# Patient Record
Sex: FEMALE | Race: BLACK OR AFRICAN AMERICAN | Employment: UNEMPLOYED | URBAN - METROPOLITAN AREA
[De-identification: names, ages, dates, MRNs, and addresses within clinical notes are randomized per-mention and may not be internally consistent; named-entity substitution may affect disease eponyms.]

---

## 2017-01-17 ENCOUNTER — TELEPHONE (OUTPATIENT)
Dept: BEHAVIORAL/MENTAL HEALTH CLINIC | Age: 62
End: 2017-01-17

## 2017-01-17 DIAGNOSIS — F33.0 MDD (MAJOR DEPRESSIVE DISORDER), RECURRENT EPISODE, MILD (HCC): ICD-10-CM

## 2017-01-17 DIAGNOSIS — F41.0 PANIC DISORDER: ICD-10-CM

## 2017-01-17 NOTE — TELEPHONE ENCOUNTER
Patient's transportation did not pick her up on time, despite her telling them her appointment was an hour earlier than scheduled. She will not be able to make it back in before provider leaves practice. Requesting refills.  Patient due on 1/30/2017

## 2017-01-18 RX ORDER — QUETIAPINE FUMARATE 100 MG/1
TABLET, FILM COATED ORAL
Qty: 90 TAB | Refills: 0 | Status: SHIPPED | OUTPATIENT
Start: 2017-01-18 | End: 2017-02-15 | Stop reason: SDUPTHER

## 2017-01-18 RX ORDER — ALPRAZOLAM 0.5 MG/1
0.5 TABLET ORAL 2 TIMES DAILY
Qty: 60 TAB | Refills: 0 | OUTPATIENT
Start: 2017-01-18 | End: 2017-02-15 | Stop reason: SDUPTHER

## 2017-01-18 RX ORDER — SERTRALINE HYDROCHLORIDE 100 MG/1
150 TABLET, FILM COATED ORAL DAILY
Qty: 45 TAB | Refills: 0 | Status: SHIPPED | OUTPATIENT
Start: 2017-01-18 | End: 2017-03-05 | Stop reason: SDUPTHER

## 2017-01-18 NOTE — TELEPHONE ENCOUNTER
Discussed w patient. Xanax to Ativan switch was too rapid for patient, resulting in rebound anxiety. Seroquel dose decrease has led to insomnia and the patient is has been using an extra 100mg seroquel at night. We agreed to switch back to Xanax 0.5mg BID. She was instructed to stop Ativan. We also agreed to taker Seroquel 100mg qAM + 200mg qPM.  She will follow up with Alicia Stone next month.      Signed By: Edwina Bartlett MD     January 18, 2017

## 2017-01-18 NOTE — TELEPHONE ENCOUNTER
Patient is requesting a recommendation on what over the counter medication she can take to help her sleep because she is going to be out today and \"just doesn't know why. \"    After further discussion, she stated the Seroquel wasn't working, so she increased the night time dose to 2 tablets. This is working. So, she will be out sooner because she is taking 3 tabs a day instead of the just 2 prescribed. Informed her I will discuss this dosage change with Dr. Star Leroy and see if he will approve it.

## 2017-01-30 DIAGNOSIS — K21.9 GASTROESOPHAGEAL REFLUX DISEASE WITHOUT ESOPHAGITIS: ICD-10-CM

## 2017-01-30 RX ORDER — OMEPRAZOLE 20 MG/1
20 CAPSULE, DELAYED RELEASE ORAL DAILY
Qty: 30 CAP | Refills: 6 | Status: SHIPPED | OUTPATIENT
Start: 2017-01-30 | End: 2018-02-21 | Stop reason: SDUPTHER

## 2017-02-04 DIAGNOSIS — F41.1 GAD (GENERALIZED ANXIETY DISORDER): Chronic | ICD-10-CM

## 2017-02-04 DIAGNOSIS — F33.0 MDD (MAJOR DEPRESSIVE DISORDER), RECURRENT EPISODE, MILD (HCC): ICD-10-CM

## 2017-02-06 RX ORDER — SERTRALINE HYDROCHLORIDE 50 MG/1
TABLET, FILM COATED ORAL
Qty: 90 TAB | Refills: 0 | Status: SHIPPED | OUTPATIENT
Start: 2017-02-06 | End: 2017-03-05 | Stop reason: SDUPTHER

## 2017-02-14 RX ORDER — ALPRAZOLAM 0.5 MG/1
TABLET ORAL
Qty: 60 TAB | Refills: 0 | OUTPATIENT
Start: 2017-02-14

## 2017-02-15 DIAGNOSIS — F33.0 MDD (MAJOR DEPRESSIVE DISORDER), RECURRENT EPISODE, MILD (HCC): ICD-10-CM

## 2017-02-15 RX ORDER — QUETIAPINE FUMARATE 100 MG/1
TABLET, FILM COATED ORAL
Qty: 90 TAB | Refills: 0 | Status: SHIPPED | OUTPATIENT
Start: 2017-02-15 | End: 2017-03-14 | Stop reason: SDUPTHER

## 2017-02-15 RX ORDER — ALPRAZOLAM 0.5 MG/1
0.5 TABLET ORAL
Qty: 60 TAB | Refills: 1 | OUTPATIENT
Start: 2017-02-15 | End: 2017-03-29 | Stop reason: SDUPTHER

## 2017-03-14 DIAGNOSIS — F33.0 MDD (MAJOR DEPRESSIVE DISORDER), RECURRENT EPISODE, MILD (HCC): ICD-10-CM

## 2017-03-14 RX ORDER — QUETIAPINE FUMARATE 100 MG/1
TABLET, FILM COATED ORAL
Qty: 90 TAB | Refills: 0 | Status: SHIPPED | OUTPATIENT
Start: 2017-03-14 | End: 2017-04-11 | Stop reason: SDUPTHER

## 2017-03-24 ENCOUNTER — PATIENT OUTREACH (OUTPATIENT)
Dept: INTERNAL MEDICINE CLINIC | Age: 62
End: 2017-03-24

## 2017-03-24 NOTE — PROGRESS NOTES
NNTOCIP Post Hospitalization       - Patient did not attend rescheduled [facility] appt with Dr. John North on 6/29/16.  - Patient attended RFU with Dr. John North on 10/7/16; was advised to f/u in 3 months. To the best of this NN's knowledge, this patient had no additional Hospital Admissions during 30 day ARIANA period following admission to [facility] 6/3/16-6/15/16. ARIANA period has ended. Post-Hospitalization Episode Resolved. Future Appointments:  Future Appointments      Provider Noah Valero   3/29/2017 1:00 PM Karmen Gilmore NP [address]   4/18/2017 1:45 PM MD Nora Langston 62 will route this encounter to Ambulatory CM NN for notification/review; patient overdue for routine follow-up with PCP. This note will not be viewable in [address]

## 2017-03-29 ENCOUNTER — APPOINTMENT (OUTPATIENT)
Dept: CT IMAGING | Age: 62
End: 2017-03-29
Attending: EMERGENCY MEDICINE
Payer: MEDICARE

## 2017-03-29 ENCOUNTER — HOSPITAL ENCOUNTER (EMERGENCY)
Age: 62
Discharge: HOME | End: 2017-03-29
Attending: EMERGENCY MEDICINE | Admitting: EMERGENCY MEDICINE
Payer: MEDICARE

## 2017-03-29 ENCOUNTER — OFFICE VISIT (OUTPATIENT)
Dept: BEHAVIORAL/MENTAL HEALTH CLINIC | Age: 62
End: 2017-03-29

## 2017-03-29 VITALS
HEIGHT: 62 IN | WEIGHT: 293 LBS | DIASTOLIC BLOOD PRESSURE: 105 MMHG | HEART RATE: 78 BPM | BODY MASS INDEX: 53.92 KG/M2 | SYSTOLIC BLOOD PRESSURE: 182 MMHG

## 2017-03-29 VITALS
HEIGHT: 62 IN | WEIGHT: 293 LBS | TEMPERATURE: 97.7 F | BODY MASS INDEX: 53.92 KG/M2 | HEART RATE: 75 BPM | RESPIRATION RATE: 17 BRPM | SYSTOLIC BLOOD PRESSURE: 135 MMHG | DIASTOLIC BLOOD PRESSURE: 80 MMHG | OXYGEN SATURATION: 94 %

## 2017-03-29 DIAGNOSIS — F41.0 PANIC DISORDER: ICD-10-CM

## 2017-03-29 DIAGNOSIS — F41.1 GAD (GENERALIZED ANXIETY DISORDER): ICD-10-CM

## 2017-03-29 DIAGNOSIS — R42 LIGHTHEADEDNESS: ICD-10-CM

## 2017-03-29 DIAGNOSIS — G44.209 ACUTE NON INTRACTABLE TENSION-TYPE HEADACHE: Primary | ICD-10-CM

## 2017-03-29 DIAGNOSIS — F33.0 MDD (MAJOR DEPRESSIVE DISORDER), RECURRENT EPISODE, MILD (HCC): Primary | ICD-10-CM

## 2017-03-29 DIAGNOSIS — R42 VERTIGO: ICD-10-CM

## 2017-03-29 LAB
ALBUMIN SERPL BCP-MCNC: 3.7 G/DL (ref 3.5–5)
ALBUMIN/GLOB SERPL: 1 {RATIO} (ref 1.1–2.2)
ALP SERPL-CCNC: 101 U/L (ref 45–117)
ALT SERPL-CCNC: 27 U/L (ref 12–78)
ANION GAP BLD CALC-SCNC: 10 MMOL/L (ref 5–15)
AST SERPL W P-5'-P-CCNC: 21 U/L (ref 15–37)
ATRIAL RATE: 73 BPM
BASOPHILS # BLD AUTO: 0 K/UL (ref 0–0.1)
BASOPHILS # BLD: 0 % (ref 0–1)
BILIRUB SERPL-MCNC: 0.4 MG/DL (ref 0.2–1)
BUN SERPL-MCNC: 15 MG/DL (ref 6–20)
BUN/CREAT SERPL: 14 (ref 12–20)
CALCIUM SERPL-MCNC: 9.2 MG/DL (ref 8.5–10.1)
CALCULATED P AXIS, ECG09: 59 DEGREES
CALCULATED R AXIS, ECG10: 4 DEGREES
CALCULATED T AXIS, ECG11: 69 DEGREES
CHLORIDE SERPL-SCNC: 105 MMOL/L (ref 97–108)
CO2 SERPL-SCNC: 26 MMOL/L (ref 21–32)
CREAT SERPL-MCNC: 1.04 MG/DL (ref 0.55–1.02)
DIAGNOSIS, 93000: NORMAL
EOSINOPHIL # BLD: 0.1 K/UL (ref 0–0.4)
EOSINOPHIL NFR BLD: 2 % (ref 0–7)
ERYTHROCYTE [DISTWIDTH] IN BLOOD BY AUTOMATED COUNT: 14.2 % (ref 11.5–14.5)
GLOBULIN SER CALC-MCNC: 3.8 G/DL (ref 2–4)
GLUCOSE BLD STRIP.AUTO-MCNC: 110 MG/DL (ref 65–100)
GLUCOSE SERPL-MCNC: 111 MG/DL (ref 65–100)
HCT VFR BLD AUTO: 39.1 % (ref 35–47)
HGB BLD-MCNC: 12.7 G/DL (ref 11.5–16)
LYMPHOCYTES # BLD AUTO: 41 % (ref 12–49)
LYMPHOCYTES # BLD: 2.3 K/UL (ref 0.8–3.5)
MAGNESIUM SERPL-MCNC: 1.6 MG/DL (ref 1.6–2.4)
MCH RBC QN AUTO: 27.4 PG (ref 26–34)
MCHC RBC AUTO-ENTMCNC: 32.5 G/DL (ref 30–36.5)
MCV RBC AUTO: 84.3 FL (ref 80–99)
MONOCYTES # BLD: 0.5 K/UL (ref 0–1)
MONOCYTES NFR BLD AUTO: 9 % (ref 5–13)
NEUTS SEG # BLD: 2.7 K/UL (ref 1.8–8)
NEUTS SEG NFR BLD AUTO: 48 % (ref 32–75)
P-R INTERVAL, ECG05: 156 MS
PLATELET # BLD AUTO: 166 K/UL (ref 150–400)
POTASSIUM SERPL-SCNC: 3.9 MMOL/L (ref 3.5–5.1)
PROT SERPL-MCNC: 7.5 G/DL (ref 6.4–8.2)
Q-T INTERVAL, ECG07: 418 MS
QRS DURATION, ECG06: 70 MS
QTC CALCULATION (BEZET), ECG08: 460 MS
RBC # BLD AUTO: 4.64 M/UL (ref 3.8–5.2)
SERVICE CMNT-IMP: ABNORMAL
SODIUM SERPL-SCNC: 141 MMOL/L (ref 136–145)
VENTRICULAR RATE, ECG03: 73 BPM
WBC # BLD AUTO: 5.6 K/UL (ref 3.6–11)

## 2017-03-29 PROCEDURE — 74011250636 HC RX REV CODE- 250/636: Performed by: EMERGENCY MEDICINE

## 2017-03-29 PROCEDURE — 70450 CT HEAD/BRAIN W/O DYE: CPT

## 2017-03-29 PROCEDURE — 96374 THER/PROPH/DIAG INJ IV PUSH: CPT

## 2017-03-29 PROCEDURE — 82962 GLUCOSE BLOOD TEST: CPT

## 2017-03-29 PROCEDURE — 99284 EMERGENCY DEPT VISIT MOD MDM: CPT

## 2017-03-29 PROCEDURE — 36415 COLL VENOUS BLD VENIPUNCTURE: CPT | Performed by: EMERGENCY MEDICINE

## 2017-03-29 PROCEDURE — 85025 COMPLETE CBC W/AUTO DIFF WBC: CPT | Performed by: EMERGENCY MEDICINE

## 2017-03-29 PROCEDURE — 80053 COMPREHEN METABOLIC PANEL: CPT | Performed by: EMERGENCY MEDICINE

## 2017-03-29 PROCEDURE — 83735 ASSAY OF MAGNESIUM: CPT | Performed by: EMERGENCY MEDICINE

## 2017-03-29 PROCEDURE — 96375 TX/PRO/DX INJ NEW DRUG ADDON: CPT

## 2017-03-29 PROCEDURE — 93005 ELECTROCARDIOGRAM TRACING: CPT

## 2017-03-29 RX ORDER — ZOLPIDEM TARTRATE 5 MG/1
5 TABLET ORAL
Qty: 30 TAB | Refills: 1 | OUTPATIENT
Start: 2017-03-29 | End: 2017-05-24 | Stop reason: SDUPTHER

## 2017-03-29 RX ORDER — DIPHENHYDRAMINE HYDROCHLORIDE 50 MG/ML
25 INJECTION, SOLUTION INTRAMUSCULAR; INTRAVENOUS
Status: COMPLETED | OUTPATIENT
Start: 2017-03-29 | End: 2017-03-29

## 2017-03-29 RX ORDER — PROCHLORPERAZINE EDISYLATE 5 MG/ML
10 INJECTION INTRAMUSCULAR; INTRAVENOUS
Status: COMPLETED | OUTPATIENT
Start: 2017-03-29 | End: 2017-03-29

## 2017-03-29 RX ORDER — SERTRALINE HYDROCHLORIDE 100 MG/1
150 TABLET, FILM COATED ORAL DAILY
Qty: 45 TAB | Refills: 1 | Status: SHIPPED | OUTPATIENT
Start: 2017-03-29 | End: 2017-05-24 | Stop reason: SDUPTHER

## 2017-03-29 RX ORDER — KETOROLAC TROMETHAMINE 30 MG/ML
30 INJECTION, SOLUTION INTRAMUSCULAR; INTRAVENOUS
Status: COMPLETED | OUTPATIENT
Start: 2017-03-29 | End: 2017-03-29

## 2017-03-29 RX ORDER — ALPRAZOLAM 0.5 MG/1
0.5 TABLET ORAL
Qty: 60 TAB | Refills: 1 | Status: SHIPPED | OUTPATIENT
Start: 2017-03-29 | End: 2017-05-24 | Stop reason: SDUPTHER

## 2017-03-29 RX ADMIN — DIPHENHYDRAMINE HYDROCHLORIDE 25 MG: 50 INJECTION, SOLUTION INTRAMUSCULAR; INTRAVENOUS at 11:30

## 2017-03-29 RX ADMIN — PROCHLORPERAZINE EDISYLATE 10 MG: 5 INJECTION INTRAMUSCULAR; INTRAVENOUS at 11:31

## 2017-03-29 RX ADMIN — KETOROLAC TROMETHAMINE 30 MG: 30 INJECTION, SOLUTION INTRAMUSCULAR at 11:31

## 2017-03-29 NOTE — DISCHARGE INSTRUCTIONS

## 2017-03-29 NOTE — PROGRESS NOTES
INITIAL PSYCHIATRIC EVALUATION    IDENTIFICATION:      A. Name: Pravin Ardon Age:     60 y.o.      C.   MRN: [id number]       D.   CSN:      [id number]      E.   :     1955          CC: \"The medicines I take help\". HISTORY OF PRESENT ILLNESS:    The patient Regis López is a 60 y.o.  female with a history of depression anxiety. She reports the following psychiatric symptoms:  depression and anxiety. The symptoms have been present for months/years and are of moderate severity. The symptoms are chronic in nature. Pt reports depression started approx 5 years ago when she started gaining weight and when she had to stop working. Additional symptoms include agitation, anger outbursts, anxiety, anxiety attacks, avoidance of crowds, chronic pain, concern about health problems, depression worse, difficulty sleeping, fearfulness, tearfulness and trauma recollections. Symptoms are not precipitated by psychosocial stressors. Symptoms made worse by psychosocial stressors. Pt denies hx of childhood trauma. Pt here today to transition care. PAST HISTORY:  Psychiatric:  Past Psychiatric Hospitalization:  [facility] [place], approx 20 yrs, was dx'd with bipolar disorder  Past Outpatient Providers:   Russell John NP, Dr Juan Ontiveros, Dr Margarita Taveras  Past Psychiatric Medications: Paxil - worsened anxiety  Xanax and Valium   Medical:  Active Ambulatory Problems     Diagnosis Date Noted    Pneumonia due to other Gram-negative bacteria ([facility] 2011    GABRIEL (generalized anxiety disorder) 2012    Diverticulitis large intestine 2014    Hypotension 2014    UTI (urinary tract infection) 2015    Hyperlipidemia 2015    Pre-syncope 2015    CHANDLER (acute kidney injury) ([address] 09/10/2015    MDD (major depressive disorder), recurrent episode, mild ([address] 2016    Acute respiratory failure with hypoxia ([address] 2016    RLL pneumonia 2016    MURIEL on CPAP 2016    Hypokalemia 06/03/2016     Resolved Ambulatory Problems     Diagnosis Date Noted    HTN (hypertension) 12/06/2011    ARF (acute renal failure) ([facility] 12/06/2011    Acute renal failure (ARF) ([facility] 09/09/2015    Leg cramps 09/09/2015     Past Medical History:   Diagnosis Date    Arthritis     Bipolar 1 disorder (HCC)     GERD (gastroesophageal reflux disease)     Hypertension     Other ill-defined conditions(799.89)     Psychiatric disorder     Sleep apnea     Thromboembolus ([facility]     Unspecified sleep apnea      Substance Use:   Social History     Social History    Marital status:      Spouse name: N/A    Number of children: N/A    Years of education: N/A     Social History Main Topics    Smoking status: Never Smoker    Smokeless tobacco: Never Used    Alcohol use No    Drug use: No    Sexual activity: Not Asked     Other Topics Concern    None     Social History Narrative     Social:  Marital Status:  in 1982, son and grandson live with pt, goes to [employer] for support, supportive family, mother still living and is active, pt does things with her mother socially  Children: x2 children: daughter=39 and son=30, grandchildren=24,18,15,10,8 and 9, loves being a mother and a grandmother  Educational Level:  12th grade, [employer] x2 yrs for early childhood development  Work History: disability-approx 6-7 yrs ago  Legal History: denies  Pertinent Childhood History: raised by mother and grandparents, father was overseas-would see him at times, describes childhood as \"excellent\"  Family:  Family history of mental or substance use history reported: grandmother=, older sister=sub eugene d/o and depression. Family history of medical problems reviewed: mother=arthritis, diabetes, cardiac    MEDICATIONS:  Current Outpatient Prescriptions   Medication Sig Dispense Refill    ALPRAZolam (XANAX) 0.5 mg tablet Take 1 Tab by mouth two (2) times daily as needed for Anxiety.  Max Daily Amount: 1 mg. 60 Tab 1    sertraline (ZOLOFT) 100 mg tablet Take 1.5 Tabs by mouth daily. 45 Tab 1    zolpidem (AMBIEN) 5 mg tablet Take 1 Tab by mouth nightly as needed for Sleep. Max Daily Amount: 5 mg. 30 Tab 1    aspirin-acetaminophen-caffeine (EXCEDRIN MIGRAINE) 250-250-65 mg per tablet Take 1 Tab by mouth every six (6) hours as needed for Headache. 30 Tab 0    QUEtiapine (SEROQUEL) 100 mg tablet Take 1 tab QAM, take 2 tabs QHS 90 Tab 0    omeprazole (PRILOSEC) 20 mg capsule Take 1 Cap by mouth daily. 30 Cap 6    lisinopril (PRINIVIL, ZESTRIL) 5 mg tablet TAKE 1 TABLET BY MOUTH DAILY 30 Tab 6    HYDROcodone-acetaminophen (NORCO) 5-325 mg per tablet Take 1 Tab by mouth every six (6) hours as needed for Pain. Max Daily Amount: 4 Tabs. 12 Tab 0    potassium chloride SR (KLOR-CON 10) 10 mEq tablet Take 2 Tabs by mouth daily. 60 Tab 6    furosemide (LASIX) 40 mg tablet Take 1 Tab by mouth daily. 30 Tab 6    simvastatin (ZOCOR) 20 mg tablet Take 1 Tab by mouth nightly. 30 Tab 6       ALLERGIES:  No Known Allergies    REVIEW OF SYSTEMS:  Pt reports feeling mentally I feel ok but physically I'm having some problems.   All other Systems reviewed and are considered negative    MENTAL STATUS EXAM:    FINDINGS WITHIN NORMAL LIMITS (WNL) UNLESS OTHERWISE STATED BELOW:    Orientation oriented to time, place and person   Vital Signs (BP,Pulse, Temp) See below (reviewed)   Gait and Station Within normal limits   Abnormal Muscular Movements/Tone/Behavior No EPS, no Tardive Dyskinesia, no abnormal muscular movements; wnl tone   Relations cooperative   General Appearance:  age appropriate and casually dressed   Language No aphasia or dysarthria   Speech:  normal volume   Thought Processes logical, wnl rate of thoughts, good abstract reasoning and computation   Thought Associations goal directed   Thought Content free of delusions and free of hallucinations   Suicidal Ideations no intention   Homicidal Ideations no intention   Mood:  Denies depression at times, within normal limits, intermittent anxiety   Affect:  euthymic   Memory recent  adequate   Memory remote:  adequate   Concentration/Attention:  adequate   Fund of Knowledge Fair/average   Insight:  fair   Reliability fair   Judgment:  fair     VITALS:     Visit Vitals    BP (!) 182/105    Pulse 78    Ht 5' 2\" (1.575 m)    Wt (!) 161.5 kg (356 lb)    BMI 65.11 kg/m2       PERTINENT DATA:  Admission on 03/29/2017, Discharged on 03/29/2017   Component Date Value Ref Range Status    Ventricular Rate 03/29/2017 73  BPM Preliminary    Atrial Rate 03/29/2017 73  BPM Preliminary    P-R Interval 03/29/2017 156  ms Preliminary    QRS Duration 03/29/2017 70  ms Preliminary    Q-T Interval 03/29/2017 418  ms Preliminary    QTC Calculation (Bezet) 03/29/2017 460  ms Preliminary    Calculated P Axis 03/29/2017 59  degrees Preliminary    Calculated R Axis 03/29/2017 4  degrees Preliminary    Calculated T Axis 03/29/2017 69  degrees Preliminary    Diagnosis 03/29/2017    Preliminary                    Value:Normal sinus rhythm  Nonspecific T wave abnormality  Abnormal ECG  When compared with ECG of 03-JUN-2016 15:50,  Nonspecific T wave abnormality no longer evident in Inferior leads      Sodium 03/29/2017 141  136 - 145 mmol/L Final    Potassium 03/29/2017 3.9  3.5 - 5.1 mmol/L Final    Chloride 03/29/2017 105  97 - 108 mmol/L Final    CO2 03/29/2017 26  21 - 32 mmol/L Final    Anion gap 03/29/2017 10  5 - 15 mmol/L Final    Glucose 03/29/2017 111* 65 - 100 mg/dL Final    BUN 03/29/2017 15  6 - 20 MG/DL Final    Creatinine 03/29/2017 1.04* 0.55 - 1.02 MG/DL Final    BUN/Creatinine ratio 03/29/2017 14  12 - 20   Final    GFR est AA 03/29/2017 >60  >60 ml/min/1.73m2 Final    GFR est non-AA 03/29/2017 54* >60 ml/min/1.73m2 Final    Calcium 03/29/2017 9.2  8.5 - 10.1 MG/DL Final    Bilirubin, total 03/29/2017 0.4  0.2 - 1.0 MG/DL Final    ALT (SGPT) 03/29/2017 27  12 - 78 U/L Final    AST (SGOT) 03/29/2017 21  15 - 37 U/L Final    Alk. phosphatase 03/29/2017 101  45 - 117 U/L Final    Protein, total 03/29/2017 7.5  6.4 - 8.2 g/dL Final    Albumin 03/29/2017 3.7  3.5 - 5.0 g/dL Final    Globulin 03/29/2017 3.8  2.0 - 4.0 g/dL Final    A-G Ratio 03/29/2017 1.0* 1.1 - 2.2   Final    WBC 03/29/2017 5.6  3.6 - 11.0 K/uL Final    RBC 03/29/2017 4.64  3.80 - 5.20 M/uL Final    HGB 03/29/2017 12.7  11.5 - 16.0 g/dL Final    HCT 03/29/2017 39.1  35.0 - 47.0 % Final    MCV 03/29/2017 84.3  80.0 - 99.0 FL Final    MCH 03/29/2017 27.4  26.0 - 34.0 PG Final    MCHC 03/29/2017 32.5  30.0 - 36.5 g/dL Final    RDW 03/29/2017 14.2  11.5 - 14.5 % Final    PLATELET 76/49/2885 535  150 - 400 K/uL Final    NEUTROPHILS 03/29/2017 48  32 - 75 % Final    LYMPHOCYTES 03/29/2017 41  12 - 49 % Final    MONOCYTES 03/29/2017 9  5 - 13 % Final    EOSINOPHILS 03/29/2017 2  0 - 7 % Final    BASOPHILS 03/29/2017 0  0 - 1 % Final    ABS. NEUTROPHILS 03/29/2017 2.7  1.8 - 8.0 K/UL Final    ABS. LYMPHOCYTES 03/29/2017 2.3  0.8 - 3.5 K/UL Final    ABS. MONOCYTES 03/29/2017 0.5  0.0 - 1.0 K/UL Final    ABS. EOSINOPHILS 03/29/2017 0.1  0.0 - 0.4 K/UL Final    ABS. BASOPHILS 03/29/2017 0.0  0.0 - 0.1 K/UL Final    Magnesium 03/29/2017 1.6  1.6 - 2.4 mg/dL Final    Glucose (POC) 03/29/2017 110* 65 - 100 mg/dL Final    Performed by 03/29/2017 Ritta Roses   Final       XR Results (most recent):    Results from Hospital Encounter encounter on 07/19/16   XR CHEST PA LAT   Narrative **Final Report**      ICD Codes / Adm. Diagnosis: 486  J18.9 / Pneumonia, organism unspecifie    Pneumonia, unspecified organ  Examination:  CR CHEST PA AND LATERAL  - [id number] - Jul 19 2016 11:16AM  Accession No:  [id number]  Reason:  pneumonia      REPORT:  EXAM:  CR CHEST PA AND LATERAL    INDICATION:   pneumonia assess for resolution    COMPARISON: 6/13/2016.     FINDINGS: PA and lateral radiographs of the chest demonstrate clear lungs   except for minor residual atelectasis/scarring left base. . The cardiac and   mediastinal contours and pulmonary vascularity are normal.  Bony structures   are unchanged. IMPRESSION: No acute abnormality. The previously noted bibasilar atelectasis   has resolved except for minimal residual atelectasis/scarring left base. Signing/Reading Doctor: Pancho Walter ([id number])    Approved: Pancho Walter ([id number])  Jul 19 2016 11:36AM                                    ASSESSMENT/PLAN:  The patient Carlin Medel is a 60 y.o.  female who presents at this time for treatment of the following diagnoses:    ICD-10-CM ICD-9-CM    1. MDD (major depressive disorder), recurrent episode, mild (HCC) F33.0 296.31 sertraline (ZOLOFT) 100 mg tablet   2. Panic disorder F41.0 300.01    3. GABRIEL (generalized anxiety disorder) F41.1 300.02 sertraline (ZOLOFT) 100 mg tablet       Assessment:   Carlin Medel is a 60 y.o. female and presents with h/o bipolar d/o, depression, anxiety and panic attacks. Pt here today to transition care. Reviewed dx and past tx. Unclear if pt is truly bipolar as she does not seem to have distinct episodes of maxine. She reports an incident when she left a classroom unattended and went to eat at a restaurant. She states she does not understand how that happened but no other manic symptoms noted. Will work to get records form past hospitalization. For now will begin weaning off of seroquel. Pt reports sig weight gain since starting use. Currently, physical health impacted by weight gain. Pt agrees with current plan. Will re-start ambien for sleep as pt dose benefit from seroquel use for sleep. Discussed benzo safety. Plan:  1. Medications:  Current Outpatient Prescriptions   Medication Sig Dispense Refill    ALPRAZolam (XANAX) 0.5 mg tablet Take 1 Tab by mouth two (2) times daily as needed for Anxiety. Max Daily Amount: 1 mg. 60 Tab 1    sertraline (ZOLOFT) 100 mg tablet Take 1.5 Tabs by mouth daily. 45 Tab 1    zolpidem (AMBIEN) 5 mg tablet Take 1 Tab by mouth nightly as needed for Sleep. Max Daily Amount: 5 mg. 30 Tab 1    aspirin-acetaminophen-caffeine (EXCEDRIN MIGRAINE) 250-250-65 mg per tablet Take 1 Tab by mouth every six (6) hours as needed for Headache. 30 Tab 0    QUEtiapine (SEROQUEL) 100 mg tablet Take 1 tab QAM, take 2 tabs QHS 90 Tab 0    omeprazole (PRILOSEC) 20 mg capsule Take 1 Cap by mouth daily. 30 Cap 6    lisinopril (PRINIVIL, ZESTRIL) 5 mg tablet TAKE 1 TABLET BY MOUTH DAILY 30 Tab 6    HYDROcodone-acetaminophen (NORCO) 5-325 mg per tablet Take 1 Tab by mouth every six (6) hours as needed for Pain. Max Daily Amount: 4 Tabs. 12 Tab 0    potassium chloride SR (KLOR-CON 10) 10 mEq tablet Take 2 Tabs by mouth daily. 60 Tab 6    furosemide (LASIX) 40 mg tablet Take 1 Tab by mouth daily. 30 Tab 6    simvastatin (ZOCOR) 20 mg tablet Take 1 Tab by mouth nightly. 30 Tab 6      The following regarding treatment was addressed with patient:   Decrease seroquel to 100 mg daily (take in am if using ambien)  Cont zoloft 150 mg for dep/anx  Cont alprazolam 0.5 bid prn anxiety  Start ambien   the risks and benefits of the proposed medication, instructions for management, education and risk factor reduction. The patient was given the opportunity to ask questions. Informed consent given to the use of the above medications. Adjust psychiatric medications as needed based upon diagnoses and response to treatment. 2.  Review previous labs and medical tests in the EHR and or transferring hospital documents. I will continue to order blood tests/labs and diagnostic tests as deemed appropriate and review results as they become available (see orders for details). 3.  Review old psychiatric and medical records available in the EHR. I will order additional psychiatric records from other institutions/providers as appropriate. 4.  Gather additional collateral information as appropriate.     5. Supportive and/or Individual Therapy    Follow-up Disposition:  Return in about 4 weeks (around 4/26/2017).      STRENGTHS:  Supportive family  Stable place to live      SIGNED:    Marquise Whitmore NP  3/29/2017

## 2017-03-29 NOTE — ED PROVIDER NOTES
HPI Comments: Anthony Miranda, 60 y.o. female, presents ambulatory to [address] [facility] ED with cc of 10/10 intermittent episodes of pulsating frontal headaches x 1 month. The patient also c/o intermittent episodes of dizziness, light-headedness, nausea, and feeling \"clammy. \" the patient states that she took tylenol with no relief. The patient also notes that she has chronic diarrhea, and she has an appointment with GI on 4/5/17 for this symptom. The patient denies a history of migraines or headaches. The patient specifically denies vomiting, visual changes, photophobia, phonophobia, fevers, unilateral numbness, new gait changes, tinnitus, or ear pressure at this time. PCP: Emerson Wray MD  GI: Delfin Coates MD    PMHx significant for: HTN, GERD, sleep apnea, hyperlipidemia, bipolar 1 disorder, arthritis  PSHx significant for: Cholecystectomy, hysterectomy  Social history significant for: - Tobacco, - EtOH, - Illicit Drug Use    There are no other complaints, changes, or physical findings at this time. Written by CHRIS Parr, as dictated by Karen Poon MD.    The history is provided by the patient. No  was used. Past Medical History:   Diagnosis Date    Arthritis     chronic pain related arthritis    Bipolar 1 disorder (HCC)     GERD (gastroesophageal reflux disease)     Hypertension     Other ill-defined conditions(799.89)     hyperlipidemia.  Stomach abcess    Psychiatric disorder     panic attacks    Sleep apnea     Thromboembolus ([address]     last year L leg    Unspecified sleep apnea        Past Surgical History:   Procedure Laterality Date    HX CHOLECYSTECTOMY      HX GI      gallbladder removed 2/2010    HX HYSTERECTOMY           Family History:   Problem Relation Age of Onset    Heart Disease Mother     Diabetes Mother     Arthritis-osteo Mother     High Cholesterol Mother     Heart Attack Sister     Diabetes Sister     Heart Disease Sister     Diabetes Sister     Arthritis-osteo Sister     High Cholesterol Sister     Schizophrenia Paternal Grandmother     Drug Abuse Sister        Social History     Social History    Marital status:      Spouse name: N/A    Number of children: N/A    Years of education: N/A     Occupational History    Not on file. Social History Main Topics    Smoking status: Never Smoker    Smokeless tobacco: Never Used    Alcohol use No    Drug use: No    Sexual activity: Not on file     Other Topics Concern    Not on file     Social History Narrative         ALLERGIES: Review of patient's allergies indicates no known allergies. Review of Systems   Constitutional: Positive for diaphoresis (Feeling clammy). Negative for chills and fever. HENT: Negative for ear pain and tinnitus. Eyes: Negative for photophobia and visual disturbance. Respiratory: Negative for cough and shortness of breath. Cardiovascular: Negative for chest pain. Gastrointestinal: Positive for nausea. Negative for constipation, diarrhea and vomiting. Musculoskeletal: Negative for gait problem. Neurological: Positive for dizziness, light-headedness and headaches. Negative for weakness and numbness. (-) Phonophobia   All other systems reviewed and are negative. Vitals:    03/29/17 0954 03/29/17 1004 03/29/17 1200   BP: 147/83 (!) 155/97 135/80   Pulse: 71 74 75   Resp: 16  17   Temp: 97.7 °F (36.5 °C)  (P) 97.4 °F (36.3 °C)   SpO2: 99%  94%   Weight: (!) 161.7 kg (356 lb 7.7 oz)     Height: 5' 2\" (1.575 m)              Physical Exam   Constitutional: She is oriented to person, place, and time. She appears well-developed and well-nourished. Obese female   HENT:   Head: Normocephalic. Head: No temporal artery pain   Eyes: EOM are normal. Pupils are equal, round, and reactive to light. No photophobia   Neck: Normal range of motion. Cardiovascular: Normal rate and regular rhythm.     Pulmonary/Chest: Effort normal and breath sounds normal.   Abdominal: Soft. She exhibits no distension. There is no tenderness. Neurological: She is alert and oriented to person, place, and time. She has normal strength. No cranial nerve deficit. CN 2-12 intact, Finger to nose intact, negative Romberg, normal gait   Skin: Skin is warm and dry. Psychiatric: She has a normal mood and affect. Nursing note and vitals reviewed. Written by Mitchell Deras ED Scribe, as dictated by Karen Poon MD.    MDM  Number of Diagnoses or Management Options  Acute non intractable tension-type headache:   Lightheadedness:   Vertigo:   Diagnosis management comments: Patient presents with headache that is likely tensions headache vs dehydration. DDx: migraine, cluster HA, tension HA. Less likely pseudotumor cerebri, SAH, ICH, cerebral dural venous thrombosis, or meningitis given the course, story and physical exam.  Labs and analgesics and fluids ordered. Discussed proper hydration and intake. Discussed headaches and return instructions. F/u with GI. Amount and/or Complexity of Data Reviewed  Clinical lab tests: ordered and reviewed  Tests in the radiology section of CPT®: ordered and reviewed  Tests in the medicine section of CPT®: ordered and reviewed  Review and summarize past medical records: yes  Independent visualization of images, tracings, or specimens: yes    Patient Progress  Patient progress: stable    ED Course       Procedures     EKG interpretation: (Preliminary) 10:23  Rhythm: normal sinus rhythm; and regular . Rate (approx.): 73; Axis: normal; P wave: normal; QRS interval: normal ; ST/T wave: Nonspecific T wave abnormality. Written by CHRIS Parribe, as dictated by Karen Poon MD.    Progress Note:  12:02 PM  The patient was re-evaluated and states that she is feeling better. The patient's lab and imaging results have been discussed with her, and she conveys her understanding. Will discharge.   Written by Mitchell Deras ED Epi, as dictated by Demetrius Erwin MD.    Progress Note:  12:14 PM  The patient was counseled on the side effects of using narcotic pain medications. Side effects include nausea, constipation, fatigue, itchy skin, dizziness, drowsiness, and the potential to be habit forming. The patient conveys his understanding of the side effects, and he is in agreement with the plan of care. Written by CHRIS Banuelos, as dictated by Demetrius Erwin MD.    LABORATORY TESTS:  Recent Results (from the past 12 hour(s))   METABOLIC PANEL, COMPREHENSIVE    Collection Time: 03/29/17 10:00 AM   Result Value Ref Range    Sodium 141 136 - 145 mmol/L    Potassium 3.9 3.5 - 5.1 mmol/L    Chloride 105 97 - 108 mmol/L    CO2 26 21 - 32 mmol/L    Anion gap 10 5 - 15 mmol/L    Glucose 111 (H) 65 - 100 mg/dL    BUN 15 6 - 20 MG/DL    Creatinine 1.04 (H) 0.55 - 1.02 MG/DL    BUN/Creatinine ratio 14 12 - 20      GFR est AA >60 >60 ml/min/1.73m2    GFR est non-AA 54 (L) >60 ml/min/1.73m2    Calcium 9.2 8.5 - 10.1 MG/DL    Bilirubin, total 0.4 0.2 - 1.0 MG/DL    ALT (SGPT) 27 12 - 78 U/L    AST (SGOT) 21 15 - 37 U/L    Alk. phosphatase 101 45 - 117 U/L    Protein, total 7.5 6.4 - 8.2 g/dL    Albumin 3.7 3.5 - 5.0 g/dL    Globulin 3.8 2.0 - 4.0 g/dL    A-G Ratio 1.0 (L) 1.1 - 2.2     CBC WITH AUTOMATED DIFF    Collection Time: 03/29/17 10:00 AM   Result Value Ref Range    WBC 5.6 3.6 - 11.0 K/uL    RBC 4.64 3.80 - 5.20 M/uL    HGB 12.7 11.5 - 16.0 g/dL    HCT 39.1 35.0 - 47.0 %    MCV 84.3 80.0 - 99.0 FL    MCH 27.4 26.0 - 34.0 PG    MCHC 32.5 30.0 - 36.5 g/dL    RDW 14.2 11.5 - 14.5 %    PLATELET 239 585 - 542 K/uL    NEUTROPHILS 48 32 - 75 %    LYMPHOCYTES 41 12 - 49 %    MONOCYTES 9 5 - 13 %    EOSINOPHILS 2 0 - 7 %    BASOPHILS 0 0 - 1 %    ABS. NEUTROPHILS 2.7 1.8 - 8.0 K/UL    ABS. LYMPHOCYTES 2.3 0.8 - 3.5 K/UL    ABS. MONOCYTES 0.5 0.0 - 1.0 K/UL    ABS. EOSINOPHILS 0.1 0.0 - 0.4 K/UL    ABS.  BASOPHILS 0.0 0.0 - 0.1 K/UL MAGNESIUM    Collection Time: 03/29/17 10:00 AM   Result Value Ref Range    Magnesium 1.6 1.6 - 2.4 mg/dL   GLUCOSE, POC    Collection Time: 03/29/17 10:04 AM   Result Value Ref Range    Glucose (POC) 110 (H) 65 - 100 mg/dL    Performed by Chrissy Bentiez    EKG, 12 LEAD, INITIAL    Collection Time: 03/29/17 10:23 AM   Result Value Ref Range    Ventricular Rate 73 BPM    Atrial Rate 73 BPM    P-R Interval 156 ms    QRS Duration 70 ms    Q-T Interval 418 ms    QTC Calculation (Bezet) 460 ms    Calculated P Axis 59 degrees    Calculated R Axis 4 degrees    Calculated T Axis 69 degrees    Diagnosis       Normal sinus rhythm  Nonspecific T wave abnormality  Abnormal ECG  When compared with ECG of 03-JUN-2016 15:50,  Nonspecific T wave abnormality no longer evident in Inferior leads         IMAGING RESULTS:  CT Results  (Last 48 hours)               03/29/17 1015  CT HEAD WO CONT Final result    Impression:  IMPRESSION: No acute process or change compared to the prior exam.               Narrative:  EXAM:  CT HEAD WO CONT       INDICATION:   headaches       COMPARISON: 6/1/2016. TECHNIQUE: Unenhanced CT of the head was performed using 5 mm images. Brain and   bone windows were generated. CT dose reduction was achieved through use of a   standardized protocol tailored for this examination and automatic exposure   control for dose modulation. FINDINGS:   The ventricles and sulci are normal in size, shape and configuration and   midline. There is no significant white matter disease. There is no intracranial   hemorrhage, extra-axial collection, mass, mass effect or midline shift. The   basilar cisterns are open. No acute infarct is identified. The bone windows   demonstrate no abnormalities. The visualized portions of the paranasal sinuses   and mastoid air cells are clear.                MEDICATIONS GIVEN:  Medications   ketorolac (TORADOL) injection 30 mg (30 mg IntraVENous Given 3/29/17 3711) prochlorperazine (COMPAZINE) injection 10 mg (10 mg IntraVENous Given 3/29/17 1131)   diphenhydrAMINE (BENADRYL) injection 25 mg (25 mg IntraVENous Given 3/29/17 1130)       IMPRESSION:  1. Acute non intractable tension-type headache    2. Lightheadedness    3. Vertigo        PLAN:  1. Discharge Medication List as of 3/29/2017 12:11 PM      START taking these medications    Details   aspirin-acetaminophen-caffeine (EXCEDRIN MIGRAINE) 250-250-65 mg per tablet Take 1 Tab by mouth every six (6) hours as needed for Headache., Normal, Disp-30 Tab, R-0         CONTINUE these medications which have NOT CHANGED    Details   QUEtiapine (SEROQUEL) 100 mg tablet Take 1 tab QAM, take 2 tabs QHS, Normal, Disp-90 Tab, R-0      sertraline (ZOLOFT) 50 mg tablet TAKE 3 TABLETS BY MOUTH DAILY, Normal, Disp-90 Tab, R-2      ALPRAZolam (XANAX) 0.5 mg tablet Take 1 Tab by mouth two (2) times daily as needed for Anxiety. Max Daily Amount: 1 mg., Phone In, Disp-60 Tab, R-1      omeprazole (PRILOSEC) 20 mg capsule Take 1 Cap by mouth daily. , Normal, Disp-30 Cap, R-6      lisinopril (PRINIVIL, ZESTRIL) 5 mg tablet TAKE 1 TABLET BY MOUTH DAILY, Normal, Disp-30 Tab, R-6      HYDROcodone-acetaminophen (NORCO) 5-325 mg per tablet Take 1 Tab by mouth every six (6) hours as needed for Pain. Max Daily Amount: 4 Tabs., Print, Disp-12 Tab, R-0      potassium chloride SR (KLOR-CON 10) 10 mEq tablet Take 2 Tabs by mouth daily. , Normal, Disp-60 Tab, R-6      furosemide (LASIX) 40 mg tablet Take 1 Tab by mouth daily. , Normal, Disp-30 Tab, R-6      simvastatin (ZOCOR) 20 mg tablet Take 1 Tab by mouth nightly., Normal, Disp-30 Tab, R-6           2. Follow-up Information     Follow up With Details Comments Contact Info    Alfie Causey MD  If symptoms worsen [address]  [address]  [phone]          Return to ED if worse     Discharge Note:  12:14 PM  The pt is ready for discharge.  The pt's signs, symptoms, diagnosis, and discharge instructions have been discussed and pt has conveyed their understanding. The pt is to follow up as recommended or return to ER should their symptoms worsen. Plan has been discussed and pt is in agreement. This note is prepared by Chelsi Corona, acting as a Scribe for Monique De Souza MD.    Monique De Souza MD: The scribe's documentation has been prepared under my direction and personally reviewed by me in its entirety. I confirm that the notes above accurately reflects all work, treatment, procedures, and medical decision making performed by me.

## 2017-03-29 NOTE — MR AVS SNAPSHOT
Visit Information Date & Time Provider Department Dept. Phone Encounter #  
 3/29/2017  1:00 PM Camryn Thornton NP [place] [address] [phone] [id number] Follow-up Instructions Return in about 4 weeks (around 4/26/2017). Your Appointments 4/18/2017  1:45 PM  
ROUTINE CARE with MD ABRAHAN [facility]) Appt Note: routine f/u appt per pt; rs; pt r/s from 3/3/17 [address] [facility] [id number]  
[address] [facility] [address] [place] [address] Upcoming Health Maintenance Date Due Hepatitis C Screening 1955 DTaP/Tdap/Td series (1 - Tdap) 3/5/1975 PAP AKA CERVICAL CYTOLOGY 3/5/1975 BREAST CANCER SCRN MAMMOGRAM 3/5/2004 ZOSTER VACCINE AGE 60> 3/5/2014 INFLUENZA AGE 9 TO ADULT 8/1/2016 MEDICARE YEARLY EXAM 2/8/2017 FOBT Q 1 YEAR AGE 50-75 2/27/2017 Allergies as of 3/29/2017  Review Complete On: 3/29/2017 By: Camryn Thornton NP No Known Allergies Current Immunizations  Reviewed on 9/10/2015 Name Date Influenza Vaccine 4/9/2015 Pneumococcal Vaccine (Unspecified Type) 4/9/2015 Not reviewed this visit You Were Diagnosed With   
  
 Codes Comments MDD (major depressive disorder), recurrent episode, mild ([facility]    -  Primary ICD-10-CM: F33.0 ICD-9-CM: 296.31 Panic disorder     ICD-10-CM: F41.0 ICD-9-CM: 300.01   
 GABRIEL (generalized anxiety disorder)     ICD-10-CM: F41.1 ICD-9-CM: 300.02 Vitals BP Pulse Height(growth percentile) Weight(growth percentile) BMI OB Status (!) 182/105 78 5' 2\" (1.575 m) (!) 356 lb (161.5 kg) 65.11 kg/m2 Postmenopausal  
 Smoking Status Never Smoker Vitals History BMI and BSA Data Body Mass Index Body Surface Area  
 65.11 kg/m 2 2.66 m 2 Preferred Pharmacy Pharmacy Name Phone [address] [facility] [phone] Your Updated Medication List  
  
   
This list is accurate as of: 3/29/17  1:39 PM.  Always use your most recent med list.  
  
  
  
  
 ALPRAZolam 0.5 mg tablet Commonly known as:  Zac Beecham Take 1 Tab by mouth two (2) times daily as needed for Anxiety. Max Daily Amount: 1 mg.  
  
 aspirin-acetaminophen-caffeine 250-250-65 mg per tablet Commonly known as:  Kashmir Wilder Take 1 Tab by mouth every six (6) hours as needed for Headache. furosemide 40 mg tablet Commonly known as:  LASIX Take 1 Tab by mouth daily. HYDROcodone-acetaminophen 5-325 mg per tablet Commonly known as:  Juanetta Rasp Take 1 Tab by mouth every six (6) hours as needed for Pain. Max Daily Amount: 4 Tabs. lisinopril 5 mg tablet Commonly known as:  PRINIVIL, ZESTRIL  
TAKE 1 TABLET BY MOUTH DAILY  
  
 omeprazole 20 mg capsule Commonly known as:  PRILOSEC Take 1 Cap by mouth daily. potassium chloride SR 10 mEq tablet Commonly known as:  KLOR-CON 10 Take 2 Tabs by mouth daily. QUEtiapine 100 mg tablet Commonly known as:  SEROquel Take 1 tab QAM, take 2 tabs QHS  
  
 sertraline 100 mg tablet Commonly known as:  ZOLOFT Take 1.5 Tabs by mouth daily. simvastatin 20 mg tablet Commonly known as:  ZOCOR Take 1 Tab by mouth nightly. Prescriptions Printed Refills ALPRAZolam (XANAX) 0.5 mg tablet 1 Sig: Take 1 Tab by mouth two (2) times daily as needed for Anxiety. Max Daily Amount: 1 mg. Class: Print Route: Oral  
  
Prescriptions Sent to Pharmacy Refills  
 sertraline (ZOLOFT) 100 mg tablet 1 Sig: Take 1.5 Tabs by mouth daily. Class: Normal  
 Pharmacy: [facility] [address] [place] [address], [address] [place] [address] #: [phone] Route: Oral  
  
Follow-up Instructions Return in about 4 weeks (around 4/26/2017). Introducing [facility] & HEALTH SERVICES! April Beasley introduces [facility] patient portal. Now you can access parts of your medical record, email your doctor's office, and request medication refills online. 1. In your internet browser, go to [email or web address] 2. Click on the First Time User? Click Here link in the Sign In box. You will see the New Member Sign Up page. 3. Enter your [facility] Access Code exactly as it appears below. You will not need to use this code after youve completed the sign-up process. If you do not sign up before the expiration date, you must request a new code. · [facility] Access Code: [email or web address] Expires: 6/27/2017 10:14 AM 
 
4. Enter the last four digits of your Social Security Number (xxxx) and Date of Birth (mm/dd/yyyy) as indicated and click Submit. You will be taken to the next sign-up page. 5. Create a [facility] ID. This will be your [facility] login ID and cannot be changed, so think of one that is secure and easy to remember. 6. Create a [facility] password. You can change your password at any time. 7. Enter your Password Reset Question and Answer. This can be used at a later time if you forget your password. 8. Enter your e-mail address. You will receive e-mail notification when new information is available in [address] 9. Click Sign Up. You can now view and download portions of your medical record. 10. Click the Download Summary menu link to download a portable copy of your medical information. If you have questions, please visit the Frequently Asked Questions section of the [facility] website. Remember, [facility] is NOT to be used for urgent needs. For medical emergencies, dial 911. Now available from your iPhone and Android! Please provide this summary of care documentation to your next provider.  
  
  
 Your primary care clinician is listed as Cyril Singh. If you have any questions after today's visit, please call [phone].

## 2017-04-04 ENCOUNTER — PATIENT OUTREACH (OUTPATIENT)
Dept: INTERNAL MEDICINE CLINIC | Age: 62
End: 2017-04-04

## 2017-04-04 NOTE — PROGRESS NOTES
[address] [facility]            NN follow-up    Patient referred to this NN via Referral from [facility] ED Report. Patient with ED visit to [id number] [place] [address] on 3/29/17 with diagnosis of Acute non-intractable tension type headache, lightheadedness, vertigo. Per notes, patient was discharged from ED to Home. New Presciption(s) at ED Discharge: Excedrin Migraine  Change(s) to existing Medication(s) at ED Discharge: N/A  Medication(s) discontinued at ED Discharge: N/A  Patient to follow-up with:   Dr. Sade Houston - if symptoms worsen    Patient's most recent Office Visit with PCP: 10/7/16; was advised to f/u in 3 months  Patient's currently scheduled future appointments are listed below. Future Appointments      Provider Noah Valero   4/18/2017 1:45 PM Pavan Cormier MD [facility]             NN follow-up phone call  NN contacted the patient today to complete NN Post-ED discharge assessment. Two patient identifiers verified. NN introduced self to the patient, including NN role and purpose of NN follow-up phone call; patient verbalizes understanding. NN inquired about the patient's current condition and how the patient is feeling. Patient denies dizziness, headaches since ED visit. Patient able to pick-up new medication(s) without difficulty: Yes; reports that she is taking Excedrin as prescribed (as needed). Questions/Concerns regarding new medication(s) and/or medication change(s): Denies questions/concerns. Patient expressed no questions, concerns or needs for this NN at this time. Patient verbalized understanding of all information discussed. NN contact information provided and patient advised to contact NN as needed.            PLAN    -Patient to attend routine follow-up Appointment with Dr. Sade Houston on 4/18/17; declines the need for an acute office visit or ED f/u appt prior to this date.  -Patient reports that she has an appt scheduled with Dr. Zahira Fernandez (JOSE MARIA) tomorrow, 4/5/17, for evaluation of diarrhea/constipation.  -Patient to contact this NN and/or PCP office with any questions/concerns.  -Notified of availability of PCP on-call physician after-hours and on the weekends for non-emergent/non-life threatening medical questions/concerns; patient verbalizes understanding. NN will route this encounter to Dr. Dipesh Mcgrath for notification/review. NN will route this encounter to Ambulatory CM NN for notification/review. This note will not be viewable in [address]

## 2017-04-10 NOTE — TELEPHONE ENCOUNTER
Xiomara sent a fax over stating patient lost/misplaced her medication (xanax) and would like us to fill it now. The last fill was 3/15. Pharmacy is asking if we are ok with filling it early. ..

## 2017-04-13 RX ORDER — ALPRAZOLAM 0.5 MG/1
0.5 TABLET ORAL
Qty: 60 TAB | Refills: 1 | OUTPATIENT
Start: 2017-04-13

## 2017-04-19 ENCOUNTER — PATIENT OUTREACH (OUTPATIENT)
Dept: INTERNAL MEDICINE CLINIC | Age: 62
End: 2017-04-19

## 2017-05-18 DIAGNOSIS — F33.0 MDD (MAJOR DEPRESSIVE DISORDER), RECURRENT EPISODE, MILD (HCC): ICD-10-CM

## 2017-05-18 DIAGNOSIS — F41.1 GAD (GENERALIZED ANXIETY DISORDER): Chronic | ICD-10-CM

## 2017-05-18 RX ORDER — QUETIAPINE FUMARATE 100 MG/1
TABLET, FILM COATED ORAL
Qty: 90 TAB | Refills: 0 | Status: SHIPPED | OUTPATIENT
Start: 2017-05-18 | End: 2017-05-18 | Stop reason: SDUPTHER

## 2017-05-18 RX ORDER — QUETIAPINE FUMARATE 100 MG/1
TABLET, FILM COATED ORAL
Qty: 270 TAB | Refills: 0 | Status: SHIPPED | OUTPATIENT
Start: 2017-05-18 | End: 2017-05-24 | Stop reason: SDUPTHER

## 2017-05-19 RX ORDER — SERTRALINE HYDROCHLORIDE 50 MG/1
TABLET, FILM COATED ORAL
Qty: 270 TAB | Refills: 2 | Status: SHIPPED | OUTPATIENT
Start: 2017-05-19 | End: 2017-06-29 | Stop reason: SDUPTHER

## 2017-05-24 ENCOUNTER — OFFICE VISIT (OUTPATIENT)
Dept: BEHAVIORAL/MENTAL HEALTH CLINIC | Age: 62
End: 2017-05-24

## 2017-05-24 VITALS
WEIGHT: 293 LBS | HEART RATE: 90 BPM | BODY MASS INDEX: 53.92 KG/M2 | HEIGHT: 62 IN | SYSTOLIC BLOOD PRESSURE: 158 MMHG | DIASTOLIC BLOOD PRESSURE: 97 MMHG

## 2017-05-24 DIAGNOSIS — F41.0 PANIC DISORDER: ICD-10-CM

## 2017-05-24 DIAGNOSIS — F41.1 GAD (GENERALIZED ANXIETY DISORDER): ICD-10-CM

## 2017-05-24 DIAGNOSIS — F33.0 MDD (MAJOR DEPRESSIVE DISORDER), RECURRENT EPISODE, MILD (HCC): Primary | ICD-10-CM

## 2017-05-24 RX ORDER — ZOLPIDEM TARTRATE 5 MG/1
5 TABLET ORAL
Qty: 30 TAB | Refills: 1 | Status: SHIPPED | OUTPATIENT
Start: 2017-05-24 | End: 2017-08-09

## 2017-05-24 RX ORDER — QUETIAPINE FUMARATE 100 MG/1
200 TABLET, FILM COATED ORAL
Qty: 270 TAB | Refills: 0
Start: 2017-05-24 | End: 2017-08-09 | Stop reason: SINTOL

## 2017-05-24 RX ORDER — ALPRAZOLAM 0.5 MG/1
0.5 TABLET ORAL
Qty: 60 TAB | Refills: 1 | Status: SHIPPED | OUTPATIENT
Start: 2017-05-24 | End: 2017-07-20 | Stop reason: SDUPTHER

## 2017-05-24 NOTE — PROGRESS NOTES
CHIEF COMPLAINT:  Stas Martinez is a 60 y.o. female and was seen today for follow-up of psychiatric condition and psychotropic medication management. HPI:    Annie Rosario reports the following psychiatric symptoms: anxiety, depression, maxine and hypomania. The symptoms have been present for months and are of moderate severity. The symptoms occur most days. Pt reports overall she has benefited from current medication. She reports she has had sig medical stressors lately which have contributed to depression. Pt here today to review current treatment plan. FAMILY/SOCIAL HX: medical and financial stressors    REVIEW OF SYSTEMS:  Psychiatric:  depression, anxiety, hypomania/maxine  Appetite:decreased secondary to GI symptoms   Sleep: poor with DMS (maintaining sleep)   Neuro: denies    Visit Vitals    BP (!) 158/97    Pulse 90    Ht 5' 2\" (1.575 m)    Wt (!) 161 kg (355 lb)    BMI 64.93 kg/m2       Side Effects:  Weight gain from seroquel    MENTAL STATUS EXAM:   Sensorium  oriented to time, place and person   Relations cooperative   Appearance:  age appropriate and casually dressed   Motor Behavior:  gait stable and within normal limits   Speech:  normal volume   Thought Process: goal directed   Thought Content free of delusions and free of hallucinations   Suicidal ideations no intention   Homicidal ideations no intention   Mood:  anxious and depressed   Affect:  anxious and depressed   Memory recent  adequate   Memory remote:  adequate   Concentration:  adequate   Abstraction:  abstract   Insight:  fair   Reliability fair   Judgment:  fair     MEDICAL DECISION MAKING:  Problems addressed today:    ICD-10-CM ICD-9-CM    1. MDD (major depressive disorder), recurrent episode, mild (HCC) F33.0 296.31 QUEtiapine (SEROQUEL) 100 mg tablet   2. Panic disorder F41.0 300.01    3.  GABRIEL (generalized anxiety disorder) F41.1 300.02        Assessment:   Annie Rosario is responding to treatment, symptoms are relatively stable at this time. She is experiencing depressive symptoms and was tearful at times during session. Have been assessing for mood lability and possible bipolar presentation but pt claims these symptoms are r/t concerns about physical health and possible upcoming surgery. Have been working on weaning off of seroquel due to metabolic side effects. She got wrong bottle of seroquel refilled and so was still taking it 100 mg in am and 200 mg in pm. Will reduce to 200 mg at bedtime. Will continue to monitor closely and and determine if pt does require a mood stabilize. Pt has benefit from prn use of alprazolam. Reviewed potential for build up of tolerance with daily use and pt agrees to prn use. Pt has benefited form use of ambien. Still waking early in am but this may be due to medical issues so will cont lower dose. Pt discussed current stressors. Provided support and encouragement. Plan:   1. Current Outpatient Prescriptions   Medication Sig Dispense Refill    QUEtiapine (SEROQUEL) 100 mg tablet Take 2 Tabs by mouth nightly. 270 Tab 0    ALPRAZolam (XANAX) 0.5 mg tablet Take 1 Tab by mouth two (2) times daily as needed for Anxiety. Max Daily Amount: 1 mg. 60 Tab 1    zolpidem (AMBIEN) 5 mg tablet Take 1 Tab by mouth nightly as needed for Sleep. Max Daily Amount: 5 mg. 30 Tab 1    lisinopril (PRINIVIL, ZESTRIL) 5 mg tablet TAKE 1 TABLET BY MOUTH DAILY 90 Tab 3    sertraline (ZOLOFT) 50 mg tablet TAKE 3 TABLETS BY MOUTH DAILY 270 Tab 2    aspirin-acetaminophen-caffeine (EXCEDRIN MIGRAINE) 250-250-65 mg per tablet Take 1 Tab by mouth every six (6) hours as needed for Headache. 30 Tab 0    omeprazole (PRILOSEC) 20 mg capsule Take 1 Cap by mouth daily. 30 Cap 6    HYDROcodone-acetaminophen (NORCO) 5-325 mg per tablet Take 1 Tab by mouth every six (6) hours as needed for Pain. Max Daily Amount: 4 Tabs. 12 Tab 0    potassium chloride SR (KLOR-CON 10) 10 mEq tablet Take 2 Tabs by mouth daily.  60 Tab 6    furosemide (LASIX) 40 mg tablet Take 1 Tab by mouth daily. 30 Tab 6    simvastatin (ZOCOR) 20 mg tablet Take 1 Tab by mouth nightly. 30 Tab 6          medication changes made today:   Seroquel 200 mg bedtime  Cont zoloft 150 mg for dep/anx  Cont alprazolam 0.5 bid prn anxiety  Ambien 5 mg     2. Counseling and coordination of care including instructions for treatment, risks/benefits, risk factor reduction and patient/family education. She agrees with the plan. Patient instructed to call with any side effects, questions or issues. 3.  Follow-up Disposition:  Return in about 8 weeks (around 7/19/2017).     5/24/2017  Ana Lara NP

## 2017-05-24 NOTE — MR AVS SNAPSHOT
Visit Information Date & Time Provider Department Dept. Phone Encounter #  
 5/24/2017  2:00 PM [facility] [phone] [id number] Follow-up Instructions Return in about 8 weeks (around 7/19/2017). Follow-up and Disposition History Your Appointments 6/14/2017  1:30 PM  
ROUTINE CARE with Levander Cheadle, MD [facility]) Appt Note: routine f/u appt per pt; rs; pt r/s from 3/3/17 [address] [place] [address] 37; Routine F/U per pat.//R/S from 4/18/17 No Show  
 [facility] [address] [id number]  
[address] [place] [address] Upcoming Health Maintenance Date Due Hepatitis C Screening 1955 DTaP/Tdap/Td series (1 - Tdap) 3/5/1975 PAP AKA CERVICAL CYTOLOGY 3/5/1975 BREAST CANCER SCRN MAMMOGRAM 3/5/2004 ZOSTER VACCINE AGE 60> 3/5/2014 MEDICARE YEARLY EXAM 2/8/2017 FOBT Q 1 YEAR AGE 50-75 2/27/2017 INFLUENZA AGE 9 TO ADULT 8/1/2017 Allergies as of 5/24/2017  Review Complete On: 5/24/2017 By: Ellouise Osgood, NP No Known Allergies Current Immunizations  Reviewed on 9/10/2015 Name Date Influenza Vaccine 4/9/2015 Pneumococcal Vaccine (Unspecified Type) 4/9/2015 Not reviewed this visit You Were Diagnosed With   
  
 Codes Comments MDD (major depressive disorder), recurrent episode, mild ([facility]    -  Primary ICD-10-CM: F33.0 ICD-9-CM: 296.31 Panic disorder     ICD-10-CM: F41.0 ICD-9-CM: 300.01   
 GABRIEL (generalized anxiety disorder)     ICD-10-CM: F41.1 ICD-9-CM: 300.02 Vitals BP Pulse Height(growth percentile) Weight(growth percentile) BMI OB Status (!) 158/97 90 5' 2\" (1.575 m) (!) 355 lb (161 kg) 64.93 kg/m2 Postmenopausal  
 Smoking Status Never Smoker BMI and BSA Data Body Mass Index Body Surface Area  64.93 kg/m 2 2.65 m 2  
  
  
 Preferred Pharmacy Pharmacy Name Phone [facility] [phone] [facility] [place] [phone] Your Updated Medication List  
  
   
This list is accurate as of: 5/24/17  3:30 PM.  Always use your most recent med list.  
  
  
  
  
 ALPRAZolam 0.5 mg tablet Commonly known as:  Latrice Hyde Take 1 Tab by mouth two (2) times daily as needed for Anxiety. Max Daily Amount: 1 mg.  
  
 aspirin-acetaminophen-caffeine 250-250-65 mg per tablet Commonly known as:  Marilyn Forts Take 1 Tab by mouth every six (6) hours as needed for Headache. furosemide 40 mg tablet Commonly known as:  LASIX Take 1 Tab by mouth daily. HYDROcodone-acetaminophen 5-325 mg per tablet Commonly known as:  Simmie Blonder Take 1 Tab by mouth every six (6) hours as needed for Pain. Max Daily Amount: 4 Tabs. lisinopril 5 mg tablet Commonly known as:  PRINIVIL, ZESTRIL  
TAKE 1 TABLET BY MOUTH DAILY  
  
 omeprazole 20 mg capsule Commonly known as:  PRILOSEC Take 1 Cap by mouth daily. potassium chloride SR 10 mEq tablet Commonly known as:  KLOR-CON 10 Take 2 Tabs by mouth daily. QUEtiapine 100 mg tablet Commonly known as:  SEROquel Take 2 Tabs by mouth nightly. sertraline 50 mg tablet Commonly known as:  ZOLOFT  
TAKE 3 TABLETS BY MOUTH DAILY  
  
 simvastatin 20 mg tablet Commonly known as:  ZOCOR Take 1 Tab by mouth nightly. zolpidem 5 mg tablet Commonly known as:  AMBIEN Take 1 Tab by mouth nightly as needed for Sleep. Max Daily Amount: 5 mg. Prescriptions Printed Refills ALPRAZolam (XANAX) 0.5 mg tablet 1 Sig: Take 1 Tab by mouth two (2) times daily as needed for Anxiety. Max Daily Amount: 1 mg. Class: Print Route: Oral  
 zolpidem (AMBIEN) 5 mg tablet 1 Sig: Take 1 Tab by mouth nightly as needed for Sleep. Max Daily Amount: 5 mg. Class: Print  Route: Oral  
  
 Follow-up Instructions Return in about 8 weeks (around 7/19/2017). Introducing [facility] & HEALTH SERVICES! Giulia Cleveland introduces [employer] patient portal. Now you can access parts of your medical record, email your doctor's office, and request medication refills online. 1. In your internet browser, go to [email or web address] 2. Click on the First Time User? Click Here link in the Sign In box. You will see the New Member Sign Up page. 3. Enter your [employer] Access Code exactly as it appears below. You will not need to use this code after youve completed the sign-up process. If you do not sign up before the expiration date, you must request a new code. · [employer] Access Code: [email or web address] Expires: 6/27/2017 10:14 AM 
 
4. Enter the last four digits of your Social Security Number (xxxx) and Date of Birth (mm/dd/yyyy) as indicated and click Submit. You will be taken to the next sign-up page. 5. Create a [employer] Style ID. This will be your [employer] Style login ID and cannot be changed, so think of one that is secure and easy to remember. 6. Create a [employer] password. You can change your password at any time. 7. Enter your Password Reset Question and Answer. This can be used at a later time if you forget your password. 8. Enter your e-mail address. You will receive e-mail notification when new information is available in [address] 9. Click Sign Up. You can now view and download portions of your medical record. 10. Click the Download Summary menu link to download a portable copy of your medical information. If you have questions, please visit the Frequently Asked Questions section of the [employer] website. Remember, [employer] Style is NOT to be used for urgent needs. For medical emergencies, dial 911. Now available from your iPhone and Android! Please provide this summary of care documentation to your next provider. Your primary care clinician is listed as Suhas Addison. If you have any questions after today's visit, please call [phone].

## 2017-05-25 RX ORDER — DICYCLOMINE HYDROCHLORIDE 20 MG/1
TABLET ORAL
Qty: 90 TAB | Refills: 0 | Status: SHIPPED | OUTPATIENT
Start: 2017-05-25 | End: 2019-01-16 | Stop reason: SDUPTHER

## 2017-06-19 DIAGNOSIS — F33.0 MDD (MAJOR DEPRESSIVE DISORDER), RECURRENT EPISODE, MILD (HCC): ICD-10-CM

## 2017-06-19 DIAGNOSIS — F41.1 GAD (GENERALIZED ANXIETY DISORDER): ICD-10-CM

## 2017-06-19 RX ORDER — SERTRALINE HYDROCHLORIDE 100 MG/1
TABLET, FILM COATED ORAL
Qty: 135 TAB | Refills: 0 | OUTPATIENT
Start: 2017-06-19

## 2017-06-21 DIAGNOSIS — F41.1 GAD (GENERALIZED ANXIETY DISORDER): Chronic | ICD-10-CM

## 2017-06-21 DIAGNOSIS — F33.0 MDD (MAJOR DEPRESSIVE DISORDER), RECURRENT EPISODE, MILD (HCC): ICD-10-CM

## 2017-06-21 NOTE — TELEPHONE ENCOUNTER
6/21/2017    Requested Prescriptions     Pending Prescriptions Disp Refills    sertraline (ZOLOFT) 50 mg tablet 270 Tab 2       Quantity requested:   Pharmacy requested: era on file     Patient advised of 72 hour turnaround: Yes    Other Comments: pt schedule her f/u appt for 8/14/17 with Dr. HANDY [facility] (first available)

## 2017-06-23 RX ORDER — SERTRALINE HYDROCHLORIDE 50 MG/1
TABLET, FILM COATED ORAL
Qty: 270 TAB | Refills: 2 | OUTPATIENT
Start: 2017-06-23

## 2017-06-23 NOTE — TELEPHONE ENCOUNTER
Patient states she needs a call back to get the status of her refill request made on 6/21/17 & is still pending .  Please call to advise & update on approval. Thank you

## 2017-06-28 NOTE — TELEPHONE ENCOUNTER
Identified patient 2 identifiers verified.   Patient reports that Dr. Rosita Mathews psychiatrist told her to get refill for Zoloft from PCP

## 2017-06-28 NOTE — TELEPHONE ENCOUNTER
Hesham Paz filled this when she stopped seeing Dr. Gagan Bradley. Then she switched to you. I believe you are adjusting her dose and filled this prescription in May.   Thanks,  Nicholas Malone

## 2017-06-29 ENCOUNTER — TELEPHONE (OUTPATIENT)
Dept: INTERNAL MEDICINE CLINIC | Age: 62
End: 2017-06-29

## 2017-06-29 DIAGNOSIS — F41.1 GAD (GENERALIZED ANXIETY DISORDER): Chronic | ICD-10-CM

## 2017-06-29 DIAGNOSIS — F33.0 MDD (MAJOR DEPRESSIVE DISORDER), RECURRENT EPISODE, MILD (HCC): ICD-10-CM

## 2017-06-29 RX ORDER — SERTRALINE HYDROCHLORIDE 50 MG/1
TABLET, FILM COATED ORAL
Qty: 270 TAB | Refills: 0 | Status: SHIPPED | OUTPATIENT
Start: 2017-06-29 | End: 2017-08-09 | Stop reason: CLARIF

## 2017-06-29 NOTE — TELEPHONE ENCOUNTER
Identified patient 2 identifiers verified. Spoke with patient and informed her to contact Dr. Ludwig Bailey office for refill.

## 2017-06-30 ENCOUNTER — HOSPITAL ENCOUNTER (OUTPATIENT)
Dept: GENERAL RADIOLOGY | Age: 62
Discharge: HOME | End: 2017-06-30
Payer: MEDICARE

## 2017-06-30 DIAGNOSIS — F32.A DEPRESSIVE DISORDER: ICD-10-CM

## 2017-06-30 DIAGNOSIS — G47.30 SLEEP APNEA: ICD-10-CM

## 2017-06-30 DIAGNOSIS — K57.90 DIVERTICULAR DISEASE: ICD-10-CM

## 2017-06-30 DIAGNOSIS — F41.0 PANIC DISORDER: ICD-10-CM

## 2017-06-30 DIAGNOSIS — R19.4 CHANGE IN BOWEL HABITS: ICD-10-CM

## 2017-06-30 DIAGNOSIS — F41.9 ANXIETY: ICD-10-CM

## 2017-06-30 DIAGNOSIS — K60.2 ANAL FISSURE: ICD-10-CM

## 2017-06-30 DIAGNOSIS — K62.5 RECTAL HEMORRHAGE: ICD-10-CM

## 2017-06-30 PROCEDURE — 74020 XR ABD FLAT/ ERECT: CPT

## 2017-07-20 RX ORDER — ALPRAZOLAM 0.5 MG/1
0.5 TABLET ORAL
Qty: 60 TAB | Refills: 0 | OUTPATIENT
Start: 2017-07-20 | End: 2017-08-09 | Stop reason: SDUPTHER

## 2017-08-09 ENCOUNTER — OFFICE VISIT (OUTPATIENT)
Dept: BEHAVIORAL/MENTAL HEALTH CLINIC | Age: 62
End: 2017-08-09

## 2017-08-09 VITALS
HEART RATE: 98 BPM | WEIGHT: 293 LBS | DIASTOLIC BLOOD PRESSURE: 108 MMHG | BODY MASS INDEX: 53.92 KG/M2 | HEIGHT: 62 IN | SYSTOLIC BLOOD PRESSURE: 143 MMHG

## 2017-08-09 DIAGNOSIS — F33.0 MDD (MAJOR DEPRESSIVE DISORDER), RECURRENT EPISODE, MILD (HCC): Primary | ICD-10-CM

## 2017-08-09 DIAGNOSIS — F41.0 PANIC DISORDER: ICD-10-CM

## 2017-08-09 DIAGNOSIS — F41.1 GAD (GENERALIZED ANXIETY DISORDER): ICD-10-CM

## 2017-08-09 RX ORDER — DULOXETIN HYDROCHLORIDE 30 MG/1
CAPSULE, DELAYED RELEASE ORAL
Qty: 90 CAP | Refills: 1 | Status: SHIPPED | OUTPATIENT
Start: 2017-08-09 | End: 2017-10-18 | Stop reason: SDUPTHER

## 2017-08-09 RX ORDER — ALPRAZOLAM 0.5 MG/1
0.5 TABLET ORAL
Qty: 60 TAB | Refills: 1 | Status: SHIPPED | OUTPATIENT
Start: 2017-08-09 | End: 2017-10-18 | Stop reason: SDUPTHER

## 2017-08-09 RX ORDER — DULOXETIN HYDROCHLORIDE 30 MG/1
30 CAPSULE, DELAYED RELEASE ORAL DAILY
Qty: 30 CAP | Refills: 1 | Status: SHIPPED | OUTPATIENT
Start: 2017-08-09 | End: 2017-08-09 | Stop reason: SDUPTHER

## 2017-08-09 RX ORDER — TEMAZEPAM 30 MG/1
30 CAPSULE ORAL
Qty: 30 CAP | Refills: 1 | Status: SHIPPED | OUTPATIENT
Start: 2017-08-09 | End: 2017-10-06 | Stop reason: SDUPTHER

## 2017-08-09 NOTE — MR AVS SNAPSHOT
Visit Information Date & Time Provider Department Dept. Phone Encounter #  
 8/9/2017 11:00 AM [facility] [phone] [id number] Your Appointments 8/14/2017 11:30 AM  
ROUTINE CARE with MD ABRAHAN Uriostegui [facility] [address]) Appt Note: routine f/u appt per pt  
 [place] [address] [address]  
[address] [facility] [address] [place] [address] Upcoming Health Maintenance Date Due Hepatitis C Screening 1955 DTaP/Tdap/Td series (1 - Tdap) 3/5/1975 PAP AKA CERVICAL CYTOLOGY 3/5/1975 BREAST CANCER SCRN MAMMOGRAM 3/5/2004 ZOSTER VACCINE AGE 60> 1/5/2014 MEDICARE YEARLY EXAM 2/8/2017 FOBT Q 1 YEAR AGE 50-75 2/27/2017 INFLUENZA AGE 9 TO ADULT 8/1/2017 Allergies as of 8/9/2017  Review Complete On: 8/9/2017 By: Dori Castillo NP No Known Allergies Current Immunizations  Reviewed on 9/10/2015 Name Date Influenza Vaccine 4/9/2015 Pneumococcal Vaccine (Unspecified Type) 4/9/2015 Not reviewed this visit You Were Diagnosed With   
  
 Codes Comments MDD (major depressive disorder), recurrent episode, mild ([facility]    -  Primary ICD-10-CM: F33.0 ICD-9-CM: 296.31   
 GABRIEL (generalized anxiety disorder)     ICD-10-CM: F41.1 ICD-9-CM: 300.02 Panic disorder     ICD-10-CM: F41.0 ICD-9-CM: 300.01 Vitals BP Pulse Height(growth percentile) Weight(growth percentile) BMI OB Status (!) 143/108 98 5' 2\" (1.575 m) (!) 376 lb (170.6 kg) 68.77 kg/m2 Postmenopausal  
 Smoking Status Never Smoker BMI and BSA Data Body Mass Index Body Surface Area 68.77 kg/m 2 2.73 m 2 Preferred Pharmacy Pharmacy Name Phone [facility] [address] [address] [phone] Your Updated Medication List  
  
   
 This list is accurate as of: 8/9/17 11:23 AM.  Always use your most recent med list.  
  
  
  
  
 ALPRAZolam 0.5 mg tablet Commonly known as:  Tate Flack Take 1 Tab by mouth two (2) times daily as needed for Anxiety (Do not take with narcotics, other benzos or alcohol. ). Max Daily Amount: 1 mg.  
  
 aspirin-acetaminophen-caffeine 250-250-65 mg per tablet Commonly known as:  Karen Pod Take 1 Tab by mouth every six (6) hours as needed for Headache. dicyclomine 20 mg tablet Commonly known as:  BENTYL TAKE 1 TABLET BY MOUTH THREE TIMES DAILY DULoxetine 30 mg capsule Commonly known as:  CYMBALTA Take 1 Cap by mouth daily. furosemide 40 mg tablet Commonly known as:  LASIX Take 1 Tab by mouth daily. HYDROcodone-acetaminophen 5-325 mg per tablet Commonly known as:  Billye [place] Take 1 Tab by mouth every six (6) hours as needed for Pain. Max Daily Amount: 4 Tabs. lisinopril 5 mg tablet Commonly known as:  PRINIVIL, ZESTRIL  
TAKE 1 TABLET BY MOUTH DAILY  
  
 omeprazole 20 mg capsule Commonly known as:  PRILOSEC Take 1 Cap by mouth daily. potassium chloride SR 10 mEq tablet Commonly known as:  KLOR-CON 10  
TAKE 2 TABLETS BY MOUTH DAILY  
  
 simvastatin 20 mg tablet Commonly known as:  ZOCOR Take 1 Tab by mouth nightly. temazepam 30 mg capsule Commonly known as:  RESTORIL Take 1 Cap by mouth nightly as needed for Sleep (Do not take with other benzos, narcotics or alcohol. ). Max Daily Amount: 30 mg.  
  
  
  
  
Prescriptions Printed Refills ALPRAZolam (XANAX) 0.5 mg tablet 1 Sig: Take 1 Tab by mouth two (2) times daily as needed for Anxiety (Do not take with narcotics, other benzos or alcohol. ). Max Daily Amount: 1 mg. Class: Print Route: Oral  
 temazepam (RESTORIL) 30 mg capsule 1 Sig: Take 1 Cap by mouth nightly as needed for Sleep (Do not take with other benzos, narcotics or alcohol. ). Max Daily Amount: 30 mg. Class: Print Route: Oral  
  
Prescriptions Sent to Pharmacy Refills DULoxetine (CYMBALTA) 30 mg capsule 1 Sig: Take 1 Cap by mouth daily. Class: Normal  
 Pharmacy: [id number] | [employer] [address] #: [phone] Route: Oral  
  
Patient Instructions DIRECTIONS FOR NEW MEDICATIONS: 
 
1. WEAN OFF OF SERTRALINE/ZOLOFT: 
 
STARTING TOMORROW DECREASE TO 100mg for 7 days, AFTER 7 days DECREASE TO 50 mg FOR 7 days and THEN STOP. TOMORROW START CYMBALTA 30 mg 
 
2. STARTING TOMORROW DECREASE SEROQUEL  mg AT NIGHT. DO NOT TAKE ANY DURING THE DAYTIME. AFTER 3 DAYS DECREASE  mg (1.5 tablets). AFTER 3 DAYS DECREASE  mg (1 tablet) FOLLOWED BY 50 mg (1/2 tablet) for 3 DAYS and then STOP SEROQUEL. DO NOT RESTART AS THIS MEDICATION CAN CONTRIBUTE TO WEIGHT GAIN. ONCE SEROQUEL IS 50 mg you can start TEMAZEPAM. Introducing [facility] & HEALTH SERVICES! [facility] introduces [employer] patient portal. Now you can access parts of your medical record, email your doctor's office, and request medication refills online. 1. In your internet browser, go to [email or web address] 2. Click on the First Time User? Click Here link in the Sign In box. You will see the New Member Sign Up page. 3. Enter your [employer] Access Code exactly as it appears below. You will not need to use this code after youve completed the sign-up process. If you do not sign up before the expiration date, you must request a new code. · [employer] Access Code: [email or web address] Expires: 9/28/2017 11:23 AM 
 
4. Enter the last four digits of your Social Security Number (xxxx) and Date of Birth (mm/dd/yyyy) as indicated and click Submit. You will be taken to the next sign-up page. 5. Create a Yet[employer] ID. This will be your [employer] login ID and cannot be changed, so think of one that is secure and easy to remember. 6. Create a [employer] password. You can change your password at any time. 7. Enter your Password Reset Question and Answer. This can be used at a later time if you forget your password. 8. Enter your e-mail address. You will receive e-mail notification when new information is available in [address] 9. Click Sign Up. You can now view and download portions of your medical record. 10. Click the Download Summary menu link to download a portable copy of your medical information. If you have questions, please visit the Frequently Asked Questions section of the [employer] website. Remember, [employer] is NOT to be used for urgent needs. For medical emergencies, dial 911. Now available from your iPhone and Android! Please provide this summary of care documentation to your next provider. Your primary care clinician is listed as Angelina Jean. If you have any questions after today's visit, please call [phone].

## 2017-08-09 NOTE — PATIENT INSTRUCTIONS
DIRECTIONS FOR NEW MEDICATIONS:    1. WEAN OFF OF SERTRALINE/ZOLOFT:    STARTING TOMORROW DECREASE TO 100mg for 7 days, AFTER 7 days DECREASE TO 50 mg FOR 7 days and THEN STOP. TOMORROW START CYMBALTA 30 mg    2. STARTING TOMORROW DECREASE SEROQUEL  mg AT NIGHT. DO NOT TAKE ANY DURING THE DAYTIME. AFTER 3 DAYS DECREASE  mg (1.5 tablets). AFTER 3 DAYS DECREASE  mg (1 tablet) FOLLOWED BY 50 mg (1/2 tablet) for 3 DAYS and then STOP SEROQUEL. DO NOT RESTART AS THIS MEDICATION CAN CONTRIBUTE TO WEIGHT GAIN.     ONCE SEROQUEL IS 50 mg you can start TEMAZEPAM.

## 2017-08-09 NOTE — PROGRESS NOTES
CHIEF COMPLAINT:  Bayron Davis is a 60 y.o. female and was seen today for follow-up of psychiatric condition and psychotropic medication management. HPI:    Cruz Fleming reports the following psychiatric symptoms:  depression and anxiety. The symptoms have been present for months and are of moderate/high severity. The symptoms occur daily. Pt reports concern about her weight has caused increased depression and social isolation. She reports some benefit from current medications. She is here today to review current treatment. FAMILY/SOCIAL HX: medical stressors, wants to undergo bariatric surgery    REVIEW OF SYSTEMS:  Psychiatric:  depression, anxiety  Appetite:decreased, working on weight loss strategies   Sleep: good with use of seroquel  Neuro: chronic pain    Visit Vitals    BP (!) 143/108    Pulse 98    Ht 5' 2\" (1.575 m)    Wt (!) 170.6 kg (376 lb)  Comment: \"full of fluid\" per pt    BMI 68.77 kg/m2       Side Effects:  none    MENTAL STATUS EXAM:   Sensorium  oriented to time, place and person   Relations cooperative   Appearance:  age appropriate, casually dressed and overweight   Motor Behavior:  gait stable and within normal limits   Speech:  normal volume   Thought Process: goal directed   Thought Content free of delusions and free of hallucinations   Suicidal ideations no intention   Homicidal ideations no intention   Mood:  anxious and depressed   Affect:  anxious and depressed   Memory recent  adequate   Memory remote:  adequate   Concentration:  adequate   Abstraction:  abstract   Insight:  fair   Reliability fair   Judgment:  fair     MEDICAL DECISION MAKING:  Problems addressed today:    ICD-10-CM ICD-9-CM    1. MDD (major depressive disorder), recurrent episode, mild (HCC) F33.0 296.31    2. GABRIEL (generalized anxiety disorder) F41.1 300.02    3. Panic disorder F41.0 300.01        Assessment:   Cruz Fleming is not responding to treatment, symptoms are currently exacerbated.  Pt presents today with increased depression and anxiety. Was tearful throughout the session. Reviewed treatment plan and explored options. Will switch antidepressant to cymbalta which may also help with chronic pain issues. Pt warned to wean off of seroquel and not re-start. She continues to express concern that she will not sleep. Reviewed risk of using seroquel and contributing to weight issues does not justify use. Pt verbalized agreement. Reviewed options for sleep medication and will re-try temazepam but at a higher dose. She has used 15 mg in past but does not benefit. Pt also has tried trazodone but stated even at 300 mg dose she did not sleep. Reviewed prn use of alprazolam. Discussed benzo safety. Answered questions and provided written instructions for weaning off of sertraline and seroquel. Provided support and encouragement. Plan:   1. Current Outpatient Prescriptions   Medication Sig Dispense Refill    ALPRAZolam (XANAX) 0.5 mg tablet Take 1 Tab by mouth two (2) times daily as needed for Anxiety (Do not take with narcotics, other benzos or alcohol. ). Max Daily Amount: 1 mg. 60 Tab 1    temazepam (RESTORIL) 30 mg capsule Take 1 Cap by mouth nightly as needed for Sleep (Do not take with other benzos, narcotics or alcohol. ). Max Daily Amount: 30 mg. 30 Cap 1    DULoxetine (CYMBALTA) 30 mg capsule Take 1 Cap by mouth daily. 30 Cap 1    potassium chloride SR (KLOR-CON 10) 10 mEq tablet TAKE 2 TABLETS BY MOUTH DAILY 180 Tab 0    dicyclomine (BENTYL) 20 mg tablet TAKE 1 TABLET BY MOUTH THREE TIMES DAILY 90 Tab 0    lisinopril (PRINIVIL, ZESTRIL) 5 mg tablet TAKE 1 TABLET BY MOUTH DAILY 90 Tab 3    aspirin-acetaminophen-caffeine (EXCEDRIN MIGRAINE) 250-250-65 mg per tablet Take 1 Tab by mouth every six (6) hours as needed for Headache. 30 Tab 0    omeprazole (PRILOSEC) 20 mg capsule Take 1 Cap by mouth daily.  30 Cap 6    HYDROcodone-acetaminophen (NORCO) 5-325 mg per tablet Take 1 Tab by mouth every six (6) hours as needed for Pain. Max Daily Amount: 4 Tabs. 12 Tab 0    furosemide (LASIX) 40 mg tablet Take 1 Tab by mouth daily. 30 Tab 6    simvastatin (ZOCOR) 20 mg tablet Take 1 Tab by mouth nightly. 30 Tab 6          medication changes made today: wean off zoloft and start cymbalta, wean off of seroquel and start temazepam for sleep, cont alprazolam 0.5 mg prn anxiety    WEAN OFF OF SERTRALINE/ZOLOFT:    STARTING TOMORROW DECREASE TO 100mg for 7 days, AFTER 7 days DECREASE TO 50 mg FOR 7 days and THEN STOP. TOMORROW START CYMBALTA 30 mg    2. STARTING TOMORROW DECREASE SEROQUEL  mg AT NIGHT. DO NOT TAKE ANY DURING THE DAYTIME. AFTER 3 DAYS DECREASE  mg (1.5 tablets). AFTER 3 DAYS DECREASE  mg (1 tablet) FOLLOWED BY 50 mg (1/2 tablet) for 3 DAYS and then STOP SEROQUEL. DO NOT RESTART AS THIS MEDICATION CAN CONTRIBUTE TO WEIGHT GAIN. ONCE SEROQUEL IS 50 mg you can start TEMAZEPAM.      2.  Counseling and coordination of care including instructions for treatment, risks/benefits, risk factor reduction and patient/family education. She agrees with the plan. Patient instructed to call with any side effects, questions or issues. 3.  Follow-up Disposition:  Return in about 6 weeks (around 9/20/2017).      4. Consider ind therapy     8/9/2017  Millie Garcia NP

## 2017-08-11 ENCOUNTER — TELEPHONE (OUTPATIENT)
Dept: BEHAVIORAL/MENTAL HEALTH CLINIC | Age: 62
End: 2017-08-11

## 2017-08-11 NOTE — TELEPHONE ENCOUNTER
Patient called to let you know she let her cousin in her house and they had a nice sit down conversation.

## 2017-08-27 ENCOUNTER — HOSPITAL ENCOUNTER (EMERGENCY)
Age: 62
Discharge: HOME | End: 2017-08-27
Attending: EMERGENCY MEDICINE
Payer: MEDICARE

## 2017-08-27 VITALS
TEMPERATURE: 97.4 F | OXYGEN SATURATION: 95 % | HEIGHT: 62 IN | BODY MASS INDEX: 53.92 KG/M2 | WEIGHT: 293 LBS | RESPIRATION RATE: 14 BRPM | HEART RATE: 90 BPM

## 2017-08-27 DIAGNOSIS — R19.7 DIARRHEA, UNSPECIFIED TYPE: ICD-10-CM

## 2017-08-27 DIAGNOSIS — R10.84 ABDOMINAL PAIN, GENERALIZED: Primary | ICD-10-CM

## 2017-08-27 PROCEDURE — 99281 EMR DPT VST MAYX REQ PHY/QHP: CPT

## 2017-08-27 NOTE — ED NOTES
Patient discharged by Dr. Savita Vasquez. Patient provided with discharge instructions Rx and instructions on follow up care. Patient out of ED via wheelchair accompanied by son.

## 2017-08-27 NOTE — ED NOTES
Upon this RN's assessment, patient states that she is here for abdominal cramping for two weeks and states that she has a scheduled colonoscopy this week and would rather follow up, keep the appointment then be seen and wants to go home MD Raz Todd made aware

## 2017-08-27 NOTE — DISCHARGE INSTRUCTIONS
Abdominal Pain: Care Instructions  Your Care Instructions    Abdominal pain has many possible causes. Some aren't serious and get better on their own in a few days. Others need more testing and treatment. If your pain continues or gets worse, you need to be rechecked and may need more tests to find out what is wrong. You may need surgery to correct the problem. Don't ignore new symptoms, such as fever, nausea and vomiting, urination problems, pain that gets worse, and dizziness. These may be signs of a more serious problem. Your doctor may have recommended a follow-up visit in the next 8 to 12 hours. If you are not getting better, you may need more tests or treatment. The doctor has checked you carefully, but problems can develop later. If you notice any problems or new symptoms, get medical treatment right away. Follow-up care is a key part of your treatment and safety. Be sure to make and go to all appointments, and call your doctor if you are having problems. It's also a good idea to know your test results and keep a list of the medicines you take. How can you care for yourself at home? · Rest until you feel better. · To prevent dehydration, drink plenty of fluids, enough so that your urine is light yellow or clear like water. Choose water and other caffeine-free clear liquids until you feel better. If you have kidney, heart, or liver disease and have to limit fluids, talk with your doctor before you increase the amount of fluids you drink. · If your stomach is upset, eat mild foods, such as rice, dry toast or crackers, bananas, and applesauce. Try eating several small meals instead of two or three large ones. · Wait until 48 hours after all symptoms have gone away before you have spicy foods, alcohol, and drinks that contain caffeine. · Do not eat foods that are high in fat. · Avoid anti-inflammatory medicines such as aspirin, ibuprofen (Advil, Motrin), and naproxen (Aleve).  These can cause stomach upset. Talk to your doctor if you take daily aspirin for another health problem. When should you call for help? Call 911 anytime you think you may need emergency care. For example, call if:  · You passed out (lost consciousness). · You pass maroon or very bloody stools. · You vomit blood or what looks like coffee grounds. · You have new, severe belly pain. Call your doctor now or seek immediate medical care if:  · Your pain gets worse, especially if it becomes focused in one area of your belly. · You have a new or higher fever. · Your stools are black and look like tar, or they have streaks of blood. · You have unexpected vaginal bleeding. · You have symptoms of a urinary tract infection. These may include:  ¨ Pain when you urinate. ¨ Urinating more often than usual.  ¨ Blood in your urine. · You are dizzy or lightheaded, or you feel like you may faint. Watch closely for changes in your health, and be sure to contact your doctor if:  · You are not getting better after 1 day (24 hours). Where can you learn more? Go to http://teddyRivet & Swaymerlin.info/. Enter K157 in the search box to learn more about \"Abdominal Pain: Care Instructions. \"  Current as of: March 20, 2017  Content Version: 11.3  © 8133-2263 [employer]. Care instructions adapted under license by ip.access (which disclaims liability or warranty for this information). If you have questions about a medical condition or this instruction, always ask your healthcare professional. [place] [address] any warranty or liability for your use of this information. Omnistream Activation    Thank you for requesting access to Omnistream. Please follow the instructions below to securely access and download your online medical record. Omnistream allows you to send messages to your doctor, view your test results, renew your prescriptions, schedule appointments, and more. How Do I Sign Up? 1.  In your internet browser, go to [email or web address]  2. Click on the First Time User? Click Here link in the Sign In box. You will be redirect to the New Member Sign Up page. 3. Enter your Spotlight Access Code exactly as it appears below. You will not need to use this code after youve completed the sign-up process. If you do not sign up before the expiration date, you must request a new code. Spotlight Access Code: [email or web address]  Expires: 2017 11:23 AM (This is the date your Spotlight access code will )    4. Enter the last four digits of your Social Security Number (xxxx) and Date of Birth (mm/dd/yyyy) as indicated and click Submit. You will be taken to the next sign-up page. 5. Create a Spotlight ID. This will be your Spotlight login ID and cannot be changed, so think of one that is secure and easy to remember. 6. Create a Spotlight password. You can change your password at any time. 7. Enter your Password Reset Question and Answer. This can be used at a later time if you forget your password. 8. Enter your e-mail address. You will receive e-mail notification when new information is available in [address] 9. Click Sign Up. You can now view and download portions of your medical record. 10. Click the Download Summary menu link to download a portable copy of your medical information. Additional Information    If you have questions, please visit the Frequently Asked Questions section of the Spotlight website at [email or web address] Remember, Spotlight is NOT to be used for urgent needs. For medical emergencies, dial 911.

## 2017-08-27 NOTE — ED PROVIDER NOTES
HPI Comments: Rashida Cerna is a 60 y.o. female, with PMHx significant for HTN, GERD, who presents ambulatory to the ED with cc of worsening abdominal pain. Per nurse, she has an appointment on Tuesday for colonoscopy, she thought she had a colonoscopy 3 weeks from now, which is why she chose to come into the ED today. She states she wants to go home, since her colonoscopy appointment is early next week. Pt denies being on anticoagulants or abx. Pt specifically denies fever    PSHx of hysterectomy, cholecystectomy    Social hx: - Tobacco use, - EtOH use, - Illicit drug use    PCP: Barb Parra MD    There are no other complaints, changes or physical findings at this time. The history is provided by the patient. No  was used. Past Medical History:   Diagnosis Date    Arthritis     chronic pain related arthritis    Bipolar 1 disorder (HCC)     GERD (gastroesophageal reflux disease)     Hypertension     Other ill-defined conditions     hyperlipidemia. Stomach abcess    Psychiatric disorder     panic attacks    Sleep apnea     Thromboembolus ([address]     last year L leg    Unspecified sleep apnea        Past Surgical History:   Procedure Laterality Date    HX CHOLECYSTECTOMY      HX GI      gallbladder removed 2/2010    HX HYSTERECTOMY           Family History:   Problem Relation Age of Onset    Heart Disease Mother     Diabetes Mother     Arthritis-osteo Mother     High Cholesterol Mother     Heart Attack Sister     Diabetes Sister     Heart Disease Sister     Diabetes Sister     Arthritis-osteo Sister     High Cholesterol Sister     Schizophrenia Paternal Grandmother     Drug Abuse Sister        Social History     Social History    Marital status:      Spouse name: N/A    Number of children: N/A    Years of education: N/A     Occupational History    Not on file.      Social History Main Topics    Smoking status: Never Smoker    Smokeless tobacco: Never Used    Alcohol use No    Drug use: No    Sexual activity: Not on file     Other Topics Concern    Not on file     Social History Narrative         ALLERGIES: Review of patient's allergies indicates no known allergies. Review of Systems   Constitutional: Negative. Negative for chills and fever. HENT: Negative. Negative for congestion and rhinorrhea. Respiratory: Negative. Negative for cough, chest tightness and wheezing. Cardiovascular: Negative. Negative for chest pain and palpitations. Gastrointestinal: Positive for abdominal pain. Negative for constipation, nausea and vomiting. Endocrine: Negative. Genitourinary: Negative. Negative for decreased urine volume, flank pain, hematuria and pelvic pain. Musculoskeletal: Negative. Negative for back pain and neck pain. Skin: Negative. Negative for color change, pallor and rash. Neurological: Negative. Negative for dizziness, seizures, weakness, numbness and headaches. Hematological: Negative. Negative for adenopathy. Psychiatric/Behavioral: Negative. All other systems reviewed and are negative. Vitals:    08/27/17 1400   Pulse: 90   Resp: 14   Temp: 97.4 °F (36.3 °C)   SpO2: 95%   Weight: (!) 170 kg (374 lb 12.5 oz)   Height: 5' 2\" (1.575 m)            Physical Exam   Constitutional: She appears well-developed and well-nourished. No distress. HENT:   Head: Normocephalic and atraumatic. Mouth/Throat: No oropharyngeal exudate. Eyes: Conjunctivae are normal. Pupils are equal, round, and reactive to light. Right eye exhibits no discharge. Left eye exhibits no discharge. No scleral icterus. Neck: Normal range of motion. Neck supple. No JVD present. Cardiovascular: Normal rate, regular rhythm, normal heart sounds and intact distal pulses. Exam reveals no gallop and no friction rub. No murmur heard. Pulmonary/Chest: Effort normal and breath sounds normal. No stridor. No respiratory distress.  She has no wheezes. She has no rales. She exhibits no tenderness. Abdominal: Soft. Bowel sounds are normal. She exhibits no distension and no mass. There is no tenderness. There is no rebound and no guarding. Skin: Skin is warm. She is not diaphoretic. Nursing note and vitals reviewed. MDM  Number of Diagnoses or Management Options  Abdominal pain, generalized:   Diarrhea, unspecified type:   Risk of Complications, Morbidity, and/or Mortality  Presenting problems: low  Diagnostic procedures: low  Management options: low    Patient Progress  Patient progress: stable    ED Course       Procedures    PROGRESS NOTE:  2:47 PM  Pt has been re-evaluated. Pt was offered full medical evaluation including testing, pt declined, will follow up with her GI doctor as planned. IMPRESSION:  1. Abdominal pain, generalized    2. Diarrhea, unspecified type        PLAN:  1. Current Discharge Medication List        2. Follow-up Information     Follow up With Details Comments Contact Angel Carrillo MD Call in 2 days  [facility] 36      Fani Cleveland MD Call in 2 days  [address] [place] [address] [phone]      [facility] EMERGENCY DEPT  If symptoms worsen [address] [place] [address]  [phone]        Return to ED if worse     DISCHARGE NOTE  2:57 PM  The patient has been re-evaluated and is ready for discharge. Reviewed available results with patient. Counseled patient on diagnosis and care plan. Patient has expressed understanding, and all questions have been answered. Patient agrees with plan and agrees to follow up as recommended, or return to the ED if their symptoms worsen. Discharge instructions have been provided and explained to the patient, along with reasons to return to the ED.     ATTESTATION:  This note is prepared by Von Low, acting as Scribe for Sal Cid MD.    Sal Cid MD: The scribe's documentation has been prepared under my direction and personally reviewed by me in its entirety. I confirm that the note above accurately reflects all work, treatment, procedures, and medical decision making performed by me.

## 2017-09-26 DIAGNOSIS — F41.1 GAD (GENERALIZED ANXIETY DISORDER): Chronic | ICD-10-CM

## 2017-09-26 DIAGNOSIS — F33.0 MDD (MAJOR DEPRESSIVE DISORDER), RECURRENT EPISODE, MILD (HCC): ICD-10-CM

## 2017-09-26 DIAGNOSIS — I10 ESSENTIAL HYPERTENSION WITH GOAL BLOOD PRESSURE LESS THAN 140/90: ICD-10-CM

## 2017-09-26 RX ORDER — SERTRALINE HYDROCHLORIDE 50 MG/1
TABLET, FILM COATED ORAL
Qty: 270 TAB | Refills: 0 | OUTPATIENT
Start: 2017-09-26

## 2017-09-28 RX ORDER — POTASSIUM CHLORIDE 750 MG/1
TABLET, FILM COATED, EXTENDED RELEASE ORAL
Qty: 180 TAB | Refills: 6 | Status: SHIPPED | OUTPATIENT
Start: 2017-09-28 | End: 2018-12-10 | Stop reason: SDUPTHER

## 2017-10-06 NOTE — TELEPHONE ENCOUNTER
Pt calls asking about Temazepam. I called pharmacy and LRD was 9/8/17 for #30    Means should would be due Monday

## 2017-10-09 RX ORDER — TEMAZEPAM 30 MG/1
30 CAPSULE ORAL
Qty: 30 CAP | Refills: 1 | OUTPATIENT
Start: 2017-10-09 | End: 2017-10-18 | Stop reason: SDUPTHER

## 2017-10-12 DIAGNOSIS — E78.5 HYPERLIPIDEMIA, UNSPECIFIED HYPERLIPIDEMIA TYPE: ICD-10-CM

## 2017-10-13 DIAGNOSIS — E78.5 HYPERLIPIDEMIA, UNSPECIFIED HYPERLIPIDEMIA TYPE: ICD-10-CM

## 2017-10-13 RX ORDER — SIMVASTATIN 20 MG/1
TABLET, FILM COATED ORAL
Qty: 30 TAB | Refills: 0 | Status: SHIPPED | OUTPATIENT
Start: 2017-10-13 | End: 2017-10-13 | Stop reason: SDUPTHER

## 2017-10-13 RX ORDER — SIMVASTATIN 20 MG/1
TABLET, FILM COATED ORAL
Qty: 90 TAB | Refills: 0 | Status: SHIPPED | OUTPATIENT
Start: 2017-10-13 | End: 2018-01-08 | Stop reason: SDUPTHER

## 2017-10-18 ENCOUNTER — OFFICE VISIT (OUTPATIENT)
Dept: BEHAVIORAL/MENTAL HEALTH CLINIC | Age: 62
End: 2017-10-18

## 2017-10-18 VITALS
DIASTOLIC BLOOD PRESSURE: 93 MMHG | HEART RATE: 79 BPM | BODY MASS INDEX: 53.92 KG/M2 | WEIGHT: 293 LBS | SYSTOLIC BLOOD PRESSURE: 145 MMHG | HEIGHT: 62 IN

## 2017-10-18 DIAGNOSIS — Z13.31 SCREENING FOR DEPRESSION: Primary | ICD-10-CM

## 2017-10-18 DIAGNOSIS — F41.1 GAD (GENERALIZED ANXIETY DISORDER): ICD-10-CM

## 2017-10-18 DIAGNOSIS — F41.0 PANIC DISORDER: ICD-10-CM

## 2017-10-18 DIAGNOSIS — F33.0 MDD (MAJOR DEPRESSIVE DISORDER), RECURRENT EPISODE, MILD (HCC): ICD-10-CM

## 2017-10-18 RX ORDER — TEMAZEPAM 22.5 MG/1
45 CAPSULE ORAL
Qty: 60 CAP | Refills: 1 | Status: SHIPPED | OUTPATIENT
Start: 2017-10-18 | End: 2017-12-19 | Stop reason: SDUPTHER

## 2017-10-18 RX ORDER — DULOXETIN HYDROCHLORIDE 60 MG/1
60 CAPSULE, DELAYED RELEASE ORAL DAILY
Qty: 30 CAP | Refills: 1 | Status: SHIPPED | OUTPATIENT
Start: 2017-10-18 | End: 2017-12-19 | Stop reason: SDUPTHER

## 2017-10-18 RX ORDER — DULOXETIN HYDROCHLORIDE 60 MG/1
CAPSULE, DELAYED RELEASE ORAL
Qty: 90 CAP | Refills: 1 | OUTPATIENT
Start: 2017-10-18

## 2017-10-18 RX ORDER — ALPRAZOLAM 0.5 MG/1
0.5 TABLET ORAL
Qty: 60 TAB | Refills: 1 | Status: SHIPPED | OUTPATIENT
Start: 2017-10-18 | End: 2017-12-19 | Stop reason: SDUPTHER

## 2017-10-18 NOTE — MR AVS SNAPSHOT
Visit Information Date & Time Provider Department Dept. Phone Encounter #  
 10/18/2017  2:30 PM Lavinia Seaman NP [address] [phone] [id number] Upcoming Health Maintenance Date Due Hepatitis C Screening 1955 DTaP/Tdap/Td series (1 - Tdap) 3/5/1975 PAP AKA CERVICAL CYTOLOGY 3/5/1975 BREAST CANCER SCRN MAMMOGRAM 3/5/2004 ZOSTER VACCINE AGE 60> 1/5/2014 MEDICARE YEARLY EXAM 2/8/2017 FOBT Q 1 YEAR AGE 50-75 2/27/2017 INFLUENZA AGE 9 TO ADULT 8/1/2017 Allergies as of 10/18/2017  Review Complete On: 10/18/2017 By: Lavinia Seaman NP No Known Allergies Current Immunizations  Reviewed on 9/10/2015 Name Date Influenza Vaccine 4/9/2015 Pneumococcal Vaccine (Unspecified Type) 4/9/2015 Not reviewed this visit You Were Diagnosed With   
  
 Codes Comments Screening for depression    -  Primary ICD-10-CM: Z13.89 ICD-9-CM: V79.0 MDD (major depressive disorder), recurrent episode, mild ([facility] [address]     ICD-10-CM: F33.0 ICD-9-CM: 296.31   
 GABRIEL (generalized anxiety disorder)     ICD-10-CM: F41.1 ICD-9-CM: 300.02 Panic disorder     ICD-10-CM: F41.0 ICD-9-CM: 300.01 Vitals BP Pulse Height(growth percentile) Weight(growth percentile) BMI OB Status (!) 145/93 79 5' 2\" (1.575 m) 342 lb (155.1 kg) 62.55 kg/m2 Postmenopausal  
 Smoking Status Never Smoker BMI and BSA Data Body Mass Index Body Surface Area 62.55 kg/m 2 2.6 m 2 Preferred Pharmacy Pharmacy Name Phone [address] [facility] [place] [address] [phone] Your Updated Medication List  
  
   
This list is accurate as of: 10/18/17  3:03 PM.  Always use your most recent med list.  
  
  
  
  
 ALPRAZolam 0.5 mg tablet Commonly known as:  [place] Curio Take 1 Tab by mouth two (2) times daily as needed for Anxiety (Do not take with narcotics, other benzos or alcohol. ). Max Daily Amount: 1 mg.  
  
 aspirin-acetaminophen-caffeine 250-250-65 mg per tablet Commonly known as:  Carmen Livers Take 1 Tab by mouth every six (6) hours as needed for Headache. dicyclomine 20 mg tablet Commonly known as:  BENTYL TAKE 1 TABLET BY MOUTH THREE TIMES DAILY DULoxetine 60 mg capsule Commonly known as:  CYMBALTA Take 1 Cap by mouth daily. furosemide 40 mg tablet Commonly known as:  LASIX TAKE 1 TABLET BY MOUTH DAILY HYDROcodone-acetaminophen 5-325 mg per tablet Commonly known as:  Fani Oyster Take 1 Tab by mouth every six (6) hours as needed for Pain. Max Daily Amount: 4 Tabs. lisinopril 5 mg tablet Commonly known as:  PRINIVIL, ZESTRIL  
TAKE 1 TABLET BY MOUTH DAILY  
  
 omeprazole 20 mg capsule Commonly known as:  PRILOSEC Take 1 Cap by mouth daily. * potassium chloride SR 10 mEq tablet Commonly known as:  KLOR-CON 10  
TAKE 2 TABLETS BY MOUTH DAILY * potassium chloride SR 10 mEq tablet Commonly known as:  KLOR-CON 10  
TAKE 2 TABLETS BY MOUTH DAILY  
  
 simvastatin 20 mg tablet Commonly known as:  ZOCOR  
TAKE 1 TABLET BY MOUTH EVERY NIGHT  
  
 temazepam 22.5 mg capsule Commonly known as:  RESTORIL Take 2 Caps by mouth nightly as needed for Sleep (Do not take with narcotics, other benzos or alcohol. ). Max Daily Amount: 45 mg.  
  
 * Notice: This list has 2 medication(s) that are the same as other medications prescribed for you. Read the directions carefully, and ask your doctor or other care provider to review them with you. Prescriptions Printed Refills ALPRAZolam (XANAX) 0.5 mg tablet 1 Sig: Take 1 Tab by mouth two (2) times daily as needed for Anxiety (Do not take with narcotics, other benzos or alcohol. ). Max Daily Amount: 1 mg. Class: Print Route: Oral  
 temazepam (RESTORIL) 22.5 mg capsule 1 Sig: Take 2 Caps by mouth nightly as needed for Sleep (Do not take with narcotics, other benzos or alcohol. ). Max Daily Amount: 45 mg.  
 Class: Print Route: Oral  
  
Prescriptions Sent to Pharmacy Refills DULoxetine (CYMBALTA) 60 mg capsule 1 Sig: Take 1 Cap by mouth daily. Class: Normal  
 Pharmacy: [facility] [address] #: [phone] Route: Oral  
  
We Performed the Following BEHAV ASSMT W/SCORE & DOCD/STAND INSTRUMENT [id number] CPT(R)] Introducing [facility]! Jose Melendez introduces Pimovation patient portal. Now you can access parts of your medical record, email your doctor's office, and request medication refills online. 1. In your internet browser, go to [email or web address] 2. Click on the First Time User? Click Here link in the Sign In box. You will see the New Member Sign Up page. 3. Enter your Pimovation Access Code exactly as it appears below. You will not need to use this code after youve completed the sign-up process. If you do not sign up before the expiration date, you must request a new code. · Pimovation Access Code: [email or web address] Expires: 1/16/2018  3:03 PM 
 
4. Enter the last four digits of your Social Security Number (xxxx) and Date of Birth (mm/dd/yyyy) as indicated and click Submit. You will be taken to the next sign-up page. 5. Create a Mediastreamt ID. This will be your Pimovation login ID and cannot be changed, so think of one that is secure and easy to remember. 6. Create a Pimovation password. You can change your password at any time. 7. Enter your Password Reset Question and Answer. This can be used at a later time if you forget your password. 8. Enter your e-mail address. You will receive e-mail notification when new information is available in [address] 9. Click Sign Up. You can now view and download portions of your medical record. 10. Click the Download Summary menu link to download a portable copy of your medical information. If you have questions, please visit the Frequently Asked Questions section of the Studio Kate website. Remember, Studio Kate is NOT to be used for urgent needs. For medical emergencies, dial 911. Now available from your iPhone and Android! Please provide this summary of care documentation to your next provider. Your primary care clinician is listed as Nahun Hudson. If you have any questions after today's visit, please call [phone].

## 2017-10-18 NOTE — PROGRESS NOTES
CHIEF COMPLAINT:  Hermann Crespo is a 60 y.o. female and was seen today for follow-up of psychiatric condition and psychotropic medication management. HPI:    Nick Vail reports the following psychiatric symptoms:  depression and anxiety. The symptoms have been present for months and are of moderate severity. The symptoms occur less frequently and less severely overall. Pt reports she has been able to wean off of seroquel and zoloft. Pt excited about 22 lb weight loss. She reports depression is less severe but is still feeling very anxious and reports racing thoughts especially at bedtime. Pt here today to review current treatment plan. GDS:  2, very low/none  HAM-A: 25, moderate to severe  MOOD DISORDER QUESTIONNAIRE: negative for Bipolar Disorder    FAMILY/SOCIAL HX: denies any changes or updates    REVIEW OF SYSTEMS:  Psychiatric:  depression, anxiety  Appetite:improved,    Sleep: fitful and poor with DIMS (difficulty initiating & maintaining sleep)   Neuro: denies    Visit Vitals    BP (!) 145/93    Pulse 79    Ht 5' 2\" (1.575 m)    Wt 155.1 kg (342 lb)    BMI 62.55 kg/m2       Side Effects:  none    MENTAL STATUS EXAM:   Sensorium  oriented to time, place and person   Relations cooperative   Appearance:  age appropriate and casually dressed   Motor Behavior:  gait stable and within normal limits   Speech:  normal volume   Thought Process: goal directed   Thought Content free of delusions and free of hallucinations   Suicidal ideations no intention   Homicidal ideations no intention   Mood:  euthymic   Affect:  euthymic and increased in intensity   Memory recent  adequate   Memory remote:  adequate   Concentration:  adequate   Abstraction:  abstract   Insight:  fair and good   Reliability good and fair   Judgment:  fair and good     MEDICAL DECISION MAKING:  Problems addressed today:    ICD-10-CM ICD-9-CM    1. Screening for depression Z13.89 V79.0 BEHAV ASSMT W/SCORE & DOCD/STAND INSTRUMENT   2. MDD (major depressive disorder), recurrent episode, mild (HCC) F33.0 296.31    3. GABRIEL (generalized anxiety disorder) F41.1 300.02    4. Panic disorder F41.0 300.01        Assessment:   Norma Arceo is responding to treatment, symptoms are currently improving. Pt reports she feels better since weaning off of seroquel and zoloft. She is tolerating cymbalta well and depression score is low to none. Still with sig anxiety and pt has benefited from switch to temazepam in terms of weight loss but she is getting up at night. Pt also reports racing thoughts especially at nighttime. Of note, pt's Mood Disorder screening for bipolar was negative but pt endorses several bipolar mood symptoms. Reviewed meds and tx plan. Will increase cymbalta to 60 mg for possible improvement in anxiety. Also, will increase temazepam. Reviewed PRN guidelines for benzo use and discussed benzo safety. Pt asked about a supplement for weight loss. Reviewed literature on [address] [place] [address] St. It appears relatively safe although may not be effective. Pt thinks she may try it but was realistic in her approach. Provided support and encouragement. Reviewed and reinforced lifestyle modification factors and provided a handout on mindful eating. Plan:   1. Current Outpatient Prescriptions   Medication Sig Dispense Refill    ALPRAZolam (XANAX) 0.5 mg tablet Take 1 Tab by mouth two (2) times daily as needed for Anxiety (Do not take with narcotics, other benzos or alcohol. ). Max Daily Amount: 1 mg. 60 Tab 1    DULoxetine (CYMBALTA) 60 mg capsule Take 1 Cap by mouth daily. 30 Cap 1    temazepam (RESTORIL) 22.5 mg capsule Take 2 Caps by mouth nightly as needed for Sleep (Do not take with narcotics, other benzos or alcohol. ).  Max Daily Amount: 45 mg. 60 Cap 1    simvastatin (ZOCOR) 20 mg tablet TAKE 1 TABLET BY MOUTH EVERY NIGHT 90 Tab 0    potassium chloride SR (KLOR-CON 10) 10 mEq tablet TAKE 2 TABLETS BY MOUTH DAILY 180 Tab 6    furosemide (LASIX) 40 mg tablet TAKE 1 TABLET BY MOUTH DAILY 90 Tab 0    potassium chloride SR (KLOR-CON 10) 10 mEq tablet TAKE 2 TABLETS BY MOUTH DAILY 180 Tab 0    dicyclomine (BENTYL) 20 mg tablet TAKE 1 TABLET BY MOUTH THREE TIMES DAILY 90 Tab 0    lisinopril (PRINIVIL, ZESTRIL) 5 mg tablet TAKE 1 TABLET BY MOUTH DAILY 90 Tab 3    aspirin-acetaminophen-caffeine (EXCEDRIN MIGRAINE) 250-250-65 mg per tablet Take 1 Tab by mouth every six (6) hours as needed for Headache. 30 Tab 0    omeprazole (PRILOSEC) 20 mg capsule Take 1 Cap by mouth daily. 30 Cap 6    HYDROcodone-acetaminophen (NORCO) 5-325 mg per tablet Take 1 Tab by mouth every six (6) hours as needed for Pain. Max Daily Amount: 4 Tabs. 12 Tab 0          medication changes made today: increase cymbalta 60 mg for dep/anx, cont alprazolam 0.5 mg bid prn anxiety, increase temazepam 45 mg prn sleep    2. Counseling and coordination of care including instructions for treatment, risks/benefits, risk factor reduction and patient/family education. She agrees with the plan. Patient instructed to call with any side effects, questions or issues. 3.  Follow-up Disposition:  Return in about 8 weeks (around 12/13/2017).     10/18/2017  Valentina Martins NP

## 2017-12-03 DIAGNOSIS — I10 ESSENTIAL HYPERTENSION WITH GOAL BLOOD PRESSURE LESS THAN 140/90: ICD-10-CM

## 2017-12-03 RX ORDER — FUROSEMIDE 40 MG/1
TABLET ORAL
Qty: 90 TAB | Refills: 0 | Status: SHIPPED | OUTPATIENT
Start: 2017-12-03 | End: 2018-02-27 | Stop reason: SDUPTHER

## 2017-12-19 ENCOUNTER — TELEPHONE (OUTPATIENT)
Dept: INTERNAL MEDICINE CLINIC | Age: 62
End: 2017-12-19

## 2017-12-19 ENCOUNTER — OFFICE VISIT (OUTPATIENT)
Dept: BEHAVIORAL/MENTAL HEALTH CLINIC | Age: 62
End: 2017-12-19

## 2017-12-19 VITALS
HEIGHT: 62 IN | SYSTOLIC BLOOD PRESSURE: 151 MMHG | WEIGHT: 293 LBS | DIASTOLIC BLOOD PRESSURE: 104 MMHG | HEART RATE: 107 BPM | BODY MASS INDEX: 53.92 KG/M2

## 2017-12-19 DIAGNOSIS — F41.1 GAD (GENERALIZED ANXIETY DISORDER): ICD-10-CM

## 2017-12-19 DIAGNOSIS — F41.0 PANIC DISORDER: ICD-10-CM

## 2017-12-19 DIAGNOSIS — F33.0 MDD (MAJOR DEPRESSIVE DISORDER), RECURRENT EPISODE, MILD (HCC): Primary | ICD-10-CM

## 2017-12-19 PROBLEM — E66.01 OBESITY, MORBID (HCC): Status: ACTIVE | Noted: 2017-12-19

## 2017-12-19 RX ORDER — ALPRAZOLAM 0.5 MG/1
0.5 TABLET ORAL
Qty: 60 TAB | Refills: 1 | Status: SHIPPED | OUTPATIENT
Start: 2017-12-19 | End: 2018-02-13 | Stop reason: SDUPTHER

## 2017-12-19 RX ORDER — DULOXETIN HYDROCHLORIDE 60 MG/1
60 CAPSULE, DELAYED RELEASE ORAL DAILY
Qty: 30 CAP | Refills: 1 | Status: SHIPPED | OUTPATIENT
Start: 2017-12-19 | End: 2018-02-13 | Stop reason: SDUPTHER

## 2017-12-19 RX ORDER — TEMAZEPAM 22.5 MG/1
45 CAPSULE ORAL
Qty: 60 CAP | Refills: 1 | Status: SHIPPED | OUTPATIENT
Start: 2017-12-19 | End: 2018-02-13 | Stop reason: SDUPTHER

## 2017-12-19 NOTE — MR AVS SNAPSHOT
Visit Information Date & Time Provider Department Dept. Phone Encounter #  
 12/19/2017  2:00 PM [address] [facility] [phone] [id number] Your Appointments 1/31/2018  2:45 PM  
ROUTINE CARE with Saurav Trotter MD  
[facility] [address]) Appt Note: Routine F/U per pat.//  
 [place] [address] [id number]  
[address] [place] [address] [address] [place] [address] Upcoming Health Maintenance Date Due Hepatitis C Screening 1955 DTaP/Tdap/Td series (1 - Tdap) 3/5/1975 PAP AKA CERVICAL CYTOLOGY 3/5/1975 ZOSTER VACCINE AGE 60> 1/5/2014 MEDICARE YEARLY EXAM 2/8/2017 FOBT Q 1 YEAR AGE 50-75 2/27/2017 Influenza Age 5 to Adult 8/1/2017 Allergies as of 12/19/2017  Review Complete On: 12/19/2017 By: Modesta Manning NP No Known Allergies Current Immunizations  Reviewed on 9/10/2015 Name Date Influenza Vaccine 4/9/2015 Pneumococcal Vaccine (Unspecified Type) 4/9/2015 Not reviewed this visit You Were Diagnosed With   
  
 Codes Comments MDD (major depressive disorder), recurrent episode, mild ([facility]    -  Primary ICD-10-CM: F33.0 ICD-9-CM: 296.31   
 GABRIEL (generalized anxiety disorder)     ICD-10-CM: F41.1 ICD-9-CM: 300.02 Panic disorder     ICD-10-CM: F41.0 ICD-9-CM: 300.01 Vitals BP Pulse Height(growth percentile) Weight(growth percentile) BMI OB Status (!) 151/104 (!) 107 5' 2\" (1.575 m) 331 lb (150.1 kg) 60.54 kg/m2 Postmenopausal  
 Smoking Status Never Smoker BMI and BSA Data Body Mass Index Body Surface Area 60.54 kg/m 2 2.56 m 2 Preferred Pharmacy Pharmacy Name Phone [address] [facility] [place] [id number] [phone] Your Updated Medication List  
  
   
 This list is accurate as of: 12/19/17  2:39 PM.  Always use your most recent med list.  
  
  
  
  
 ALPRAZolam 0.5 mg tablet Commonly known as:  Alc Lawson Take 1 Tab by mouth two (2) times daily as needed for Anxiety (Do not take with narcotics, other benzos or alcohol. ). Max Daily Amount: 1 mg.  
  
 aspirin-acetaminophen-caffeine 250-250-65 mg per tablet Commonly known as:  Namita Fuse Take 1 Tab by mouth every six (6) hours as needed for Headache. dicyclomine 20 mg tablet Commonly known as:  BENTYL TAKE 1 TABLET BY MOUTH THREE TIMES DAILY DULoxetine 60 mg capsule Commonly known as:  CYMBALTA Take 1 Cap by mouth daily. furosemide 40 mg tablet Commonly known as:  LASIX TAKE 1 TABLET BY MOUTH DAILY HYDROcodone-acetaminophen 5-325 mg per tablet Commonly known as:  Nelma Kori Take 1 Tab by mouth every six (6) hours as needed for Pain. Max Daily Amount: 4 Tabs. lisinopril 5 mg tablet Commonly known as:  PRINIVIL, ZESTRIL  
TAKE 1 TABLET BY MOUTH DAILY  
  
 omeprazole 20 mg capsule Commonly known as:  PRILOSEC Take 1 Cap by mouth daily. * potassium chloride SR 10 mEq tablet Commonly known as:  KLOR-CON 10  
TAKE 2 TABLETS BY MOUTH DAILY * potassium chloride SR 10 mEq tablet Commonly known as:  KLOR-CON 10  
TAKE 2 TABLETS BY MOUTH DAILY  
  
 simvastatin 20 mg tablet Commonly known as:  ZOCOR  
TAKE 1 TABLET BY MOUTH EVERY NIGHT  
  
 temazepam 22.5 mg capsule Commonly known as:  RESTORIL Take 2 Caps by mouth nightly as needed for Sleep (Do not take with narcotics, other benzos or alcohol. ). Max Daily Amount: 45 mg.  
  
 * Notice: This list has 2 medication(s) that are the same as other medications prescribed for you. Read the directions carefully, and ask your doctor or other care provider to review them with you. Prescriptions Printed Refills  
 temazepam (RESTORIL) 22.5 mg capsule 1 Sig: Take 2 Caps by mouth nightly as needed for Sleep (Do not take with narcotics, other benzos or alcohol. ). Max Daily Amount: 45 mg.  
 Class: Print Route: Oral  
 ALPRAZolam (XANAX) 0.5 mg tablet 1 Sig: Take 1 Tab by mouth two (2) times daily as needed for Anxiety (Do not take with narcotics, other benzos or alcohol. ). Max Daily Amount: 1 mg. Class: Print Route: Oral  
  
Prescriptions Sent to Pharmacy Refills DULoxetine (CYMBALTA) 60 mg capsule 1 Sig: Take 1 Cap by mouth daily. Class: Normal  
 Pharmacy: [facility] [address] Ph #: [phone] Route: Oral  
  
Introducing [facility]! Maren Leroy introduces Familiar patient portal. Now you can access parts of your medical record, email your doctor's office, and request medication refills online. 1. In your internet browser, go to [email or web address] 2. Click on the First Time User? Click Here link in the Sign In box. You will see the New Member Sign Up page. 3. Enter your Familiar Access Code exactly as it appears below. You will not need to use this code after youve completed the sign-up process. If you do not sign up before the expiration date, you must request a new code. · Familiar Access Code: [email or web address] Expires: 1/16/2018  2:03 PM 
 
4. Enter the last four digits of your Social Security Number (xxxx) and Date of Birth (mm/dd/yyyy) as indicated and click Submit. You will be taken to the next sign-up page. 5. Create a Familiar ID. This will be your Familiar login ID and cannot be changed, so think of one that is secure and easy to remember. 6. Create a Familiar password. You can change your password at any time. 7. Enter your Password Reset Question and Answer. This can be used at a later time if you forget your password. 8. Enter your e-mail address.  You will receive e-mail notification when new information is available in [employer]. 9. Click Sign Up. You can now view and download portions of your medical record. 10. Click the Download Summary menu link to download a portable copy of your medical information. If you have questions, please visit the Frequently Asked Questions section of the [employer] website. Remember, [employer] is NOT to be used for urgent needs. For medical emergencies, dial 911. Now available from your iPhone and Android! Please provide this summary of care documentation to your next provider. Your primary care clinician is listed as Lore Steele. If you have any questions after today's visit, please call [phone].

## 2017-12-19 NOTE — PROGRESS NOTES
CHIEF COMPLAINT:  Zoltan Saavedra is a 60 y.o. female and was seen today for follow-up of psychiatric condition and psychotropic medication management. HPI:    Gilbert Arenas reports the following psychiatric symptoms:  depression and anxiety. The symptoms have been present for months and are of moderate/high severity. The symptoms occur less frequently with current medications. Pt reports benefit from medication changes. Pt here to update current treatment plan. FAMILY/SOCIAL HX: son has been dealing with legal issues    REVIEW OF SYSTEMS:  Psychiatric:  depression, anxiety  Appetite:improving, has had more weight loss, working on healthier eating and exercise as tolerated   Sleep: poor with DMS (maintaining sleep) at [facility], has benefited from use of temazepam which allowed pt to wean off of seroquel  Neuro: denies    Visit Vitals    BP (!) 151/104    Pulse (!) 107    Ht 5' 2\" (1.575 m)    Wt 150.1 kg (331 lb)    BMI 60.54 kg/m2     Pt to call PCP to move appt up to have BP evaluated, reports she is taking BP medication    Side Effects:  none    MENTAL STATUS EXAM:   Sensorium  oriented to time, place and person   Relations cooperative   Appearance:  age appropriate and casually dressed   Motor Behavior:  gait stable and within normal limits   Speech:  pressured and loud at times   Thought Process: goal directed   Thought Content free of delusions and free of hallucinations   Suicidal ideations no intention   Homicidal ideations no intention   Mood:  anxious and sad   Affect:  anxious and sad   Memory recent  adequate   Memory remote:  adequate   Concentration:  adequate   Abstraction:  abstract   Insight:  good   Reliability good   Judgment:  good     MEDICAL DECISION MAKING:  Problems addressed today:    ICD-10-CM ICD-9-CM    1. MDD (major depressive disorder), recurrent episode, mild ([facility]) F33.0 296.31    2. GABRIEL (generalized anxiety disorder) F41.1 300.02    3.  Panic disorder F41.0 300.01 Assessment:   Aissatou Queen is responding to treatment, symptoms are improving. Pt is doing well with use of cymbalta. She reports she got confused and took it BID vs daily. Discussed probability of benefiting from higher dose for treatment. Will reduce back to 60 mg daily for maintenance. Pt to call if symptoms worsen. She continues to deal with stressors which impact level of anxiety and contribute to sleep problems. Reviewed use of alprazolam and pt typically takes 1.5 tabs per day. She also reports benefit from use of temazepam. Reviewed benzo safety and prn guidelines. Discussed ways to promote resiliency and to set healthier boundaries. Provided support and reinforced importance of self care. Provided pt time to discuss stressors. Discussed my leaving and agreed to share feedback at next appt. Plan:   1. Current Outpatient Prescriptions   Medication Sig Dispense Refill    temazepam (RESTORIL) 22.5 mg capsule Take 2 Caps by mouth nightly as needed for Sleep (Do not take with narcotics, other benzos or alcohol. ). Max Daily Amount: 45 mg. 60 Cap 1    ALPRAZolam (XANAX) 0.5 mg tablet Take 1 Tab by mouth two (2) times daily as needed for Anxiety (Do not take with narcotics, other benzos or alcohol. ). Max Daily Amount: 1 mg. 60 Tab 1    DULoxetine (CYMBALTA) 60 mg capsule Take 1 Cap by mouth daily.  30 Cap 1    furosemide (LASIX) 40 mg tablet TAKE 1 TABLET BY MOUTH DAILY 90 Tab 0    simvastatin (ZOCOR) 20 mg tablet TAKE 1 TABLET BY MOUTH EVERY NIGHT 90 Tab 0    potassium chloride SR (KLOR-CON 10) 10 mEq tablet TAKE 2 TABLETS BY MOUTH DAILY 180 Tab 6    potassium chloride SR (KLOR-CON 10) 10 mEq tablet TAKE 2 TABLETS BY MOUTH DAILY 180 Tab 0    dicyclomine (BENTYL) 20 mg tablet TAKE 1 TABLET BY MOUTH THREE TIMES DAILY 90 Tab 0    lisinopril (PRINIVIL, ZESTRIL) 5 mg tablet TAKE 1 TABLET BY MOUTH DAILY 90 Tab 3    aspirin-acetaminophen-caffeine (EXCEDRIN MIGRAINE) 250-250-65 mg per tablet Take 1 Tab by mouth every six (6) hours as needed for Headache. 30 Tab 0    omeprazole (PRILOSEC) 20 mg capsule Take 1 Cap by mouth daily. 30 Cap 6    HYDROcodone-acetaminophen (NORCO) 5-325 mg per tablet Take 1 Tab by mouth every six (6) hours as needed for Pain. Max Daily Amount: 4 Tabs. 12 Tab 0          medication changes made today: cont cymbalta 60 mg for dep/anx, cont alprazolam 0.5 mg bid prn anxiety, cont temazepam 45 mg bedtime for sleep    2. Counseling and coordination of care including instructions for treatment, risks/benefits, risk factor reduction and patient/family education. She agrees with the plan. Patient instructed to call with any side effects, questions or issues.      3.  Follow-up Disposition: Not on File    12/19/2017  Robi Herndon NP

## 2017-12-19 NOTE — TELEPHONE ENCOUNTER
Identified patient 2 identifiers verified. Patient will keep her present appointment and monitor her BP and record, go to ED if numbness, tingling in extremities, chest pains, SOB.  Patient and will monitor  reports that she has lost 47 lbs and will monitor her BP and record

## 2017-12-20 NOTE — TELEPHONE ENCOUNTER
Call completed to patient, two identifiers verified. Patient's blood pressure was 151/104 during a visit with Stephanie Hunter on 12/19/2017. Patient also reports a headache and dizziness. Patient inquire if she can adjust her lisinopril (PRINIVIL, ZESTRIL) 5 mg tablet to 10 mg. Patient advised she will need to allow Dr. Fantasma Vang to review and a response will be communicated to her. Patient verbalized an understanding.

## 2017-12-20 NOTE — TELEPHONE ENCOUNTER
Call completed to patient, two identifiers verified. Patient advised to increase lisinopril (PRINIVIL, ZESTRIL) 5 mg tablet to 10 mg daily. Patient verbalized an understanding.

## 2017-12-20 NOTE — TELEPHONE ENCOUNTER
#[phone] pt wants to know if she takes two lisinopril would that help bring the htn down? Please call to advise.

## 2018-01-08 DIAGNOSIS — E78.5 HYPERLIPIDEMIA, UNSPECIFIED HYPERLIPIDEMIA TYPE: ICD-10-CM

## 2018-01-08 NOTE — TELEPHONE ENCOUNTER
Pt stated that Xiomara at [address] stated Dr. Talon Valentine needs to prescribe a new order from \"Lisinopril mg to Lisinopril 10mg\" pr day. Pt stated she will be out of her medication on 1/19/18.        Message received & copied from [address] [place] [address]

## 2018-01-09 RX ORDER — SIMVASTATIN 20 MG/1
TABLET, FILM COATED ORAL
Qty: 90 TAB | Refills: 0 | Status: SHIPPED | OUTPATIENT
Start: 2018-01-09 | End: 2018-04-05 | Stop reason: SDUPTHER

## 2018-01-09 RX ORDER — LISINOPRIL 10 MG/1
TABLET ORAL
Qty: 90 TAB | Refills: 3 | Status: SHIPPED | OUTPATIENT
Start: 2018-01-09 | End: 2018-02-23 | Stop reason: SDUPTHER

## 2018-01-09 NOTE — TELEPHONE ENCOUNTER
Last visit 10/7/16 last refill 5/23/17 Next visit 1/31/18. Called placed to pharmacy for clarification of what was needed. Pharmacy was closed.

## 2018-01-31 ENCOUNTER — OFFICE VISIT (OUTPATIENT)
Dept: INTERNAL MEDICINE CLINIC | Age: 63
End: 2018-01-31

## 2018-01-31 ENCOUNTER — HOSPITAL ENCOUNTER (OUTPATIENT)
Dept: LAB | Age: 63
Discharge: HOME | End: 2018-01-31
Payer: MEDICARE

## 2018-01-31 VITALS
BODY MASS INDEX: 53.92 KG/M2 | TEMPERATURE: 97.6 F | RESPIRATION RATE: 20 BRPM | OXYGEN SATURATION: 95 % | HEART RATE: 95 BPM | SYSTOLIC BLOOD PRESSURE: 130 MMHG | DIASTOLIC BLOOD PRESSURE: 80 MMHG | WEIGHT: 293 LBS | HEIGHT: 62 IN

## 2018-01-31 DIAGNOSIS — R19.8 IRREGULAR BOWEL HABITS: ICD-10-CM

## 2018-01-31 DIAGNOSIS — M25.50 ARTHRALGIA, UNSPECIFIED JOINT: ICD-10-CM

## 2018-01-31 DIAGNOSIS — R73.09 ELEVATED GLUCOSE: ICD-10-CM

## 2018-01-31 DIAGNOSIS — I10 ESSENTIAL HYPERTENSION: ICD-10-CM

## 2018-01-31 DIAGNOSIS — E78.5 HYPERLIPIDEMIA, UNSPECIFIED HYPERLIPIDEMIA TYPE: Primary | ICD-10-CM

## 2018-01-31 DIAGNOSIS — B37.2 SKIN YEAST INFECTION: ICD-10-CM

## 2018-01-31 PROCEDURE — 83036 HEMOGLOBIN GLYCOSYLATED A1C: CPT

## 2018-01-31 PROCEDURE — 80061 LIPID PANEL: CPT

## 2018-01-31 PROCEDURE — 36415 COLL VENOUS BLD VENIPUNCTURE: CPT

## 2018-01-31 PROCEDURE — 85025 COMPLETE CBC W/AUTO DIFF WBC: CPT

## 2018-01-31 PROCEDURE — 80053 COMPREHEN METABOLIC PANEL: CPT

## 2018-01-31 RX ORDER — CHOLECALCIFEROL (VITAMIN D3) 125 MCG
CAPSULE ORAL
Status: CANCELLED | OUTPATIENT
Start: 2018-01-31

## 2018-01-31 RX ORDER — LOPERAMIDE HYDROCHLORIDE 2 MG/1
2 CAPSULE ORAL AS NEEDED
Qty: 20 CAP | Refills: 1 | Status: SHIPPED | OUTPATIENT
Start: 2018-01-31 | End: 2019-01-16 | Stop reason: ALTCHOICE

## 2018-01-31 RX ORDER — LOPERAMIDE HYDROCHLORIDE 2 MG/1
CAPSULE ORAL
COMMUNITY
End: 2018-01-31 | Stop reason: SDUPTHER

## 2018-01-31 RX ORDER — LOPERAMIDE HYDROCHLORIDE 2 MG/1
CAPSULE ORAL
Status: CANCELLED | OUTPATIENT
Start: 2018-01-31

## 2018-01-31 RX ORDER — NYSTATIN 100000 [USP'U]/G
POWDER TOPICAL 4 TIMES DAILY
Qty: 1 BOTTLE | Refills: 2 | Status: SHIPPED | OUTPATIENT
Start: 2018-01-31 | End: 2018-09-02

## 2018-01-31 RX ORDER — CHOLECALCIFEROL (VITAMIN D3) 125 MCG
CAPSULE ORAL
Qty: 30 CAP | Refills: 1 | Status: SHIPPED | OUTPATIENT
Start: 2018-01-31 | End: 2018-03-23 | Stop reason: ALTCHOICE

## 2018-01-31 RX ORDER — CHOLECALCIFEROL (VITAMIN D3) 125 MCG
CAPSULE ORAL
COMMUNITY
End: 2018-01-31 | Stop reason: SDUPTHER

## 2018-01-31 NOTE — PROGRESS NOTES
Chief Complaint   Patient presents with    Obesity    Dizziness     just started     1. Have you been to the ER, urgent care clinic since your last visit? Hospitalized since your last visit? No    2. Have you seen or consulted any other health care providers outside of the [address] [place] [address] since your last visit? Include any pap smears or colon screening.  No

## 2018-01-31 NOTE — PROGRESS NOTES
SUBJECTIVE:   Ms. Vince Fuentes is a 60 y.o. female who is here for follow up of routine medical issues. Pt reports she is doing well and is excited about her weight loss. Her weight in the office today was 322lbs, down from 331lbs during her visit with another physician. Medication: Pt takes OTC imodium as needed. She also takes OTC Aleve for her arthritis. She requested to get prescriptions for these medications because it would be cheaper than buying them OTC. Pt is not currently taking Excedrin. HTN: Pt is compliant in taking lisinopril and lasix. Patient denies chest pain, MARTINS/SOB, edema, headache, visual changes, dizziness, palpitations or syncope. Pt's BP in the office today was elevated at 138/91. After some time in the office, the pt's BP was rechecked and found to be 130/80. HLD: Pt is compliant in taking simvastatin. Patient denies abdominal pain, nausea, vomiting, arthralgias/myalgias due to the medication. Pt reports she has been trying to increase her exercise. She walks occasionally, but quickly feels tired and needs to rest.    Flu Vaccine: Pt reports she did not get a flu vaccine, because she was waiting until she got over a cold. She continues to have a cough and declined getting a flu vaccine today. Pt has been taking Robitussin to help with her cough. At this time, she is otherwise doing well and has brought no other complaints to my attention today. For a list of the medical issues addressed today, see the assessment and plan below. PMH:   Past Medical History:   Diagnosis Date    Arthritis     chronic pain related arthritis    Bipolar 1 disorder (HCC)     GERD (gastroesophageal reflux disease)     Hypertension     Other ill-defined conditions(929.89)     hyperlipidemia.  Stomach abcess    Psychiatric disorder     panic attacks    Sleep apnea     Thromboembolus ([address]     last year L leg    Unspecified sleep apnea      PSH:  has a past surgical history that includes hx gi; hx cholecystectomy; and hx hysterectomy. All: has No Known Allergies. MEDS:   Current Outpatient Prescriptions   Medication Sig    loperamide (IMODIUM) 2 mg capsule Take 1 Cap by mouth as needed for Diarrhea. Indications: Diarrhea    naproxen sodium (ALEVE) 220 mg cap Take one tablet po bid    nystatin (MYCOSTATIN) powder Apply  to affected area four (4) times daily.  simvastatin (ZOCOR) 20 mg tablet TAKE 1 TABLET BY MOUTH EVERY NIGHT    lisinopril (PRINIVIL, ZESTRIL) 10 mg tablet TAKE 1 TABLET BY MOUTH DAILY    temazepam (RESTORIL) 22.5 mg capsule Take 2 Caps by mouth nightly as needed for Sleep (Do not take with narcotics, other benzos or alcohol. ). Max Daily Amount: 45 mg.    ALPRAZolam (XANAX) 0.5 mg tablet Take 1 Tab by mouth two (2) times daily as needed for Anxiety (Do not take with narcotics, other benzos or alcohol. ). Max Daily Amount: 1 mg.  DULoxetine (CYMBALTA) 60 mg capsule Take 1 Cap by mouth daily.  furosemide (LASIX) 40 mg tablet TAKE 1 TABLET BY MOUTH DAILY    potassium chloride SR (KLOR-CON 10) 10 mEq tablet TAKE 2 TABLETS BY MOUTH DAILY    potassium chloride SR (KLOR-CON 10) 10 mEq tablet TAKE 2 TABLETS BY MOUTH DAILY    omeprazole (PRILOSEC) 20 mg capsule Take 1 Cap by mouth daily.  dicyclomine (BENTYL) 20 mg tablet TAKE 1 TABLET BY MOUTH THREE TIMES DAILY    HYDROcodone-acetaminophen (NORCO) 5-325 mg per tablet Take 1 Tab by mouth every six (6) hours as needed for Pain. Max Daily Amount: 4 Tabs. No current facility-administered medications for this visit. FH: family history includes Arthritis-osteo in her mother and sister; Diabetes in her mother, sister, and sister; Drug Abuse in her sister; Heart Attack in her sister; Heart Disease in her mother and sister; High Cholesterol in her mother and sister; Schizophrenia in her paternal grandmother. SH:  reports that she has never smoked.  She has never used smokeless tobacco. She reports that she does not drink alcohol or use illicit drugs. Review of Systems - History obtained from the patient  General ROS: no fever, chills, fatigue, body aches  Psychological ROS: no change in anxiety, depression, SI/HI  Ophthalmic ROS: no blurred vision, myopia, double vision  ENT ROS: no dysphagia, otalgia, otorrhea, rhinorrhea, post nasal drip  Respiratory ROS: cough no shortness of breath, or wheezing  Cardiovascular ROS: no chest pain or dyspnea on exertion  Gastrointestinal ROS: no abdominal pain, change in bowel habits, or black or bloody stools  Genito-Urinary ROS: no frequency, urgency, incontinence, dysuria, hematouria  Musculoskeletal ROS: no arthralagia, myalgia  Neurological ROS: no headaches, dizziness, lightheadedness, tremors, seizures  Dermatological ROS: no rash or lesions    OBJECTIVE:   Vitals:   Visit Vitals    /80    Pulse 95    Temp 97.6 °F (36.4 °C) (Oral)    Resp 20    Ht 5' 2\" (1.575 m)    Wt 322 lb 3.2 oz (146.1 kg)    SpO2 95%    BMI 58.93 kg/m2      Gen: Pleasant 60 y.o.  female in NAD. HEENT: PERRLA. EOMI. OP moist and pink. Neck: Supple. No LAD. HEART: RRR, No M/G/R.      LUNGS: CTAB No W/R. ABDOMEN: S, NT, ND, BS+. EXTREMITIES: Warm. No C/C/E.    MUSCULOSKELETAL: Normal ROM, muscle strength 5/5 all groups. NEURO: Alert and oriented x 3. Cranial nerves grossly intact. No focal sensory or motor deficits noted. SKIN: Warm. Dry. No rashes or other lesions noted. ASSESSMENT/ PLAN: Diagnoses and all orders for this visit:    1. Hyperlipidemia, unspecified hyperlipidemia type  -     LIPID PANEL  -     METABOLIC PANEL, COMPREHENSIVE    2. Essential hypertension  -     LIPID PANEL  -     METABOLIC PANEL, COMPREHENSIVE    3. Irregular bowel habits  -     loperamide (IMODIUM) 2 mg capsule; Take 1 Cap by mouth as needed for Diarrhea. Indications: Diarrhea  -     CBC WITH AUTOMATED DIFF    4.  Arthralgia, unspecified joint  -     naproxen sodium (ALEVE) 220 mg cap; Take one tablet po bid  -     CBC WITH AUTOMATED DIFF    5. Elevated glucose  -     HEMOGLOBIN A1C WITH EAG    6. Skin yeast infection  -     nystatin (MYCOSTATIN) powder; Apply  to affected area four (4) times daily. ICD-10-CM ICD-9-CM    1. Hyperlipidemia, unspecified hyperlipidemia type E78.5 272.4 LIPID PANEL      METABOLIC PANEL, COMPREHENSIVE   2. Essential hypertension I10 401.9 LIPID PANEL      METABOLIC PANEL, COMPREHENSIVE   3. Irregular bowel habits R19.8 569.89 loperamide (IMODIUM) 2 mg capsule      CBC WITH AUTOMATED DIFF   4. Arthralgia, unspecified joint M25.50 719.40 naproxen sodium (ALEVE) 220 mg cap      CBC WITH AUTOMATED DIFF   5. Elevated glucose R73.09 790.29 HEMOGLOBIN A1C WITH EAG   6. Skin yeast infection B37.2 112.3 nystatin (MYCOSTATIN) powder      1. Hyperlipidemia   I recommended continuing current dose of simvastatin, eating a low fat diet, and increasing exercise. Recheck lipid panel. 2. Hypertension  I recommended continuing current dose of lisinopril and lasix, eating a low sodium diet, and increasing exercise. Recheck CMP. 3. Irregular Bowel Habits  I advised pt to continue to use Imodium as needed for diarrhea (prescription given). Recheck CBC. 4. Arthralgia   I advised pt to continue current dose of Aleve (prescription given). Recheck CBC. 5. Elevated Glucose  I advised pt to avoid sugars and starches and to increase exercise when possible. Recheck hemoglobin A1c.     6. Skin yeast infection   I prescribed Nystatin power to use as needed to prevent skin irritation from yeast. I advised pt to use antibacterial soap in areas susceptible to bacterial growth. I advised pt to take Claritin once daily. Follow-up Disposition:  Return in about 6 months (around 7/31/2018) for follow up htn and weight. I have reviewed the patient's medications and risks/side effects/benefits were discussed. Diagnosis(-es) explained to patient and questions answered. Literature provided where appropriate.      Written by Ayla Segovia, as dictated by Chata Matute MD.

## 2018-01-31 NOTE — MR AVS SNAPSHOT
[facility] [address] [facility] 
[phone] Patient: Alia Angeles MRN:  [id number] Visit Information Date & Time Provider Department Dept. Phone Encounter #  
 1/31/2018  2:45 PM Yassine Gayle, [address] [id number] Follow-up Instructions Return in about 6 months (around 7/31/2018) for follow up htn and weight. Your Appointments 2/13/2018  1:00 PM  
ESTABLISHED PATIENT with Bibiana Cordova NP  
[address] [facility]) Appt Note: 8 WEEK F/U  
 [facility] [address] [place] [address]  
[address] [address] [address] [facility] Upcoming Health Maintenance Date Due Hepatitis C Screening 1955 DTaP/Tdap/Td series (1 - Tdap) 3/5/1975 PAP AKA CERVICAL CYTOLOGY 3/5/1975 BREAST CANCER SCRN MAMMOGRAM 3/5/2004 ZOSTER VACCINE AGE 60> 1/5/2014 MEDICARE YEARLY EXAM 2/8/2017 FOBT Q 1 YEAR AGE 50-75 2/27/2017 Influenza Age 5 to Adult 8/1/2017 Allergies as of 1/31/2018  Review Complete On: 1/31/2018 By: Rose Dias LPN No Known Allergies Current Immunizations  Reviewed on 9/10/2015 Name Date Influenza Vaccine 4/9/2015 Pneumococcal Vaccine (Unspecified Type) 4/9/2015 Not reviewed this visit You Were Diagnosed With   
  
 Codes Comments Hyperlipidemia, unspecified hyperlipidemia type    -  Primary ICD-10-CM: E78.5 ICD-9-CM: 272.4 Essential hypertension     ICD-10-CM: I10 
ICD-9-CM: 401.9 Irregular bowel habits     ICD-10-CM: R19.8 ICD-9-CM: 569.89 Arthralgia, unspecified joint     ICD-10-CM: M25.50 ICD-9-CM: 719.40 Elevated glucose     ICD-10-CM: R73.09 
ICD-9-CM: 790.29 Skin yeast infection     ICD-10-CM: B37.2 ICD-9-CM: 112.3 Vitals BP Pulse Temp Resp Height(growth percentile) Weight(growth percentile) 130/80 95 97.6 °F (36.4 °C) (Oral) 20 5' 2\" (1.575 m) 322 lb 3.2 oz (146.1 kg) SpO2 BMI OB Status Smoking Status 95% 58.93 kg/m2 Postmenopausal Never Smoker Vitals History BMI and BSA Data Body Mass Index Body Surface Area 58.93 kg/m 2 2.53 m 2 Preferred Pharmacy Pharmacy Name Phone [facility] [phone] [facility] [place] [phone] Your Updated Medication List  
  
   
This list is accurate as of: 1/31/18  3:23 PM.  Always use your most recent med list.  
  
  
  
  
 ALPRAZolam 0.5 mg tablet Commonly known as:  Bertha Feather Take 1 Tab by mouth two (2) times daily as needed for Anxiety (Do not take with narcotics, other benzos or alcohol. ). Max Daily Amount: 1 mg.  
  
 dicyclomine 20 mg tablet Commonly known as:  BENTYL TAKE 1 TABLET BY MOUTH THREE TIMES DAILY DULoxetine 60 mg capsule Commonly known as:  CYMBALTA Take 1 Cap by mouth daily. furosemide 40 mg tablet Commonly known as:  LASIX TAKE 1 TABLET BY MOUTH DAILY HYDROcodone-acetaminophen 5-325 mg per tablet Commonly known as:  Salvatore Silvius Take 1 Tab by mouth every six (6) hours as needed for Pain. Max Daily Amount: 4 Tabs. lisinopril 10 mg tablet Commonly known as:  PRINIVIL, ZESTRIL  
TAKE 1 TABLET BY MOUTH DAILY  
  
 loperamide 2 mg capsule Commonly known as:  IMODIUM Take 1 Cap by mouth as needed for Diarrhea. Indications: Diarrhea  
  
 naproxen sodium 220 mg Cap Commonly known as:  Graeme Red Take one tablet po bid  
  
 nystatin powder Commonly known as:  MYCOSTATIN Apply  to affected area four (4) times daily. omeprazole 20 mg capsule Commonly known as:  PRILOSEC Take 1 Cap by mouth daily. * potassium chloride SR 10 mEq tablet Commonly known as:  KLOR-CON 10  
TAKE 2 TABLETS BY MOUTH DAILY * potassium chloride SR 10 mEq tablet Commonly known as:  KLOR-CON 10  
TAKE 2 TABLETS BY MOUTH DAILY  
  
 simvastatin 20 mg tablet Commonly known as:  ZOCOR  
TAKE 1 TABLET BY MOUTH EVERY NIGHT  
  
 temazepam 22.5 mg capsule Commonly known as:  RESTORIL Take 2 Caps by mouth nightly as needed for Sleep (Do not take with narcotics, other benzos or alcohol. ). Max Daily Amount: 45 mg.  
  
 * Notice: This list has 2 medication(s) that are the same as other medications prescribed for you. Read the directions carefully, and ask your doctor or other care provider to review them with you. Prescriptions Sent to Pharmacy Refills  
 loperamide (IMODIUM) 2 mg capsule 1 Sig: Take 1 Cap by mouth as needed for Diarrhea. Indications: Diarrhea Class: Normal  
 Pharmacy: [facility] [address] Ph #: [phone] Route: Oral  
 naproxen sodium (ALEVE) 220 mg cap 1 Sig: Take one tablet po bid Class: Normal  
 Pharmacy: [facility] [address] Ph #: [phone]  
 nystatin (MYCOSTATIN) powder 2 Sig: Apply  to affected area four (4) times daily. Class: Normal  
 Pharmacy: [facility] [address] Ph #: [phone] Route: Topical  
  
We Performed the Following CBC WITH AUTOMATED DIFF [82658 CPT(R)] HEMOGLOBIN A1C WITH EAG [81141 CPT(R)] LIPID PANEL [72212 CPT(R)] METABOLIC PANEL, COMPREHENSIVE [61217 CPT(R)] Follow-up Instructions Return in about 6 months (around 7/31/2018) for follow up htn and weight. Introducing [facility] & HEALTH SERVICES! Preston Huang introduces [employer] patient portal. Now you can access parts of your medical record, email your doctor's office, and request medication refills online.    
 
1. In your internet browser, go to [email or web address] 2. Click on the First Time User? Click Here link in the Sign In box. You will see the New Member Sign Up page. 3. Enter your NxThera Access Code exactly as it appears below. You will not need to use this code after youve completed the sign-up process. If you do not sign up before the expiration date, you must request a new code. · NxThera Access Code: [email or web address] Expires: 5/1/2018  3:23 PM 
 
4. Enter the last four digits of your Social Security Number (xxxx) and Date of Birth (mm/dd/yyyy) as indicated and click Submit. You will be taken to the next sign-up page. 5. Create a Soma Watert ID. This will be your NxThera login ID and cannot be changed, so think of one that is secure and easy to remember. 6. Create a NxThera password. You can change your password at any time. 7. Enter your Password Reset Question and Answer. This can be used at a later time if you forget your password. 8. Enter your e-mail address. You will receive e-mail notification when new information is available in [address] 9. Click Sign Up. You can now view and download portions of your medical record. 10. Click the Download Summary menu link to download a portable copy of your medical information. If you have questions, please visit the Frequently Asked Questions section of the NxThera website. Remember, NxThera is NOT to be used for urgent needs. For medical emergencies, dial 911. Now available from your iPhone and Android! Please provide this summary of care documentation to your next provider. Your primary care clinician is listed as Kulwinder Hollins. If you have any questions after today's visit, please call [phone].

## 2018-02-01 LAB
ALBUMIN SERPL-MCNC: 4.5 G/DL (ref 3.6–4.8)
ALBUMIN/GLOB SERPL: 1.7 {RATIO} (ref 1.2–2.2)
ALP SERPL-CCNC: 97 IU/L (ref 39–117)
ALT SERPL-CCNC: 17 IU/L (ref 0–32)
AST SERPL-CCNC: 19 IU/L (ref 0–40)
BASOPHILS # BLD AUTO: 0 X10E3/UL (ref 0–0.2)
BASOPHILS NFR BLD AUTO: 0 %
BILIRUB SERPL-MCNC: 1 MG/DL (ref 0–1.2)
BUN SERPL-MCNC: 10 MG/DL (ref 8–27)
BUN/CREAT SERPL: 12 (ref 12–28)
CALCIUM SERPL-MCNC: 9.6 MG/DL (ref 8.7–10.3)
CHLORIDE SERPL-SCNC: 98 MMOL/L (ref 96–106)
CHOLEST SERPL-MCNC: 226 MG/DL (ref 100–199)
CO2 SERPL-SCNC: 21 MMOL/L (ref 18–29)
CREAT SERPL-MCNC: 0.84 MG/DL (ref 0.57–1)
EOSINOPHIL # BLD AUTO: 0.1 X10E3/UL (ref 0–0.4)
EOSINOPHIL NFR BLD AUTO: 1 %
ERYTHROCYTE [DISTWIDTH] IN BLOOD BY AUTOMATED COUNT: 13.7 % (ref 12.3–15.4)
EST. AVERAGE GLUCOSE BLD GHB EST-MCNC: 126 MG/DL
GFR SERPLBLD CREATININE-BSD FMLA CKD-EPI: 74 ML/MIN/1.73
GFR SERPLBLD CREATININE-BSD FMLA CKD-EPI: 86 ML/MIN/1.73
GLOBULIN SER CALC-MCNC: 2.7 G/DL (ref 1.5–4.5)
GLUCOSE SERPL-MCNC: 111 MG/DL (ref 65–99)
HBA1C MFR BLD: 6 % (ref 4.8–5.6)
HCT VFR BLD AUTO: 41.2 % (ref 34–46.6)
HDLC SERPL-MCNC: 64 MG/DL
HGB BLD-MCNC: 14.3 G/DL (ref 11.1–15.9)
IMM GRANULOCYTES # BLD: 0 X10E3/UL (ref 0–0.1)
IMM GRANULOCYTES NFR BLD: 0 %
LDLC SERPL CALC-MCNC: 125 MG/DL (ref 0–99)
LYMPHOCYTES # BLD AUTO: 3.6 X10E3/UL (ref 0.7–3.1)
LYMPHOCYTES NFR BLD AUTO: 58 %
MCH RBC QN AUTO: 30 PG (ref 26.6–33)
MCHC RBC AUTO-ENTMCNC: 34.7 G/DL (ref 31.5–35.7)
MCV RBC AUTO: 86 FL (ref 79–97)
MONOCYTES # BLD AUTO: 0.5 X10E3/UL (ref 0.1–0.9)
MONOCYTES NFR BLD AUTO: 7 %
NEUTROPHILS # BLD AUTO: 2.1 X10E3/UL (ref 1.4–7)
NEUTROPHILS NFR BLD AUTO: 34 %
PLATELET # BLD AUTO: 187 X10E3/UL (ref 150–379)
POTASSIUM SERPL-SCNC: 3.8 MMOL/L (ref 3.5–5.2)
PROT SERPL-MCNC: 7.2 G/DL (ref 6–8.5)
RBC # BLD AUTO: 4.77 X10E6/UL (ref 3.77–5.28)
SODIUM SERPL-SCNC: 139 MMOL/L (ref 134–144)
TRIGL SERPL-MCNC: 186 MG/DL (ref 0–149)
VLDLC SERPL CALC-MCNC: 37 MG/DL (ref 5–40)
WBC # BLD AUTO: 6.2 X10E3/UL (ref 3.4–10.8)

## 2018-02-02 RX ORDER — DULOXETIN HYDROCHLORIDE 30 MG/1
CAPSULE, DELAYED RELEASE ORAL
Qty: 90 CAP | Refills: 0 | OUTPATIENT
Start: 2018-02-02

## 2018-02-03 NOTE — PROGRESS NOTES
Lipids-Your current lab results reveal a elevated total cholesterol,triglyceride, and ldl. Your total cholesterol should be under 200, triglycerides under 150, and your ldl under 100. Work on following a low fat diet and exercise at least three times a week. CMP-Normal electrolyte levels except for a elevation in glucose levels, normal renal, and liver function. A1c-Your current hgbA1c is lower than your last level of 6.3. Keep up the good work! Continue to work on following a diabetic diet and exercise. Recheck this test: hgbA1c  in  3 months. Continue with current  Medications.

## 2018-02-11 DIAGNOSIS — F41.1 GAD (GENERALIZED ANXIETY DISORDER): Primary | Chronic | ICD-10-CM

## 2018-02-12 DIAGNOSIS — F41.1 GAD (GENERALIZED ANXIETY DISORDER): Primary | Chronic | ICD-10-CM

## 2018-02-12 RX ORDER — ALPRAZOLAM 0.5 MG/1
TABLET ORAL
Qty: 60 TAB | Refills: 0 | OUTPATIENT
Start: 2018-02-12

## 2018-02-12 RX ORDER — TEMAZEPAM 22.5 MG/1
CAPSULE ORAL
Qty: 60 CAP | Refills: 0 | OUTPATIENT
Start: 2018-02-12

## 2018-02-12 RX ORDER — DULOXETIN HYDROCHLORIDE 60 MG/1
CAPSULE, DELAYED RELEASE ORAL
Qty: 30 CAP | Refills: 0 | OUTPATIENT
Start: 2018-02-12

## 2018-02-13 ENCOUNTER — TELEPHONE (OUTPATIENT)
Dept: INTERNAL MEDICINE CLINIC | Age: 63
End: 2018-02-13

## 2018-02-13 ENCOUNTER — OFFICE VISIT (OUTPATIENT)
Dept: BEHAVIORAL/MENTAL HEALTH CLINIC | Age: 63
End: 2018-02-13

## 2018-02-13 VITALS
WEIGHT: 293 LBS | DIASTOLIC BLOOD PRESSURE: 107 MMHG | HEIGHT: 62 IN | SYSTOLIC BLOOD PRESSURE: 167 MMHG | HEART RATE: 82 BPM | BODY MASS INDEX: 53.92 KG/M2

## 2018-02-13 DIAGNOSIS — F33.0 MDD (MAJOR DEPRESSIVE DISORDER), RECURRENT EPISODE, MILD (HCC): ICD-10-CM

## 2018-02-13 DIAGNOSIS — F41.0 PANIC DISORDER: ICD-10-CM

## 2018-02-13 DIAGNOSIS — F41.1 GAD (GENERALIZED ANXIETY DISORDER): Primary | ICD-10-CM

## 2018-02-13 RX ORDER — TEMAZEPAM 22.5 MG/1
22.5 CAPSULE ORAL
Qty: 30 CAP | Refills: 1 | Status: SHIPPED | OUTPATIENT
Start: 2018-02-13 | End: 2018-03-07 | Stop reason: SDUPTHER

## 2018-02-13 RX ORDER — ALPRAZOLAM 0.5 MG/1
0.5 TABLET ORAL
Qty: 60 TAB | Refills: 1 | Status: SHIPPED | OUTPATIENT
Start: 2018-02-13 | End: 2018-04-19 | Stop reason: SDUPTHER

## 2018-02-13 RX ORDER — DULOXETIN HYDROCHLORIDE 30 MG/1
CAPSULE, DELAYED RELEASE ORAL
Qty: 90 CAP | Refills: 1 | OUTPATIENT
Start: 2018-02-13

## 2018-02-13 RX ORDER — DULOXETIN HYDROCHLORIDE 60 MG/1
60 CAPSULE, DELAYED RELEASE ORAL DAILY
Qty: 30 CAP | Refills: 1 | Status: SHIPPED | OUTPATIENT
Start: 2018-02-13 | End: 2018-04-09 | Stop reason: SDUPTHER

## 2018-02-13 RX ORDER — DULOXETIN HYDROCHLORIDE 30 MG/1
30 CAPSULE, DELAYED RELEASE ORAL DAILY
Qty: 30 CAP | Refills: 1 | Status: SHIPPED | OUTPATIENT
Start: 2018-02-13 | End: 2018-04-12 | Stop reason: SDUPTHER

## 2018-02-13 NOTE — MR AVS SNAPSHOT
[address] [facility] 
[phone] Patient: Safia Firtz MRN:  [id number] Visit Information Date & Time Provider Department Dept. Phone Encounter #  
 2/13/2018  1:00 PM Rachelle Mancuso NP [address] [facility] [phone] [id number] Upcoming Health Maintenance Date Due Hepatitis C Screening 1955 DTaP/Tdap/Td series (1 - Tdap) 3/5/1975 PAP AKA CERVICAL CYTOLOGY 3/5/1975 BREAST CANCER SCRN MAMMOGRAM 3/5/2004 ZOSTER VACCINE AGE 60> 1/5/2014 MEDICARE YEARLY EXAM 2/8/2017 FOBT Q 1 YEAR AGE 50-75 2/27/2017 Influenza Age 5 to Adult 8/1/2017 Allergies as of 2/13/2018  Review Complete On: 2/13/2018 By: Rachelle Mancuso NP No Known Allergies Current Immunizations  Reviewed on 9/10/2015 Name Date Influenza Vaccine 4/9/2015 Pneumococcal Vaccine (Unspecified Type) 4/9/2015 Not reviewed this visit You Were Diagnosed With   
  
 Codes Comments GABRIEL (generalized anxiety disorder)    -  Primary ICD-10-CM: F41.1 ICD-9-CM: 300.02   
 MDD (major depressive disorder), recurrent episode, mild (HCC)     ICD-10-CM: F33.0 ICD-9-CM: 296.31 Panic disorder     ICD-10-CM: F41.0 ICD-9-CM: 300.01 Vitals BP Pulse Height(growth percentile) Weight(growth percentile) BMI OB Status (!) 167/107 82 5' 2\" (1.575 m) 327 lb (148.3 kg) 59.81 kg/m2 Postmenopausal  
 Smoking Status Never Smoker BMI and BSA Data Body Mass Index Body Surface Area  
 59.81 kg/m 2 2.55 m 2 Preferred Pharmacy Pharmacy Name Phone [facility] [phone] [facility] [place] [address] [phone] Your Updated Medication List  
  
   
This list is accurate as of: 2/13/18  1:43 PM.  Always use your most recent med list.  
  
  
  
  
 ALPRAZolam 0.5 mg tablet Commonly known as:  Aloma Mable Take 1 Tab by mouth two (2) times daily as needed for Anxiety (Do not take with narcotics, other benzos or alcohol. ). Max Daily Amount: 1 mg.  
  
 dicyclomine 20 mg tablet Commonly known as:  BENTYL TAKE 1 TABLET BY MOUTH THREE TIMES DAILY * DULoxetine 60 mg capsule Commonly known as:  CYMBALTA Take 1 Cap by mouth daily. * DULoxetine 30 mg capsule Commonly known as:  CYMBALTA Take 1 Cap by mouth daily. furosemide 40 mg tablet Commonly known as:  LASIX TAKE 1 TABLET BY MOUTH DAILY HYDROcodone-acetaminophen 5-325 mg per tablet Commonly known as:  Raymond Orange Take 1 Tab by mouth every six (6) hours as needed for Pain. Max Daily Amount: 4 Tabs. lisinopril 10 mg tablet Commonly known as:  PRINIVIL, ZESTRIL  
TAKE 1 TABLET BY MOUTH DAILY  
  
 loperamide 2 mg capsule Commonly known as:  IMODIUM Take 1 Cap by mouth as needed for Diarrhea. Indications: Diarrhea  
  
 naproxen sodium 220 mg Cap Commonly known as:  Melba Hurry Take one tablet po bid  
  
 nystatin powder Commonly known as:  MYCOSTATIN Apply  to affected area four (4) times daily. omeprazole 20 mg capsule Commonly known as:  PRILOSEC Take 1 Cap by mouth daily. * potassium chloride SR 10 mEq tablet Commonly known as:  KLOR-CON 10  
TAKE 2 TABLETS BY MOUTH DAILY * potassium chloride SR 10 mEq tablet Commonly known as:  KLOR-CON 10  
TAKE 2 TABLETS BY MOUTH DAILY  
  
 simvastatin 20 mg tablet Commonly known as:  ZOCOR  
TAKE 1 TABLET BY MOUTH EVERY NIGHT  
  
 temazepam 22.5 mg capsule Commonly known as:  RESTORIL Take 1 Cap by mouth nightly as needed for Sleep (Do not take with narcotics, other benzos or alcohol. For weaning purposes. ). Max Daily Amount: 22.5 mg.  
  
 * Notice: This list has 4 medication(s) that are the same as other medications prescribed for you.  Read the directions carefully, and ask your doctor or other care provider to review them with you. Prescriptions Printed Refills  
 temazepam (RESTORIL) 22.5 mg capsule 1 Sig: Take 1 Cap by mouth nightly as needed for Sleep (Do not take with narcotics, other benzos or alcohol. For weaning purposes. ). Max Daily Amount: 22.5 mg.  
 Class: Print Route: Oral  
 ALPRAZolam (XANAX) 0.5 mg tablet 1 Sig: Take 1 Tab by mouth two (2) times daily as needed for Anxiety (Do not take with narcotics, other benzos or alcohol. ). Max Daily Amount: 1 mg. Class: Print Route: Oral  
  
Prescriptions Sent to Pharmacy Refills DULoxetine (CYMBALTA) 60 mg capsule 1 Sig: Take 1 Cap by mouth daily. Class: Normal  
 Pharmacy: [facility] [address] Ph #: [phone] Route: Oral  
 DULoxetine (CYMBALTA) 30 mg capsule 1 Sig: Take 1 Cap by mouth daily. Class: Normal  
 Pharmacy: [facility] [address] Ph #: [phone] Route: Oral  
  
Introducing [facility] & HEALTH SERVICES! Amada Trevino introduces [employer] patient portal. Now you can access parts of your medical record, email your doctor's office, and request medication refills online. 1. In your internet browser, go to [email or web address] 2. Click on the First Time User? Click Here link in the Sign In box. You will see the New Member Sign Up page. 3. Enter your Fidbacks Access Code exactly as it appears below. You will not need to use this code after youve completed the sign-up process. If you do not sign up before the expiration date, you must request a new code. · Fidbacks Access Code: [email or web address] Expires: 5/1/2018  3:23 PM 
 
4. Enter the last four digits of your Social Security Number (xxxx) and Date of Birth (mm/dd/yyyy) as indicated and click Submit. You will be taken to the next sign-up page. 5. Create a ProspX ID. This will be your ProspX login ID and cannot be changed, so think of one that is secure and easy to remember. 6. Create a ProspX password. You can change your password at any time. 7. Enter your Password Reset Question and Answer. This can be used at a later time if you forget your password. 8. Enter your e-mail address. You will receive e-mail notification when new information is available in [address] 9. Click Sign Up. You can now view and download portions of your medical record. 10. Click the Download Summary menu link to download a portable copy of your medical information. If you have questions, please visit the Frequently Asked Questions section of the ProspX website. Remember, ProspX is NOT to be used for urgent needs. For medical emergencies, dial 911. Now available from your iPhone and Android! Please provide this summary of care documentation to your next provider. Your primary care clinician is listed as Ozzy [facility]. If you have any questions after today's visit, please call [phone].

## 2018-02-13 NOTE — TELEPHONE ENCOUNTER
Patient states she needs a call back in reference to getting her recent test/lab results. Patient states her Blood p[ressure is still elevated each day & when she tries to lay down at night her head is pounding. Please call to discuss.  Thank you

## 2018-02-13 NOTE — PROGRESS NOTES
CHIEF COMPLAINT:  Luiza Rdz is a 60 y.o. female and was seen today for follow-up of psychiatric condition and psychotropic medication management. HPI:    Piedad Hodgkins reports the following psychiatric symptoms:  depression and anxiety. The symptoms have been present for months and are of moderate severity. The symptoms occur more frequently and more severely overall. Pt reports exacerbated symptoms. She also reports feeling dizzy and light-headed. Pt here today to update current treatment plan. FAMILY/SOCIAL HX: son with legal issues    REVIEW OF SYSTEMS:  Psychiatric:  depression, anxiety  Appetite:decreased   Sleep: fitful   Neuro: denies    Visit Vitals    BP (!) 167/107    Pulse 82    Ht 5' 2\" (1.575 m)    Wt 148.3 kg (327 lb)    BMI 59.81 kg/m2      Dizzy and light-headed x3 weeks, pt reports when she lays head back she has \"swimming head\", 2/18/17 pt reports feeling jittery and shakey--pt will call PCP's office for f/u    Side Effects:  none from psychotropics    MENTAL STATUS EXAM:   Sensorium  oriented to time, place and person   Relations cooperative   Appearance:  age appropriate and casually dressed, rushing, slightly disheveled   Motor Behavior:  gait stable and within normal limits   Speech:  normal volume   Thought Process: goal directed   Thought Content free of delusions and free of hallucinations   Suicidal ideations no intention   Homicidal ideations no intention   Mood:  Anxious, sad   Affect:  anxious and increased in intensity, sad   Memory recent  adequate   Memory remote:  adequate   Concentration:  adequate   Abstraction:  abstract   Insight:  fair   Reliability fair   Judgment:  fair     MEDICAL DECISION MAKING:  Problems addressed today:    ICD-10-CM ICD-9-CM    1. GABRIEL (generalized anxiety disorder) F41.1 300.02 temazepam (RESTORIL) 22.5 mg capsule      DULoxetine (CYMBALTA) 60 mg capsule   2.  MDD (major depressive disorder), recurrent episode, mild (HCC) F33.0 296.31 temazepam (RESTORIL) 22.5 mg capsule      DULoxetine (CYMBALTA) 60 mg capsule   3. Panic disorder F41.0 300.01 ALPRAZolam (XANAX) 0.5 mg tablet       Assessment:   Barrera Juárez is responding to treatment overall. She has noticed some exacerbated symptoms. Reviewed options and will increase cymbalta today. Reviewed sleep isues and anxiety. Pt has been using benzo's. Reviewed the risk vs benefit and pt agrees risk is of concern. Will begin weaning off of temazepam first and then will work on alprazolam. Discussed use of alprazolam for prn panic attacks only. Will reduce temazepam to 22.5 g today. Pt plans to f/u with sleep specialist within the next couple of weeks. Reviewed benzo safety and PRN guidelines. Reviewed treatment goal of adequate coverage of SNRI for symptoms management. Provided support and answered pt's questions. Plan:   1. Current Outpatient Prescriptions   Medication Sig Dispense Refill    temazepam (RESTORIL) 22.5 mg capsule Take 1 Cap by mouth nightly as needed for Sleep (Do not take with narcotics, other benzos or alcohol. For weaning purposes. ). Max Daily Amount: 22.5 mg. 30 Cap 1    ALPRAZolam (XANAX) 0.5 mg tablet Take 1 Tab by mouth two (2) times daily as needed for Anxiety (Do not take with narcotics, other benzos or alcohol. ). Max Daily Amount: 1 mg. 60 Tab 1    DULoxetine (CYMBALTA) 60 mg capsule Take 1 Cap by mouth daily. 30 Cap 1    DULoxetine (CYMBALTA) 30 mg capsule Take 1 Cap by mouth daily. 30 Cap 1    loperamide (IMODIUM) 2 mg capsule Take 1 Cap by mouth as needed for Diarrhea. Indications: Diarrhea 20 Cap 1    naproxen sodium (ALEVE) 220 mg cap Take one tablet po bid 30 Cap 1    nystatin (MYCOSTATIN) powder Apply  to affected area four (4) times daily.  1 Bottle 2    simvastatin (ZOCOR) 20 mg tablet TAKE 1 TABLET BY MOUTH EVERY NIGHT 90 Tab 0    lisinopril (PRINIVIL, ZESTRIL) 10 mg tablet TAKE 1 TABLET BY MOUTH DAILY 90 Tab 3    furosemide (LASIX) 40 mg tablet TAKE 1 TABLET BY MOUTH DAILY 90 Tab 0    potassium chloride SR (KLOR-CON 10) 10 mEq tablet TAKE 2 TABLETS BY MOUTH DAILY 180 Tab 6    potassium chloride SR (KLOR-CON 10) 10 mEq tablet TAKE 2 TABLETS BY MOUTH DAILY 180 Tab 0    dicyclomine (BENTYL) 20 mg tablet TAKE 1 TABLET BY MOUTH THREE TIMES DAILY 90 Tab 0    omeprazole (PRILOSEC) 20 mg capsule Take 1 Cap by mouth daily. 30 Cap 6    HYDROcodone-acetaminophen (NORCO) 5-325 mg per tablet Take 1 Tab by mouth every six (6) hours as needed for Pain. Max Daily Amount: 4 Tabs. 12 Tab 0          medication changes made today: increase cymbalta 90 mg for dep/anx, decrease temazepam 22.5 mg prn sleep for weaning, cont alprazolam 0.5 mg bid prn anxiety with plan to wean off daily use    2. Counseling and coordination of care including instructions for treatment, risks/benefits, risk factor reduction and patient/family education. She agrees with the plan. Patient instructed to call with any side effects, questions or issues. 3.  Follow-up Disposition:  Return in about 4 weeks (around 3/13/2018).     2/13/2018  Elizabeth Gregory NP

## 2018-02-13 NOTE — TELEPHONE ENCOUNTER
Pt would like a call back regarding high BP.  Pt states last reading was 167/107.  Pt contact [phone].        Message received & copied from [facility]

## 2018-02-14 ENCOUNTER — HOSPITAL ENCOUNTER (EMERGENCY)
Age: 63
Discharge: HOME | End: 2018-02-14
Attending: EMERGENCY MEDICINE
Payer: MEDICARE

## 2018-02-14 VITALS
SYSTOLIC BLOOD PRESSURE: 146 MMHG | HEIGHT: 62 IN | TEMPERATURE: 98.2 F | WEIGHT: 293 LBS | HEART RATE: 84 BPM | DIASTOLIC BLOOD PRESSURE: 82 MMHG | RESPIRATION RATE: 17 BRPM | OXYGEN SATURATION: 99 % | BODY MASS INDEX: 53.92 KG/M2

## 2018-02-14 DIAGNOSIS — R51.9 ACUTE INTRACTABLE HEADACHE, UNSPECIFIED HEADACHE TYPE: ICD-10-CM

## 2018-02-14 DIAGNOSIS — I10 ESSENTIAL HYPERTENSION: Primary | ICD-10-CM

## 2018-02-14 DIAGNOSIS — R11.2 NAUSEA AND VOMITING, INTRACTABILITY OF VOMITING NOT SPECIFIED, UNSPECIFIED VOMITING TYPE: ICD-10-CM

## 2018-02-14 LAB
ALBUMIN SERPL-MCNC: 3.6 G/DL (ref 3.5–5)
ALBUMIN/GLOB SERPL: 0.9 {RATIO} (ref 1.1–2.2)
ALP SERPL-CCNC: 95 U/L (ref 45–117)
ALT SERPL-CCNC: 25 U/L (ref 12–78)
ANION GAP SERPL CALC-SCNC: 9 MMOL/L (ref 5–15)
AST SERPL-CCNC: 26 U/L (ref 15–37)
BASOPHILS # BLD: 0 K/UL (ref 0–0.1)
BASOPHILS NFR BLD: 1 % (ref 0–1)
BILIRUB SERPL-MCNC: 0.6 MG/DL (ref 0.2–1)
BUN SERPL-MCNC: 12 MG/DL (ref 6–20)
BUN/CREAT SERPL: 12 (ref 12–20)
CALCIUM SERPL-MCNC: 9.7 MG/DL (ref 8.5–10.1)
CHLORIDE SERPL-SCNC: 103 MMOL/L (ref 97–108)
CO2 SERPL-SCNC: 27 MMOL/L (ref 21–32)
CREAT SERPL-MCNC: 0.97 MG/DL (ref 0.55–1.02)
DIFFERENTIAL METHOD BLD: NORMAL
EOSINOPHIL # BLD: 0.1 K/UL (ref 0–0.4)
EOSINOPHIL NFR BLD: 1 % (ref 0–7)
ERYTHROCYTE [DISTWIDTH] IN BLOOD BY AUTOMATED COUNT: 13 % (ref 11.5–14.5)
GLOBULIN SER CALC-MCNC: 4 G/DL (ref 2–4)
GLUCOSE SERPL-MCNC: 96 MG/DL (ref 65–100)
HCT VFR BLD AUTO: 41.8 % (ref 35–47)
HGB BLD-MCNC: 13.7 G/DL (ref 11.5–16)
IMM GRANULOCYTES # BLD: 0 K/UL (ref 0–0.04)
IMM GRANULOCYTES NFR BLD AUTO: 0 % (ref 0–0.5)
LIPASE SERPL-CCNC: 78 U/L (ref 73–393)
LYMPHOCYTES # BLD: 2.8 K/UL (ref 0.8–3.5)
LYMPHOCYTES NFR BLD: 43 % (ref 12–49)
MCH RBC QN AUTO: 29 PG (ref 26–34)
MCHC RBC AUTO-ENTMCNC: 32.8 G/DL (ref 30–36.5)
MCV RBC AUTO: 88.6 FL (ref 80–99)
MONOCYTES # BLD: 0.5 K/UL (ref 0–1)
MONOCYTES NFR BLD: 7 % (ref 5–13)
NEUTS SEG # BLD: 3.1 K/UL (ref 1.8–8)
NEUTS SEG NFR BLD: 48 % (ref 32–75)
NRBC # BLD: 0 K/UL (ref 0–0.01)
NRBC BLD-RTO: 0 PER 100 WBC
PLATELET # BLD AUTO: 184 K/UL (ref 150–400)
PMV BLD AUTO: 12.6 FL (ref 8.9–12.9)
POTASSIUM SERPL-SCNC: 4.7 MMOL/L (ref 3.5–5.1)
PROT SERPL-MCNC: 7.6 G/DL (ref 6.4–8.2)
RBC # BLD AUTO: 4.72 M/UL (ref 3.8–5.2)
SODIUM SERPL-SCNC: 139 MMOL/L (ref 136–145)
TROPONIN I SERPL-MCNC: <0.04 NG/ML
WBC # BLD AUTO: 6.5 K/UL (ref 3.6–11)

## 2018-02-14 PROCEDURE — 99282 EMERGENCY DEPT VISIT SF MDM: CPT

## 2018-02-14 PROCEDURE — 83690 ASSAY OF LIPASE: CPT | Performed by: EMERGENCY MEDICINE

## 2018-02-14 PROCEDURE — 96375 TX/PRO/DX INJ NEW DRUG ADDON: CPT

## 2018-02-14 PROCEDURE — 74011250636 HC RX REV CODE- 250/636: Performed by: EMERGENCY MEDICINE

## 2018-02-14 PROCEDURE — 85025 COMPLETE CBC W/AUTO DIFF WBC: CPT | Performed by: EMERGENCY MEDICINE

## 2018-02-14 PROCEDURE — 84484 ASSAY OF TROPONIN QUANT: CPT | Performed by: EMERGENCY MEDICINE

## 2018-02-14 PROCEDURE — 36415 COLL VENOUS BLD VENIPUNCTURE: CPT | Performed by: EMERGENCY MEDICINE

## 2018-02-14 PROCEDURE — 96361 HYDRATE IV INFUSION ADD-ON: CPT

## 2018-02-14 PROCEDURE — 96374 THER/PROPH/DIAG INJ IV PUSH: CPT

## 2018-02-14 PROCEDURE — 80053 COMPREHEN METABOLIC PANEL: CPT | Performed by: EMERGENCY MEDICINE

## 2018-02-14 RX ORDER — KETOROLAC TROMETHAMINE 30 MG/ML
15 INJECTION, SOLUTION INTRAMUSCULAR; INTRAVENOUS
Status: COMPLETED | OUTPATIENT
Start: 2018-02-14 | End: 2018-02-14

## 2018-02-14 RX ORDER — ONDANSETRON 4 MG/1
4 TABLET, ORALLY DISINTEGRATING ORAL
Qty: 10 TAB | Refills: 0 | Status: SHIPPED | OUTPATIENT
Start: 2018-02-14 | End: 2019-01-16 | Stop reason: ALTCHOICE

## 2018-02-14 RX ORDER — SODIUM CHLORIDE 0.9 % (FLUSH) 0.9 %
5-10 SYRINGE (ML) INJECTION AS NEEDED
Status: DISCONTINUED | OUTPATIENT
Start: 2018-02-14 | End: 2018-02-14 | Stop reason: HOSPADM

## 2018-02-14 RX ORDER — SODIUM CHLORIDE 0.9 % (FLUSH) 0.9 %
5-10 SYRINGE (ML) INJECTION EVERY 8 HOURS
Status: DISCONTINUED | OUTPATIENT
Start: 2018-02-14 | End: 2018-02-14 | Stop reason: HOSPADM

## 2018-02-14 RX ORDER — METOCLOPRAMIDE HYDROCHLORIDE 5 MG/ML
10 INJECTION INTRAMUSCULAR; INTRAVENOUS
Status: COMPLETED | OUTPATIENT
Start: 2018-02-14 | End: 2018-02-14

## 2018-02-14 RX ADMIN — METOCLOPRAMIDE 10 MG: 5 INJECTION, SOLUTION INTRAMUSCULAR; INTRAVENOUS at 12:53

## 2018-02-14 RX ADMIN — KETOROLAC TROMETHAMINE 15 MG: 30 INJECTION, SOLUTION INTRAMUSCULAR at 12:53

## 2018-02-14 RX ADMIN — SODIUM CHLORIDE 500 ML: 900 INJECTION, SOLUTION INTRAVENOUS at 12:53

## 2018-02-14 NOTE — TELEPHONE ENCOUNTER
Pt request to have nurse call her as soon as possible in reference to ER visit for cholesterol, best contact number to reach pt is [phone].       Message received & copied from [facility]

## 2018-02-14 NOTE — ED PROVIDER NOTES
EMERGENCY DEPARTMENT HISTORY AND PHYSICAL EXAM      Date: 2/14/2018  Patient Name: Safia Fritz    History of Presenting Illness     Chief Complaint   Patient presents with    Hypertension     pt reported \"my BP has been high for over a month, this morning it was 167/107\"  Had lisinopril increased from 5 to 10 mg on 1/9 with no improvement in BP per patient    Vomiting     pt c/o vomiting since yesterday and she hasn't been able to sleep in two days.  Dizziness     for several weeks       History Provided By: Patient    HPI: Safia Fritz, 60 y.o. female with PMHx significant for HTN, GERD, arthritis, presents ambulatory to the ED with cc of intermittent episodes of dizziness that increase in frequency upon looking upwards, present x 2 days. Pt reports additional associated sx of \"all-over\" 5/10 headache, vomiting (yesterday and today), decreased appetite, nausea, and photophobia. Pt reports that she was recently seen by her PCP and had blood work showing increased HbA1c, HLD and HTN. Pt reports that she is on Lisinopril. She states \"my pressure has been in the 160's/107 ranges and I know it aggravates my headaches\". She states \"my PCP hasn't called me to do anything and I want something to be done\". She has a hx of GERD and seasonal allergies, and reports feeling congested recently. She did have flu-like sx in December and January. She denies abdominal pain, blood in stool, fever. Pt denies smoking or alcohol use. PCP: Suraj Lyman MD    There are no other complaints, changes, or physical findings at this time.     Current Facility-Administered Medications   Medication Dose Route Frequency Provider Last Rate Last Dose    sodium chloride (NS) flush 5-10 mL  5-10 mL IntraVENous Q8H Creed Corrente, DO        sodium chloride (NS) flush 5-10 mL  5-10 mL IntraVENous PRN Creed Corrente, DO         Current Outpatient Prescriptions   Medication Sig Dispense Refill    ondansetron (ZOFRAN ODT) 4 mg disintegrating tablet Take 1 Tab by mouth every eight (8) hours as needed for Nausea. 10 Tab 0    temazepam (RESTORIL) 22.5 mg capsule Take 1 Cap by mouth nightly as needed for Sleep (Do not take with narcotics, other benzos or alcohol. For weaning purposes. ). Max Daily Amount: 22.5 mg. 30 Cap 1    ALPRAZolam (XANAX) 0.5 mg tablet Take 1 Tab by mouth two (2) times daily as needed for Anxiety (Do not take with narcotics, other benzos or alcohol. ). Max Daily Amount: 1 mg. 60 Tab 1    DULoxetine (CYMBALTA) 60 mg capsule Take 1 Cap by mouth daily. 30 Cap 1    DULoxetine (CYMBALTA) 30 mg capsule Take 1 Cap by mouth daily. 30 Cap 1    loperamide (IMODIUM) 2 mg capsule Take 1 Cap by mouth as needed for Diarrhea. Indications: Diarrhea 20 Cap 1    naproxen sodium (ALEVE) 220 mg cap Take one tablet po bid 30 Cap 1    nystatin (MYCOSTATIN) powder Apply  to affected area four (4) times daily. 1 Bottle 2    simvastatin (ZOCOR) 20 mg tablet TAKE 1 TABLET BY MOUTH EVERY NIGHT 90 Tab 0    lisinopril (PRINIVIL, ZESTRIL) 10 mg tablet TAKE 1 TABLET BY MOUTH DAILY 90 Tab 3    furosemide (LASIX) 40 mg tablet TAKE 1 TABLET BY MOUTH DAILY 90 Tab 0    potassium chloride SR (KLOR-CON 10) 10 mEq tablet TAKE 2 TABLETS BY MOUTH DAILY 180 Tab 6    potassium chloride SR (KLOR-CON 10) 10 mEq tablet TAKE 2 TABLETS BY MOUTH DAILY 180 Tab 0    dicyclomine (BENTYL) 20 mg tablet TAKE 1 TABLET BY MOUTH THREE TIMES DAILY 90 Tab 0    omeprazole (PRILOSEC) 20 mg capsule Take 1 Cap by mouth daily. 30 Cap 6    HYDROcodone-acetaminophen (NORCO) 5-325 mg per tablet Take 1 Tab by mouth every six (6) hours as needed for Pain. Max Daily Amount: 4 Tabs. 12 Tab 0       Past History     Past Medical History:  Past Medical History:   Diagnosis Date    Arthritis     chronic pain related arthritis    Bipolar 1 disorder (HCC)     GERD (gastroesophageal reflux disease)     Hypertension     Other ill-defined conditions(169.89)     hyperlipidemia. Stomach abcess    Psychiatric disorder     panic attacks    Sleep apnea     Thromboembolus ([address]     last year L leg    Unspecified sleep apnea        Past Surgical History:  Past Surgical History:   Procedure Laterality Date    HX CHOLECYSTECTOMY      HX GI      gallbladder removed 2/2010    HX HYSTERECTOMY         Family History:  Family History   Problem Relation Age of Onset    Heart Disease Mother     Diabetes Mother     Arthritis-osteo Mother     High Cholesterol Mother     Heart Attack Sister     Diabetes Sister     Heart Disease Sister     Diabetes Sister     Arthritis-osteo Sister     High Cholesterol Sister     Schizophrenia Paternal Grandmother     Drug Abuse Sister        Social History:  Social History   Substance Use Topics    Smoking status: Never Smoker    Smokeless tobacco: Never Used    Alcohol use No       Allergies:  No Known Allergies      Review of Systems   Review of Systems   Constitutional: Positive for appetite change. Negative for chills, fatigue and fever. HENT: Negative. Negative for congestion, rhinorrhea, sinus pressure and sore throat. Eyes: Positive for photophobia. Respiratory: Negative. Negative for cough, choking, chest tightness, shortness of breath and wheezing. Cardiovascular: Negative. Negative for chest pain, palpitations and leg swelling. Gastrointestinal: Positive for nausea and vomiting. Negative for abdominal pain, blood in stool, constipation and diarrhea. Endocrine: Negative. Genitourinary: Negative. Negative for difficulty urinating, dysuria, flank pain and urgency. Musculoskeletal: Negative. Skin: Negative. Neurological: Positive for dizziness and headaches. Negative for speech difficulty, weakness, light-headedness and numbness. Psychiatric/Behavioral: Negative. All other systems reviewed and are negative. Physical Exam   Physical Exam   Constitutional: She is oriented to person, place, and time.  She appears well-developed and well-nourished. HENT:   Head: Normocephalic and atraumatic. Right Ear: External ear normal.   Left Ear: External ear normal.   Mouth/Throat: Oropharynx is clear and moist.   Eyes: Conjunctivae and EOM are normal. Pupils are equal, round, and reactive to light. Neck: Normal range of motion. Neck supple. No JVD present. No tracheal deviation present. Cardiovascular: Normal rate, regular rhythm, normal heart sounds and intact distal pulses. No murmur heard. Pulmonary/Chest: Effort normal and breath sounds normal. No stridor. No respiratory distress. She has no wheezes. She has no rales. Abdominal: Soft. Bowel sounds are normal. She exhibits no distension. There is no tenderness. There is no rebound and no guarding. Morbidly obese   Musculoskeletal: Normal range of motion. She exhibits no edema or tenderness. Neurological: She is alert and oriented to person, place, and time. No cranial nerve deficit. No nystagmus, no dysmetria, no focal motor or sensory deficits   Skin: Skin is warm and dry. Psychiatric: She has a normal mood and affect. Her behavior is normal.   Nursing note and vitals reviewed. Diagnostic Study Results     Labs -     Recent Results (from the past 12 hour(s))   CBC WITH AUTOMATED DIFF    Collection Time: 02/14/18 12:50 PM   Result Value Ref Range    WBC 6.5 3.6 - 11.0 K/uL    RBC 4.72 3.80 - 5.20 M/uL    HGB 13.7 11.5 - 16.0 g/dL    HCT 41.8 35.0 - 47.0 %    MCV 88.6 80.0 - 99.0 FL    MCH 29.0 26.0 - 34.0 PG    MCHC 32.8 30.0 - 36.5 g/dL    RDW 13.0 11.5 - 14.5 %    PLATELET 233 264 - 067 K/uL    MPV 12.6 8.9 - 12.9 FL    NRBC 0.0 0  WBC    ABSOLUTE NRBC 0.00 0.00 - 0.01 K/uL    NEUTROPHILS 48 32 - 75 %    LYMPHOCYTES 43 12 - 49 %    MONOCYTES 7 5 - 13 %    EOSINOPHILS 1 0 - 7 %    BASOPHILS 1 0 - 1 %    IMMATURE GRANULOCYTES 0 0.0 - 0.5 %    ABS. NEUTROPHILS 3.1 1.8 - 8.0 K/UL    ABS. LYMPHOCYTES 2.8 0.8 - 3.5 K/UL    ABS.  MONOCYTES 0.5 0.0 - 1.0 K/UL    ABS. EOSINOPHILS 0.1 0.0 - 0.4 K/UL    ABS. BASOPHILS 0.0 0.0 - 0.1 K/UL    ABS. IMM. GRANS. 0.0 0.00 - 0.04 K/UL    DF AUTOMATED     METABOLIC PANEL, COMPREHENSIVE    Collection Time: 02/14/18  1:50 PM   Result Value Ref Range    Sodium 139 136 - 145 mmol/L    Potassium 4.7 3.5 - 5.1 mmol/L    Chloride 103 97 - 108 mmol/L    CO2 27 21 - 32 mmol/L    Anion gap 9 5 - 15 mmol/L    Glucose 96 65 - 100 mg/dL    BUN 12 6 - 20 MG/DL    Creatinine 0.97 0.55 - 1.02 MG/DL    BUN/Creatinine ratio 12 12 - 20      GFR est AA >60 >60 ml/min/1.73m2    GFR est non-AA 58 (L) >60 ml/min/1.73m2    Calcium 9.7 8.5 - 10.1 MG/DL    Bilirubin, total 0.6 0.2 - 1.0 MG/DL    ALT (SGPT) 25 12 - 78 U/L    AST (SGOT) 26 15 - 37 U/L    Alk. phosphatase 95 45 - 117 U/L    Protein, total 7.6 6.4 - 8.2 g/dL    Albumin 3.6 3.5 - 5.0 g/dL    Globulin 4.0 2.0 - 4.0 g/dL    A-G Ratio 0.9 (L) 1.1 - 2.2     TROPONIN I    Collection Time: 02/14/18  1:50 PM   Result Value Ref Range    Troponin-I, Qt. <0.04 <0.05 ng/mL   LIPASE    Collection Time: 02/14/18  1:50 PM   Result Value Ref Range    Lipase 78 73 - 393 U/L       Radiologic Studies -   No orders to display     CT Results  (Last 48 hours)    None        CXR Results  (Last 48 hours)    None            Medical Decision Making   I am the first provider for this patient. I reviewed the vital signs, available nursing notes, past medical history, past surgical history, family history and social history. Vital Signs-Reviewed the patient's vital signs. Patient Vitals for the past 12 hrs:   Temp Pulse Resp BP SpO2   02/14/18 1131 98.2 °F (36.8 °C) 75 16 130/75 100 %       Pulse Oximetry Analysis - 98% on room air    Cardiac Monitor:   Rate: 80 bpm  Rhythm: Normal Sinus Rhythm        Records Reviewed: Old Medical Records    Provider Notes (Medical Decision Making):   DDx: gastritis, ACS, anxiety, hypertensive urgency    ED Course:   Initial assessment performed.  The patients presenting problems have been discussed, and they are in agreement with the care plan formulated and outlined with them. I have encouraged them to ask questions as they arise throughout their visit. Medications   sodium chloride (NS) flush 5-10 mL (not administered)   sodium chloride (NS) flush 5-10 mL (not administered)   sodium chloride 0.9 % bolus infusion 500 mL (0 mL IntraVENous IV Completed 2/14/18 1353)   metoclopramide HCl (REGLAN) injection 10 mg (10 mg IntraVENous Given 2/14/18 1253)   ketorolac (TORADOL) injection 15 mg (15 mg IntraVENous Given 2/14/18 1253)       Time spent with patient reassuring her about her BP readings here in the ED. I have discussed with pt that she needs to ensure that she is sitting for a short time period before checking her BP as this may cause a false read. There are times when you check your BP because of concern and there is potential that stress and worry may also then falsely elevate her BP. I have encouraged pt to keep diary of her BP with these things in mind. I have encouraged to continue diet changes and exercises. Pt has also been under increase stress due to family issues. Disposition:  Discharge Note:  4:23 PM  The pt is ready for discharge. The pt's signs, symptoms, diagnosis, and discharge instructions have been discussed and pt has conveyed their understanding. The pt is to follow up as recommended or return to ER should their symptoms worsen. Plan has been discussed and pt is in agreement. This note is prepared by Angelica Fernnadez, acting as a Scribe for Pablo Spain, [address] [place] [address] Aleja Tsang, DO: The scribe's documentation has been prepared under my direction and personally reviewed by me in its entirety. I confirm that the notes above accurately reflects all work, treatment, procedures, and medical decision making performed by me. PLAN:  1.    Current Discharge Medication List      START taking these medications    Details   ondansetron (ZOFRAN ODT) 4 mg disintegrating tablet Take 1 Tab by mouth every eight (8) hours as needed for Nausea. Qty: 10 Tab, Refills: 0           2. Follow-up Information     Follow up With Details Comments Av. MD Elizabeth Copeland. [address]  [facility] [address]  [facility]  [phone]          Return to ED if worse     Diagnosis     Clinical Impression:   1. Essential hypertension    2. Acute intractable headache, unspecified headache type    3. Nausea and vomiting, intractability of vomiting not specified, unspecified vomiting type        Attestations: This note is prepared by Donna Officer, acting as a Scribe for Robi Mejía, [address] [place], DO: The scribe's documentation has been prepared under my direction and personally reviewed by me in its entirety. I confirm that the notes above accurately reflects all work, treatment, procedures, and medical decision making performed by me.

## 2018-02-14 NOTE — TELEPHONE ENCOUNTER
Identified patient 2 identifiers verified. Patient was called she was on her way to the emergency room, with elevated BP. Reviewed recent labs with patient  She is aware of elevated lab levels. Patient reports that she has not been compliant wit diet and exercise.

## 2018-02-14 NOTE — TELEPHONE ENCOUNTER
Pt would like a call call back in reference to blood work, pt last b/p reading was 167/107, pt's third request.  pt would like a call back [phone].        Message received & copied from [facility]

## 2018-02-14 NOTE — DISCHARGE INSTRUCTIONS
Headache: Care Instructions  Your Care Instructions    Headaches have many possible causes. Most headaches aren't a sign of a more serious problem, and they will get better on their own. Home treatment may help you feel better faster. The doctor has checked you carefully, but problems can develop later. If you notice any problems or new symptoms, get medical treatment right away. Follow-up care is a key part of your treatment and safety. Be sure to make and go to all appointments, and call your doctor if you are having problems. It's also a good idea to know your test results and keep a list of the medicines you take. How can you care for yourself at home? · Do not drive if you have taken a prescription pain medicine. · Rest in a quiet, dark room until your headache is gone. Close your eyes and try to relax or go to sleep. Don't watch TV or read. · Put a cold, moist cloth or cold pack on the painful area for 10 to 20 minutes at a time. Put a thin cloth between the cold pack and your skin. · Use a warm, moist towel or a heating pad set on low to relax tight shoulder and neck muscles. · Have someone gently massage your neck and shoulders. · Take pain medicines exactly as directed. ¨ If the doctor gave you a prescription medicine for pain, take it as prescribed. ¨ If you are not taking a prescription pain medicine, ask your doctor if you can take an over-the-counter medicine. · Be careful not to take pain medicine more often than the instructions allow, because you may get worse or more frequent headaches when the medicine wears off. · Do not ignore new symptoms that occur with a headache, such as a fever, weakness or numbness, vision changes, or confusion. These may be signs of a more serious problem. To prevent headaches  · Keep a headache diary so you can figure out what triggers your headaches. Avoiding triggers may help you prevent headaches.  Record when each headache began, how long it lasted, and what the pain was like (throbbing, aching, stabbing, or dull). Write down any other symptoms you had with the headache, such as nausea, flashing lights or dark spots, or sensitivity to bright light or loud noise. Note if the headache occurred near your period. List anything that might have triggered the headache, such as certain foods (chocolate, cheese, wine) or odors, smoke, bright light, stress, or lack of sleep. · Find healthy ways to deal with stress. Headaches are most common during or right after stressful times. Take time to relax before and after you do something that has caused a headache in the past.  · Try to keep your muscles relaxed by keeping good posture. Check your jaw, face, neck, and shoulder muscles for tension, and try relaxing them. When sitting at a desk, change positions often, and stretch for 30 seconds each hour. · Get plenty of sleep and exercise. · Eat regularly and well. Long periods without food can trigger a headache. · Treat yourself to a massage. Some people find that regular massages are very helpful in relieving tension. · Limit caffeine by not drinking too much coffee, tea, or soda. But don't quit caffeine suddenly, because that can also give you headaches. · Reduce eyestrain from computers by blinking frequently and looking away from the computer screen every so often. Make sure you have proper eyewear and that your monitor is set up properly, about an arm's length away. · Seek help if you have depression or anxiety. Your headaches may be linked to these conditions. Treatment can both prevent headaches and help with symptoms of anxiety or depression. When should you call for help? Call 911 anytime you think you may need emergency care. For example, call if:  ? · You have signs of a stroke. These may include:  ¨ Sudden numbness, paralysis, or weakness in your face, arm, or leg, especially on only one side of your body. ¨ Sudden vision changes.   ¨ Sudden trouble speaking. ¨ Sudden confusion or trouble understanding simple statements. ¨ Sudden problems with walking or balance. ¨ A sudden, severe headache that is different from past headaches. ?Call your doctor now or seek immediate medical care if:  ? · You have a new or worse headache. ? · Your headache gets much worse. Where can you learn more? Go to http://teddy-merlin.info/. Enter M271 in the search box to learn more about \"Headache: Care Instructions. \"  Current as of: October 14, 2016  Content Version: 11.4  © 4217-8412 [employer]. Care instructions adapted under license by [employer] (which disclaims liability or warranty for this information). If you have questions about a medical condition or this instruction, always ask your healthcare professional. [address] any warranty or liability for your use of this information. High Blood Pressure: Care Instructions  Your Care Instructions    If your blood pressure is usually above 140/90, you have high blood pressure, or hypertension. That means the top number is 140 or higher or the bottom number is 90 or higher, or both. Despite what a lot of people think, high blood pressure usually doesn't cause headaches or make you feel dizzy or lightheaded. It usually has no symptoms. But it does increase your risk for heart attack, stroke, and kidney or eye damage. The higher your blood pressure, the more your risk increases. Your doctor will give you a goal for your blood pressure. Your goal will be based on your health and your age. An example of a goal is to keep your blood pressure below 140/90. Lifestyle changes, such as eating healthy and being active, are always important to help lower blood pressure. You might also take medicine to reach your blood pressure goal.  Follow-up care is a key part of your treatment and safety.  Be sure to make and go to all appointments, and call your doctor if you are having problems. It's also a good idea to know your test results and keep a list of the medicines you take. How can you care for yourself at home? Medical treatment  · If you stop taking your medicine, your blood pressure will go back up. You may take one or more types of medicine to lower your blood pressure. Be safe with medicines. Take your medicine exactly as prescribed. Call your doctor if you think you are having a problem with your medicine. · Talk to your doctor before you start taking aspirin every day. Aspirin can help certain people lower their risk of a heart attack or stroke. But taking aspirin isn't right for everyone, because it can cause serious bleeding. · See your doctor regularly. You may need to see the doctor more often at first or until your blood pressure comes down. · If you are taking blood pressure medicine, talk to your doctor before you take decongestants or anti-inflammatory medicine, such as ibuprofen. Some of these medicines can raise blood pressure. · Learn how to check your blood pressure at home. Lifestyle changes  · Stay at a healthy weight. This is especially important if you put on weight around the waist. Losing even 10 pounds can help you lower your blood pressure. · If your doctor recommends it, get more exercise. Walking is a good choice. Bit by bit, increase the amount you walk every day. Try for at least 30 minutes on most days of the week. You also may want to swim, bike, or do other activities. · Avoid or limit alcohol. Talk to your doctor about whether you can drink any alcohol. · Try to limit how much sodium you eat to less than 2,300 milligrams (mg) a day. Your doctor may ask you to try to eat less than 1,500 mg a day. · Eat plenty of fruits (such as bananas and oranges), vegetables, legumes, whole grains, and low-fat dairy products. · Lower the amount of saturated fat in your diet.  Saturated fat is found in animal products such as milk, cheese, and meat. Limiting these foods may help you lose weight and also lower your risk for heart disease. · Do not smoke. Smoking increases your risk for heart attack and stroke. If you need help quitting, talk to your doctor about stop-smoking programs and medicines. These can increase your chances of quitting for good. When should you call for help? Call 911 anytime you think you may need emergency care. This may mean having symptoms that suggest that your blood pressure is causing a serious heart or blood vessel problem. Your blood pressure may be over 180/110. ? For example, call 911 if:  ? · You have symptoms of a heart attack. These may include:  ¨ Chest pain or pressure, or a strange feeling in the chest.  ¨ Sweating. ¨ Shortness of breath. ¨ Nausea or vomiting. ¨ Pain, pressure, or a strange feeling in the back, neck, jaw, or upper belly or in one or both shoulders or arms. ¨ Lightheadedness or sudden weakness. ¨ A fast or irregular heartbeat. ? · You have symptoms of a stroke. These may include:  ¨ Sudden numbness, tingling, weakness, or loss of movement in your face, arm, or leg, especially on only one side of your body. ¨ Sudden vision changes. ¨ Sudden trouble speaking. ¨ Sudden confusion or trouble understanding simple statements. ¨ Sudden problems with walking or balance. ¨ A sudden, severe headache that is different from past headaches. ? · You have severe back or belly pain. ?Do not wait until your blood pressure comes down on its own. Get help right away. ?Call your doctor now or seek immediate care if:  ? · Your blood pressure is much higher than normal (such as 180/110 or higher), but you don't have symptoms. ? · You think high blood pressure is causing symptoms, such as:  ¨ Severe headache. ¨ Blurry vision. ? Watch closely for changes in your health, and be sure to contact your doctor if:  ? · Your blood pressure measures 140/90 or higher at least 2 times.  That means the top number is 140 or higher or the bottom number is 90 or higher, or both. ? · You think you may be having side effects from your blood pressure medicine. ? · Your blood pressure is usually normal, but it goes above normal at least 2 times. Where can you learn more? Go to http://teddy-merlin.info/. Enter R257 in the search box to learn more about \"High Blood Pressure: Care Instructions. \"  Current as of: September 21, 2016  Content Version: 11.4  © 6735-4477 [employer]. Care instructions adapted under license by [employer] (which disclaims liability or warranty for this information). If you have questions about a medical condition or this instruction, always ask your healthcare professional. [address] any warranty or liability for your use of this information. Low Sodium Diet (2,000 Milligram): Care Instructions  Your Care Instructions    Too much sodium causes your body to hold on to extra water. This can raise your blood pressure and force your heart and kidneys to work harder. In very serious cases, this could cause you to be put in the hospital. It might even be life-threatening. By limiting sodium, you will feel better and lower your risk of serious problems. The most common source of sodium is salt. People get most of the salt in their diet from canned, prepared, and packaged foods. Fast food and restaurant meals also are very high in sodium. Your doctor will probably limit your sodium to less than 2,000 milligrams (mg) a day. This limit counts all the sodium in prepared and packaged foods and any salt you add to your food. Follow-up care is a key part of your treatment and safety. Be sure to make and go to all appointments, and call your doctor if you are having problems. It's also a good idea to know your test results and keep a list of the medicines you take. How can you care for yourself at home?   Read food labels  · Read labels on cans and food packages. The labels tell you how much sodium is in each serving. Make sure that you look at the serving size. If you eat more than the serving size, you have eaten more sodium. · Food labels also tell you the Percent Daily Value for sodium. Choose products with low Percent Daily Values for sodium. · Be aware that sodium can come in forms other than salt, including monosodium glutamate (MSG), sodium citrate, and sodium bicarbonate (baking soda). MSG is often added to Asian food. When you eat out, you can sometimes ask for food without MSG or added salt. Buy low-sodium foods  · Buy foods that are labeled \"unsalted\" (no salt added), \"sodium-free\" (less than 5 mg of sodium per serving), or \"low-sodium\" (less than 140 mg of sodium per serving). Foods labeled \"reduced-sodium\" and \"light sodium\" may still have too much sodium. Be sure to read the label to see how much sodium you are getting. · Buy fresh vegetables, or frozen vegetables without added sauces. Buy low-sodium versions of canned vegetables, soups, and other canned goods. Prepare low-sodium meals  · Cut back on the amount of salt you use in cooking. This will help you adjust to the taste. Do not add salt after cooking. One teaspoon of salt has about 2,300 mg of sodium. · Take the salt shaker off the table. · Flavor your food with garlic, lemon juice, onion, vinegar, herbs, and spices. Do not use soy sauce, lite soy sauce, steak sauce, onion salt, garlic salt, celery salt, mustard, or ketchup on your food. · Use low-sodium salad dressings, sauces, and ketchup. Or make your own salad dressings and sauces without adding salt. · Use less salt (or none) when recipes call for it. You can often use half the salt a recipe calls for without losing flavor. Other foods such as rice, pasta, and grains do not need added salt. · Rinse canned vegetables, and cook them in fresh water. This removes some-but not all-of the salt.   · Avoid water that is naturally high in sodium or that has been treated with water softeners, which add sodium. Call your local water company to find out the sodium content of your water supply. If you buy bottled water, read the label and choose a sodium-free brand. Avoid high-sodium foods  · Avoid eating:  ¨ Smoked, cured, salted, and canned meat, fish, and poultry. ¨ Ham, almazan, hot dogs, and luncheon meats. ¨ Regular, hard, and processed cheese and regular peanut butter. ¨ Crackers with salted tops, and other salted snack foods such as pretzels, chips, and salted popcorn. ¨ Frozen prepared meals, unless labeled low-sodium. ¨ Canned and dried soups, broths, and bouillon, unless labeled sodium-free or low-sodium. ¨ Canned vegetables, unless labeled sodium-free or low-sodium. ¨ Western Frannie fries, pizza, tacos, and other fast foods. ¨ Pickles, olives, ketchup, and other condiments, especially soy sauce, unless labeled sodium-free or low-sodium. Where can you learn more? Go to http://teddy-merlin.info/. Enter L325 in the search box to learn more about \"Low Sodium Diet (2,000 Milligram): Care Instructions. \"  Current as of: May 12, 2017  Content Version: 11.4  © 9905-1502 [email or web address]. Care instructions adapted under license by [employer] (which disclaims liability or warranty for this information). If you have questions about a medical condition or this instruction, always ask your healthcare professional. [place] [address] any warranty or liability for your use of this information.

## 2018-02-14 NOTE — ED NOTES
Patient presents to the ED from triage with complaint of elevated blood pressure, dizziness and vomiting. Patient verbalizes frustration with her PCP's office stating they have not been returning her phone calls related to blood work drawn. Provided a listening ear for patient and reassuring presence. Patient reports she hasn't been sleeping well and that she has vomiting intermittently for two days. Patient assisted into stretcher and placed on monitor x3.

## 2018-02-14 NOTE — TELEPHONE ENCOUNTER
Message was left for patient to contact this office with concerns. Patient was seen in ED today.  See notes

## 2018-02-14 NOTE — TELEPHONE ENCOUNTER
Spoke to patient today. Informed her that a results letter went out 2 weeks ago. She was in no distress and feeling better. She will keep her scheduled follow up appointment. Informed her that dizziness could be a side effect of her cymbalta as well a bp elevation. Advised to continue to check bp outside of the office.

## 2018-02-14 NOTE — ED NOTES
Pt received discharge instructions from Dr. Christine Daigle and verbalized understanding. Pt wheeled to exit to wait for a ride.

## 2018-02-14 NOTE — ED NOTES
Bedside and Verbal shift change report given to Hong Jacobs (oncoming nurse) by Ruth Ann Parr (offgoing nurse). Report included the following information SBAR, ED Summary and MAR.

## 2018-02-15 NOTE — TELEPHONE ENCOUNTER
Identified patient 2 identifiers verified. Spoke with patient , reports that she feels better since she went to the ED and Dr. Lizzie Guadalupe had called her and spoke with her.

## 2018-02-23 RX ORDER — LISINOPRIL 10 MG/1
TABLET ORAL
Qty: 90 TAB | Refills: 3 | Status: SHIPPED | OUTPATIENT
Start: 2018-02-23 | End: 2018-12-27 | Stop reason: SDUPTHER

## 2018-02-23 NOTE — TELEPHONE ENCOUNTER
#[phone] pt she lost her lisinopril bottle that was for 90 days and not refillable until the end of March. Pt needs a script sent to [facility] on file. Any questions call pt.

## 2018-02-27 ENCOUNTER — HOSPITAL ENCOUNTER (EMERGENCY)
Age: 63
Discharge: HOME | End: 2018-02-28
Attending: EMERGENCY MEDICINE
Payer: MEDICARE

## 2018-02-27 ENCOUNTER — APPOINTMENT (OUTPATIENT)
Dept: CT IMAGING | Age: 63
End: 2018-02-27
Attending: EMERGENCY MEDICINE
Payer: MEDICARE

## 2018-02-27 ENCOUNTER — TELEPHONE (OUTPATIENT)
Dept: BEHAVIORAL/MENTAL HEALTH CLINIC | Age: 63
End: 2018-02-27

## 2018-02-27 VITALS
OXYGEN SATURATION: 98 % | DIASTOLIC BLOOD PRESSURE: 64 MMHG | WEIGHT: 293 LBS | SYSTOLIC BLOOD PRESSURE: 121 MMHG | RESPIRATION RATE: 22 BRPM | HEIGHT: 62 IN | TEMPERATURE: 98 F | BODY MASS INDEX: 53.92 KG/M2 | HEART RATE: 97 BPM

## 2018-02-27 DIAGNOSIS — I10 ESSENTIAL HYPERTENSION WITH GOAL BLOOD PRESSURE LESS THAN 140/90: ICD-10-CM

## 2018-02-27 DIAGNOSIS — S31.105A OPEN WOUND OF UMBILICAL REGION, INITIAL ENCOUNTER: Primary | ICD-10-CM

## 2018-02-27 LAB
ALBUMIN SERPL-MCNC: 3.3 G/DL (ref 3.5–5)
ALBUMIN/GLOB SERPL: 0.8 {RATIO} (ref 1.1–2.2)
ALP SERPL-CCNC: 98 U/L (ref 45–117)
ALT SERPL-CCNC: 21 U/L (ref 12–78)
ANION GAP SERPL CALC-SCNC: 9 MMOL/L (ref 5–15)
AST SERPL-CCNC: 25 U/L (ref 15–37)
BASOPHILS # BLD: 0 K/UL (ref 0–0.1)
BASOPHILS NFR BLD: 0 % (ref 0–1)
BILIRUB SERPL-MCNC: 0.4 MG/DL (ref 0.2–1)
BUN SERPL-MCNC: 20 MG/DL (ref 6–20)
BUN/CREAT SERPL: 14 (ref 12–20)
CALCIUM SERPL-MCNC: 9 MG/DL (ref 8.5–10.1)
CHLORIDE SERPL-SCNC: 103 MMOL/L (ref 97–108)
CO2 SERPL-SCNC: 25 MMOL/L (ref 21–32)
CREAT SERPL-MCNC: 1.44 MG/DL (ref 0.55–1.02)
DIFFERENTIAL METHOD BLD: ABNORMAL
EOSINOPHIL # BLD: 0.1 K/UL (ref 0–0.4)
EOSINOPHIL NFR BLD: 2 % (ref 0–7)
ERYTHROCYTE [DISTWIDTH] IN BLOOD BY AUTOMATED COUNT: 12.8 % (ref 11.5–14.5)
GLOBULIN SER CALC-MCNC: 3.9 G/DL (ref 2–4)
GLUCOSE SERPL-MCNC: 101 MG/DL (ref 65–100)
HCT VFR BLD AUTO: 39.9 % (ref 35–47)
HGB BLD-MCNC: 13.3 G/DL (ref 11.5–16)
IMM GRANULOCYTES # BLD: 0 K/UL (ref 0–0.04)
IMM GRANULOCYTES NFR BLD AUTO: 0 % (ref 0–0.5)
INR PPP: 1 (ref 0.9–1.1)
LYMPHOCYTES # BLD: 3.8 K/UL (ref 0.8–3.5)
LYMPHOCYTES NFR BLD: 50 % (ref 12–49)
MCH RBC QN AUTO: 28.9 PG (ref 26–34)
MCHC RBC AUTO-ENTMCNC: 33.3 G/DL (ref 30–36.5)
MCV RBC AUTO: 86.7 FL (ref 80–99)
MONOCYTES # BLD: 0.7 K/UL (ref 0–1)
MONOCYTES NFR BLD: 9 % (ref 5–13)
NEUTS SEG # BLD: 2.9 K/UL (ref 1.8–8)
NEUTS SEG NFR BLD: 39 % (ref 32–75)
NRBC # BLD: 0 K/UL (ref 0–0.01)
NRBC BLD-RTO: 0 PER 100 WBC
PLATELET # BLD AUTO: 178 K/UL (ref 150–400)
PMV BLD AUTO: 11.3 FL (ref 8.9–12.9)
POTASSIUM SERPL-SCNC: 4.3 MMOL/L (ref 3.5–5.1)
PROT SERPL-MCNC: 7.2 G/DL (ref 6.4–8.2)
PROTHROMBIN TIME: 10 SEC (ref 9–11.1)
RBC # BLD AUTO: 4.6 M/UL (ref 3.8–5.2)
SODIUM SERPL-SCNC: 137 MMOL/L (ref 136–145)
WBC # BLD AUTO: 7.7 K/UL (ref 3.6–11)

## 2018-02-27 PROCEDURE — 96376 TX/PRO/DX INJ SAME DRUG ADON: CPT

## 2018-02-27 PROCEDURE — 96361 HYDRATE IV INFUSION ADD-ON: CPT

## 2018-02-27 PROCEDURE — 85025 COMPLETE CBC W/AUTO DIFF WBC: CPT | Performed by: EMERGENCY MEDICINE

## 2018-02-27 PROCEDURE — 80053 COMPREHEN METABOLIC PANEL: CPT | Performed by: EMERGENCY MEDICINE

## 2018-02-27 PROCEDURE — 74011250636 HC RX REV CODE- 250/636: Performed by: EMERGENCY MEDICINE

## 2018-02-27 PROCEDURE — 74011636320 HC RX REV CODE- 636/320: Performed by: EMERGENCY MEDICINE

## 2018-02-27 PROCEDURE — 74011250637 HC RX REV CODE- 250/637: Performed by: EMERGENCY MEDICINE

## 2018-02-27 PROCEDURE — 96374 THER/PROPH/DIAG INJ IV PUSH: CPT

## 2018-02-27 PROCEDURE — 74177 CT ABD & PELVIS W/CONTRAST: CPT

## 2018-02-27 PROCEDURE — 96375 TX/PRO/DX INJ NEW DRUG ADDON: CPT

## 2018-02-27 PROCEDURE — 36415 COLL VENOUS BLD VENIPUNCTURE: CPT | Performed by: EMERGENCY MEDICINE

## 2018-02-27 PROCEDURE — 74011000250 HC RX REV CODE- 250: Performed by: EMERGENCY MEDICINE

## 2018-02-27 PROCEDURE — 85610 PROTHROMBIN TIME: CPT | Performed by: EMERGENCY MEDICINE

## 2018-02-27 PROCEDURE — 93005 ELECTROCARDIOGRAM TRACING: CPT

## 2018-02-27 PROCEDURE — 99283 EMERGENCY DEPT VISIT LOW MDM: CPT

## 2018-02-27 RX ORDER — MORPHINE SULFATE 2 MG/ML
4 INJECTION, SOLUTION INTRAMUSCULAR; INTRAVENOUS ONCE
Status: COMPLETED | OUTPATIENT
Start: 2018-02-27 | End: 2018-02-27

## 2018-02-27 RX ORDER — ONDANSETRON 2 MG/ML
8 INJECTION INTRAMUSCULAR; INTRAVENOUS
Status: COMPLETED | OUTPATIENT
Start: 2018-02-27 | End: 2018-02-27

## 2018-02-27 RX ORDER — SODIUM CHLORIDE 0.9 % (FLUSH) 0.9 %
10 SYRINGE (ML) INJECTION
Status: COMPLETED | OUTPATIENT
Start: 2018-02-27 | End: 2018-02-27

## 2018-02-27 RX ORDER — HYDROCODONE BITARTRATE AND ACETAMINOPHEN 5; 325 MG/1; MG/1
1 TABLET ORAL
Qty: 12 TAB | Refills: 0 | Status: SHIPPED | OUTPATIENT
Start: 2018-02-27 | End: 2018-03-23

## 2018-02-27 RX ORDER — CEPHALEXIN 500 MG/1
1000 CAPSULE ORAL 2 TIMES DAILY
Qty: 28 CAP | Refills: 0 | Status: SHIPPED | OUTPATIENT
Start: 2018-02-27 | End: 2018-03-06

## 2018-02-27 RX ORDER — SODIUM CHLORIDE 9 MG/ML
50 INJECTION, SOLUTION INTRAVENOUS
Status: COMPLETED | OUTPATIENT
Start: 2018-02-27 | End: 2018-02-27

## 2018-02-27 RX ADMIN — SODIUM CHLORIDE 1000 ML: 900 INJECTION, SOLUTION INTRAVENOUS at 21:10

## 2018-02-27 RX ADMIN — SODIUM CHLORIDE 1000 ML: 900 INJECTION, SOLUTION INTRAVENOUS at 23:14

## 2018-02-27 RX ADMIN — BENZOCAINE, BUTAMBEN, AND TETRACAINE HYDROCHLORIDE: .028; .004; .004 AEROSOL, SPRAY TOPICAL at 23:33

## 2018-02-27 RX ADMIN — MORPHINE SULFATE 4 MG: 2 INJECTION, SOLUTION INTRAMUSCULAR; INTRAVENOUS at 21:10

## 2018-02-27 RX ADMIN — ONDANSETRON HYDROCHLORIDE 8 MG: 2 INJECTION, SOLUTION INTRAMUSCULAR; INTRAVENOUS at 21:10

## 2018-02-27 RX ADMIN — MORPHINE SULFATE 4 MG: 2 INJECTION, SOLUTION INTRAMUSCULAR; INTRAVENOUS at 22:45

## 2018-02-27 RX ADMIN — Medication 10 ML: at 22:33

## 2018-02-27 RX ADMIN — SODIUM CHLORIDE 50 ML/HR: 900 INJECTION, SOLUTION INTRAVENOUS at 22:33

## 2018-02-27 RX ADMIN — IOPAMIDOL 100 ML: 755 INJECTION, SOLUTION INTRAVENOUS at 22:34

## 2018-02-27 NOTE — TELEPHONE ENCOUNTER
Pt called tearful. .. Zoila Favorite decreased her temazepam from 2 tabs to 1 tab and pt is not sleeping now. Very upset. Kept saying she needs 2 tabs a night. Pt is requesting a call back on [phone].

## 2018-02-27 NOTE — TELEPHONE ENCOUNTER
Returned call. Pt reports she has not been sleeping with only 1 temazepam. Pt states she has been up at night and often gets out of bed. Briefly discussed limiting total daily benzo dose. She reports she will decrease alprazolam dose to x1 per day. Agreed to higher dose of temazepam (45 mg) for sleep. Reviewed goal to reduce benzo's and pt in agreement. Will refill temazepam early when pt is out due to need for higher dose.

## 2018-02-28 LAB
ATRIAL RATE: 87 BPM
CALCULATED P AXIS, ECG09: 50 DEGREES
CALCULATED R AXIS, ECG10: 12 DEGREES
CALCULATED T AXIS, ECG11: 44 DEGREES
DIAGNOSIS, 93000: NORMAL
P-R INTERVAL, ECG05: 162 MS
Q-T INTERVAL, ECG07: 390 MS
QRS DURATION, ECG06: 72 MS
QTC CALCULATION (BEZET), ECG08: 469 MS
VENTRICULAR RATE, ECG03: 87 BPM

## 2018-02-28 RX ORDER — FUROSEMIDE 40 MG/1
TABLET ORAL
Qty: 90 TAB | Refills: 0 | Status: SHIPPED | OUTPATIENT
Start: 2018-02-28 | End: 2018-05-27 | Stop reason: SDUPTHER

## 2018-02-28 NOTE — DISCHARGE INSTRUCTIONS
Wound Care: After Your Visit  Your Care Instructions  Taking good care of your wound at home will help it heal quickly and reduce your chance of infection. The doctor has checked you carefully, but problems can develop later. If you notice any problems or new symptoms, get medical treatment right away. Follow-up care is a key part of your treatment and safety. Be sure to make and go to all appointments, and call your doctor if you are having problems. It's also a good idea to know your test results and keep a list of the medicines you take. How can you care for yourself at home? · Clean the area with soap and water 2 times a day unless your doctor gives you different instructions. Don't use hydrogen peroxide or alcohol, which can slow healing. ¨ You may cover the wound with a thin layer of antibiotic ointment, such as bacitracin, and a nonstick bandage. ¨ Apply more ointment and replace the bandage as needed. · Take pain medicines exactly as directed. Some pain is normal with a wound, but do not ignore pain that is getting worse instead of better. You could have an infection. ¨ If the doctor gave you a prescription medicine for pain, take it as prescribed. ¨ If you are not taking a prescription pain medicine, ask your doctor if you can take an over-the-counter medicine. · Your doctor may have closed your wound with stitches (sutures), staples, or skin glue. ¨ If you have stitches, your doctor may remove them after several days to 2 weeks. Or you may have stitches that dissolve on their own. ¨ If you have staples, your doctor may remove them after 7 to 10 days. ¨ If your wound was closed with skin glue, the glue will wear off in a few days to 2 weeks. When should you call for help? Call your doctor now or seek immediate medical care if:  · You have signs of infection, such as:  ¨ Increased pain, swelling, warmth, or redness near the wound. ¨ Red streaks leading from the wound.   ¨ Pus draining from the wound. ¨ A fever. · You bleed so much from your incision that you soak one or more bandages over 2 to 4 hours. Watch closely for changes in your health, and be sure to contact your doctor if:  · The wound is not getting better each day. Where can you learn more? Go to [employer]  Enter M973 in the search box to learn more about \"Wound Care: After Your Visit. \"   © 8412-8370 [employer]. Care instructions adapted under license by [employer] (which disclaims liability or warranty for this information). This care instruction is for use with your licensed healthcare professional. If you have questions about a medical condition or this instruction, always ask your healthcare professional. [address] any warranty or liability for your use of this information. Content Version: 72.0.919538;  Last Revised: April 23, 2012

## 2018-02-28 NOTE — ED NOTES
Lea Carlson MD   has done a bedside review of the discharge instructions. The patient is in understanding. The patients line(s) are removed. The patient is dressed, and belongings together for discharge.

## 2018-02-28 NOTE — ED NOTES
The patient reports to the ED with complaints of drainage and redness and pain around the navel, worse today. Reports her GI Dr. Zina Anaya told her to use Neosporin. Reports it has gotten worse today. Reports nausea, vomiting, diarrhea.

## 2018-02-28 NOTE — ED PROVIDER NOTES
EMERGENCY DEPARTMENT HISTORY AND PHYSICAL EXAM      Date: 2/27/2018  Patient Name: Elton Kramer    History of Presenting Illness     Chief Complaint   Patient presents with    Abdominal Pain     States that her naval is raw and draining pus. This has been going on for a couple of days. History Provided By: Patient    HPI: Elton Kramer, 60 y.o. female with PMHx significant for HTN, anxiety, GERD, diverticulitis, and PShx for cholecystectomy and hysterectomy, presents ambulatory to the ED with cc of gradual onset, constant, pusy naval discharge with abdominal pain and swelling ongoing 2 days. Pt notes that she has experienced similar sxs after laparoscopic abdominal surgeries. She reports fever, chills, and diarrhea for 2 days. Her daily medications include Lisinopril, Lasix, K+, and Simvastatin. She denies a hx of DM or cardiology f/u. Pt specifically denies vomiting. PCP: Rich Peña MD    Social Hx: - Tobacco, - EtOH, - Illicit Drugs    There are no other complaints, changes, or physical findings at this time. Current Facility-Administered Medications   Medication Dose Route Frequency Provider Last Rate Last Dose    sodium chloride 0.9 % bolus infusion 1,000 mL  1,000 mL IntraVENous ONCE Mary Jane Carlson MD 1,000 mL/hr at 02/27/18 2314 1,000 mL at 02/27/18 2314    dental ball (lidocaine/Benadryl/Cetacaine) mixture   Mucous Membrane ONCE Mary Jane Carlson MD         Current Outpatient Prescriptions   Medication Sig Dispense Refill    cephALEXin (KEFLEX) 500 mg capsule Take 2 Caps by mouth two (2) times a day for 7 days. 28 Cap 0    HYDROcodone-acetaminophen (NORCO) 5-325 mg per tablet Take 1 Tab by mouth every six (6) hours as needed for Pain. Max Daily Amount: 4 Tabs.  12 Tab 0    lisinopril (PRINIVIL, ZESTRIL) 10 mg tablet TAKE 1 TABLET BY MOUTH DAILY 90 Tab 3    omeprazole (PRILOSEC) 20 mg capsule TAKE 1 CAPSULE BY MOUTH DAILY 30 Cap 2    ondansetron (ZOFRAN ODT) 4 mg disintegrating tablet Take 1 Tab by mouth every eight (8) hours as needed for Nausea. 10 Tab 0    temazepam (RESTORIL) 22.5 mg capsule Take 1 Cap by mouth nightly as needed for Sleep (Do not take with narcotics, other benzos or alcohol. For weaning purposes. ). Max Daily Amount: 22.5 mg. 30 Cap 1    ALPRAZolam (XANAX) 0.5 mg tablet Take 1 Tab by mouth two (2) times daily as needed for Anxiety (Do not take with narcotics, other benzos or alcohol. ). Max Daily Amount: 1 mg. 60 Tab 1    DULoxetine (CYMBALTA) 60 mg capsule Take 1 Cap by mouth daily. 30 Cap 1    DULoxetine (CYMBALTA) 30 mg capsule Take 1 Cap by mouth daily. 30 Cap 1    loperamide (IMODIUM) 2 mg capsule Take 1 Cap by mouth as needed for Diarrhea. Indications: Diarrhea 20 Cap 1    naproxen sodium (ALEVE) 220 mg cap Take one tablet po bid 30 Cap 1    nystatin (MYCOSTATIN) powder Apply  to affected area four (4) times daily. 1 Bottle 2    simvastatin (ZOCOR) 20 mg tablet TAKE 1 TABLET BY MOUTH EVERY NIGHT 90 Tab 0    furosemide (LASIX) 40 mg tablet TAKE 1 TABLET BY MOUTH DAILY 90 Tab 0    potassium chloride SR (KLOR-CON 10) 10 mEq tablet TAKE 2 TABLETS BY MOUTH DAILY 180 Tab 6    potassium chloride SR (KLOR-CON 10) 10 mEq tablet TAKE 2 TABLETS BY MOUTH DAILY 180 Tab 0    dicyclomine (BENTYL) 20 mg tablet TAKE 1 TABLET BY MOUTH THREE TIMES DAILY 90 Tab 0     Past History     Past Medical History:  Past Medical History:   Diagnosis Date    Arthritis     chronic pain related arthritis    Bipolar 1 disorder (HCC)     GERD (gastroesophageal reflux disease)     Hypertension     Other ill-defined conditions(799.89)     hyperlipidemia.  Stomach abcess    Psychiatric disorder     panic attacks    Sleep apnea     Thromboembolus ([address]     last year L leg    Unspecified sleep apnea      Past Surgical History:  Past Surgical History:   Procedure Laterality Date    HX CHOLECYSTECTOMY      HX GI      gallbladder removed 2/2010    HX HYSTERECTOMY Family History:  Family History   Problem Relation Age of Onset    Heart Disease Mother     Diabetes Mother     Arthritis-osteo Mother     High Cholesterol Mother     Heart Attack Sister     Diabetes Sister     Heart Disease Sister     Diabetes Sister     Arthritis-osteo Sister     High Cholesterol Sister     Schizophrenia Paternal Grandmother     Drug Abuse Sister      Social History:  Social History   Substance Use Topics    Smoking status: Never Smoker    Smokeless tobacco: Never Used    Alcohol use No     Allergies:  No Known Allergies    Review of Systems   Review of Systems   Constitutional: Positive for chills and fever. Negative for activity change, appetite change and unexpected weight change. HENT: Negative for congestion. Eyes: Negative for pain and visual disturbance. Respiratory: Negative for cough and shortness of breath. Cardiovascular: Negative for chest pain. Gastrointestinal: Positive for abdominal distention, abdominal pain, diarrhea and nausea. Negative for vomiting.        + naval discharge    Genitourinary: Negative for dysuria. Musculoskeletal: Negative for back pain. Skin: Negative for rash. Neurological: Negative for headaches. Physical Exam   Physical Exam   Constitutional: She is oriented to person, place, and time. She appears well-developed. Morbidly obese female    HENT:   Head: Normocephalic and atraumatic. Mouth/Throat: Oropharynx is clear and moist.   Eyes: Conjunctivae and EOM are normal. Pupils are equal, round, and reactive to light. Right eye exhibits no discharge. Left eye exhibits no discharge. Neck: Normal range of motion. Neck supple. Cardiovascular: Regular rhythm and normal heart sounds. No murmur heard. Borderline tachycardic    Pulmonary/Chest: Effort normal and breath sounds normal. Tachypnea noted. No respiratory distress. She has no wheezes. She has no rales. Abdominal: Soft.  Bowel sounds are normal. She exhibits no distension. There is tenderness. Abdominal tenderness to the mid region   Erythema around the umbilicus with malodorous pusy discharge    Musculoskeletal: Normal range of motion. She exhibits no edema. No peripheral edema    Neurological: She is alert and oriented to person, place, and time. No cranial nerve deficit. She exhibits normal muscle tone. Skin: Skin is warm and dry. No rash noted. She is not diaphoretic. Nursing note and vitals reviewed. Diagnostic Study Results     Labs -     Recent Results (from the past 12 hour(s))   EKG, 12 LEAD, SUBSEQUENT    Collection Time: 02/27/18  8:58 PM   Result Value Ref Range    Ventricular Rate 87 BPM    Atrial Rate 87 BPM    P-R Interval 162 ms    QRS Duration 72 ms    Q-T Interval 390 ms    QTC Calculation (Bezet) 469 ms    Calculated P Axis 50 degrees    Calculated R Axis 12 degrees    Calculated T Axis 44 degrees    Diagnosis       Normal sinus rhythm  Cannot rule out Anterior infarct , age undetermined  When compared with ECG of 29-MAR-2017 10:23,  Minimal criteria for Anterior infarct are now present     CBC WITH AUTOMATED DIFF    Collection Time: 02/27/18  9:05 PM   Result Value Ref Range    WBC 7.7 3.6 - 11.0 K/uL    RBC 4.60 3.80 - 5.20 M/uL    HGB 13.3 11.5 - 16.0 g/dL    HCT 39.9 35.0 - 47.0 %    MCV 86.7 80.0 - 99.0 FL    MCH 28.9 26.0 - 34.0 PG    MCHC 33.3 30.0 - 36.5 g/dL    RDW 12.8 11.5 - 14.5 %    PLATELET 502 971 - 680 K/uL    MPV 11.3 8.9 - 12.9 FL    NRBC 0.0 0  WBC    ABSOLUTE NRBC 0.00 0.00 - 0.01 K/uL    NEUTROPHILS 39 32 - 75 %    LYMPHOCYTES 50 (H) 12 - 49 %    MONOCYTES 9 5 - 13 %    EOSINOPHILS 2 0 - 7 %    BASOPHILS 0 0 - 1 %    IMMATURE GRANULOCYTES 0 0.0 - 0.5 %    ABS. NEUTROPHILS 2.9 1.8 - 8.0 K/UL    ABS. LYMPHOCYTES 3.8 (H) 0.8 - 3.5 K/UL    ABS. MONOCYTES 0.7 0.0 - 1.0 K/UL    ABS. EOSINOPHILS 0.1 0.0 - 0.4 K/UL    ABS. BASOPHILS 0.0 0.0 - 0.1 K/UL    ABS. IMM.  GRANS. 0.0 0.00 - 0.04 K/UL    DF AUTOMATED     METABOLIC PANEL, COMPREHENSIVE    Collection Time: 02/27/18  9:05 PM   Result Value Ref Range    Sodium 137 136 - 145 mmol/L    Potassium 4.3 3.5 - 5.1 mmol/L    Chloride 103 97 - 108 mmol/L    CO2 25 21 - 32 mmol/L    Anion gap 9 5 - 15 mmol/L    Glucose 101 (H) 65 - 100 mg/dL    BUN 20 6 - 20 MG/DL    Creatinine 1.44 (H) 0.55 - 1.02 MG/DL    BUN/Creatinine ratio 14 12 - 20      GFR est AA 45 (L) >60 ml/min/1.73m2    GFR est non-AA 37 (L) >60 ml/min/1.73m2    Calcium 9.0 8.5 - 10.1 MG/DL    Bilirubin, total 0.4 0.2 - 1.0 MG/DL    ALT (SGPT) 21 12 - 78 U/L    AST (SGOT) 25 15 - 37 U/L    Alk. phosphatase 98 45 - 117 U/L    Protein, total 7.2 6.4 - 8.2 g/dL    Albumin 3.3 (L) 3.5 - 5.0 g/dL    Globulin 3.9 2.0 - 4.0 g/dL    A-G Ratio 0.8 (L) 1.1 - 2.2     PROTHROMBIN TIME + INR    Collection Time: 02/27/18  9:05 PM   Result Value Ref Range    INR 1.0 0.9 - 1.1      Prothrombin time 10.0 9.0 - 11.1 sec     Radiologic Studies -     CT Results  (Last 48 hours)               02/27/18 2233  CT ABD PELV W CONT Final result    Impression:  IMPRESSION:   No acute process on CT. Recommend IV hydration to reduce the risk of contrast induced nephrotoxicity. Narrative:  EXAM:  CT ABD PELV W CONT       INDICATION: Abdominal pain; drainage from umbilicus, pus. Morbid obesity. Renal   insufficiency is new today. Cholecystectomy and hysterectomy. COMPARISON: CT abdomen/pelvis on 5/27/2015. CONTRAST:  100 mL of Isovue-370. TECHNIQUE:    Following the uneventful intravenous administration of contrast, thin axial   images were obtained through the abdomen and pelvis. Coronal and sagittal   reconstructions were generated. Oral contrast was not administered. CT dose   reduction was achieved through use of a standardized protocol tailored for this   examination and automatic exposure control for dose modulation. FINDINGS:    LUNG BASES: Clear.    INCIDENTALLY IMAGED HEART AND MEDIASTINUM: Cardiac size is within normal limits. Small sliding-type hiatal hernia is unchanged. LIVER: No mass or biliary dilatation. GALLBLADDER: Cholecystectomy. CBD is not dilated. SPLEEN: Normal size. No mass or infarct. PANCREAS: No mass or ductal dilatation. ADRENALS: Unremarkable. KIDNEYS: No mass, calculus, or hydronephrosis. STOMACH: Partial distention with food products. SMALL BOWEL: No dilatation or wall thickening. COLON: Moderate colonic diverticulosis. No diverticulitis. APPENDIX: Unremarkable. PERITONEUM: No ascites or pneumoperitoneum. RETROPERITONEUM: No lymphadenopathy or aortic aneurysm. REPRODUCTIVE ORGANS: Hysterectomy. No adnexal mass. URINARY BLADDER: Partial distention. Limited evaluation. BONES: No destructive bone lesion. ADDITIONAL COMMENTS: No drainable fluid collection associated with the elongated   umbilicus. Medical Decision Making   I am the first provider for this patient. I reviewed the vital signs, available nursing notes, past medical history, past surgical history, family history and social history. Vital Signs-Reviewed the patient's vital signs. Patient Vitals for the past 12 hrs:   Temp Pulse Resp BP SpO2   02/27/18 2305 - - - 121/64 98 %   02/27/18 2015 98 °F (36.7 °C) 97 22 120/74 97 %     Pulse Oximetry Analysis - 97% on RA    EKG interpretation: (Preliminary) 20:58  Rhythm: normal sinus rhythm; and regular . Rate (approx.): 87; Axis: normal; FL interval: normal; QRS interval: normal ; ST/T wave: T wave flattening diffuse with T wave inversion V1-V3. Written by Kyaw Gipson ED Scribe, as dictated by Angela Gallego MD.    Records Reviewed: Nursing Notes and Old Medical Records    Provider Notes (Medical Decision Making):   Obese female with periumbilical infection. Exam notable for tenderness, will send patient for CT r/o intra-abdominal source. ED Course:   Initial assessment performed.  The patients presenting problems have been discussed, and they are in agreement with the care plan formulated and outlined with them. I have encouraged them to ask questions as they arise throughout their visit. 22:45 Patient re-evaluated and updated regarding results, just returning from CT. Requesting further medications for pain. VS remain stable. 23:20 PM  Pt states that she has a dental infection that she wants evaluated. Her pain began yesterday but has been intermittent for months. She has dental insurance and can call dentist tomorrow. On exam, she has no facial erythema, edema, induration, or fluctuance. She has a tender upper molar. Pt will take Abx as prescribed and will f/u with dentist tomorrow but return precautions discussed. Disposition:  Discharge Note:  11:30 PM  The patient has been re-evaluated and is ready for discharge. Reviewed available results with patient. Counseled patient/parent/guardian on diagnosis and care plan. Patient has expressed understanding, and all questions have been answered. Patient agrees with plan and agrees to follow up as recommended, or return to the ED if their symptoms worsen. Discharge instructions have been provided and explained to the patient, along with reasons to return to the ED. PLAN:  1. Current Discharge Medication List      START taking these medications    Details   cephALEXin (KEFLEX) 500 mg capsule Take 2 Caps by mouth two (2) times a day for 7 days. Qty: 28 Cap, Refills: 0    Associated Diagnoses: Open wound of umbilical region, initial encounter         CONTINUE these medications which have CHANGED    Details   HYDROcodone-acetaminophen (NORCO) 5-325 mg per tablet Take 1 Tab by mouth every six (6) hours as needed for Pain. Max Daily Amount: 4 Tabs. Qty: 12 Tab, Refills: 0    Associated Diagnoses: Open wound of umbilical region, initial encounter           2.    Follow-up Information     Follow up With Details Comments Contact Info    [facility] EMERGENCY DEPT  If symptoms worsen [phone] Alisa Jha MD  for recheck in 2 days [address] [phone]          Return to ED if worse     Diagnosis     Clinical Impression:   1. Open wound of umbilical region, initial encounter      Attestations:    Attestation: This note is prepared by Beni Paulino, acting as scribe for MD Andreea Gaona MD: The scribe's documentation has been prepared under my direction and personally reviewed by me in its entirety. I confirm that the note above accurately reflects all work, treatment, procedures, and medical decision making performed by me.

## 2018-03-06 DIAGNOSIS — F41.1 GAD (GENERALIZED ANXIETY DISORDER): ICD-10-CM

## 2018-03-06 DIAGNOSIS — F33.0 MDD (MAJOR DEPRESSIVE DISORDER), RECURRENT EPISODE, MILD (HCC): ICD-10-CM

## 2018-03-06 NOTE — TELEPHONE ENCOUNTER
Pt called and scheduled a followup for April. She is saying she needs 2 tabs of temazepam instead of the 1 prescribed. .. Said we need to call [facility] to change it.

## 2018-03-07 RX ORDER — TEMAZEPAM 22.5 MG/1
45 CAPSULE ORAL
Qty: 60 CAP | Refills: 0 | OUTPATIENT
Start: 2018-03-07 | End: 2018-04-12 | Stop reason: SDUPTHER

## 2018-03-07 NOTE — TELEPHONE ENCOUNTER
Per documentation from phone note 2/27, script will be updated to 2 caps qhs. Per  [facility] 2/15, will be called in with a fill date of 3/9. 1 cap qhs will be discontinued.

## 2018-03-15 ENCOUNTER — TELEPHONE (OUTPATIENT)
Dept: INTERNAL MEDICINE CLINIC | Age: 63
End: 2018-03-15

## 2018-03-15 RX ORDER — OMEPRAZOLE 40 MG/1
40 CAPSULE, DELAYED RELEASE ORAL DAILY
Qty: 90 CAP | Refills: 3 | Status: SHIPPED | OUTPATIENT
Start: 2018-03-15 | End: 2018-05-15 | Stop reason: ALTCHOICE

## 2018-03-15 NOTE — TELEPHONE ENCOUNTER
#[phone] pt states she is having a hard time with stomach and asking if you can change the dosage of the omeprazole to 40 mg instead of 20 mg as when she takes 2 she feels so much better. Please call with any questions and to let her know what you can do. Thanks.

## 2018-03-15 NOTE — TELEPHONE ENCOUNTER
Call attempted to patient, there was no answer. Left a voice mail message advising a Rx request would be submitted and allow 48-72 hours for review and a decision.

## 2018-03-23 ENCOUNTER — HOSPITAL ENCOUNTER (EMERGENCY)
Age: 63
Discharge: HOME | End: 2018-03-23
Attending: EMERGENCY MEDICINE | Admitting: EMERGENCY MEDICINE
Payer: MEDICARE

## 2018-03-23 VITALS
SYSTOLIC BLOOD PRESSURE: 150 MMHG | RESPIRATION RATE: 20 BRPM | DIASTOLIC BLOOD PRESSURE: 89 MMHG | BODY MASS INDEX: 53.92 KG/M2 | WEIGHT: 293 LBS | HEART RATE: 88 BPM | HEIGHT: 62 IN | TEMPERATURE: 97.2 F | OXYGEN SATURATION: 96 %

## 2018-03-23 DIAGNOSIS — R51.9 FACE PAIN: Primary | ICD-10-CM

## 2018-03-23 DIAGNOSIS — S02.5XXB OPEN FRACTURE OF TOOTH, INITIAL ENCOUNTER: ICD-10-CM

## 2018-03-23 DIAGNOSIS — R03.0 ELEVATED BLOOD PRESSURE READING: ICD-10-CM

## 2018-03-23 PROCEDURE — 99282 EMERGENCY DEPT VISIT SF MDM: CPT

## 2018-03-23 PROCEDURE — 74011000250 HC RX REV CODE- 250: Performed by: PHYSICIAN ASSISTANT

## 2018-03-23 PROCEDURE — 74011250637 HC RX REV CODE- 250/637: Performed by: PHYSICIAN ASSISTANT

## 2018-03-23 RX ORDER — AMOXICILLIN 500 MG/1
500 TABLET, FILM COATED ORAL 3 TIMES DAILY
Qty: 30 TAB | Refills: 0 | Status: SHIPPED | OUTPATIENT
Start: 2018-03-23 | End: 2018-09-02

## 2018-03-23 RX ORDER — NAPROXEN 500 MG/1
500 TABLET ORAL
Qty: 20 TAB | Refills: 0 | Status: SHIPPED | OUTPATIENT
Start: 2018-03-23 | End: 2018-09-02

## 2018-03-23 RX ORDER — HYDROCODONE BITARTRATE AND ACETAMINOPHEN 5; 325 MG/1; MG/1
1 TABLET ORAL
Qty: 10 TAB | Refills: 0 | Status: SHIPPED | OUTPATIENT
Start: 2018-03-23 | End: 2018-09-02

## 2018-03-23 RX ORDER — AMOXICILLIN 250 MG/1
500 CAPSULE ORAL
Status: COMPLETED | OUTPATIENT
Start: 2018-03-23 | End: 2018-03-23

## 2018-03-23 RX ORDER — HYDROCODONE BITARTRATE AND ACETAMINOPHEN 5; 325 MG/1; MG/1
1 TABLET ORAL
Status: COMPLETED | OUTPATIENT
Start: 2018-03-23 | End: 2018-03-23

## 2018-03-23 RX ORDER — NAPROXEN 250 MG/1
500 TABLET ORAL
Status: COMPLETED | OUTPATIENT
Start: 2018-03-23 | End: 2018-03-23

## 2018-03-23 RX ORDER — NALOXONE HYDROCHLORIDE 4 MG/.1ML
SPRAY NASAL
Qty: 2 EACH | Refills: 0 | Status: SHIPPED | OUTPATIENT
Start: 2018-03-23 | End: 2019-01-16 | Stop reason: ALTCHOICE

## 2018-03-23 RX ADMIN — BENZOCAINE, BUTAMBEN, AND TETRACAINE HYDROCHLORIDE: .028; .004; .004 AEROSOL, SPRAY TOPICAL at 20:00

## 2018-03-23 RX ADMIN — NAPROXEN 500 MG: 250 TABLET ORAL at 20:25

## 2018-03-23 RX ADMIN — HYDROCODONE BITARTRATE AND ACETAMINOPHEN 1 TABLET: 5; 325 TABLET ORAL at 20:25

## 2018-03-23 RX ADMIN — AMOXICILLIN 500 MG: 250 CAPSULE ORAL at 20:26

## 2018-03-23 NOTE — ED NOTES
Patient arrived to ED via wheelchair with C/O upper right sided dental pain. Patients vital signs are stable at this time.  Waiting for evaluation from PA/MD.

## 2018-03-24 NOTE — ED PROVIDER NOTES
EMERGENCY DEPARTMENT HISTORY AND PHYSICAL EXAM      Date: 3/23/2018  Patient Name: Willam Tadeo    History of Presenting Illness     Chief Complaint   Patient presents with    Dental Pain     into triage via [facility]; onset Monday; entire upper jaw       History Provided By: Patient    HPI: Willam Tadeo, 58 y.o. female with PMHx significant for HTN, presents ambulatory to the ED with cc of an acute onset of a constant severe aching L upper dental pain x five days. Pt reports pain radiates to cheeks. She notes taking OTC meds including tylenol w/o relief. She states her last dose was at 1700 today. Pt denies other modifying factors. Pt denies any hx of DM or kidney/liver/thyroid disease. Pt denies other sxs including fevers, nausea, vomiting, diarrhea, and dysuria. Pt notes she is in the process of scheduling an appt with a dentist.     Social hx: -Tobacco, -EtOH, -Drugs    PCP: Kimberli Duffy MD    There are no other complaints, changes, or physical findings at this time. Current Facility-Administered Medications   Medication Dose Route Frequency Provider Last Rate Last Dose    dental ball (lidocaine/Benadryl/Cetacaine) mixture   Mucous Membrane ONCE WAYLON Jackson         Current Outpatient Prescriptions   Medication Sig Dispense Refill    HYDROcodone-acetaminophen (NORCO) 5-325 mg per tablet Take 1 Tab by mouth every six (6) hours as needed for Pain. Max Daily Amount: 4 Tabs. 10 Tab 0    naproxen (NAPROSYN) 500 mg tablet Take 1 Tab by mouth every twelve (12) hours as needed for Pain. 20 Tab 0    amoxicillin 500 mg tab Take 500 mg by mouth three (3) times daily. 30 Tab 0    naloxone (NARCAN) 4 mg/actuation nasal spray Use 1 spray intranasally into 1 nostril. Use a new Narcan nasal spray for subsequent doses and administer into alternating nostrils. May repeat every 2 to 3 minutes as needed.   Indications: OPIATE-INDUCED RESPIRATORY DEPRESSION 2 Each 0    omeprazole (PRILOSEC) 40 mg capsule Take 1 Cap by mouth daily. 90 Cap 3    temazepam (RESTORIL) 22.5 mg capsule Take 2 Caps by mouth nightly as needed for Sleep (Do not take with narcotics, other benzos or alcohol. For weaning purposes. ). Max Daily Amount: 45 mg. 60 Cap 0    furosemide (LASIX) 40 mg tablet TAKE 1 TABLET BY MOUTH DAILY 90 Tab 0    lisinopril (PRINIVIL, ZESTRIL) 10 mg tablet TAKE 1 TABLET BY MOUTH DAILY 90 Tab 3    ondansetron (ZOFRAN ODT) 4 mg disintegrating tablet Take 1 Tab by mouth every eight (8) hours as needed for Nausea. 10 Tab 0    ALPRAZolam (XANAX) 0.5 mg tablet Take 1 Tab by mouth two (2) times daily as needed for Anxiety (Do not take with narcotics, other benzos or alcohol. ). Max Daily Amount: 1 mg. 60 Tab 1    DULoxetine (CYMBALTA) 60 mg capsule Take 1 Cap by mouth daily. 30 Cap 1    DULoxetine (CYMBALTA) 30 mg capsule Take 1 Cap by mouth daily. 30 Cap 1    loperamide (IMODIUM) 2 mg capsule Take 1 Cap by mouth as needed for Diarrhea. Indications: Diarrhea 20 Cap 1    nystatin (MYCOSTATIN) powder Apply  to affected area four (4) times daily. 1 Bottle 2    simvastatin (ZOCOR) 20 mg tablet TAKE 1 TABLET BY MOUTH EVERY NIGHT 90 Tab 0    potassium chloride SR (KLOR-CON 10) 10 mEq tablet TAKE 2 TABLETS BY MOUTH DAILY 180 Tab 6    potassium chloride SR (KLOR-CON 10) 10 mEq tablet TAKE 2 TABLETS BY MOUTH DAILY 180 Tab 0    dicyclomine (BENTYL) 20 mg tablet TAKE 1 TABLET BY MOUTH THREE TIMES DAILY 90 Tab 0       Past History     Past Medical History:  Past Medical History:   Diagnosis Date    Arthritis     chronic pain related arthritis    Bipolar 1 disorder (HCC)     GERD (gastroesophageal reflux disease)     Hypertension     Other ill-defined conditions(929.89)     hyperlipidemia.  Stomach abcess    Psychiatric disorder     panic attacks    Sleep apnea     Thromboembolus ([address]     last year L leg    Unspecified sleep apnea        Past Surgical History:  Past Surgical History:   Procedure Laterality Date    HX CHOLECYSTECTOMY      HX GI      gallbladder removed 2/2010    HX HYSTERECTOMY         Family History:  Family History   Problem Relation Age of Onset    Heart Disease Mother     Diabetes Mother     Arthritis-osteo Mother     High Cholesterol Mother     Heart Attack Sister     Diabetes Sister     Heart Disease Sister     Diabetes Sister     Arthritis-osteo Sister     High Cholesterol Sister     Schizophrenia Paternal Grandmother     Drug Abuse Sister        Social History:  Social History   Substance Use Topics    Smoking status: Never Smoker    Smokeless tobacco: Never Used    Alcohol use No       Allergies:  No Known Allergies      Review of Systems   Review of Systems   Constitutional: Negative. Negative for fever. HENT: Positive for dental problem (L upper). Positive for facial pain. Eyes: Negative. Respiratory: Negative. Cardiovascular: Negative. Gastrointestinal: Negative. Negative for diarrhea, nausea and vomiting. Denies liver disease   Endocrine:        Denies thyroid disease   Genitourinary: Negative. Negative for dysuria. Denies kidney disease   Musculoskeletal: Negative. Skin: Negative. Neurological: Negative. All other systems reviewed and are negative. Physical Exam   Physical Exam   Constitutional: She is oriented to person, place, and time. She appears well-developed and well-nourished. No distress. HENT:   Head: Normocephalic and atraumatic. Right Ear: External ear normal.   Left Ear: External ear normal.   Nose: Nose normal.   Mouth/Throat: Oropharynx is clear and moist. No oropharyngeal exudate. Multiple fractured teeth BL. Tenderness to the cheek. No facial swelling. Eyes: Conjunctivae and EOM are normal. Pupils are equal, round, and reactive to light. Right eye exhibits no discharge. Left eye exhibits no discharge. No scleral icterus. Neck: Normal range of motion. Neck supple. No tracheal deviation present. Cardiovascular: Normal rate, regular rhythm, normal heart sounds and intact distal pulses. Exam reveals no gallop and no friction rub. No murmur heard. Pulmonary/Chest: Effort normal and breath sounds normal. No respiratory distress. She has no wheezes. She has no rales. She exhibits no tenderness. Abdominal: Soft. Bowel sounds are normal. She exhibits no distension and no mass. There is no tenderness. There is no rebound and no guarding. Musculoskeletal: She exhibits no edema or tenderness. Lymphadenopathy:     She has no cervical adenopathy. Neurological: She is alert and oriented to person, place, and time. No cranial nerve deficit. Skin: Skin is warm and dry. No rash noted. No erythema. Psychiatric: She has a normal mood and affect. Her behavior is normal.   Nursing note and vitals reviewed. Diagnostic Study Results     Labs -   No results found for this or any previous visit (from the past 12 hour(s)). Radiologic Studies -   No orders to display     CT Results  (Last 48 hours)    None        CXR Results  (Last 48 hours)    None            Medical Decision Making   I am the first provider for this patient. I reviewed the vital signs, available nursing notes, past medical history, past surgical history, family history and social history. Vital Signs-Reviewed the patient's vital signs. Patient Vitals for the past 12 hrs:   Temp Pulse Resp BP SpO2   03/23/18 1808 97.2 °F (36.2 °C) 88 20 (!) 159/98 96 %       Records Reviewed: Old Medical Records    Provider Notes (Medical Decision Making):   DDx: tooth fracture, caries, abscess, face pain, elevated BP    ED Course:   Initial assessment performed. The patients presenting problems have been discussed, and they are in agreement with the care plan formulated and outlined with them. I have encouraged them to ask questions as they arise throughout their visit.     Critical Care Time:   0    Disposition:  DISCHARGE NOTE  8:29 PM  The patient has been re-evaluated and is ready for discharge. Reviewed available results with patient. Counseled pt on diagnosis and care plan. Pt has expressed understanding, and all questions have been answered. Pt agrees with plan and agrees to F/U as recommended, or return to the ED if their sxs worsen. Discharge instructions have been provided and explained to the pt, along with reasons to return to the ED. PLAN:  1. Current Discharge Medication List      START taking these medications    Details   naproxen (NAPROSYN) 500 mg tablet Take 1 Tab by mouth every twelve (12) hours as needed for Pain. Qty: 20 Tab, Refills: 0      amoxicillin 500 mg tab Take 500 mg by mouth three (3) times daily. Qty: 30 Tab, Refills: 0      naloxone (NARCAN) 4 mg/actuation nasal spray Use 1 spray intranasally into 1 nostril. Use a new Narcan nasal spray for subsequent doses and administer into alternating nostrils. May repeat every 2 to 3 minutes as needed. Indications: OPIATE-INDUCED RESPIRATORY DEPRESSION  Qty: 2 Each, Refills: 0         CONTINUE these medications which have CHANGED    Details   HYDROcodone-acetaminophen (NORCO) 5-325 mg per tablet Take 1 Tab by mouth every six (6) hours as needed for Pain. Max Daily Amount: 4 Tabs. Qty: 10 Tab, Refills: 0    Associated Diagnoses: Face pain; Open fracture of tooth, initial encounter           2. Follow-up Information     Follow up With Details Comments Contact Info    Your dentist Schedule an appointment as soon as possible for a visit      [facility] EM[facility]  If symptoms worsen [address] [place] [address]  [phone]        Return to ED if worse     Diagnosis     Clinical Impression:   1. Face pain    2. Open fracture of tooth, initial encounter    3. Elevated blood pressure reading        Attestations: This note is prepared by Joby Trina, acting as Scribe for [employer].     The scribe's documentation has been prepared under my direction and personally reviewed by me in its entirety. I confirm that the note above accurately reflects all work, treatment, procedures, and medical decision making performed by me.   LEFTY Anderson

## 2018-03-24 NOTE — ED NOTES
Discharge instructions given to patient by WAYLON Merchant. Patient verbalized understanding of discharge instructions. Pt discharged without difficulty. Pt discharged in stable condition via wheelchair, accompanied by son.

## 2018-04-02 ENCOUNTER — TELEPHONE (OUTPATIENT)
Dept: INTERNAL MEDICINE CLINIC | Age: 63
End: 2018-04-02

## 2018-04-02 ENCOUNTER — APPOINTMENT (OUTPATIENT)
Dept: GENERAL RADIOLOGY | Age: 63
End: 2018-04-02
Attending: PHYSICIAN ASSISTANT
Payer: MEDICARE

## 2018-04-02 ENCOUNTER — HOSPITAL ENCOUNTER (EMERGENCY)
Age: 63
Discharge: HOME | End: 2018-04-02
Attending: EMERGENCY MEDICINE
Payer: MEDICARE

## 2018-04-02 VITALS
BODY MASS INDEX: 53.92 KG/M2 | SYSTOLIC BLOOD PRESSURE: 173 MMHG | HEART RATE: 76 BPM | WEIGHT: 293 LBS | RESPIRATION RATE: 16 BRPM | DIASTOLIC BLOOD PRESSURE: 104 MMHG | TEMPERATURE: 97.6 F | OXYGEN SATURATION: 96 % | HEIGHT: 62 IN

## 2018-04-02 DIAGNOSIS — K59.00 CONSTIPATION, UNSPECIFIED CONSTIPATION TYPE: Primary | ICD-10-CM

## 2018-04-02 DIAGNOSIS — R03.0 ELEVATED BLOOD PRESSURE READING: ICD-10-CM

## 2018-04-02 PROCEDURE — 74018 RADEX ABDOMEN 1 VIEW: CPT

## 2018-04-02 PROCEDURE — 99282 EMERGENCY DEPT VISIT SF MDM: CPT

## 2018-04-02 RX ORDER — DOCUSATE SODIUM 100 MG/1
100 CAPSULE, LIQUID FILLED ORAL 2 TIMES DAILY
Qty: 20 CAP | Refills: 0 | Status: SHIPPED | OUTPATIENT
Start: 2018-04-02 | End: 2018-04-12

## 2018-04-02 NOTE — TELEPHONE ENCOUNTER
Identified patient 2 identifiers verified. Patient called reporting SOB, and constipation, no real BM in 14 days, with abdominal pain. Patient was told to go to ED for evaluation.

## 2018-04-02 NOTE — ED PROVIDER NOTES
EMERGENCY DEPARTMENT HISTORY AND PHYSICAL EXAM      Date: 4/2/2018  Patient Name: Juanita Santoyo    History of Presenting Illness     Chief Complaint   Patient presents with    Constipation     pt has been constipated over the last 10 days, responsive to \"some powder my sister mixed up\". She was given this mixture twice. Last BM was loose stool today. pt also had some blood with that bowel movement    Skin Problem     pt has a \"lump\" between her breasts in the midepigastric area. no redness noted. This may not be a skin problem but may represent an underlying issue       History Provided By: Patient    HPI: Juanita Santoyo, 58 y.o. female with PMHx significant for HTN, GERD, and bipolar, presents ambulatory to the ED for evaluation of constipation with intermittent, mild to moderate ABD cramping x 10 days. She reports associated episode of NV and small BM with BRB 3 days ago. She believes the blood is related to hemorrhoids. She states she has been taking OTC medications with mild relief. She reports taking medications this AM and states she had a small non-bloody BM this morning. Pt denies any urinary symptoms. PCP: Stephanie Medrano MD    There are no other complaints, changes, or physical findings at this time. Current Outpatient Prescriptions   Medication Sig Dispense Refill    docusate sodium (COLACE) 100 mg capsule Take 1 Cap by mouth two (2) times a day for 10 days. 20 Cap 0    HYDROcodone-acetaminophen (NORCO) 5-325 mg per tablet Take 1 Tab by mouth every six (6) hours as needed for Pain. Max Daily Amount: 4 Tabs. 10 Tab 0    naproxen (NAPROSYN) 500 mg tablet Take 1 Tab by mouth every twelve (12) hours as needed for Pain. 20 Tab 0    amoxicillin 500 mg tab Take 500 mg by mouth three (3) times daily. 30 Tab 0    naloxone (NARCAN) 4 mg/actuation nasal spray Use 1 spray intranasally into 1 nostril. Use a new Narcan nasal spray for subsequent doses and administer into alternating nostrils.  May repeat every 2 to 3 minutes as needed. Indications: OPIATE-INDUCED RESPIRATORY DEPRESSION 2 Each 0    omeprazole (PRILOSEC) 40 mg capsule Take 1 Cap by mouth daily. 90 Cap 3    temazepam (RESTORIL) 22.5 mg capsule Take 2 Caps by mouth nightly as needed for Sleep (Do not take with narcotics, other benzos or alcohol. For weaning purposes. ). Max Daily Amount: 45 mg. 60 Cap 0    furosemide (LASIX) 40 mg tablet TAKE 1 TABLET BY MOUTH DAILY 90 Tab 0    lisinopril (PRINIVIL, ZESTRIL) 10 mg tablet TAKE 1 TABLET BY MOUTH DAILY 90 Tab 3    ondansetron (ZOFRAN ODT) 4 mg disintegrating tablet Take 1 Tab by mouth every eight (8) hours as needed for Nausea. 10 Tab 0    ALPRAZolam (XANAX) 0.5 mg tablet Take 1 Tab by mouth two (2) times daily as needed for Anxiety (Do not take with narcotics, other benzos or alcohol. ). Max Daily Amount: 1 mg. 60 Tab 1    DULoxetine (CYMBALTA) 60 mg capsule Take 1 Cap by mouth daily. 30 Cap 1    DULoxetine (CYMBALTA) 30 mg capsule Take 1 Cap by mouth daily. 30 Cap 1    loperamide (IMODIUM) 2 mg capsule Take 1 Cap by mouth as needed for Diarrhea. Indications: Diarrhea 20 Cap 1    nystatin (MYCOSTATIN) powder Apply  to affected area four (4) times daily. 1 Bottle 2    simvastatin (ZOCOR) 20 mg tablet TAKE 1 TABLET BY MOUTH EVERY NIGHT 90 Tab 0    potassium chloride SR (KLOR-CON 10) 10 mEq tablet TAKE 2 TABLETS BY MOUTH DAILY 180 Tab 6    potassium chloride SR (KLOR-CON 10) 10 mEq tablet TAKE 2 TABLETS BY MOUTH DAILY 180 Tab 0    dicyclomine (BENTYL) 20 mg tablet TAKE 1 TABLET BY MOUTH THREE TIMES DAILY 90 Tab 0       Past History     Past Medical History:  Past Medical History:   Diagnosis Date    Arthritis     chronic pain related arthritis    Bipolar 1 disorder (HCC)     GERD (gastroesophageal reflux disease)     Hypertension     Other ill-defined conditions(209.89)     hyperlipidemia.  Stomach abcess    Psychiatric disorder     panic attacks    Sleep apnea     [address] [address]     last year L leg    Unspecified sleep apnea        Past Surgical History:  Past Surgical History:   Procedure Laterality Date    HX CHOLECYSTECTOMY      HX GI      gallbladder removed 2/2010    HX HYSTERECTOMY         Family History:  Family History   Problem Relation Age of Onset    Heart Disease Mother     Diabetes Mother     Arthritis-osteo Mother     High Cholesterol Mother     Heart Attack Sister     Diabetes Sister     Heart Disease Sister     Diabetes Sister     Arthritis-osteo Sister     High Cholesterol Sister     Schizophrenia Paternal Grandmother     Drug Abuse Sister        Social History:  Social History   Substance Use Topics    Smoking status: Never Smoker    Smokeless tobacco: Never Used    Alcohol use No       Allergies:  No Known Allergies      Review of Systems   Review of Systems   Constitutional: Negative for chills and fever. HENT: Negative for sore throat. Eyes: Negative for pain. Respiratory: Negative for cough and shortness of breath. Cardiovascular: Negative for chest pain. Gastrointestinal: Positive for abdominal pain and constipation. Negative for diarrhea. Genitourinary: Negative for dysuria and hematuria. Musculoskeletal: Negative for arthralgias and myalgias. Skin: Negative for rash. Neurological: Negative for dizziness, light-headedness, numbness and headaches. Psychiatric/Behavioral: Negative for behavioral problems and confusion. Physical Exam   Physical Exam   Constitutional: She is oriented to person, place, and time. No distress. HENT:   Head: Normocephalic and atraumatic. Right Ear: External ear normal.   Left Ear: External ear normal.   Nose: Nose normal.   Eyes: Conjunctivae and EOM are normal.   Neck: Normal range of motion. Neck supple. Cardiovascular: Normal rate, regular rhythm and normal heart sounds. No murmur heard. Pulmonary/Chest: Effort normal and breath sounds normal. She has no decreased breath sounds. She has no wheezes. Abdominal: Soft. Bowel sounds are normal. She exhibits no distension. There is no tenderness. There is no rigidity and no guarding. Obese ABD. No focal tenderness. Musculoskeletal: Normal range of motion. She exhibits no edema or tenderness. Neurological: She is alert and oriented to person, place, and time. Skin: Skin is warm and dry. No rash noted. She is not diaphoretic. Psychiatric: She has a normal mood and affect. Her behavior is normal. Judgment normal.   Nursing note and vitals reviewed. Diagnostic Study Results     Radiologic Studies -   INDICATION: Constipation pattern     Exam: Supine frontal views of the abdomen.     There is a nonspecific bowel gas pattern. No dilated loop of large or small  bowel is visualized. No pathologic calcification is visualized. The osseous  structures are intact. There is no evidence of free intraperitoneal gas on  supine views.     IMPRESSION  IMPRESSION: Nonspecific bowel gas pattern. Medical Decision Making   I am the first provider for this patient. I reviewed the vital signs, available nursing notes, past medical history, past surgical history, family history and social history. Vital Signs-Reviewed the patient's vital signs. Patient Vitals for the past 12 hrs:   Temp Pulse Resp BP SpO2   04/02/18 1717 - 76 16 (!) 173/104 96 %   04/02/18 1530 97.6 °F (36.4 °C) 80 16 (!) 186/107 99 %       Records Reviewed: Nursing Notes and Old Medical Records    Provider Notes (Medical Decision Making):   Patient presents with decreased BM. DDx: constipation, SBO, volvulus, hemorrhoids. Will obtain AXR series to r/o surgical pathology. Patient generally well appearing. Tolerating PO without difficulty. No evidence for obstruction. Will dc and encourage close f/u with PCP regarding CC and elevated BP readings in ED.     For the constipation, patient counseled to push fluids, use Dulcolax oral and/or suppository until bowel movement occurs, then Colace or Surfak bid-tid to maintain soft bowel movement until normal pattern ensues. Maintain a high fiber diet with plenty of roughage, and 6-8 large glasses of water daily to avoid constipation in the future. Timing elimination to occur after meals, or after a hot drink, can also improve the situation long term. 1 or 2 Fleet enemas may be necessary. Patient will call PCP symptoms persist or worsen. ED Course:   Initial assessment performed. The patients presenting problems have been discussed, and they are in agreement with the care plan formulated and outlined with them. I have encouraged them to ask questions as they arise throughout their visit. HYPERTENSION COUNSELING  Patient denies chest pain, headache, shortness of breath. Patient is made aware of their elevated blood pressure and is instructed to follow up this week with their Primary Care for a recheck. Patient is counseled regarding consequences of chronic, uncontrolled hypertension including kidney disease, heart disease, stroke or even death. Patient states their understanding and agrees to follow up this week. Critical Care Time:   0    Disposition:  DISCHARGE NOTE  5:24 PM  The patient has been re-evaluated and is ready for discharge. Reviewed available results with patient. Counseled pt on diagnosis and care plan. Pt has expressed understanding, and all questions have been answered. Pt agrees with plan and agrees to follow up as recommended, or return to the ED if their symptoms worsen. Discharge instructions have been provided and explained to the pt, along with reasons to return to the ED. PLAN:  1. Discharge home  2. Medications as directed  3. Schedule f/u with PCP  4. Return precautions reviewed    Discharge Medication List as of 4/2/2018  5:21 PM        2.    Follow-up Information     Follow up With Details Comments Av. Ade Bryant MD In 3 days  [address] [facility] [address]  [phone]      \[facility]"" EMERGENCY DEPT  As needed, If symptoms worsen [address] [place] [address]  [phone]        Return to ED if worse     Diagnosis     Clinical Impression:   1. Constipation, unspecified constipation type    2. Elevated blood pressure reading        Attestations: This note is prepared by Blossom Childers, acting as Scribe for [employer]. Katty Solis PA-C: The scribe's documentation has been prepared under my direction and personally reviewed by me in its entirety. I confirm that the note above accurately reflects all work, treatment, procedures, and medical decision making performed by me.         This note will not be viewable in [employer]

## 2018-04-03 NOTE — TELEPHONE ENCOUNTER
Patient stats she needs a call back to get the name of Doctor she was referred to for weight lost & also to get [facility] ER Follow Up appt sooner than available for Elevated Blood Pressure. Please call.  Thank you

## 2018-04-04 NOTE — TELEPHONE ENCOUNTER
Date/time Monday, April 16, 2018 12:00 PM Appointment  Accepted with Dr. Antonino Vail . Identified patient 2 identifiers verified. Patient  Has multiple issues to discuss including ED follow up.

## 2018-04-05 DIAGNOSIS — E78.5 HYPERLIPIDEMIA, UNSPECIFIED HYPERLIPIDEMIA TYPE: ICD-10-CM

## 2018-04-05 RX ORDER — SIMVASTATIN 20 MG/1
TABLET, FILM COATED ORAL
Qty: 90 TAB | Refills: 0 | Status: SHIPPED | OUTPATIENT
Start: 2018-04-05 | End: 2018-07-02 | Stop reason: SDUPTHER

## 2018-04-09 DIAGNOSIS — F33.0 MDD (MAJOR DEPRESSIVE DISORDER), RECURRENT EPISODE, MILD (HCC): ICD-10-CM

## 2018-04-09 DIAGNOSIS — F41.1 GAD (GENERALIZED ANXIETY DISORDER): ICD-10-CM

## 2018-04-09 RX ORDER — DULOXETIN HYDROCHLORIDE 60 MG/1
CAPSULE, DELAYED RELEASE ORAL
Qty: 30 CAP | Refills: 0 | Status: SHIPPED | OUTPATIENT
Start: 2018-04-09 | End: 2018-05-10 | Stop reason: SDUPTHER

## 2018-04-12 DIAGNOSIS — F33.0 MDD (MAJOR DEPRESSIVE DISORDER), RECURRENT EPISODE, MILD (HCC): ICD-10-CM

## 2018-04-12 DIAGNOSIS — F41.1 GAD (GENERALIZED ANXIETY DISORDER): ICD-10-CM

## 2018-04-12 DIAGNOSIS — F41.0 PANIC DISORDER: ICD-10-CM

## 2018-04-12 RX ORDER — TEMAZEPAM 22.5 MG/1
CAPSULE ORAL
Qty: 60 CAP | Refills: 0 | Status: SHIPPED | OUTPATIENT
Start: 2018-04-12 | End: 2018-04-19 | Stop reason: SDUPTHER

## 2018-04-12 NOTE — TELEPHONE ENCOUNTER
Will RF temazepam early per Gena's note. Pt's xanax will not be RF-per Grey Desai she should be taking daily vs BID.

## 2018-04-13 RX ORDER — DULOXETIN HYDROCHLORIDE 30 MG/1
CAPSULE, DELAYED RELEASE ORAL
Qty: 30 CAP | Refills: 0 | Status: SHIPPED | OUTPATIENT
Start: 2018-04-13 | End: 2018-05-10 | Stop reason: SDUPTHER

## 2018-04-13 RX ORDER — ALPRAZOLAM 0.5 MG/1
TABLET ORAL
Qty: 60 TAB | Refills: 0 | OUTPATIENT
Start: 2018-04-13

## 2018-04-17 ENCOUNTER — TELEPHONE (OUTPATIENT)
Dept: BEHAVIORAL/MENTAL HEALTH CLINIC | Age: 63
End: 2018-04-17

## 2018-04-17 NOTE — TELEPHONE ENCOUNTER
Returned call to clarify medications. Pt reports increased stressors. Informed pt I would not refill medication as she increased dose without discussing with me. Advised pt to move her f/u appointment sooner. Pt stated she understood and would see me at next OV.

## 2018-04-19 ENCOUNTER — OFFICE VISIT (OUTPATIENT)
Dept: BEHAVIORAL/MENTAL HEALTH CLINIC | Age: 63
End: 2018-04-19

## 2018-04-19 VITALS
SYSTOLIC BLOOD PRESSURE: 154 MMHG | BODY MASS INDEX: 59.81 KG/M2 | DIASTOLIC BLOOD PRESSURE: 96 MMHG | HEART RATE: 90 BPM | WEIGHT: 293 LBS

## 2018-04-19 DIAGNOSIS — F41.0 PANIC DISORDER: ICD-10-CM

## 2018-04-19 DIAGNOSIS — F41.1 GAD (GENERALIZED ANXIETY DISORDER): Primary | ICD-10-CM

## 2018-04-19 DIAGNOSIS — F33.0 MDD (MAJOR DEPRESSIVE DISORDER), RECURRENT EPISODE, MILD (HCC): ICD-10-CM

## 2018-04-19 RX ORDER — ALPRAZOLAM 0.5 MG/1
0.5 TABLET ORAL
Qty: 30 TAB | Refills: 1 | Status: SHIPPED | OUTPATIENT
Start: 2018-04-19 | End: 2018-06-13 | Stop reason: SDUPTHER

## 2018-04-19 RX ORDER — TEMAZEPAM 22.5 MG/1
CAPSULE ORAL
Qty: 60 CAP | Refills: 0 | Status: SHIPPED | OUTPATIENT
Start: 2018-05-11 | End: 2018-06-14 | Stop reason: SDUPTHER

## 2018-04-19 NOTE — PROGRESS NOTES
CHIEF COMPLAINT:  Susan Fernandez is a 58 y.o. female and was seen today for follow-up of psychiatric condition and psychotropic medication management. HPI:    Goldie Gonzalez reports the following psychiatric symptoms:  depression and anxiety. The symptoms have been present for months and are of moderate/high severity. The symptoms occur more frequently and more severely. Pt reports significant stressors. Pt here today to review current treatment plan. GDS: 1, negative  HAM-A: 8, mild anxiety    FAMILY/SOCIAL HX: psychosocial stressors    REVIEW OF SYSTEMS:  Psychiatric:  depression, anxiety  Appetite:variable, wants to work with a dietician   Sleep: improved with use of temazepam  Neuro: denies    Visit Vitals    BP (!) 154/96    Pulse 90    Wt 148.3 kg (327 lb)    BMI 59.81 kg/m2       Side Effects:  none    MENTAL STATUS EXAM:   Sensorium  oriented to time, place and person   Relations cooperative   Appearance:  age appropriate and casually dressed   Motor Behavior:  gait stable and within normal limits   Speech:  normal volume   Thought Process: goal directed   Thought Content free of delusions and free of hallucinations   Suicidal ideations no intention   Homicidal ideations no intention   Mood:  anxious and depressed   Affect:  anxious and depressed   Memory recent  adequate   Memory remote:  adequate   Concentration:  adequate   Abstraction:  abstract   Insight:  fair   Reliability fair and limited   Judgment:  fair and limited     MEDICAL DECISION MAKING:  Problems addressed today:    ICD-10-CM ICD-9-CM    1. GABRIEL (generalized anxiety disorder) F41.1 300.02 temazepam (RESTORIL) 22.5 mg capsule   2. MDD (major depressive disorder), recurrent episode, mild (HCC) F33.0 296.31 temazepam (RESTORIL) 22.5 mg capsule   3. Panic disorder F41.0 300.01 ALPRAZolam (XANAX) 0.5 mg tablet       Assessment:   Goldie Gonzalez is responding to treatment somewhat. Anxiety symptoms are currently exacerbated.  Reviewed current meds and pt has not been taking full dose of cymbalta. Reviewed dosing and provided written daily amount. Pt reports significant problems with sleep. She continues to use temazepam 45 mg. Had attempted to decrease and pt was not sleeping so restarted 45 mg dose. Pt appears to have some sympathetic dominance. She is asking for a refill on alprazolam. Reviewed dosing instructions and guidelines for safety and PRN use. Informed pt I would be weaning her off. Also, informed her she could only take it as prescribed and she should not increase dose on her own. Will continue weaning process and then work to improve sleep and decrease need for sleep medication. She continues to work on work physical health and plans to see a weight loss specialist. Discussed importance of ind therapy. Provided support and reinforced current treatment plan. Plan:   1. Current Outpatient Prescriptions   Medication Sig Dispense Refill    [START ON 5/11/2018] temazepam (RESTORIL) 22.5 mg capsule TAKE 2 CAPSULES BY MOUTH EVERY NIGHT AT BEDTIME AS NEEDED FOR SLEEP 60 Cap 0    ALPRAZolam (XANAX) 0.5 mg tablet Take 1 Tab by mouth daily as needed for Anxiety (Do not take with narcotics, other benzos or alcohol. ). 30 Tab 1    DULoxetine (CYMBALTA) 30 mg capsule TAKE 1 CAPSULE BY MOUTH DAILY 30 Cap 0    DULoxetine (CYMBALTA) 60 mg capsule TAKE 1 CAPSULE BY MOUTH DAILY 30 Cap 0    simvastatin (ZOCOR) 20 mg tablet TAKE 1 TABLET BY MOUTH EVERY NIGHT 90 Tab 0    HYDROcodone-acetaminophen (NORCO) 5-325 mg per tablet Take 1 Tab by mouth every six (6) hours as needed for Pain. Max Daily Amount: 4 Tabs. 10 Tab 0    naproxen (NAPROSYN) 500 mg tablet Take 1 Tab by mouth every twelve (12) hours as needed for Pain. 20 Tab 0    amoxicillin 500 mg tab Take 500 mg by mouth three (3) times daily. 30 Tab 0    naloxone (NARCAN) 4 mg/actuation nasal spray Use 1 spray intranasally into 1 nostril.  Use a new Narcan nasal spray for subsequent doses and administer into alternating nostrils. May repeat every 2 to 3 minutes as needed. Indications: OPIATE-INDUCED RESPIRATORY DEPRESSION 2 Each 0    omeprazole (PRILOSEC) 40 mg capsule Take 1 Cap by mouth daily. 90 Cap 3    furosemide (LASIX) 40 mg tablet TAKE 1 TABLET BY MOUTH DAILY 90 Tab 0    lisinopril (PRINIVIL, ZESTRIL) 10 mg tablet TAKE 1 TABLET BY MOUTH DAILY 90 Tab 3    ondansetron (ZOFRAN ODT) 4 mg disintegrating tablet Take 1 Tab by mouth every eight (8) hours as needed for Nausea. 10 Tab 0    loperamide (IMODIUM) 2 mg capsule Take 1 Cap by mouth as needed for Diarrhea. Indications: Diarrhea 20 Cap 1    nystatin (MYCOSTATIN) powder Apply  to affected area four (4) times daily. 1 Bottle 2    potassium chloride SR (KLOR-CON 10) 10 mEq tablet TAKE 2 TABLETS BY MOUTH DAILY 180 Tab 6    potassium chloride SR (KLOR-CON 10) 10 mEq tablet TAKE 2 TABLETS BY MOUTH DAILY 180 Tab 0    dicyclomine (BENTYL) 20 mg tablet TAKE 1 TABLET BY MOUTH THREE TIMES DAILY 90 Tab 0          medication changes made today: cont cymbalta 90 mg for dep/anx, dec alprazolam 0.5 mg daily prn anxiety, cont temazepam 45 mg bedtime for sleep    2. Counseling and coordination of care including instructions for treatment, risks/benefits, risk factor reduction and patient/family education. She agrees with the plan. Patient instructed to call with any side effects, questions or issues. 3.  Follow-up Disposition:  Return in about 8 weeks (around 6/14/2018).      4. Ind therapy    4/19/2018  Linda Gibson NP

## 2018-04-19 NOTE — MR AVS SNAPSHOT
[address] [facility] 
[phone] Patient: Brianna Linton MRN:  [id number] Visit Information Date & Time Provider Department Dept. Phone Encounter #  
 2018 11:00 AM Jeff Lopez NP [address] [facility] [phone] [id number] Your Appointments 2018 10:30 AM  
ESTABLISHED PATIENT with Jeff Lopez NP  
[address] [facility]) Appt Note: f/u per pt req; pt  will call back Monday, she wants to reschedule but may not if Gallito Arias can't fill her prescriptions that will run out on . Geovanni Marcano Her mother is going in the hospital 18 LJ; moved per pt  
 [address] [facility]  
[phone]  
  
   
 [address] [facility] [address] [phone]  
  
    
 2018  2:00 PM  
ROUTINE CARE with Kendy Drake MD  
[facility]) Appt Note: f/u visit, dorothea 2/15/18  
 [address] [facility] [address] [phone]  
[address] [facility] Upcoming Health Maintenance Date Due Hepatitis C Screening 1955 DTaP/Tdap/Td series (1 - Tdap) 3/5/1975 PAP AKA CERVICAL CYTOLOGY 3/5/1975 BREAST CANCER SCRN MAMMOGRAM 3/5/2004 ZOSTER VACCINE AGE 60> 2014 FOBT Q 1 YEAR AGE 50-75 2017 Influenza Age 5 to Adult 2017 MEDICARE YEARLY EXAM 3/28/2018 Allergies as of 2018  Review Complete On: 2018 By: Jeff Lopez NP No Known Allergies Current Immunizations  Reviewed on 9/10/2015 Name Date Influenza Vaccine 2015 Pneumococcal Vaccine (Unspecified Type) 2015 Not reviewed this visit You Were Diagnosed With   
  
 Codes Comments GABRIEL (generalized anxiety disorder)    -  Primary ICD-10-CM: F41.1 ICD-9-CM: 300.02   
 MDD (major depressive disorder), recurrent episode, mild ([facility] [address]     ICD-10-CM: F33.0 ICD-9-CM: 296.31 Panic disorder     ICD-10-CM: F41.0 ICD-9-CM: 300.01 Vitals BP Pulse Weight(growth percentile) BMI OB Status Smoking Status (!) 154/96 90 327 lb (148.3 kg) 59.81 kg/m2 Postmenopausal Never Smoker BMI and BSA Data Body Mass Index Body Surface Area  
 59.81 kg/m 2 2.55 m 2 Preferred Pharmacy Pharmacy Name Phone [phone] [facility] [place] [phone] Your Updated Medication List  
  
   
This list is accurate as of 4/19/18 11:42 AM.  Always use your most recent med list.  
  
  
  
  
 ALPRAZolam 0.5 mg tablet Commonly known as:  Trula Crick Take 1 Tab by mouth daily as needed for Anxiety (Do not take with narcotics, other benzos or alcohol. ). amoxicillin 500 mg Tab Take 500 mg by mouth three (3) times daily. dicyclomine 20 mg tablet Commonly known as:  BENTYL TAKE 1 TABLET BY MOUTH THREE TIMES DAILY * DULoxetine 60 mg capsule Commonly known as:  CYMBALTA TAKE 1 CAPSULE BY MOUTH DAILY * DULoxetine 30 mg capsule Commonly known as:  CYMBALTA TAKE 1 CAPSULE BY MOUTH DAILY  
  
 furosemide 40 mg tablet Commonly known as:  LASIX TAKE 1 TABLET BY MOUTH DAILY HYDROcodone-acetaminophen 5-325 mg per tablet Commonly known as:  Chanda Leyland Take 1 Tab by mouth every six (6) hours as needed for Pain. Max Daily Amount: 4 Tabs. lisinopril 10 mg tablet Commonly known as:  PRINIVIL, ZESTRIL  
TAKE 1 TABLET BY MOUTH DAILY  
  
 loperamide 2 mg capsule Commonly known as:  IMODIUM Take 1 Cap by mouth as needed for Diarrhea. Indications: Diarrhea  
  
 naloxone 4 mg/actuation nasal spray Commonly known as:  [employer] Use 1 spray intranasally into 1 nostril. Use a new Narcan nasal spray for subsequent doses and administer into alternating nostrils.  May repeat every 2 to 3 minutes as needed. Indications: OPIATE-INDUCED RESPIRATORY DEPRESSION  
  
 naproxen 500 mg tablet Commonly known as:  NAPROSYN Take 1 Tab by mouth every twelve (12) hours as needed for Pain. nystatin powder Commonly known as:  MYCOSTATIN Apply  to affected area four (4) times daily. omeprazole 40 mg capsule Commonly known as:  PRILOSEC Take 1 Cap by mouth daily. ondansetron 4 mg disintegrating tablet Commonly known as:  ZOFRAN ODT Take 1 Tab by mouth every eight (8) hours as needed for Nausea. * potassium chloride SR 10 mEq tablet Commonly known as:  KLOR-CON 10  
TAKE 2 TABLETS BY MOUTH DAILY * potassium chloride SR 10 mEq tablet Commonly known as:  KLOR-CON 10  
TAKE 2 TABLETS BY MOUTH DAILY  
  
 simvastatin 20 mg tablet Commonly known as:  ZOCOR  
TAKE 1 TABLET BY MOUTH EVERY NIGHT  
  
 temazepam 22.5 mg capsule Commonly known as:  RESTORIL  
TAKE 2 CAPSULES BY MOUTH EVERY NIGHT AT BEDTIME AS NEEDED FOR SLEEP Start taking on:  5/11/2018 * Notice: This list has 4 medication(s) that are the same as other medications prescribed for you. Read the directions carefully, and ask your doctor or other care provider to review them with you. Prescriptions Printed Refills  
 temazepam (RESTORIL) 22.5 mg capsule 0 Starting on: 5/11/2018 Sig: TAKE 2 CAPSULES BY MOUTH EVERY NIGHT AT BEDTIME AS NEEDED FOR SLEEP Class: Print ALPRAZolam (XANAX) 0.5 mg tablet 1 Sig: Take 1 Tab by mouth daily as needed for Anxiety (Do not take with narcotics, other benzos or alcohol. ). Class: Print Route: Oral  
  
Introducing [facility]! Tiffanie Nagy introduces [facility] patient portal. Now you can access parts of your medical record, email your doctor's office, and request medication refills online. 1. In your internet browser, go to [email or web address] 2. Click on the First Time User? Click Here link in the Sign In box. You will see the New Member Sign Up page. 3. Enter your [employer] Access Code exactly as it appears below. You will not need to use this code after youve completed the sign-up process. If you do not sign up before the expiration date, you must request a new code. · [employer] Access Code: [email or web address] Expires: 5/1/2018  4:23 PM 
 
4. Enter the last four digits of your Social Security Number (xxxx) and Date of Birth (mm/dd/yyyy) as indicated and click Submit. You will be taken to the next sign-up page. 5. Create a [employer] ID. This will be your [employer] login ID and cannot be changed, so think of one that is secure and easy to remember. 6. Create a [employer] password. You can change your password at any time. 7. Enter your Password Reset Question and Answer. This can be used at a later time if you forget your password. 8. Enter your e-mail address. You will receive e-mail notification when new information is available in [address] 9. Click Sign Up. You can now view and download portions of your medical record. 10. Click the Download Summary menu link to download a portable copy of your medical information. If you have questions, please visit the Frequently Asked Questions section of the [employer] website. Remember, [employer] is NOT to be used for urgent needs. For medical emergencies, dial 911. Now available from your iPhone and Android! Please provide this summary of care documentation to your next provider. Your primary care clinician is listed as Praveena Busch. If you have any questions after today's visit, please call [phone].

## 2018-05-10 DIAGNOSIS — F41.1 GAD (GENERALIZED ANXIETY DISORDER): ICD-10-CM

## 2018-05-10 DIAGNOSIS — K21.9 GASTROESOPHAGEAL REFLUX DISEASE WITHOUT ESOPHAGITIS: ICD-10-CM

## 2018-05-10 DIAGNOSIS — F33.0 MDD (MAJOR DEPRESSIVE DISORDER), RECURRENT EPISODE, MILD (HCC): ICD-10-CM

## 2018-05-10 RX ORDER — DULOXETIN HYDROCHLORIDE 30 MG/1
CAPSULE, DELAYED RELEASE ORAL
Qty: 30 CAP | Refills: 0 | Status: SHIPPED | OUTPATIENT
Start: 2018-05-10 | End: 2018-06-14 | Stop reason: SDUPTHER

## 2018-05-10 RX ORDER — DULOXETIN HYDROCHLORIDE 60 MG/1
CAPSULE, DELAYED RELEASE ORAL
Qty: 30 CAP | Refills: 0 | Status: SHIPPED | OUTPATIENT
Start: 2018-05-10 | End: 2018-06-14 | Stop reason: SDUPTHER

## 2018-05-12 NOTE — TELEPHONE ENCOUNTER
Is she having heartburn every day. If she is having a problem she will need to see GI. If no issues take every other day and stop after 2 weeks.

## 2018-05-15 RX ORDER — OMEPRAZOLE 20 MG/1
CAPSULE, DELAYED RELEASE ORAL
Qty: 30 CAP | Refills: 0 | OUTPATIENT
Start: 2018-05-15

## 2018-05-15 NOTE — TELEPHONE ENCOUNTER
Call completed to patient, two identifiers verified. Patient advised she did NOT request a refill. Call completed to [facility], request Rx be discontinued.

## 2018-05-27 DIAGNOSIS — I10 ESSENTIAL HYPERTENSION WITH GOAL BLOOD PRESSURE LESS THAN 140/90: ICD-10-CM

## 2018-05-27 RX ORDER — FUROSEMIDE 40 MG/1
TABLET ORAL
Qty: 90 TAB | Refills: 0 | Status: SHIPPED | OUTPATIENT
Start: 2018-05-27 | End: 2018-09-03 | Stop reason: SDUPTHER

## 2018-06-13 DIAGNOSIS — F41.0 PANIC DISORDER: ICD-10-CM

## 2018-06-13 RX ORDER — ALPRAZOLAM 0.5 MG/1
TABLET ORAL
Qty: 20 TAB | Refills: 0 | OUTPATIENT
Start: 2018-06-13 | End: 2018-07-19 | Stop reason: SDUPTHER

## 2018-06-19 ENCOUNTER — TELEPHONE (OUTPATIENT)
Dept: INTERNAL MEDICINE CLINIC | Age: 63
End: 2018-06-19

## 2018-06-19 DIAGNOSIS — Z12.31 ENCOUNTER FOR SCREENING MAMMOGRAM FOR BREAST CANCER: Primary | ICD-10-CM

## 2018-06-19 NOTE — TELEPHONE ENCOUNTER
Called and spoke to pt. Two pt identifiers confirmed. Pt confirmed having last mammo in 2016 with Tiffanie Nagy, no record of this in chart. Stated she received letter in the mail stating it was time for another mammo from Tiffanie Nagy. No order found in chart. Will request order from Dr. Naina Horner and mail out to pt. No further questions at time of call.

## 2018-07-19 DIAGNOSIS — F41.1 GAD (GENERALIZED ANXIETY DISORDER): ICD-10-CM

## 2018-07-19 DIAGNOSIS — F41.0 PANIC DISORDER: ICD-10-CM

## 2018-07-19 DIAGNOSIS — F33.0 MDD (MAJOR DEPRESSIVE DISORDER), RECURRENT EPISODE, MILD (HCC): ICD-10-CM

## 2018-07-19 RX ORDER — ALPRAZOLAM 0.25 MG/1
0.25 TABLET ORAL
Qty: 30 TAB | Refills: 0 | OUTPATIENT
Start: 2018-07-19 | End: 2018-08-27 | Stop reason: SDUPTHER

## 2018-07-19 RX ORDER — TEMAZEPAM 22.5 MG/1
CAPSULE ORAL
Qty: 60 CAP | Refills: 0 | OUTPATIENT
Start: 2018-07-19 | End: 2018-08-27 | Stop reason: SDUPTHER

## 2018-08-07 NOTE — TELEPHONE ENCOUNTER
Patient states she needs a call back to get her dosage changed on her Omeprazole to 2 a day, 1 in the morning with Breakfast & 1 in the evening with Dinner as this works best for her. Patient states current prescribed dosage of 1 a day is not working & needs a New Prescription with updated/increased dosage. Please call to discuss.  Thank you

## 2018-08-08 RX ORDER — OMEPRAZOLE 20 MG/1
20 CAPSULE, DELAYED RELEASE ORAL 2 TIMES DAILY
Qty: 180 CAP | Refills: 0 | Status: SHIPPED | OUTPATIENT
Start: 2018-08-08 | End: 2018-11-08 | Stop reason: SDUPTHER

## 2018-08-08 NOTE — TELEPHONE ENCOUNTER
Call completed to patient, two identifiers verified. Inquired if the patient has been seen by Russell Cunningham. Patient reports that she does have a gastroenterologist. Explained that omeprazole is not to be taken long term if possible. Patient verbalized an understanding and reports that she will contact her gastroenterologist to be seen and evaluated. Encouraged patient to take 20 mg of omeprazole in the morning and 20 mg in the afternoon to equal a daily dose of 40 mg. Patient agrees with recommendations.

## 2018-08-13 ENCOUNTER — TELEPHONE (OUTPATIENT)
Dept: INTERNAL MEDICINE CLINIC | Age: 63
End: 2018-08-13

## 2018-08-13 NOTE — TELEPHONE ENCOUNTER
#[phone] Pt is asking if the nystop comes in a cream?  She is very broken out under breast and navel area. Nothing is working and when she gets the  (I could not find this in pt's chart and she didn't spell it to me)  I believe she stated this came in a bottle? Please call pt with any questions as she needs this as soon as possible. Thanks.

## 2018-08-13 NOTE — TELEPHONE ENCOUNTER
Identified patient 2 identifiers verified. Patient reports that area under right breast is red and raw and sore to touch. Patient has tried over the counter ointments not working.

## 2018-08-27 DIAGNOSIS — F41.0 PANIC DISORDER: ICD-10-CM

## 2018-08-27 DIAGNOSIS — F41.1 GAD (GENERALIZED ANXIETY DISORDER): ICD-10-CM

## 2018-08-27 DIAGNOSIS — F33.0 MDD (MAJOR DEPRESSIVE DISORDER), RECURRENT EPISODE, MILD (HCC): ICD-10-CM

## 2018-08-27 RX ORDER — ALPRAZOLAM 0.25 MG/1
0.12 TABLET ORAL
Qty: 15 TAB | Refills: 0 | OUTPATIENT
Start: 2018-08-27 | End: 2018-09-20 | Stop reason: SDUPTHER

## 2018-08-27 RX ORDER — TEMAZEPAM 22.5 MG/1
CAPSULE ORAL
Qty: 60 CAP | Refills: 0 | Status: SHIPPED | OUTPATIENT
Start: 2018-08-27 | End: 2018-09-25 | Stop reason: SDUPTHER

## 2018-08-27 NOTE — TELEPHONE ENCOUNTER
Pt has appt 9/13/18  Pt wants to know if she can enough meds until she sees kumar on 9/13/18  She needs her sleep med and Xanax

## 2018-09-02 ENCOUNTER — HOSPITAL ENCOUNTER (EMERGENCY)
Age: 63
Discharge: HOME | End: 2018-09-02
Attending: EMERGENCY MEDICINE | Admitting: EMERGENCY MEDICINE
Payer: MEDICAID

## 2018-09-02 VITALS
SYSTOLIC BLOOD PRESSURE: 146 MMHG | OXYGEN SATURATION: 97 % | RESPIRATION RATE: 18 BRPM | WEIGHT: 293 LBS | DIASTOLIC BLOOD PRESSURE: 83 MMHG | HEART RATE: 102 BPM | TEMPERATURE: 97.4 F | BODY MASS INDEX: 63.1 KG/M2

## 2018-09-02 DIAGNOSIS — B37.2 SKIN CANDIDIASIS: Primary | ICD-10-CM

## 2018-09-02 PROCEDURE — 74011250637 HC RX REV CODE- 250/637: Performed by: PHYSICIAN ASSISTANT

## 2018-09-02 PROCEDURE — 99283 EMERGENCY DEPT VISIT LOW MDM: CPT

## 2018-09-02 RX ORDER — NYSTATIN 100000 [USP'U]/G
POWDER TOPICAL 4 TIMES DAILY
Qty: 300 G | Refills: 1 | Status: SHIPPED | OUTPATIENT
Start: 2018-09-02 | End: 2019-01-16 | Stop reason: ALTCHOICE

## 2018-09-02 RX ORDER — NAPROXEN 250 MG/1
500 TABLET ORAL
Status: COMPLETED | OUTPATIENT
Start: 2018-09-02 | End: 2018-09-02

## 2018-09-02 RX ORDER — NAPROXEN 500 MG/1
500 TABLET ORAL
Qty: 20 TAB | Refills: 0 | Status: SHIPPED | OUTPATIENT
Start: 2018-09-02 | End: 2019-01-16 | Stop reason: SDUPTHER

## 2018-09-02 RX ADMIN — NAPROXEN 500 MG: 250 TABLET ORAL at 16:24

## 2018-09-02 NOTE — DISCHARGE INSTRUCTIONS
Yeast Skin Infection: Care Instructions  Your Care Instructions    Yeast normally lives on your skin. Sometimes too much yeast can overgrow in certain areas of the skin and cause an infection. The infection causes red, scaly, moist patches on your skin that may itch. Common areas for skin yeast infections are skin folds under the breasts or belly area. The warm and moist areas in the skin folds can make it easier for yeast to overgrow. Yeast infections also can be found on other parts of the body such as the groin or armpits. You will probably get a cream or ointment that contains an antifungal medicine. Examples of these are miconazole and clotrimazole. You put it on your skin to treat the infection. Your doctor may give you a prescription for the cream or ointment. Or you may be able to buy it without a prescription at most drugstores. If the infection is severe, the doctor will prescribe antifungal pills. A yeast infection usually goes away after about a week of treatment. But it's important to use the medicine for as long as your doctor tells you to. Follow-up care is a key part of your treatment and safety. Be sure to make and go to all appointments, and call your doctor if you are having problems. It's also a good idea to know your test results and keep a list of the medicines you take. How can you care for yourself at home? · Be safe with medicines. Take your medicines exactly as prescribed. Call your doctor if you think you are having a problem with your medicine. · Keep your skin clean and dry. Your doctor may suggest using powder that contains an antifungal medicine in the skin folds. · Wear loose clothing. When should you call for help? Call your doctor now or seek immediate medical care if:    · You have symptoms of infection, such as:  ¨ Increased pain, swelling, warmth, or redness. ¨ Red streaks leading from the area. ¨ Pus draining from the area.   ¨ A fever.    Watch closely for changes in your health, and be sure to contact your doctor if:    · You do not get better as expected. Where can you learn more? Go to http://teddy-merlin.info/. Enter D386 in the search box to learn more about \"Yeast Skin Infection: Care Instructions. \"  Current as of: October 5, 2017  Content Version: 11.7  © 6523-3648 [employer]. Care instructions adapted under license by [employer] (which disclaims liability or warranty for this information). If you have questions about a medical condition or this instruction, always ask your healthcare professional. [address] any warranty or liability for your use of this information.

## 2018-09-02 NOTE — ED NOTES
Assumed care of patient. Patient is alert and oriented, does not appear to be in distress. Patient c/o rash under both breasts x 2-3 weeks. Patient positioned for comfort with call bell within reach. Side rails up for safety. Provider to evaluate patient.

## 2018-09-02 NOTE — ED PROVIDER NOTES
EMERGENCY DEPARTMENT HISTORY AND PHYSICAL EXAM 
     
 
Date: 9/2/2018 Patient Name: Regis López History of Presenting Illness Chief Complaint Patient presents with  Rash  
  pt reports under bilateral breasts x 2-3 weeks History Provided By: Patient HPI: Regis López is a 58 y.o. female, pmhx yeast infections of the skin, who presents ambulatory to the ED c/o sticky burning rash under bilateral breasts x 2 weeks. She has tried multiple OTC ointments with no relief. There has been no pus. She denies injury. She specifically denies any recent fevers, chills, nausea, vomiting, diarrhea, abd pain, CP, urinary sxs, changes in BM, or headache. She denies history of diabetes, kidney disease, liver disease, thyroid disease PCP: Nick Dalton MD 
 
There are no other complaints, changes, or physical findings at this time. Current Facility-Administered Medications Medication Dose Route Frequency Provider Last Rate Last Dose  naproxen (NAPROSYN) tablet 500 mg  500 mg Oral NOW WAYLON Morrison Current Outpatient Prescriptions Medication Sig Dispense Refill  nystatin (MYCOSTATIN) powder Apply  to affected area four (4) times daily. 300 g 1  
 naproxen (NAPROSYN) 500 mg tablet Take 1 Tab by mouth every twelve (12) hours as needed for Pain. 20 Tab 0  ALPRAZolam (XANAX) 0.25 mg tablet Take 0.5 Tabs by mouth daily as needed for Anxiety. 15 Tab 0  
 temazepam (RESTORIL) 22.5 mg capsule TAKE 2 CAPSULE BY MOUTH EVERY NIGHT AT BEDTIME AS NEEDED FOR SLEEP. DO NOT TAKE WITH OTHER BENZO/NARCOTICS/ALCOHOL 60 Cap 0  
 omeprazole (PRILOSEC) 20 mg capsule Take 1 Cap by mouth two (2) times a day. 180 Cap 0  
 simvastatin (ZOCOR) 20 mg tablet TAKE 1 TABLET BY MOUTH EVERY NIGHT 90 Tab 1  DULoxetine (CYMBALTA) 30 mg capsule TAKE 1 CAPSULE BY MOUTH DAILY 30 Cap 2  
 DULoxetine (CYMBALTA) 60 mg capsule TAKE 1 CAPSULE BY MOUTH DAILY 30 Cap 2  
  furosemide (LASIX) 40 mg tablet TAKE 1 TABLET BY MOUTH DAILY 90 Tab 0  
 naloxone (NARCAN) 4 mg/actuation nasal spray Use 1 spray intranasally into 1 nostril. Use a new Narcan nasal spray for subsequent doses and administer into alternating nostrils. May repeat every 2 to 3 minutes as needed. Indications: OPIATE-INDUCED RESPIRATORY DEPRESSION 2 Each 0  
 lisinopril (PRINIVIL, ZESTRIL) 10 mg tablet TAKE 1 TABLET BY MOUTH DAILY 90 Tab 3  
 ondansetron (ZOFRAN ODT) 4 mg disintegrating tablet Take 1 Tab by mouth every eight (8) hours as needed for Nausea. 10 Tab 0  
 loperamide (IMODIUM) 2 mg capsule Take 1 Cap by mouth as needed for Diarrhea. Indications: Diarrhea 20 Cap 1  potassium chloride SR (KLOR-CON 10) 10 mEq tablet TAKE 2 TABLETS BY MOUTH DAILY 180 Tab 6  potassium chloride SR (KLOR-CON 10) 10 mEq tablet TAKE 2 TABLETS BY MOUTH DAILY 180 Tab 0  
 dicyclomine (BENTYL) 20 mg tablet TAKE 1 TABLET BY MOUTH THREE TIMES DAILY 90 Tab 0 Past History Past Medical History: 
Past Medical History:  
Diagnosis Date  Arthritis   
 chronic pain related arthritis  Bipolar 1 disorder ([address]  GERD (gastroesophageal reflux disease)  Hypertension  Other ill-defined conditions(799.89)   
 hyperlipidemia. Stomach abcess  Psychiatric disorder   
 panic attacks  Sleep apnea  Thromboembolus ([address]   
 last year L leg  Unspecified sleep apnea Past Surgical History: 
Past Surgical History:  
Procedure Laterality Date  HX CHOLECYSTECTOMY  HX GI    
 gallbladder removed 2/2010  HX HYSTERECTOMY Family History: 
Family History Problem Relation Age of Onset  Heart Disease Mother  Diabetes Mother [address] [place] [address] Arthritis-osteo Mother  High Cholesterol Mother  Heart Attack Sister  Diabetes Sister  Heart Disease Sister  Diabetes Sister  Arthritis-osteo Sister  High Cholesterol Sister  Schizophrenia Paternal Grandmother  Drug Abuse Sister Social History: 
Social History Substance Use Topics  Smoking status: Never Smoker  Smokeless tobacco: Never Used  Alcohol use No  
 
 
Allergies: 
No Known Allergies Review of Systems Review of Systems Constitutional: Negative for activity change, appetite change, fatigue and fever. HENT: Negative for congestion, dental problem, ear pain, rhinorrhea and sinus pressure. Eyes: Negative for photophobia, pain, discharge, redness, itching and visual disturbance. Respiratory: Negative for cough, chest tightness, shortness of breath, wheezing and stridor. Cardiovascular: Negative for chest pain and leg swelling. Gastrointestinal: Negative for abdominal distention, abdominal pain and blood in stool. Genitourinary: Negative for difficulty urinating, dysuria, flank pain, frequency, vaginal bleeding, vaginal discharge and vaginal pain. Musculoskeletal: Negative for arthralgias, back pain, gait problem, joint swelling and neck pain. Skin: Positive for color change and rash. Negative for wound. Neurological: Negative for dizziness, syncope, weakness, numbness and headaches. Psychiatric/Behavioral: Negative for behavioral problems. The patient is not nervous/anxious. Physical Exam  
Physical Exam  
Constitutional: She is oriented to person, place, and time. She appears well-developed and well-nourished. No distress. Obese, ambulatory HENT:  
Head: Normocephalic and atraumatic. Right Ear: External ear normal.  
Left Ear: External ear normal.  
Nose: Nose normal.  
Mouth/Throat: Oropharynx is clear and moist. No oropharyngeal exudate. Eyes: Conjunctivae and EOM are normal. Pupils are equal, round, and reactive to light. Right eye exhibits no discharge. Left eye exhibits no discharge. No scleral icterus. Neck: Normal range of motion. Neck supple. No tracheal deviation present.   
Cardiovascular: Normal rate, regular rhythm, normal heart sounds and intact distal pulses. Exam reveals no gallop and no friction rub. No murmur heard. Pulmonary/Chest: Effort normal and breath sounds normal. No respiratory distress. She has no wheezes. She has no rales. She exhibits no tenderness. Musculoskeletal: She exhibits no edema or tenderness. Lymphadenopathy:  
  She has no cervical adenopathy. Neurological: She is alert and oriented to person, place, and time. No cranial nerve deficit. Skin: Skin is warm and dry. Rash noted. There is erythema. Erythematous rash with thin white exudate under bilateral breasts. No pustules. No red streaks. Psychiatric: She has a normal mood and affect. Her behavior is normal.  
Nursing note and vitals reviewed. Diagnostic Study Results Labs - No results found for this or any previous visit (from the past 12 hour(s)). Radiologic Studies - No orders to display CT Results  (Last 48 hours) None CXR Results  (Last 48 hours) None Medical Decision Making I am the first provider for this patient. I reviewed the vital signs, available nursing notes, past medical history, past surgical history, family history and social history. Vital Signs-Reviewed the patient's vital signs. Patient Vitals for the past 12 hrs: 
 Temp Pulse Resp BP SpO2  
09/02/18 1552 97.4 °F (36.3 °C) (!) 102 18 146/83 97 % Records Reviewed: Nursing Notes and Old Medical Records Provider Notes (Medical Decision Making): DDx: yeast infection, allergic rxn, rash, cellulitis ED Course:  
Initial assessment performed. The patients presenting problems have been discussed, and they are in agreement with the care plan formulated and outlined with them. I have encouraged them to ask questions as they arise throughout their visit. PROGRESS NOTE: 
4:21 PM 
Pt noted to be, ready for discharge. Will follow up as instructed.  All questions have been answered, pt voiced understanding and agreement with plan. Specific return precautions provided as well as instructions to return to the ED should sx worsen at any time. Vital signs stable for discharge. LEFTY [place] Disposition: 
 
DISCHARGE NOTE: 
4:21PM 
The patient's results have been reviewed with family and/or caregiver. They verbally convey their understanding and agreement of the patient's signs, symptoms, diagnosis, treatment, and prognosis. They additionally agree to follow up as recommended in the discharge instructions or to return to the Emergency Room should the patient's condition change prior to their follow-up appointment. The family and/or caregiver verbally agrees with the care-plan and all of their questions have been answered. The discharge instructions have also been provided to the them along with educational information regarding the patient's diagnosis and a list of reasons why the patient would want to return to the ER prior to their follow-up appointment should their condition change. LEFTY [place] PLAN: 
1. Current Discharge Medication List  
  
CONTINUE these medications which have CHANGED Details  
nystatin (MYCOSTATIN) powder Apply  to affected area four (4) times daily. Qty: 300 g, Refills: 1  
  
naproxen (NAPROSYN) 500 mg tablet Take 1 Tab by mouth every twelve (12) hours as needed for Pain. Qty: 20 Tab, Refills: 0 STOP taking these medications HYDROcodone-acetaminophen (NORCO) 5-325 mg per tablet Comments:  
Reason for Stopping:   
   
 amoxicillin 500 mg tab Comments:  
Reason for Stoppin.  
Follow-up Information Follow up With Details Comments Contact Info Loi Ramirez MD   [address] [facility]. 
[phone] [facility] EMERGENCY DEPT  If symptoms worsen [address] [facility] 
[phone] Return to ED if worse Diagnosis Clinical Impression: 1. Skin candidiasis This note will not be viewable in [address]

## 2018-09-03 DIAGNOSIS — I10 ESSENTIAL HYPERTENSION WITH GOAL BLOOD PRESSURE LESS THAN 140/90: ICD-10-CM

## 2018-09-04 RX ORDER — FUROSEMIDE 40 MG/1
TABLET ORAL
Qty: 90 TAB | Refills: 0 | Status: SHIPPED | OUTPATIENT
Start: 2018-09-04 | End: 2018-12-10 | Stop reason: SDUPTHER

## 2018-09-16 DIAGNOSIS — F33.0 MDD (MAJOR DEPRESSIVE DISORDER), RECURRENT EPISODE, MILD (HCC): ICD-10-CM

## 2018-09-16 DIAGNOSIS — F41.1 GAD (GENERALIZED ANXIETY DISORDER): ICD-10-CM

## 2018-09-17 RX ORDER — DULOXETIN HYDROCHLORIDE 60 MG/1
CAPSULE, DELAYED RELEASE ORAL
Qty: 30 CAP | Refills: 0 | OUTPATIENT
Start: 2018-09-17

## 2018-09-17 RX ORDER — DULOXETIN HYDROCHLORIDE 30 MG/1
CAPSULE, DELAYED RELEASE ORAL
Qty: 30 CAP | Refills: 0 | OUTPATIENT
Start: 2018-09-17

## 2018-09-18 ENCOUNTER — TELEPHONE (OUTPATIENT)
Dept: INTERNAL MEDICINE CLINIC | Age: 63
End: 2018-09-18

## 2018-09-18 NOTE — TELEPHONE ENCOUNTER
Pt had an appt scheduled for 9/18/18 that she canceled. Pt states that she wants to speak with no one but Dr. Zeke Whittington in regards to several issues that she is having(no details given) and then she will r/s appt if she needs to.

## 2018-09-19 ENCOUNTER — TELEPHONE (OUTPATIENT)
Dept: BEHAVIORAL/MENTAL HEALTH CLINIC | Age: 63
End: 2018-09-19

## 2018-09-19 NOTE — TELEPHONE ENCOUNTER
Per our conversation advised the patient to make an appt. She said she will call back to make one once she is home. Patient said she only talk to you about her depression.

## 2018-09-19 NOTE — TELEPHONE ENCOUNTER
If I treat her over the phone she does not need to come into the office. Her appointment usually are long. Please get more detail. I have a full schedule today and cannot do a long visit on the phone.

## 2018-09-19 NOTE — TELEPHONE ENCOUNTER
Call completed to patient, two identifiers verified. Patient explained that she is currently dealing with a lot of extra stress. Patient being crying and explaining that she is caring for her mother and trying to help resolve medical issues with her mothers' oxygen. Additionally, all the weight the patient lost she has regain. She feels that she has failed Dr. Bonita Kumar and does not want her to see her. Advised the patient that we are not here to  her physical appearance but to help. Patient advised to come in to the appointment so that Dr. Bonita Kumar can assist with her weight loss. Patient reports she is so depressed that she has also cancelled her psych appointments due to her appearance too. Patient advised to reschedule all her appointments. Patient agrees to call and reschedule her appointments once she is home.

## 2018-09-20 DIAGNOSIS — F41.0 PANIC DISORDER: ICD-10-CM

## 2018-09-20 DIAGNOSIS — F41.1 GAD (GENERALIZED ANXIETY DISORDER): ICD-10-CM

## 2018-09-20 DIAGNOSIS — F33.0 MDD (MAJOR DEPRESSIVE DISORDER), RECURRENT EPISODE, MILD (HCC): ICD-10-CM

## 2018-09-20 RX ORDER — ALPRAZOLAM 0.25 MG/1
0.12 TABLET ORAL
Qty: 15 TAB | Refills: 0 | OUTPATIENT
Start: 2018-09-20 | End: 2018-10-10 | Stop reason: SDUPTHER

## 2018-09-20 NOTE — PROGRESS NOTES
Returned call. Pt reports she has felt upset because of all the stressors she has been dealing with. She states she made a new appt and plans to come in on 10/10/18. Will refill alprazolam for her at this time.

## 2018-09-21 RX ORDER — ALPRAZOLAM 0.25 MG/1
TABLET ORAL
Qty: 15 TAB | Refills: 0 | OUTPATIENT
Start: 2018-09-21

## 2018-09-21 RX ORDER — TEMAZEPAM 22.5 MG/1
CAPSULE ORAL
Qty: 60 CAP | Refills: 0 | OUTPATIENT
Start: 2018-09-21

## 2018-09-25 DIAGNOSIS — F33.0 MDD (MAJOR DEPRESSIVE DISORDER), RECURRENT EPISODE, MILD (HCC): ICD-10-CM

## 2018-09-25 DIAGNOSIS — F41.1 GAD (GENERALIZED ANXIETY DISORDER): ICD-10-CM

## 2018-09-25 DIAGNOSIS — F41.0 PANIC DISORDER: ICD-10-CM

## 2018-09-25 RX ORDER — TEMAZEPAM 22.5 MG/1
CAPSULE ORAL
Qty: 60 CAP | Refills: 0 | OUTPATIENT
Start: 2018-09-25 | End: 2018-10-23 | Stop reason: SDUPTHER

## 2018-09-25 RX ORDER — ALPRAZOLAM 0.25 MG/1
TABLET ORAL
Qty: 15 TAB | Refills: 0 | OUTPATIENT
Start: 2018-09-25

## 2018-09-26 DIAGNOSIS — F41.1 GAD (GENERALIZED ANXIETY DISORDER): ICD-10-CM

## 2018-09-26 DIAGNOSIS — F33.0 MDD (MAJOR DEPRESSIVE DISORDER), RECURRENT EPISODE, MILD (HCC): ICD-10-CM

## 2018-09-26 DIAGNOSIS — F41.0 PANIC DISORDER: ICD-10-CM

## 2018-09-26 RX ORDER — DULOXETIN HYDROCHLORIDE 30 MG/1
CAPSULE, DELAYED RELEASE ORAL
Qty: 90 CAP | Refills: 0 | OUTPATIENT
Start: 2018-09-26

## 2018-09-26 RX ORDER — ALPRAZOLAM 0.25 MG/1
0.12 TABLET ORAL
Qty: 15 TAB | Refills: 0 | Status: CANCELLED | OUTPATIENT
Start: 2018-09-26

## 2018-09-26 RX ORDER — DULOXETIN HYDROCHLORIDE 30 MG/1
CAPSULE, DELAYED RELEASE ORAL
Qty: 30 CAP | Refills: 0 | Status: SHIPPED | OUTPATIENT
Start: 2018-09-26 | End: 2018-10-22 | Stop reason: SDUPTHER

## 2018-09-26 RX ORDER — DULOXETIN HYDROCHLORIDE 60 MG/1
CAPSULE, DELAYED RELEASE ORAL
Qty: 90 CAP | Refills: 0 | OUTPATIENT
Start: 2018-09-26

## 2018-09-26 RX ORDER — DULOXETIN HYDROCHLORIDE 60 MG/1
CAPSULE, DELAYED RELEASE ORAL
Qty: 30 CAP | Refills: 0 | Status: SHIPPED | OUTPATIENT
Start: 2018-09-26 | End: 2018-10-22 | Stop reason: SDUPTHER

## 2018-09-26 NOTE — TELEPHONE ENCOUNTER
Pt called and said pharmacy never received the xanax last week. She said she just ran out of her 8/27 script. Can we check the  for the 9/20 fill to make sure it wasn't filled and possibly call [facility] to double check? They claim they never received it according to pt. Pt also needs RF on both duloxetines.

## 2018-10-10 ENCOUNTER — OFFICE VISIT (OUTPATIENT)
Dept: BEHAVIORAL/MENTAL HEALTH CLINIC | Age: 63
End: 2018-10-10

## 2018-10-10 VITALS
HEIGHT: 62 IN | HEART RATE: 115 BPM | SYSTOLIC BLOOD PRESSURE: 145 MMHG | WEIGHT: 293 LBS | DIASTOLIC BLOOD PRESSURE: 85 MMHG | BODY MASS INDEX: 53.92 KG/M2

## 2018-10-10 DIAGNOSIS — F41.1 GAD (GENERALIZED ANXIETY DISORDER): Primary | ICD-10-CM

## 2018-10-10 DIAGNOSIS — F33.0 MDD (MAJOR DEPRESSIVE DISORDER), RECURRENT EPISODE, MILD (HCC): ICD-10-CM

## 2018-10-10 DIAGNOSIS — F41.0 PANIC DISORDER: ICD-10-CM

## 2018-10-10 RX ORDER — ALPRAZOLAM 0.25 MG/1
0.25 TABLET ORAL
Qty: 15 TAB | Refills: 0 | Status: SHIPPED | OUTPATIENT
Start: 2018-10-10 | End: 2018-10-31 | Stop reason: SDUPTHER

## 2018-10-10 NOTE — MR AVS SNAPSHOT
[address] [facility] 
[phone] Patient: Katia Romero MRN:  [id number] Visit Information Date & Time Provider Department Dept. Phone Encounter #  
 10/10/2018  2:30 PM Leeanne Singletary NP [address] [facility] [phone] [id number] Follow-up Instructions Return in about 8 weeks (around 12/5/2018). Your Appointments 10/19/2018 11:30 AM  
ROUTINE CARE with Rose Toussaint MD  
[facility]) Appt Note: 6 mon f/up TR 9/20/18  
 [address] [id number]  
[address] [place] [address] [address] [facility] Upcoming Health Maintenance Date Due Hepatitis C Screening 1955 DTaP/Tdap/Td series (1 - Tdap) 3/5/1975 PAP AKA CERVICAL CYTOLOGY 3/5/1975 Shingrix Vaccine Age 50> (1 of 2) 3/5/2004 BREAST CANCER SCRN MAMMOGRAM 3/5/2004 FOBT Q 1 YEAR AGE 50-75 2/27/2017 Influenza Age 5 to Adult 8/1/2018 Allergies as of 10/10/2018  Review Complete On: 10/10/2018 By: Rin Almanza No Known Allergies Current Immunizations  Reviewed on 9/10/2015 Name Date Influenza Vaccine 4/9/2015 Pneumococcal Vaccine (Unspecified Type) 4/9/2015 Not reviewed this visit You Were Diagnosed With   
  
 Codes Comments GABRIEL (generalized anxiety disorder)    -  Primary ICD-10-CM: F41.1 ICD-9-CM: 300.02   
 MDD (major depressive disorder), recurrent episode, mild (HCC)     ICD-10-CM: F33.0 ICD-9-CM: 296.31 Panic disorder     ICD-10-CM: F41.0 ICD-9-CM: 300.01 Vitals BP Pulse Height(growth percentile) Weight(growth percentile) BMI OB Status 145/85 (!) 115 5' 2\" (1.575 m) 347 lb (157.4 kg) 63.47 kg/m2 Postmenopausal  
 Smoking Status Never Smoker Vitals History BMI and BSA Data Body Mass Index Body Surface Area  
 63.47 kg/m 2 2.62 m 2 Preferred Pharmacy Pharmacy Name Phone [facility] [address] [facility] [place] [phone] Your Updated Medication List  
  
   
This list is accurate as of 10/10/18  3:00 PM.  Always use your most recent med list.  
  
  
  
  
 ALPRAZolam 0.25 mg tablet Commonly known as:  Sherleen Lay Take 1 Tab by mouth daily as needed for Anxiety. dicyclomine 20 mg tablet Commonly known as:  BENTYL TAKE 1 TABLET BY MOUTH THREE TIMES DAILY * DULoxetine 30 mg capsule Commonly known as:  CYMBALTA TAKE 1 CAPSULE BY MOUTH DAILY * DULoxetine 60 mg capsule Commonly known as:  CYMBALTA TAKE 1 CAPSULE BY MOUTH DAILY  
  
 furosemide 40 mg tablet Commonly known as:  LASIX TAKE 1 TABLET BY MOUTH DAILY  
  
 lisinopril 10 mg tablet Commonly known as:  PRINIVIL, ZESTRIL  
TAKE 1 TABLET BY MOUTH DAILY  
  
 loperamide 2 mg capsule Commonly known as:  IMODIUM Take 1 Cap by mouth as needed for Diarrhea. Indications: Diarrhea  
  
 naloxone 4 mg/actuation nasal spray Commonly known as:  [employer] Use 1 spray intranasally into 1 nostril. Use a new Narcan nasal spray for subsequent doses and administer into alternating nostrils. May repeat every 2 to 3 minutes as needed. Indications: OPIATE-INDUCED RESPIRATORY DEPRESSION  
  
 naproxen 500 mg tablet Commonly known as:  NAPROSYN Take 1 Tab by mouth every twelve (12) hours as needed for Pain. nystatin powder Commonly known as:  MYCOSTATIN Apply  to affected area four (4) times daily. omeprazole 20 mg capsule Commonly known as:  PRILOSEC Take 1 Cap by mouth two (2) times a day. ondansetron 4 mg disintegrating tablet Commonly known as:  ZOFRAN ODT Take 1 Tab by mouth every eight (8) hours as needed for Nausea. * potassium chloride SR 10 mEq tablet Commonly known as:  KLOR-CON 10  
 TAKE 2 TABLETS BY MOUTH DAILY * potassium chloride SR 10 mEq tablet Commonly known as:  KLOR-CON 10  
TAKE 2 TABLETS BY MOUTH DAILY  
  
 simvastatin 20 mg tablet Commonly known as:  ZOCOR  
TAKE 1 TABLET BY MOUTH EVERY NIGHT  
  
 temazepam 22.5 mg capsule Commonly known as:  RESTORIL  
TAKE 2 CAPSULES BY MOUTH EVERY NIGHT AT BEDTIME AS NEEDED FOR SLEEP, DO NOT TAKE WITH OTHER BENZOS, NARCOTICS, OR ALCOHOL * Notice: This list has 4 medication(s) that are the same as other medications prescribed for you. Read the directions carefully, and ask your doctor or other care provider to review them with you. Prescriptions Printed Refills ALPRAZolam (XANAX) 0.25 mg tablet 0 Sig: Take 1 Tab by mouth daily as needed for Anxiety. Class: Print Route: Oral  
  
Follow-up Instructions Return in about 8 weeks (around 12/5/2018). Introducing \[facility]\"" & [facility] SERVICES! [facility] introduces Seer patient portal. Now you can access parts of your medical record, email your doctor's office, and request medication refills online. 1. In your internet browser, go to [email or web address] 2. Click on the First Time User? Click Here link in the Sign In box. You will see the New Member Sign Up page. 3. Enter your Seer Access Code exactly as it appears below. You will not need to use this code after youve completed the sign-up process. If you do not sign up before the expiration date, you must request a new code. · Seer Access Code: [email or web address] Expires: 1/8/2019  3:00 PM 
 
4. Enter the last four digits of your Social Security Number (xxxx) and Date of Birth (mm/dd/yyyy) as indicated and click Submit. You will be taken to the next sign-up page. 5. Create a [employer] ID. This will be your Seer login ID and cannot be changed, so think of one that is secure and easy to remember. 6. Create a Seer password. You can change your password at any time. 7. Enter your Password Reset Question and Answer. This can be used at a later time if you forget your password. 8. Enter your e-mail address. You will receive e-mail notification when new information is available in [address] 9. Click Sign Up. You can now view and download portions of your medical record. 10. Click the Download Summary menu link to download a portable copy of your medical information. If you have questions, please visit the Frequently Asked Questions section of the [employer] website. Remember, [employer] is NOT to be used for urgent needs. For medical emergencies, dial 911. Now available from your iPhone and Android! Please provide this summary of care documentation to your next provider. Your primary care clinician is listed as Lidia Henry. If you have any questions after today's visit, please call [phone].

## 2018-10-10 NOTE — PROGRESS NOTES
CHIEF COMPLAINT:  Pat Dick is a 58 y.o. female and was seen today for follow-up of psychiatric condition and psychotropic medication management. HPI:    Adele Watts reports the following psychiatric symptoms:  depression and anxiety. The symptoms have been present for months and are of moderate/high severity. The symptoms occur more severely. Pt reports she has been dealing with significant stressors r/t her mother's health. Pt states she has been working on reducing daily alprazolam use. She is here today for follow up. FAMILY/SOCIAL HX: mother has been dealing with health issues    REVIEW OF SYSTEMS:  Psychiatric:  depression, anxiety  Appetite:increased   Sleep: fitful and poor with DIMS (difficulty initiating & maintaining sleep)   Neuro: denies    Visit Vitals    /85    Pulse (!) 115    Ht 5' 2\" (1.575 m)    Wt 157.4 kg (347 lb)    BMI 63.47 kg/m2       Side Effects:  none    MENTAL STATUS EXAM:   Sensorium  oriented to time, place and person   Relations cooperative   Appearance:  age appropriate, casually dressed and overweight   Motor Behavior:  gait stable and within normal limits, using a wheelchair from office to parking lot   Speech:  normal volume   Thought Process: goal directed   Thought Content free of delusions and free of hallucinations   Suicidal ideations no intention   Homicidal ideations no intention   Mood:  anxious and depressed   Affect:  anxious and depressed, tearful at times   Memory recent  adequate   Memory remote:  adequate   Concentration:  adequate   Abstraction:  abstract   Insight:  fair   Reliability fair   Judgment:  fair     MEDICAL DECISION MAKING:  Problems addressed today:    ICD-10-CM ICD-9-CM    1. GABRIEL (generalized anxiety disorder) F41.1 300.02    2. MDD (major depressive disorder), recurrent episode, mild (HCC) F33.0 296.31    3.  Panic disorder F41.0 300.01 ALPRAZolam (XANAX) 0.25 mg tablet       Assessment:   Adele Watts is responding to treatment overall. Reviewed current use of cymbalta and she reports she has had significant benefit from use. She continues to struggle with ongoing stressors and anxiety. Reviewed use of benzo and pt reports she has been working to reduce daily alprazolam. She continues to use it most days but states at ties she uses every other day. Reviewed risk vs benefit of use and reinforced importance of weaning off of benzo's due to long term use. Pt in agreement but asking to hold off until she is through current stressors. Explored contributing factors to anxiety and pt seems to be identified as the go to person by her family she she is constantly taking responsibility for needs for family members. Reinforced importance of healthy boundaries and self care vs increasing responsibilities for others. Will continue to support pt and work to wean off of alprazolam followed by temazepam. Provided support and encouragement. Plan:   1. Current Outpatient Prescriptions   Medication Sig Dispense Refill    ALPRAZolam (XANAX) 0.25 mg tablet Take 1 Tab by mouth daily as needed for Anxiety. 15 Tab 0    DULoxetine (CYMBALTA) 30 mg capsule TAKE 1 CAPSULE BY MOUTH DAILY 30 Cap 0    DULoxetine (CYMBALTA) 60 mg capsule TAKE 1 CAPSULE BY MOUTH DAILY 30 Cap 0    temazepam (RESTORIL) 22.5 mg capsule TAKE 2 CAPSULES BY MOUTH EVERY NIGHT AT BEDTIME AS NEEDED FOR SLEEP, DO NOT TAKE WITH OTHER BENZOS, NARCOTICS, OR ALCOHOL 60 Cap 0    furosemide (LASIX) 40 mg tablet TAKE 1 TABLET BY MOUTH DAILY 90 Tab 0    nystatin (MYCOSTATIN) powder Apply  to affected area four (4) times daily. 300 g 1    naproxen (NAPROSYN) 500 mg tablet Take 1 Tab by mouth every twelve (12) hours as needed for Pain. 20 Tab 0    omeprazole (PRILOSEC) 20 mg capsule Take 1 Cap by mouth two (2) times a day.  180 Cap 0    simvastatin (ZOCOR) 20 mg tablet TAKE 1 TABLET BY MOUTH EVERY NIGHT 90 Tab 1    naloxone (NARCAN) 4 mg/actuation nasal spray Use 1 spray intranasally into 1 nostril. Use a new Narcan nasal spray for subsequent doses and administer into alternating nostrils. May repeat every 2 to 3 minutes as needed. Indications: OPIATE-INDUCED RESPIRATORY DEPRESSION 2 Each 0    lisinopril (PRINIVIL, ZESTRIL) 10 mg tablet TAKE 1 TABLET BY MOUTH DAILY 90 Tab 3    ondansetron (ZOFRAN ODT) 4 mg disintegrating tablet Take 1 Tab by mouth every eight (8) hours as needed for Nausea. 10 Tab 0    loperamide (IMODIUM) 2 mg capsule Take 1 Cap by mouth as needed for Diarrhea. Indications: Diarrhea 20 Cap 1    potassium chloride SR (KLOR-CON 10) 10 mEq tablet TAKE 2 TABLETS BY MOUTH DAILY 180 Tab 6    potassium chloride SR (KLOR-CON 10) 10 mEq tablet TAKE 2 TABLETS BY MOUTH DAILY 180 Tab 0    dicyclomine (BENTYL) 20 mg tablet TAKE 1 TABLET BY MOUTH THREE TIMES DAILY 90 Tab 0          medication changes made today: cont cymbalta 90 mg for dep/anx, cont alprazolam 0.5 mg daily prn anxiety #15 per month, cont temazepam 45 mg bedtime for sleep    2. Counseling and coordination of care including instructions for treatment, risks/benefits, risk factor reduction and patient/family education. She agrees with the plan. Patient instructed to call with any side effects, questions or issues. 3.  Follow-up Disposition:  Return in about 8 weeks (around 12/5/2018).      4. Will recommend ind therapy and nutrition services at next OV    10/10/2018  Winsome Grant NP

## 2018-10-15 ENCOUNTER — TELEPHONE (OUTPATIENT)
Dept: INTERNAL MEDICINE CLINIC | Age: 63
End: 2018-10-15

## 2018-10-15 NOTE — TELEPHONE ENCOUNTER
Message was left for patient that Dr. Becerril Later is in Clinic this morning , but the call will be forwarded to Dr. Becerril Later for review.

## 2018-10-15 NOTE — TELEPHONE ENCOUNTER
Patient requests that she receive a call back from Dr. Allyson Ny today at the end of her day or sometime tomorrow to discuss her Physically & medical concerns prior to her upcoming appt she has this Friday, 10/19/18. Please call.  Thank you

## 2018-10-18 NOTE — TELEPHONE ENCOUNTER
Patient will discuss issues at next appointment. No call needed. Patient wanted to be called after hours if possible and can not understand why this could not be done.

## 2018-10-19 ENCOUNTER — TELEPHONE (OUTPATIENT)
Dept: INTERNAL MEDICINE CLINIC | Age: 63
End: 2018-10-19

## 2018-10-22 ENCOUNTER — TELEPHONE (OUTPATIENT)
Dept: INTERNAL MEDICINE CLINIC | Age: 63
End: 2018-10-22

## 2018-10-22 DIAGNOSIS — F33.0 MDD (MAJOR DEPRESSIVE DISORDER), RECURRENT EPISODE, MILD (HCC): ICD-10-CM

## 2018-10-22 DIAGNOSIS — F41.1 GAD (GENERALIZED ANXIETY DISORDER): ICD-10-CM

## 2018-10-22 RX ORDER — DULOXETIN HYDROCHLORIDE 30 MG/1
CAPSULE, DELAYED RELEASE ORAL
Qty: 30 CAP | Refills: 2 | Status: SHIPPED | OUTPATIENT
Start: 2018-10-22 | End: 2019-01-20 | Stop reason: SDUPTHER

## 2018-10-22 NOTE — TELEPHONE ENCOUNTER
Identified patient 2 identifiers verified.  Patient has an appointment on 10/24/18 at 2:15pm. Patient requesting an x-ray order be  Placed so that she can have this done on Wed

## 2018-10-22 NOTE — TELEPHONE ENCOUNTER
Pt is requesting a call back from \"Linda\" regarding scheduled appointment for 10/24/18.      Best [phone]       Message received & copied from [facility]

## 2018-10-22 NOTE — TELEPHONE ENCOUNTER
Call completed to patient, two identifiers verified. Patient reports that her transportation requires 5 days notice prior to appointment.  Appointment rescheduled for Wednesday, October 31, 2018 03:30 PM

## 2018-10-23 DIAGNOSIS — F41.1 GAD (GENERALIZED ANXIETY DISORDER): ICD-10-CM

## 2018-10-23 DIAGNOSIS — F33.0 MDD (MAJOR DEPRESSIVE DISORDER), RECURRENT EPISODE, MILD (HCC): ICD-10-CM

## 2018-10-23 RX ORDER — DULOXETIN HYDROCHLORIDE 60 MG/1
CAPSULE, DELAYED RELEASE ORAL
Qty: 30 CAP | Refills: 0 | Status: SHIPPED | OUTPATIENT
Start: 2018-10-23 | End: 2018-11-22 | Stop reason: SDUPTHER

## 2018-10-23 RX ORDER — TEMAZEPAM 22.5 MG/1
CAPSULE ORAL
Qty: 60 CAP | Refills: 1 | OUTPATIENT
Start: 2018-10-23 | End: 2018-11-27 | Stop reason: SDUPTHER

## 2018-10-25 ENCOUNTER — TELEPHONE (OUTPATIENT)
Dept: INTERNAL MEDICINE CLINIC | Age: 63
End: 2018-10-25

## 2018-10-25 DIAGNOSIS — F41.0 PANIC DISORDER: ICD-10-CM

## 2018-10-25 DIAGNOSIS — R06.02 SOB (SHORTNESS OF BREATH): Primary | ICD-10-CM

## 2018-10-25 RX ORDER — ALPRAZOLAM 0.25 MG/1
TABLET ORAL
Qty: 15 TAB | Refills: 0 | OUTPATIENT
Start: 2018-10-25

## 2018-10-25 NOTE — TELEPHONE ENCOUNTER
Pt is requesting confirmation that referral for her X-ray was sent.      Best [phone]       Message received & copied from [facility]

## 2018-10-25 NOTE — TELEPHONE ENCOUNTER
----- Message from Jean-Claude Alicia sent at 10/25/2018  2:07 PM EDT -----  Regarding: /Telephone  Pt is requesting confirmation that referral for her X-ray was sent.     Best [phone]    Copy/paste Kati Catherine

## 2018-10-25 NOTE — TELEPHONE ENCOUNTER
She needs to go to the ER if she is having chest pain. I cant treat her over the phone. She may need a chest CT.

## 2018-10-25 NOTE — TELEPHONE ENCOUNTER
Call completed to patient, two identifiers verified. Patient reports experiencing \"chest pain and shallow breathing\". She reports speaking with the on call provider the other day and was referred to Urgent Care. Patient reports attempting to have X-rays completed at Urgent Care with no orders. Patient advised she was suppose to be seen and evaluated. Patient does NOT want to be seen and evaluated by another provider. She would like orders place and reviewed bu Dr. Anup Sepulveda on 10/31/2018.  Orders pended to MD for review and approval.

## 2018-10-26 NOTE — TELEPHONE ENCOUNTER
Call completed to patient, two identifiers verified. Patient advised Chest X-ray was ordered and to seek medical treatment with she has SOB & chest pain. Understanding verbalized.

## 2018-10-31 ENCOUNTER — TELEPHONE (OUTPATIENT)
Dept: BEHAVIORAL/MENTAL HEALTH CLINIC | Age: 63
End: 2018-10-31

## 2018-10-31 DIAGNOSIS — F41.0 PANIC DISORDER: ICD-10-CM

## 2018-10-31 RX ORDER — ALPRAZOLAM 0.25 MG/1
TABLET ORAL
Qty: 16 TAB | Refills: 1 | OUTPATIENT
Start: 2018-10-31 | End: 2019-01-02 | Stop reason: SDUPTHER

## 2018-11-08 RX ORDER — OMEPRAZOLE 20 MG/1
20 CAPSULE, DELAYED RELEASE ORAL 2 TIMES DAILY
Qty: 180 CAP | Refills: 0 | Status: SHIPPED | OUTPATIENT
Start: 2018-11-08 | End: 2019-01-16 | Stop reason: SDUPTHER

## 2018-11-22 DIAGNOSIS — F41.1 GAD (GENERALIZED ANXIETY DISORDER): ICD-10-CM

## 2018-11-22 DIAGNOSIS — F33.0 MDD (MAJOR DEPRESSIVE DISORDER), RECURRENT EPISODE, MILD (HCC): ICD-10-CM

## 2018-11-23 ENCOUNTER — HOSPITAL ENCOUNTER (EMERGENCY)
Age: 63
Discharge: STILL ADMITTED OR ADMINISTRATIVE | End: 2018-11-23
Attending: EMERGENCY MEDICINE
Payer: MEDICAID

## 2018-11-23 VITALS
DIASTOLIC BLOOD PRESSURE: 86 MMHG | HEART RATE: 105 BPM | WEIGHT: 293 LBS | HEIGHT: 62 IN | RESPIRATION RATE: 19 BRPM | TEMPERATURE: 97.6 F | BODY MASS INDEX: 53.92 KG/M2 | OXYGEN SATURATION: 95 % | SYSTOLIC BLOOD PRESSURE: 117 MMHG

## 2018-11-23 PROCEDURE — 75810000275 HC EMERGENCY DEPT VISIT NO LEVEL OF CARE

## 2018-11-23 RX ORDER — DULOXETIN HYDROCHLORIDE 60 MG/1
CAPSULE, DELAYED RELEASE ORAL
Qty: 30 CAP | Refills: 0 | Status: SHIPPED | OUTPATIENT
Start: 2018-11-23 | End: 2018-12-22 | Stop reason: SDUPTHER

## 2018-11-24 DIAGNOSIS — F33.0 MDD (MAJOR DEPRESSIVE DISORDER), RECURRENT EPISODE, MILD (HCC): ICD-10-CM

## 2018-11-24 DIAGNOSIS — F41.1 GAD (GENERALIZED ANXIETY DISORDER): ICD-10-CM

## 2018-11-26 RX ORDER — TEMAZEPAM 22.5 MG/1
CAPSULE ORAL
Qty: 60 CAP | Refills: 0 | OUTPATIENT
Start: 2018-11-26

## 2018-11-27 DIAGNOSIS — F33.0 MDD (MAJOR DEPRESSIVE DISORDER), RECURRENT EPISODE, MILD (HCC): ICD-10-CM

## 2018-11-27 DIAGNOSIS — F41.1 GAD (GENERALIZED ANXIETY DISORDER): ICD-10-CM

## 2018-11-27 RX ORDER — TEMAZEPAM 22.5 MG/1
CAPSULE ORAL
Qty: 60 CAP | Refills: 0 | OUTPATIENT
Start: 2018-11-27

## 2018-11-27 RX ORDER — TEMAZEPAM 22.5 MG/1
CAPSULE ORAL
Qty: 60 CAP | Refills: 1 | OUTPATIENT
Start: 2018-11-27 | End: 2019-01-29 | Stop reason: SDUPTHER

## 2018-12-10 DIAGNOSIS — I10 ESSENTIAL HYPERTENSION WITH GOAL BLOOD PRESSURE LESS THAN 140/90: ICD-10-CM

## 2018-12-10 RX ORDER — FUROSEMIDE 40 MG/1
TABLET ORAL
Qty: 90 TAB | Refills: 0 | Status: SHIPPED | OUTPATIENT
Start: 2018-12-10 | End: 2019-01-16 | Stop reason: SDUPTHER

## 2018-12-10 RX ORDER — POTASSIUM CHLORIDE 750 MG/1
TABLET, FILM COATED, EXTENDED RELEASE ORAL
Qty: 180 TAB | Refills: 0 | Status: SHIPPED | OUTPATIENT
Start: 2018-12-10 | End: 2019-01-16 | Stop reason: SDUPTHER

## 2018-12-11 ENCOUNTER — TELEPHONE (OUTPATIENT)
Dept: BEHAVIORAL/MENTAL HEALTH CLINIC | Age: 63
End: 2018-12-11

## 2018-12-11 RX ORDER — TRAZODONE HYDROCHLORIDE 50 MG/1
50 TABLET ORAL
Qty: 30 TAB | Refills: 0 | Status: SHIPPED | OUTPATIENT
Start: 2018-12-11 | End: 2019-01-08 | Stop reason: SDUPTHER

## 2018-12-11 RX ORDER — TRAZODONE HYDROCHLORIDE 50 MG/1
TABLET ORAL
Qty: 90 TAB | Refills: 0 | OUTPATIENT
Start: 2018-12-11

## 2018-12-11 NOTE — TELEPHONE ENCOUNTER
Returned call. Pt tearful as she reported she has not slept well in the last several nights. Pt agrees her temazepam is not working. Will use a trial of trazodone and work to complete weaning pt off of benzos in the next few months.

## 2018-12-11 NOTE — TELEPHONE ENCOUNTER
Patient calls to inform provider the duloxetine is working. She states the alprazolam and the temazepam isn't working. She states this is her 5th night of \"being up all night\" and she's shaking and \"a nervous wreck. \" She doesn't want to take her \"nerve medicine and her sleep medicine\" together. She would like to speak with the provider about this. Follow up us scheduled in January.

## 2018-12-22 DIAGNOSIS — F33.0 MDD (MAJOR DEPRESSIVE DISORDER), RECURRENT EPISODE, MILD (HCC): ICD-10-CM

## 2018-12-22 DIAGNOSIS — F41.1 GAD (GENERALIZED ANXIETY DISORDER): ICD-10-CM

## 2018-12-24 ENCOUNTER — TELEPHONE (OUTPATIENT)
Dept: BEHAVIORAL/MENTAL HEALTH CLINIC | Age: 63
End: 2018-12-24

## 2018-12-24 RX ORDER — DULOXETIN HYDROCHLORIDE 60 MG/1
CAPSULE, DELAYED RELEASE ORAL
Qty: 30 CAP | Refills: 0 | Status: SHIPPED | OUTPATIENT
Start: 2018-12-24 | End: 2019-01-20 | Stop reason: SDUPTHER

## 2018-12-24 NOTE — TELEPHONE ENCOUNTER
Patient calls to report the trazodone is causing nightmares and dry mouth and it's not working at all: she is not sleeping. She will take the temazepam and follow up on January 2 at her scheduled appointment.

## 2018-12-27 DIAGNOSIS — F41.1 GAD (GENERALIZED ANXIETY DISORDER): ICD-10-CM

## 2018-12-27 DIAGNOSIS — F33.0 MDD (MAJOR DEPRESSIVE DISORDER), RECURRENT EPISODE, MILD (HCC): ICD-10-CM

## 2018-12-27 DIAGNOSIS — F41.0 PANIC DISORDER: ICD-10-CM

## 2018-12-27 RX ORDER — LISINOPRIL 10 MG/1
TABLET ORAL
Qty: 90 TAB | Refills: 0 | Status: SHIPPED | OUTPATIENT
Start: 2018-12-27 | End: 2019-01-16 | Stop reason: DRUGHIGH

## 2018-12-27 RX ORDER — ALPRAZOLAM 0.25 MG/1
TABLET ORAL
Qty: 16 TAB | Refills: 0 | OUTPATIENT
Start: 2018-12-27

## 2018-12-27 RX ORDER — TEMAZEPAM 22.5 MG/1
CAPSULE ORAL
Qty: 60 CAP | Refills: 0 | OUTPATIENT
Start: 2018-12-27

## 2018-12-27 RX ORDER — TEMAZEPAM 22.5 MG/1
CAPSULE ORAL
Qty: 60 CAP | Refills: 1 | OUTPATIENT
Start: 2018-12-27

## 2018-12-27 NOTE — TELEPHONE ENCOUNTER
Pt called and asked for RF on temazepam. Said she needs enough to last her 2 a day til 1/2 when her fu appt is next Wednesday. She said she through the trazadone away per she was having hallucinations.

## 2019-01-02 ENCOUNTER — TELEPHONE (OUTPATIENT)
Dept: BEHAVIORAL/MENTAL HEALTH CLINIC | Age: 64
End: 2019-01-02

## 2019-01-02 DIAGNOSIS — F41.0 PANIC DISORDER: ICD-10-CM

## 2019-01-02 RX ORDER — ALPRAZOLAM 0.25 MG/1
TABLET ORAL
Qty: 16 TAB | Refills: 0 | Status: CANCELLED | OUTPATIENT
Start: 2019-01-02

## 2019-01-02 RX ORDER — ALPRAZOLAM 0.25 MG/1
TABLET ORAL
Qty: 12 TAB | Refills: 0 | OUTPATIENT
Start: 2019-01-02 | End: 2019-01-22 | Stop reason: SDUPTHER

## 2019-01-02 NOTE — TELEPHONE ENCOUNTER
Pt called to cx fu today per transportation did not show. She took next avail appt on 1/22. Asking for refill on \"nerve and sleep pills\".

## 2019-01-08 RX ORDER — TRAZODONE HYDROCHLORIDE 50 MG/1
TABLET ORAL
Qty: 14 TAB | Refills: 0 | Status: SHIPPED | OUTPATIENT
Start: 2019-01-08 | End: 2019-01-16 | Stop reason: ALTCHOICE

## 2019-01-16 ENCOUNTER — OFFICE VISIT (OUTPATIENT)
Dept: INTERNAL MEDICINE CLINIC | Age: 64
End: 2019-01-16

## 2019-01-16 ENCOUNTER — HOSPITAL ENCOUNTER (OUTPATIENT)
Dept: LAB | Age: 64
Discharge: HOME | End: 2019-01-16
Payer: MEDICARE

## 2019-01-16 VITALS
WEIGHT: 293 LBS | HEIGHT: 62 IN | OXYGEN SATURATION: 95 % | DIASTOLIC BLOOD PRESSURE: 99 MMHG | TEMPERATURE: 98 F | SYSTOLIC BLOOD PRESSURE: 155 MMHG | BODY MASS INDEX: 53.92 KG/M2 | RESPIRATION RATE: 18 BRPM | HEART RATE: 83 BPM

## 2019-01-16 DIAGNOSIS — Z12.31 ENCOUNTER FOR SCREENING MAMMOGRAM FOR BREAST CANCER: ICD-10-CM

## 2019-01-16 DIAGNOSIS — I10 ESSENTIAL HYPERTENSION WITH GOAL BLOOD PRESSURE LESS THAN 140/90: ICD-10-CM

## 2019-01-16 DIAGNOSIS — K58.2 IRRITABLE BOWEL SYNDROME WITH BOTH CONSTIPATION AND DIARRHEA: ICD-10-CM

## 2019-01-16 DIAGNOSIS — E78.5 HYPERLIPIDEMIA, UNSPECIFIED HYPERLIPIDEMIA TYPE: ICD-10-CM

## 2019-01-16 DIAGNOSIS — K29.60 REFLUX GASTRITIS: ICD-10-CM

## 2019-01-16 DIAGNOSIS — K08.89 TOOTH PAIN: Primary | ICD-10-CM

## 2019-01-16 DIAGNOSIS — R73.09 ELEVATED HEMOGLOBIN A1C: ICD-10-CM

## 2019-01-16 DIAGNOSIS — Z11.59 NEED FOR HEPATITIS C SCREENING TEST: ICD-10-CM

## 2019-01-16 PROCEDURE — 83036 HEMOGLOBIN GLYCOSYLATED A1C: CPT

## 2019-01-16 PROCEDURE — 80061 LIPID PANEL: CPT

## 2019-01-16 PROCEDURE — 36415 COLL VENOUS BLD VENIPUNCTURE: CPT

## 2019-01-16 PROCEDURE — 86803 HEPATITIS C AB TEST: CPT

## 2019-01-16 PROCEDURE — 80053 COMPREHEN METABOLIC PANEL: CPT

## 2019-01-16 RX ORDER — OMEPRAZOLE 20 MG/1
20 CAPSULE, DELAYED RELEASE ORAL 2 TIMES DAILY
Qty: 180 CAP | Refills: 1 | Status: SHIPPED | OUTPATIENT
Start: 2019-01-16 | End: 2020-03-27 | Stop reason: DRUGHIGH

## 2019-01-16 RX ORDER — AMOXICILLIN 875 MG/1
875 TABLET, FILM COATED ORAL 2 TIMES DAILY
Qty: 14 TAB | Refills: 0 | Status: SHIPPED | OUTPATIENT
Start: 2019-01-16 | End: 2019-11-16 | Stop reason: CLARIF

## 2019-01-16 RX ORDER — DICYCLOMINE HYDROCHLORIDE 20 MG/1
TABLET ORAL
Qty: 90 TAB | Refills: 1 | Status: SHIPPED | OUTPATIENT
Start: 2019-01-16 | End: 2019-11-16 | Stop reason: CLARIF

## 2019-01-16 RX ORDER — SIMVASTATIN 20 MG/1
TABLET, FILM COATED ORAL
Qty: 90 TAB | Refills: 1 | Status: SHIPPED | OUTPATIENT
Start: 2019-01-16 | End: 2020-03-06

## 2019-01-16 RX ORDER — NAPROXEN 500 MG/1
500 TABLET ORAL
Qty: 90 TAB | Refills: 0 | Status: SHIPPED | OUTPATIENT
Start: 2019-01-16 | End: 2019-01-16 | Stop reason: SDUPTHER

## 2019-01-16 RX ORDER — POTASSIUM CHLORIDE 750 MG/1
TABLET, FILM COATED, EXTENDED RELEASE ORAL
Qty: 180 TAB | Refills: 1 | Status: SHIPPED | OUTPATIENT
Start: 2019-01-16

## 2019-01-16 RX ORDER — LISINOPRIL 20 MG/1
20 TABLET ORAL DAILY
Qty: 90 TAB | Refills: 1 | Status: SHIPPED | OUTPATIENT
Start: 2019-01-16 | End: 2019-06-26 | Stop reason: SDUPTHER

## 2019-01-16 RX ORDER — FUROSEMIDE 40 MG/1
TABLET ORAL
Qty: 90 TAB | Refills: 1 | Status: SHIPPED | OUTPATIENT
Start: 2019-01-16 | End: 2019-11-16 | Stop reason: SDUPTHER

## 2019-01-16 RX ORDER — NAPROXEN 500 MG/1
TABLET ORAL
Qty: 180 TAB | Refills: 0 | Status: SHIPPED | OUTPATIENT
Start: 2019-01-16 | End: 2019-05-08 | Stop reason: SDUPTHER

## 2019-01-16 RX ORDER — LISINOPRIL 10 MG/1
TABLET ORAL
Qty: 90 TAB | Refills: 1 | Status: CANCELLED | OUTPATIENT
Start: 2019-01-16

## 2019-01-17 ENCOUNTER — TELEPHONE (OUTPATIENT)
Dept: INTERNAL MEDICINE CLINIC | Age: 64
End: 2019-01-17

## 2019-01-17 LAB
ALBUMIN SERPL-MCNC: 4.1 G/DL (ref 3.6–4.8)
ALBUMIN/GLOB SERPL: 1.5 {RATIO} (ref 1.2–2.2)
ALP SERPL-CCNC: 71 IU/L (ref 39–117)
ALT SERPL-CCNC: 24 IU/L (ref 0–32)
AST SERPL-CCNC: 23 IU/L (ref 0–40)
BILIRUB SERPL-MCNC: 0.4 MG/DL (ref 0–1.2)
BUN SERPL-MCNC: 11 MG/DL (ref 8–27)
BUN/CREAT SERPL: 13 (ref 12–28)
CALCIUM SERPL-MCNC: 9.6 MG/DL (ref 8.7–10.3)
CHLORIDE SERPL-SCNC: 103 MMOL/L (ref 96–106)
CHOLEST SERPL-MCNC: 182 MG/DL (ref 100–199)
CO2 SERPL-SCNC: 25 MMOL/L (ref 20–29)
CREAT SERPL-MCNC: 0.87 MG/DL (ref 0.57–1)
EST. AVERAGE GLUCOSE BLD GHB EST-MCNC: 134 MG/DL
GLOBULIN SER CALC-MCNC: 2.8 G/DL (ref 1.5–4.5)
GLUCOSE SERPL-MCNC: 101 MG/DL (ref 65–99)
HBA1C MFR BLD: 6.3 % (ref 4.8–5.6)
HCV AB S/CO SERPL IA: <0.1 S/CO RATIO (ref 0–0.9)
HDLC SERPL-MCNC: 59 MG/DL
LDLC SERPL CALC-MCNC: 93 MG/DL (ref 0–99)
POTASSIUM SERPL-SCNC: 4.2 MMOL/L (ref 3.5–5.2)
PROT SERPL-MCNC: 6.9 G/DL (ref 6–8.5)
SODIUM SERPL-SCNC: 142 MMOL/L (ref 134–144)
TRIGL SERPL-MCNC: 149 MG/DL (ref 0–149)
VLDLC SERPL CALC-MCNC: 30 MG/DL (ref 5–40)

## 2019-01-17 RX ORDER — AZITHROMYCIN 250 MG/1
250 TABLET, FILM COATED ORAL SEE ADMIN INSTRUCTIONS
Qty: 6 TAB | Refills: 0 | Status: SHIPPED | OUTPATIENT
Start: 2019-01-17 | End: 2019-11-16 | Stop reason: CLARIF

## 2019-01-20 DIAGNOSIS — F41.1 GAD (GENERALIZED ANXIETY DISORDER): ICD-10-CM

## 2019-01-20 DIAGNOSIS — F33.0 MDD (MAJOR DEPRESSIVE DISORDER), RECURRENT EPISODE, MILD (HCC): ICD-10-CM

## 2019-01-21 ENCOUNTER — TELEPHONE (OUTPATIENT)
Dept: BEHAVIORAL/MENTAL HEALTH CLINIC | Age: 64
End: 2019-01-21

## 2019-01-21 RX ORDER — DULOXETIN HYDROCHLORIDE 60 MG/1
CAPSULE, DELAYED RELEASE ORAL
Qty: 30 CAP | Refills: 0 | Status: SHIPPED | OUTPATIENT
Start: 2019-01-21 | End: 2019-02-19 | Stop reason: SDUPTHER

## 2019-01-21 RX ORDER — DULOXETIN HYDROCHLORIDE 30 MG/1
CAPSULE, DELAYED RELEASE ORAL
Qty: 30 CAP | Refills: 0 | Status: SHIPPED | OUTPATIENT
Start: 2019-01-21 | End: 2019-02-19 | Stop reason: SDUPTHER

## 2019-01-21 NOTE — TELEPHONE ENCOUNTER
Patient has to cancel appointment for tomorrow because her mother is still in ICU and she has to be there with her at 1pm to discuss starting dialysis. She is requesting a refill on her alprazolam and to speak with provider if she has a moment.

## 2019-01-22 DIAGNOSIS — F41.0 PANIC DISORDER: ICD-10-CM

## 2019-01-22 RX ORDER — ALPRAZOLAM 0.25 MG/1
TABLET ORAL
Qty: 16 TAB | Refills: 0 | OUTPATIENT
Start: 2019-01-22 | End: 2019-03-06

## 2019-01-22 NOTE — TELEPHONE ENCOUNTER
Patient requesting refill on \"nerve medicine\"    LRD 1/2 #12 for 21, 11/30 #16 for 30, 10/31 #16 for 30, 10/11 #15 for 15

## 2019-01-22 NOTE — TELEPHONE ENCOUNTER
Returned call. Informed pt that I sent a refill for her medication. Offered support re: her mother's declining health. Pt states she has made a f/u appt.

## 2019-01-29 DIAGNOSIS — F33.0 MDD (MAJOR DEPRESSIVE DISORDER), RECURRENT EPISODE, MILD (HCC): ICD-10-CM

## 2019-01-29 DIAGNOSIS — F41.1 GAD (GENERALIZED ANXIETY DISORDER): ICD-10-CM

## 2019-01-30 RX ORDER — TEMAZEPAM 22.5 MG/1
22.5 CAPSULE ORAL
Qty: 40 CAP | Refills: 0 | OUTPATIENT
Start: 2019-01-30 | End: 2019-03-06 | Stop reason: SDUPTHER

## 2019-01-30 RX ORDER — TEMAZEPAM 22.5 MG/1
CAPSULE ORAL
Qty: 60 CAP | Refills: 0 | OUTPATIENT
Start: 2019-01-30 | End: 2019-01-30

## 2019-02-07 NOTE — PROGRESS NOTES
Hep C -negative  Lipids-normal  A1c-Your current hgbA1c of 6.3 is higher than your last level of 6.0. Work on following a diabetic diet and exercise. Recheck this test: hgbA1c  in  3 months. Continue with current  medications.     CMP-Normal electrolyte levels except for an elevation in the glucose level, normal renal and liver function

## 2019-02-15 DIAGNOSIS — F41.0 PANIC DISORDER: ICD-10-CM

## 2019-02-15 RX ORDER — OMEPRAZOLE 20 MG/1
CAPSULE, DELAYED RELEASE ORAL
Qty: 180 CAP | Refills: 0 | Status: SHIPPED | OUTPATIENT
Start: 2019-02-15 | End: 2019-11-11 | Stop reason: SDUPTHER

## 2019-02-15 NOTE — TELEPHONE ENCOUNTER
Patient calls requesting \"nerve pills\" and Tracey Arenas knows my mom passed and I'm just really struggling\"    No follow up scheduled at this time, last seen in October. Also, again explained to patient 3 business day policy.     LRD 1/22 #16 for 30 ( specified as well)   1/2 #12 for 21

## 2019-02-18 DIAGNOSIS — F41.0 PANIC DISORDER: ICD-10-CM

## 2019-02-18 RX ORDER — ALPRAZOLAM 0.25 MG/1
TABLET ORAL
Qty: 16 TAB | Refills: 0 | OUTPATIENT
Start: 2019-02-18

## 2019-02-18 NOTE — TELEPHONE ENCOUNTER
Pt called again and asked for RF on xanax. Told her she needed to schedule a fu. She scheduled for next avail in March and is asking for RF.

## 2019-02-19 DIAGNOSIS — F41.1 GAD (GENERALIZED ANXIETY DISORDER): ICD-10-CM

## 2019-02-19 DIAGNOSIS — F33.0 MDD (MAJOR DEPRESSIVE DISORDER), RECURRENT EPISODE, MILD (HCC): ICD-10-CM

## 2019-02-19 DIAGNOSIS — F41.0 PANIC DISORDER: ICD-10-CM

## 2019-02-19 RX ORDER — ALPRAZOLAM 0.25 MG/1
TABLET ORAL
Qty: 16 TAB | Refills: 0 | OUTPATIENT
Start: 2019-02-19

## 2019-02-19 RX ORDER — DULOXETIN HYDROCHLORIDE 60 MG/1
CAPSULE, DELAYED RELEASE ORAL
Qty: 30 CAP | Refills: 0 | Status: SHIPPED | OUTPATIENT
Start: 2019-02-19 | End: 2019-03-06 | Stop reason: SDUPTHER

## 2019-02-19 RX ORDER — TEMAZEPAM 22.5 MG/1
CAPSULE ORAL
Qty: 40 CAP | Refills: 0 | OUTPATIENT
Start: 2019-02-19

## 2019-02-19 RX ORDER — DULOXETIN HYDROCHLORIDE 30 MG/1
CAPSULE, DELAYED RELEASE ORAL
Qty: 30 CAP | Refills: 0 | Status: SHIPPED | OUTPATIENT
Start: 2019-02-19 | End: 2019-03-06 | Stop reason: SDUPTHER

## 2019-02-19 NOTE — TELEPHONE ENCOUNTER
Follow up is scheduled 3/27 at 2pm. She is aware she hasn't been seen in several months.     Temazepam LRD 1/30 #40, 1/2 #50  Alprazolam LRD 1/22, 1/2     available for review

## 2019-02-21 ENCOUNTER — TELEPHONE (OUTPATIENT)
Dept: BEHAVIORAL/MENTAL HEALTH CLINIC | Age: 64
End: 2019-02-21

## 2019-02-21 NOTE — TELEPHONE ENCOUNTER
Patient called because she states she took her last temazepam last night. Informed her she is not due for the temazepam until the end of February/beginning of March because of her last fill dates and she was aware the quantities were to last 30 days. (by , due 3/2) Informed her the alprazolam is as needed only. Patient demanding call from provider, but also says she's going to find another one. She hung up on me before I could confirm the phone number, which has recently changed, so I don't know if the one in the system is correct.

## 2019-02-21 NOTE — TELEPHONE ENCOUNTER
Noted. Had discussed with patient during last session on 10/10/18 that treatment goal was to wean off of benzo's. Pt was advised to RTC in 8 weeks and she has not been in for follow-up. Have continue temazepam to avoid rapid discontinuation but pt has not been using it as directed.

## 2019-02-26 ENCOUNTER — TELEPHONE (OUTPATIENT)
Dept: BEHAVIORAL/MENTAL HEALTH CLINIC | Age: 64
End: 2019-02-26

## 2019-02-26 NOTE — TELEPHONE ENCOUNTER
Patient called demanding her medications or telephone call. She hasnt been here since October. She has an appt schedule 3/6. Explain she has to come in to discuss her meds. She recently  Stated she was going to go elsewhere.

## 2019-02-27 NOTE — TELEPHONE ENCOUNTER
Have discussed benzo use with patient several times and importance of follow-up. Will review prn use of medication at next OV.

## 2019-03-06 ENCOUNTER — OFFICE VISIT (OUTPATIENT)
Dept: BEHAVIORAL/MENTAL HEALTH CLINIC | Age: 64
End: 2019-03-06

## 2019-03-06 VITALS
HEIGHT: 62 IN | HEART RATE: 85 BPM | BODY MASS INDEX: 53.92 KG/M2 | SYSTOLIC BLOOD PRESSURE: 173 MMHG | WEIGHT: 293 LBS | DIASTOLIC BLOOD PRESSURE: 80 MMHG

## 2019-03-06 DIAGNOSIS — F33.0 MDD (MAJOR DEPRESSIVE DISORDER), RECURRENT EPISODE, MILD (HCC): Primary | ICD-10-CM

## 2019-03-06 DIAGNOSIS — F41.1 GAD (GENERALIZED ANXIETY DISORDER): ICD-10-CM

## 2019-03-06 DIAGNOSIS — F41.0 PANIC DISORDER: ICD-10-CM

## 2019-03-06 RX ORDER — DULOXETIN HYDROCHLORIDE 60 MG/1
60 CAPSULE, DELAYED RELEASE ORAL DAILY
Qty: 30 CAP | Refills: 1 | Status: SHIPPED | OUTPATIENT
Start: 2019-03-06

## 2019-03-06 RX ORDER — BUSPIRONE HYDROCHLORIDE 10 MG/1
10 TABLET ORAL 2 TIMES DAILY
Qty: 60 TAB | Refills: 1 | Status: SHIPPED | OUTPATIENT
Start: 2019-03-06 | End: 2019-11-16 | Stop reason: CLARIF

## 2019-03-06 RX ORDER — DULOXETIN HYDROCHLORIDE 30 MG/1
30 CAPSULE, DELAYED RELEASE ORAL DAILY
Qty: 30 CAP | Refills: 1 | Status: SHIPPED | OUTPATIENT
Start: 2019-03-06 | End: 2020-11-06 | Stop reason: ALTCHOICE

## 2019-03-06 RX ORDER — TEMAZEPAM 22.5 MG/1
22.5 CAPSULE ORAL
Qty: 30 CAP | Refills: 0 | Status: SHIPPED | OUTPATIENT
Start: 2019-03-06 | End: 2020-08-07

## 2019-03-06 NOTE — PROGRESS NOTES
CHIEF COMPLAINT:  Charley Ayon is a 71 y.o. female and was seen today for follow-up of psychiatric condition and psychotropic medication management. HPI:    Raji Toro reports the following psychiatric symptoms:  depression and anxiety. The symptoms have been present for months and are of moderate/high severity. The symptoms occur daily. Pt reports ongoing sadness/grief r/t loss of her mother. Pt states she continues to benefit from use of cymbalta. She expressed frustration r/t changes in benzo prescriptions. Pt here today to review current treatment plan. FAMILY/SOCIAL HX: loss of mother    REVIEW OF SYSTEMS:  Psychiatric:  depression, anxiety  Appetite:improving, working on weight loss  Sleep: poor with DIMS (difficulty initiating & maintaining sleep), reports problems with sleep approx 2 times per week   Neuro: chronic pain    Visit Vitals  /80   Pulse 85   Ht 5' 2\" (1.575 m)   Wt 151.5 kg (334 lb)   BMI 61.09 kg/m²       Side Effects:  none    MENTAL STATUS EXAM:   Sensorium  oriented to time, place and person   Relations cooperative, evasive at times   Appearance:  age appropriate and casually dressed   Motor Behavior:  gait stable and within normal limits, staff reports pt came up in wheelchair   Speech:  normal volume   Thought Process: goal directed   Thought Content free of delusions and free of hallucinations   Suicidal ideations no intention   Homicidal ideations no intention   Mood:  irritable   Affect:  anxious and irritable   Memory recent  adequate   Memory remote:  adequate   Concentration:  adequate   Abstraction:  abstract   Insight:  Fair/limited   Reliability fair   Judgment:  Fair/limited     MEDICAL DECISION MAKING:  Problems addressed today:    ICD-10-CM ICD-9-CM    1. MDD (major depressive disorder), recurrent episode, mild (HCC) F33.0 296.31 temazepam (RESTORIL) 22.5 mg capsule      DULoxetine (CYMBALTA) 60 mg capsule   2.  GABRIEL (generalized anxiety disorder) F41.1 300.02 temazepam (RESTORIL) 22.5 mg capsule      DULoxetine (CYMBALTA) 60 mg capsule   3. Panic disorder F41.0 300.01        Assessment:   Pablo Dominique is responding to treatment overall with use of cymbalta. Reviewed current medication and dosing and no changes to cymbalta required. Discussed possibility of increasing dose due to ongoing reports of anxiety. Discussed options and will start trial of buspar at this time. Reviewed addictive nature of benzo's and reviewed prn safety and guidelines. Advised pt that our goal was lowest amount. Pt verbalized agreement but continued to ask for higher dosages. Reinforced prn use of temazepam for sleep. Will use x1 tab a few times per week and pt is not to use nightly. Pt informed she can use melatonin. Will DC alprazolam as previously discussed. Plan:   1. Current Outpatient Medications   Medication Sig Dispense Refill    temazepam (RESTORIL) 22.5 mg capsule Take 1 Cap by mouth nightly as needed for Sleep. Max Daily Amount: 22.5 mg. 30 Cap 0    busPIRone (BUSPAR) 10 mg tablet Take 1 Tab by mouth two (2) times a day. 60 Tab 1    DULoxetine (CYMBALTA) 60 mg capsule Take 1 Cap by mouth daily. 30 Cap 1    DULoxetine (CYMBALTA) 30 mg capsule Take 1 Cap by mouth daily. 30 Cap 1    azithromycin (ZITHROMAX) 250 mg tablet Take 1 Tab by mouth See Admin Instructions for 6 doses. Take 2 tabs on day one followed by 1 tab daily for a total of 5 days. 6 Tab 0    simvastatin (ZOCOR) 20 mg tablet TAKE 1 TABLET BY MOUTH EVERY NIGHT 90 Tab 1    furosemide (LASIX) 40 mg tablet TAKE 1 TABLET BY MOUTH DAILY 90 Tab 1    potassium chloride SR (KLOR-CON 10) 10 mEq tablet TAKE 2 TABLETS BY MOUTH DAILY 180 Tab 1    dicyclomine (BENTYL) 20 mg tablet TAKE 1 TABLET BY MOUTH THREE TIMES DAILY 90 Tab 1    amoxicillin (AMOXIL) 875 mg tablet Take 1 Tab by mouth two (2) times a day. 14 Tab 0    lisinopril (PRINIVIL, ZESTRIL) 20 mg tablet Take 1 Tab by mouth daily.  90 Tab 1    omeprazole (PRILOSEC) 20 mg capsule TAKE ONE CAPSULE BY MOUTH TWICE DAILY 180 Cap 0    omeprazole (PRILOSEC) 20 mg capsule Take 1 Cap by mouth two (2) times a day. 180 Cap 1    naproxen (NAPROSYN) 500 mg tablet TAKE 1 TABLET BY MOUTH EVERY 12 HOURS AS NEEDED FOR PAIN 180 Tab 0          medication changes made today: cont cymbalta 90 mg, add buspar 10 mg bid, dc alprazolam, cont temazepam 22.5 mg prn sleep and dc option for second dose    2. Counseling and coordination of care including instructions for treatment, risks/benefits, risk factor reduction and patient/family education. She agrees with the plan. Patient instructed to call with any side effects, questions or issues. 3.  Follow-up Disposition:  Return in about 8 weeks (around 5/1/2019). PSYCHOTHERAPY: approx 20 minutes  Type: psychodynamic  Focus: loss of mother, self care  Progress: Pt has had limited progress. She is able to identify with issues with lack of self care but is not able to identify pattern in day to day life. Reviewed benefits of self care and importance in anxiety management. Reinforced lifestyle facors and ways they improve depression and anxiety.     3/6/2019  Jonatan Yepez NP

## 2019-03-08 RX ORDER — NAPROXEN 500 MG/1
TABLET ORAL
Qty: 90 TAB | Refills: 0 | Status: SHIPPED | OUTPATIENT
Start: 2019-03-08 | End: 2019-11-16 | Stop reason: CLARIF

## 2019-03-12 ENCOUNTER — TELEPHONE (OUTPATIENT)
Dept: BEHAVIORAL/MENTAL HEALTH CLINIC | Age: 64
End: 2019-03-12

## 2019-03-12 DIAGNOSIS — I10 ESSENTIAL HYPERTENSION WITH GOAL BLOOD PRESSURE LESS THAN 140/90: ICD-10-CM

## 2019-03-12 RX ORDER — POTASSIUM CHLORIDE 750 MG/1
TABLET, FILM COATED, EXTENDED RELEASE ORAL
Qty: 180 TAB | Refills: 0 | Status: SHIPPED | OUTPATIENT
Start: 2019-03-12 | End: 2019-11-16 | Stop reason: CLARIF

## 2019-03-12 RX ORDER — FUROSEMIDE 40 MG/1
TABLET ORAL
Qty: 90 TAB | Refills: 0 | Status: SHIPPED | OUTPATIENT
Start: 2019-03-12 | End: 2019-11-16 | Stop reason: CLARIF

## 2019-03-12 NOTE — TELEPHONE ENCOUNTER
Patient calls to report she has not taken the Buspar today because she believes it's causing nausea and dizziness so she doesn't want to take it.

## 2019-03-18 RX ORDER — OMEPRAZOLE 40 MG/1
CAPSULE, DELAYED RELEASE ORAL
Qty: 90 CAP | Refills: 0 | Status: SHIPPED | OUTPATIENT
Start: 2019-03-18 | End: 2019-06-25 | Stop reason: SDUPTHER

## 2019-03-21 DIAGNOSIS — F33.0 MDD (MAJOR DEPRESSIVE DISORDER), RECURRENT EPISODE, MILD (HCC): ICD-10-CM

## 2019-03-21 DIAGNOSIS — F41.1 GAD (GENERALIZED ANXIETY DISORDER): ICD-10-CM

## 2019-03-21 RX ORDER — DULOXETIN HYDROCHLORIDE 30 MG/1
CAPSULE, DELAYED RELEASE ORAL
Qty: 30 CAP | Refills: 0 | OUTPATIENT
Start: 2019-03-21

## 2019-03-21 RX ORDER — DULOXETIN HYDROCHLORIDE 60 MG/1
CAPSULE, DELAYED RELEASE ORAL
Qty: 30 CAP | Refills: 0 | OUTPATIENT
Start: 2019-03-21

## 2019-03-25 ENCOUNTER — TELEPHONE (OUTPATIENT)
Dept: INTERNAL MEDICINE CLINIC | Age: 64
End: 2019-03-25

## 2019-03-25 NOTE — TELEPHONE ENCOUNTER
----- Message from Wilma Macario sent at 3/25/2019  9:47 AM EDT -----  Regarding: Dr. Nazia Trevino  Pt stated she has been experincing elevated b/p. Attempted to call back line. She declined an appt with anyone this week. [phone].  She is requesting 3/28 or beginning of Apr.      Message copied/pasted from [facility]

## 2019-03-25 NOTE — TELEPHONE ENCOUNTER
Identified patient 2 identifiers verified.   Patient accepted an appointment on 4/1/19 at 12 noon with Dr. Silvia Aquino.

## 2019-05-08 ENCOUNTER — TELEPHONE (OUTPATIENT)
Dept: INTERNAL MEDICINE CLINIC | Age: 64
End: 2019-05-08

## 2019-05-08 DIAGNOSIS — K08.89 TOOTH PAIN: Primary | ICD-10-CM

## 2019-05-08 NOTE — TELEPHONE ENCOUNTER
Pt reported toothache and would like to have Naproxen and an antibiotic sent to [address] on file. Pt advised that she was picking with the tooth which caused more pain. Best contact number [phone].        Message received & copied from [employer]

## 2019-05-08 NOTE — TELEPHONE ENCOUNTER
Pt checking on the status of call made today in regards to having   Naproxen and an antibiotic for mouth pain sent to her pharmacy. Pt stated she cannot make it to a dentist right now. PT aware of turnaround time.      C/P [employer]

## 2019-05-09 RX ORDER — NAPROXEN 500 MG/1
TABLET ORAL
Qty: 30 TAB | Refills: 1 | Status: SHIPPED | OUTPATIENT
Start: 2019-05-09 | End: 2019-06-02 | Stop reason: SDUPTHER

## 2019-05-09 RX ORDER — AMOXICILLIN 500 MG/1
500 CAPSULE ORAL 2 TIMES DAILY
Qty: 14 CAP | Refills: 0 | Status: SHIPPED | OUTPATIENT
Start: 2019-05-09 | End: 2019-11-16 | Stop reason: CLARIF

## 2019-06-02 DIAGNOSIS — K08.89 TOOTH PAIN: ICD-10-CM

## 2019-06-04 RX ORDER — NAPROXEN 500 MG/1
TABLET ORAL
Qty: 30 TAB | Refills: 0 | Status: SHIPPED | OUTPATIENT
Start: 2019-06-04 | End: 2019-06-16 | Stop reason: SDUPTHER

## 2019-06-25 DIAGNOSIS — K29.60 REFLUX GASTRITIS: Primary | ICD-10-CM

## 2019-06-25 NOTE — TELEPHONE ENCOUNTER
----- Message from Joseph Dunaway sent at 6/25/2019  8:47 AM EDT -----  Regarding: Dr. Praful Bird   Pt has an appt. On Friday 07/05/19. She is requesting a prescription for Seven  \"omeprozal\" tablets for Acid Reflux.  Pt's best contact is [phone]      Message copied/pasted from [facility]

## 2019-06-26 DIAGNOSIS — I10 ESSENTIAL HYPERTENSION WITH GOAL BLOOD PRESSURE LESS THAN 140/90: ICD-10-CM

## 2019-06-26 DIAGNOSIS — K29.60 REFLUX GASTRITIS: ICD-10-CM

## 2019-06-26 RX ORDER — OMEPRAZOLE 40 MG/1
CAPSULE, DELAYED RELEASE ORAL
Qty: 90 CAP | Refills: 0 | Status: SHIPPED | OUTPATIENT
Start: 2019-06-26 | End: 2020-03-27 | Stop reason: SDUPTHER

## 2019-06-26 RX ORDER — LISINOPRIL 20 MG/1
TABLET ORAL
Qty: 90 TAB | Refills: 0 | Status: SHIPPED | OUTPATIENT
Start: 2019-06-26 | End: 2019-09-09 | Stop reason: SDUPTHER

## 2019-06-26 RX ORDER — OMEPRAZOLE 40 MG/1
40 CAPSULE, DELAYED RELEASE ORAL DAILY
Qty: 7 CAP | Refills: 0 | Status: SHIPPED | OUTPATIENT
Start: 2019-06-26 | End: 2019-06-26 | Stop reason: SDUPTHER

## 2019-06-26 NOTE — TELEPHONE ENCOUNTER
Requested Prescriptions     Pending Prescriptions Disp Refills    omeprazole (PRILOSEC) 40 mg capsule 7 Cap 0     Sig: Take 1 Cap by mouth. PCP: Aaron Kerr MD    Last appt: 5/24/2019  Future Appointments   Date Time Provider Noah Jenkinsi   7/5/2019 11:30 AM Aaron Kerr MD [facility]       Requested Prescriptions     Pending Prescriptions Disp Refills    omeprazole (PRILOSEC) 40 mg capsule 7 Cap 0     Sig: Take 1 Cap by mouth.

## 2019-07-02 DIAGNOSIS — K08.89 TOOTH PAIN: ICD-10-CM

## 2019-07-03 ENCOUNTER — TELEPHONE (OUTPATIENT)
Dept: INTERNAL MEDICINE CLINIC | Age: 64
End: 2019-07-03

## 2019-07-03 NOTE — TELEPHONE ENCOUNTER
PT returning call:   Caller/relationship: PT   Best contact: [phone]   Returning call for: not sure   Additional details: Appt 07/05 at 11:30am       Message received & copied from [facility]

## 2019-07-03 NOTE — TELEPHONE ENCOUNTER
2 pt identifiers verified; pt return call to our office stating that she got her reminder call for her amarjit on Friday with Dr Rosie Rico.    No further action needed.

## 2019-07-04 RX ORDER — NAPROXEN 500 MG/1
TABLET ORAL
Qty: 30 TAB | Refills: 0 | Status: SHIPPED | OUTPATIENT
Start: 2019-07-04 | End: 2019-11-16 | Stop reason: CLARIF

## 2019-07-11 ENCOUNTER — TELEPHONE (OUTPATIENT)
Dept: INTERNAL MEDICINE CLINIC | Age: 64
End: 2019-07-11

## 2019-07-11 NOTE — TELEPHONE ENCOUNTER
----- Message from Matt Norton sent at 7/11/2019  9:09 AM EDT -----  Regarding: Dr. Noreen Tello Message/Vendor Calls    Caller's first and last name:      Reason for call:      Callback required yes/no and why: yes      Best contact number(s):[phone]      Details to clarify the request: Patient wants to speak to only Dr. Den Gee please call patient she does not want to speak to the nurse.       Parth Ricardo  Copy/paste [employer]

## 2019-07-11 NOTE — TELEPHONE ENCOUNTER
Friday, July 12, 2019 03:00 PM with Dr. Aureliano Zelaya scheduled. Message was left for patient to call the office to confirm or reschedule.

## 2019-10-31 NOTE — TELEPHONE ENCOUNTER
----- Message from Leonila Fletcher sent at 12/19/2017  4:04 PM EST -----  Regarding: Dr. Georgette Patiño  Pt was at another doctor's office and her BP is elevated again. She would like to schedule as soon as possible, but needs 3 days notice for transportation. Best contact number [phone].         Message copied/pasted from [facility] denies pain/discomfort

## 2019-11-11 RX ORDER — OMEPRAZOLE 20 MG/1
CAPSULE, DELAYED RELEASE ORAL
Qty: 180 CAP | Refills: 0 | Status: SHIPPED | OUTPATIENT
Start: 2019-11-11 | End: 2019-11-16 | Stop reason: CLARIF

## 2019-11-16 ENCOUNTER — HOSPITAL ENCOUNTER (EMERGENCY)
Age: 64
Discharge: HOME | End: 2019-11-16
Attending: EMERGENCY MEDICINE
Payer: MEDICARE

## 2019-11-16 ENCOUNTER — APPOINTMENT (OUTPATIENT)
Dept: ULTRASOUND IMAGING | Age: 64
End: 2019-11-16
Attending: EMERGENCY MEDICINE
Payer: MEDICARE

## 2019-11-16 ENCOUNTER — APPOINTMENT (OUTPATIENT)
Dept: GENERAL RADIOLOGY | Age: 64
End: 2019-11-16
Attending: EMERGENCY MEDICINE
Payer: MEDICARE

## 2019-11-16 VITALS
HEIGHT: 60 IN | TEMPERATURE: 98.1 F | BODY MASS INDEX: 57.52 KG/M2 | SYSTOLIC BLOOD PRESSURE: 162 MMHG | HEART RATE: 82 BPM | RESPIRATION RATE: 18 BRPM | OXYGEN SATURATION: 96 % | WEIGHT: 293 LBS | DIASTOLIC BLOOD PRESSURE: 99 MMHG

## 2019-11-16 DIAGNOSIS — I10 ESSENTIAL HYPERTENSION WITH GOAL BLOOD PRESSURE LESS THAN 140/90: ICD-10-CM

## 2019-11-16 DIAGNOSIS — M25.562 ARTHRALGIA OF BOTH LOWER LEGS: Primary | ICD-10-CM

## 2019-11-16 DIAGNOSIS — R60.0 BILATERAL LEG EDEMA: ICD-10-CM

## 2019-11-16 DIAGNOSIS — M25.561 ARTHRALGIA OF BOTH LOWER LEGS: Primary | ICD-10-CM

## 2019-11-16 DIAGNOSIS — M17.0 PRIMARY OSTEOARTHRITIS OF BOTH KNEES: ICD-10-CM

## 2019-11-16 LAB
ANION GAP SERPL CALC-SCNC: 7 MMOL/L (ref 5–15)
BNP SERPL-MCNC: 132 PG/ML
BUN SERPL-MCNC: 18 MG/DL (ref 6–20)
BUN/CREAT SERPL: 16 (ref 12–20)
CALCIUM SERPL-MCNC: 9.1 MG/DL (ref 8.5–10.1)
CHLORIDE SERPL-SCNC: 109 MMOL/L (ref 97–108)
CO2 SERPL-SCNC: 25 MMOL/L (ref 21–32)
CREAT SERPL-MCNC: 1.14 MG/DL (ref 0.55–1.02)
ERYTHROCYTE [DISTWIDTH] IN BLOOD BY AUTOMATED COUNT: 14.3 % (ref 11.5–14.5)
GLUCOSE SERPL-MCNC: 91 MG/DL (ref 65–100)
HCT VFR BLD AUTO: 38.8 % (ref 35–47)
HGB BLD-MCNC: 12.3 G/DL (ref 11.5–16)
MCH RBC QN AUTO: 28.3 PG (ref 26–34)
MCHC RBC AUTO-ENTMCNC: 31.7 G/DL (ref 30–36.5)
MCV RBC AUTO: 89.4 FL (ref 80–99)
NRBC # BLD: 0 K/UL (ref 0–0.01)
NRBC BLD-RTO: 0 PER 100 WBC
PLATELET # BLD AUTO: 156 K/UL (ref 150–400)
PMV BLD AUTO: 11.6 FL (ref 8.9–12.9)
POTASSIUM SERPL-SCNC: 4.2 MMOL/L (ref 3.5–5.1)
RBC # BLD AUTO: 4.34 M/UL (ref 3.8–5.2)
SODIUM SERPL-SCNC: 141 MMOL/L (ref 136–145)
TROPONIN I SERPL-MCNC: <0.05 NG/ML
WBC # BLD AUTO: 6.3 K/UL (ref 3.6–11)

## 2019-11-16 PROCEDURE — 84484 ASSAY OF TROPONIN QUANT: CPT

## 2019-11-16 PROCEDURE — 85027 COMPLETE CBC AUTOMATED: CPT

## 2019-11-16 PROCEDURE — 74011250637 HC RX REV CODE- 250/637: Performed by: EMERGENCY MEDICINE

## 2019-11-16 PROCEDURE — 36415 COLL VENOUS BLD VENIPUNCTURE: CPT

## 2019-11-16 PROCEDURE — 99283 EMERGENCY DEPT VISIT LOW MDM: CPT

## 2019-11-16 PROCEDURE — 73564 X-RAY EXAM KNEE 4 OR MORE: CPT

## 2019-11-16 PROCEDURE — 80048 BASIC METABOLIC PNL TOTAL CA: CPT

## 2019-11-16 PROCEDURE — 93970 EXTREMITY STUDY: CPT

## 2019-11-16 PROCEDURE — 83880 ASSAY OF NATRIURETIC PEPTIDE: CPT

## 2019-11-16 RX ORDER — FUROSEMIDE 20 MG/1
20 TABLET ORAL 2 TIMES DAILY
Qty: 30 TAB | Refills: 0 | Status: SHIPPED | OUTPATIENT
Start: 2019-11-16 | End: 2020-02-19 | Stop reason: SDUPTHER

## 2019-11-16 RX ORDER — LORAZEPAM 0.5 MG/1
TABLET ORAL
Refills: 0 | COMMUNITY
Start: 2019-11-12 | End: 2020-08-07

## 2019-11-16 RX ORDER — QUETIAPINE FUMARATE 200 MG/1
TABLET, FILM COATED ORAL
Refills: 0 | COMMUNITY
Start: 2019-10-09

## 2019-11-16 RX ORDER — NAPROXEN 500 MG/1
500 TABLET ORAL
Qty: 20 TAB | Refills: 0 | Status: SHIPPED | OUTPATIENT
Start: 2019-11-16 | End: 2020-03-27 | Stop reason: SDUPTHER

## 2019-11-16 RX ORDER — KETOROLAC TROMETHAMINE 30 MG/ML
15 INJECTION, SOLUTION INTRAMUSCULAR; INTRAVENOUS ONCE
Status: DISCONTINUED | OUTPATIENT
Start: 2019-11-16 | End: 2019-11-16 | Stop reason: HOSPADM

## 2019-11-16 RX ORDER — NYSTATIN 100000 U/G
CREAM TOPICAL 2 TIMES DAILY
Qty: 15 G | Refills: 0 | Status: SHIPPED | OUTPATIENT
Start: 2019-11-16 | End: 2020-08-07

## 2019-11-16 RX ORDER — HYDROCODONE BITARTRATE AND ACETAMINOPHEN 5; 325 MG/1; MG/1
1 TABLET ORAL ONCE
Status: COMPLETED | OUTPATIENT
Start: 2019-11-16 | End: 2019-11-16

## 2019-11-16 RX ADMIN — HYDROCODONE BITARTRATE AND ACETAMINOPHEN 1 TABLET: 5; 325 TABLET ORAL at 11:36

## 2019-11-16 NOTE — ED NOTES
Patient ambulatory to ED with c/o bilateral leg pain for months. Patient reports pain has increased over the past few days; denies injury but states she's had frequent falls secondary to pain. Patient usually uses a wheelchair. She is morbidly obese and states she is trying to loose weight. Denies CP or SOB. VSS. 3+ BLE; pulses palpable.

## 2019-11-16 NOTE — ED PROVIDER NOTES
EMERGENCY DEPARTMENT HISTORY AND PHYSICAL EXAM      Date: 11/16/2019  Patient Name: Cris Collazo    History of Presenting Illness     Chief Complaint   Patient presents with    Leg Pain     bilateral leg pain x two months; worse in the past two days. Right leg worse than left with swelling       History Provided By: Patient and Patient's Daughter    HPI: Cris Collazo, 71 y.o. female  presents to the ED with cc of bilateral leg pain. Patient states she has had worsening bilateral leg pain for 2 months. The past 2 days it is been worse especially in the pain with weightbearing. She reports crepitus in the knee joint. She has had a history of osteoarthritis in the right knee. She is morbidly obese. She also does have a history of DVT but is currently no longer on anticoagulants. She states she gets shortness of breath with exertion. She does have a history of sleep apnea. No orthopnea or PND. Denies chest pain. There are no other complaints, changes, or physical findings at this time. PCP: Beatrice Dick MD    No current facility-administered medications on file prior to encounter. Current Outpatient Medications on File Prior to Encounter   Medication Sig Dispense Refill    lisinopril (PRINIVIL, ZESTRIL) 20 mg tablet TAKE 1 TABLET BY MOUTH DAILY 90 Tab 1    omeprazole (PRILOSEC) 40 mg capsule TAKE 1 CAPSULE BY MOUTH DAILY FOR 7 DAYS 90 Cap 0    temazepam (RESTORIL) 22.5 mg capsule Take 1 Cap by mouth nightly as needed for Sleep. Max Daily Amount: 22.5 mg. 30 Cap 0    DULoxetine (CYMBALTA) 60 mg capsule Take 1 Cap by mouth daily. 30 Cap 1    DULoxetine (CYMBALTA) 30 mg capsule Take 1 Cap by mouth daily. 30 Cap 1    simvastatin (ZOCOR) 20 mg tablet TAKE 1 TABLET BY MOUTH EVERY NIGHT 90 Tab 1    potassium chloride SR (KLOR-CON 10) 10 mEq tablet TAKE 2 TABLETS BY MOUTH DAILY 180 Tab 1    omeprazole (PRILOSEC) 20 mg capsule Take 1 Cap by mouth two (2) times a day.  180 Cap 1    LORazepam (ATIVAN) 0.5 mg tablet TK 1 T PO QD PRA  0    QUEtiapine (SEROQUEL) 200 mg tablet TK 1 T PO HS PRF INSOMNIA  0    [DISCONTINUED] furosemide (LASIX) 40 mg tablet TAKE 1 TABLET BY MOUTH DAILY 90 Tab 1       Past History     Past Medical History:  Past Medical History:   Diagnosis Date    Arthritis     chronic pain related arthritis    Bipolar 1 disorder (HCC)     GERD (gastroesophageal reflux disease)     Hypertension     Other ill-defined conditions(799.89)     hyperlipidemia. Stomach abcess    Psychiatric disorder     panic attacks    Sleep apnea     Thromboembolus ([address]     last year L leg    Unspecified sleep apnea        Past Surgical History:  Past Surgical History:   Procedure Laterality Date    HX CHOLECYSTECTOMY      HX GI      gallbladder removed 2/2010    HX HYSTERECTOMY         Family History:  Family History   Problem Relation Age of Onset    Heart Disease Mother     Diabetes Mother     Arthritis-osteo Mother     High Cholesterol Mother     Heart Attack Sister     Diabetes Sister     Heart Disease Sister     Diabetes Sister     Arthritis-osteo Sister     High Cholesterol Sister     Schizophrenia Paternal Grandmother     Drug Abuse Sister        Social History:  Social History     Tobacco Use    Smoking status: Never Smoker    Smokeless tobacco: Never Used   Substance Use Topics    Alcohol use: No    Drug use: No       Allergies:  No Known Allergies      Review of Systems   Review of Systems   Constitutional: Negative for chills and fever. HENT: Negative for congestion, ear pain, rhinorrhea, sore throat and trouble swallowing. Eyes: Negative for visual disturbance. Respiratory: Positive for shortness of breath. Negative for cough and chest tightness. Cardiovascular: Positive for leg swelling. Negative for chest pain and palpitations. Gastrointestinal: Negative for abdominal pain, blood in stool, constipation, diarrhea, nausea and vomiting. Genitourinary: Negative for decreased urine volume, difficulty urinating, dysuria and frequency. Musculoskeletal: Positive for arthralgias and myalgias. Negative for back pain and neck pain. Skin: Negative for color change and rash. Neurological: Negative for dizziness, weakness, light-headedness and headaches. Physical Exam   Physical Exam   Constitutional: She is oriented to person, place, and time. She appears well-developed and well-nourished. She does not appear ill. No distress. Super morbidly obese   HENT:   Mouth/Throat: Oropharynx is clear and moist.   Eyes: Conjunctivae are normal.   Neck: Neck supple. Cardiovascular: Normal rate and regular rhythm. Pulmonary/Chest: Effort normal and breath sounds normal. No accessory muscle usage. No respiratory distress. Abdominal: Soft. She exhibits no distension. There is no tenderness. Musculoskeletal: She exhibits edema (2+ edema). Right knee: She exhibits decreased range of motion and swelling. Tenderness found. Lymphadenopathy:     She has no cervical adenopathy. Neurological: She is alert and oriented to person, place, and time. She has normal strength. No cranial nerve deficit or sensory deficit. Skin: Skin is warm and dry. Nursing note and vitals reviewed.       Diagnostic Study Results     Labs -     Recent Results (from the past 24 hour(s))   CBC W/O DIFF    Collection Time: 11/16/19 12:06 PM   Result Value Ref Range    WBC 6.3 3.6 - 11.0 K/uL    RBC 4.34 3.80 - 5.20 M/uL    HGB 12.3 11.5 - 16.0 g/dL    HCT 38.8 35.0 - 47.0 %    MCV 89.4 80.0 - 99.0 FL    MCH 28.3 26.0 - 34.0 PG    MCHC 31.7 30.0 - 36.5 g/dL    RDW 14.3 11.5 - 14.5 %    PLATELET 104 348 - 897 K/uL    MPV 11.6 8.9 - 12.9 FL    NRBC 0.0 0  WBC    ABSOLUTE NRBC 0.00 0.00 - 1.92 K/uL   METABOLIC PANEL, BASIC    Collection Time: 11/16/19 12:06 PM   Result Value Ref Range    Sodium 141 136 - 145 mmol/L    Potassium 4.2 3.5 - 5.1 mmol/L    Chloride 109 (H) 97 - 108 mmol/L    CO2 25 21 - 32 mmol/L    Anion gap 7 5 - 15 mmol/L    Glucose 91 65 - 100 mg/dL    BUN 18 6 - 20 MG/DL    Creatinine 1.14 (H) 0.55 - 1.02 MG/DL    BUN/Creatinine ratio 16 12 - 20      GFR est AA 58 (L) >60 ml/min/1.73m2    GFR est non-AA 48 (L) >60 ml/min/1.73m2    Calcium 9.1 8.5 - 10.1 MG/DL   NT-PRO BNP    Collection Time: 11/16/19 12:06 PM   Result Value Ref Range    NT pro- (H) <125 PG/ML   TROPONIN I    Collection Time: 11/16/19 12:06 PM   Result Value Ref Range    Troponin-I, Qt. <0.05 <0.05 ng/mL       Radiologic Studies -   XR KNEE RT MIN 4 V   Final Result   IMPRESSION: No acute fracture or dislocation. CT Results  (Last 48 hours)    None        CXR Results  (Last 48 hours)    None            Medical Decision Making   I am the first provider for this patient. I reviewed the vital signs, available nursing notes, past medical history, past surgical history, family history and social history. Vital Signs-Reviewed the patient's vital signs. Patient Vitals for the past 24 hrs:   Temp Pulse Resp BP SpO2   11/16/19 1259     96 %   11/16/19 1122     97 %   11/16/19 1121    (!) 162/99    11/16/19 1120 98.1 °F (36.7 °C) 82 18 (!) 162/99 96 %       Records Reviewed: Nursing Notes and Old Medical Records    Provider Notes (Medical Decision Making): This patient is presenting with bilateral leg pain with right being greater than left. She is also having increasing edema. She is morbidly obese. She has had a history of DVT so a ultrasound to her legs they are negative for DVT. X-ray of her right knee which is extremely painful shows tricompartmental degeneration of cartilage. This can cause inflammatory effusion and pain. Labs do not show any evidence of congestive heart failure or acute kidney injury. Patient has been on Lasix in the past.  She would prefer to take it twice a day so I will prescribe her 20 mg twice a day.   She would like Ace wrap on her legs and this may help with swelling as well as her mobility. Her daughter brought her a walker and I would recommend that she use the walker. She is requesting crutches to help eating her and standing up which is okay but I would rather her use the walker. I did speak to her regarding her weight. She may likely need knee replacements in the future therefore she is going to need to lose the weight. Diet exercise and weight reduction will also help overall with chronic health problems. ED Course:   Initial assessment performed. The patients presenting problems have been discussed, and they are in agreement with the care plan formulated and outlined with them. I have encouraged them to ask questions as they arise throughout their visit. Orders Placed This Encounter    XR KNEE RT MIN 4 V    CBC W/O DIFF    BASIC METABOLIC PANEL    PRO-BNP    TROPONIN I    SAMPLES BEING HELD    SALINE LOCK IV ONE TIME STAT    LORazepam (ATIVAN) 0.5 mg tablet    QUEtiapine (SEROQUEL) 200 mg tablet    HYDROcodone-acetaminophen (NORCO) 5-325 mg per tablet 1 Tab    ketorolac (TORADOL) injection 15 mg    furosemide (LASIX) 20 mg tablet    nystatin (MYCOSTATIN) topical cream         Critical Care Time:   0    Disposition:  Discharge  2:40 PM    The patient's emergency department evaluation is now complete. I have reviewed all labs, imaging, and pertinent information. I have discussed all results with the patient and/or family. Based on our evaluation today I do believe that the patient is safe to be discharged home. The patient has been provided with at home instructions that are pertinent to their complaint today, although these may not be specific to the exact diagnosis. I have reviewed the patient's home medications and attempted to reconcile if not already done so by pharmacy or nursing staff. I have discussed all new prescriptions with the patient.   The patient has been encouraged to follow-up with primary care doctor and/or specialist, and these have been discussed with the patient. The patient has been advised that they may return to the emergency department if they have any worsening symptoms and or new symptoms that are of concern to them. Verbal discharge instructions may have also been provided to the patient that may not be specifically contained in the written discharge instructions. The patient has been given opportunity to ask questions prior to discharge. PLAN:  1. Current Discharge Medication List      START taking these medications    Details   nystatin (MYCOSTATIN) topical cream Apply  to affected area two (2) times a day. Qty: 15 g, Refills: 0         CONTINUE these medications which have CHANGED    Details   furosemide (LASIX) 20 mg tablet Take 1 Tab by mouth two (2) times a day. TAKE 1 TABLET BY MOUTH DAILY  Qty: 30 Tab, Refills: 0    Associated Diagnoses: Essential hypertension with goal blood pressure less than 140/90           2. Follow-up Information     Follow up With Specialties Details Why Contact Info    India Echeverria MD [facility] Schedule an appointment as soon as possible for a visit   Tra[address] [place] [address]  [phone]          Return to ED if worse     Diagnosis     Clinical Impression:   1. Arthralgia of both lower legs    2. Essential hypertension with goal blood pressure less than 140/90    3. Primary osteoarthritis of both knees    4. Bilateral leg edema            This note will not be viewable in [email or web address].

## 2019-11-18 ENCOUNTER — TELEPHONE (OUTPATIENT)
Dept: INTERNAL MEDICINE CLINIC | Age: 64
End: 2019-11-18

## 2019-11-18 NOTE — TELEPHONE ENCOUNTER
----- Message from [address] sent at 11/16/2019  9:57 AM EST -----  Regarding: Dr. Kayla Moreno first and last name: Jesse Chatman  Reason for call: Pt reported that she is on the way to [facility] ER due to R leg swelling and L leg sweling with pus filled bumps on the back on L leg  Callback required yes/no and why:   Best contact number(s): [phone]  Details to clarify the request:    Message copied/pasted from [facility]

## 2019-11-20 NOTE — TELEPHONE ENCOUNTER
Identified patient 2 identifiers verified. Patient informed that she should see an Orthopedic Specialist for leg pain ans swelling.

## 2019-12-20 RX ORDER — OMEPRAZOLE 40 MG/1
CAPSULE, DELAYED RELEASE ORAL
Qty: 90 CAP | Refills: 0 | Status: SHIPPED | OUTPATIENT
Start: 2019-12-20 | End: 2020-03-27 | Stop reason: SDUPTHER

## 2020-02-17 DIAGNOSIS — I10 ESSENTIAL HYPERTENSION WITH GOAL BLOOD PRESSURE LESS THAN 140/90: ICD-10-CM

## 2020-02-17 NOTE — TELEPHONE ENCOUNTER
PCP: Franko Vang MD    Last appt: 7/12/2019  No future appointments.     Requested Prescriptions     Pending Prescriptions Disp Refills    lisinopril (PRINIVIL, ZESTRIL) 20 mg tablet 90 Tab 1     Sig: TAKE 1 TABLET BY MOUTH DAILY

## 2020-02-18 RX ORDER — LISINOPRIL 20 MG/1
TABLET ORAL
Qty: 90 TAB | Refills: 1 | Status: SHIPPED | OUTPATIENT
Start: 2020-02-18 | End: 2020-05-05

## 2020-02-19 ENCOUNTER — APPOINTMENT (OUTPATIENT)
Dept: GENERAL RADIOLOGY | Age: 65
End: 2020-02-19
Attending: EMERGENCY MEDICINE
Payer: MEDICARE

## 2020-02-19 ENCOUNTER — HOSPITAL ENCOUNTER (EMERGENCY)
Age: 65
Discharge: HOME | End: 2020-02-19
Attending: EMERGENCY MEDICINE
Payer: MEDICARE

## 2020-02-19 VITALS
BODY MASS INDEX: 51.91 KG/M2 | WEIGHT: 293 LBS | SYSTOLIC BLOOD PRESSURE: 163 MMHG | DIASTOLIC BLOOD PRESSURE: 88 MMHG | HEIGHT: 63 IN | OXYGEN SATURATION: 95 % | HEART RATE: 84 BPM | RESPIRATION RATE: 18 BRPM | TEMPERATURE: 97.9 F

## 2020-02-19 DIAGNOSIS — I10 ESSENTIAL HYPERTENSION WITH GOAL BLOOD PRESSURE LESS THAN 140/90: ICD-10-CM

## 2020-02-19 DIAGNOSIS — R60.9 SWELLING: Primary | ICD-10-CM

## 2020-02-19 LAB
ALBUMIN SERPL-MCNC: 4 G/DL (ref 3.5–5)
ALBUMIN/GLOB SERPL: 1.1 {RATIO} (ref 1.1–2.2)
ALP SERPL-CCNC: 91 U/L (ref 45–117)
ALT SERPL-CCNC: 26 U/L (ref 12–78)
ANION GAP SERPL CALC-SCNC: 6 MMOL/L (ref 5–15)
APPEARANCE UR: CLEAR
AST SERPL-CCNC: 21 U/L (ref 15–37)
ATRIAL RATE: 87 BPM
BILIRUB SERPL-MCNC: 0.7 MG/DL (ref 0.2–1)
BILIRUB UR QL: NEGATIVE
BNP SERPL-MCNC: 211 PG/ML
BUN SERPL-MCNC: 14 MG/DL (ref 6–20)
BUN/CREAT SERPL: 14 (ref 12–20)
CALCIUM SERPL-MCNC: 9.7 MG/DL (ref 8.5–10.1)
CALCULATED P AXIS, ECG09: 45 DEGREES
CALCULATED R AXIS, ECG10: 34 DEGREES
CALCULATED T AXIS, ECG11: 58 DEGREES
CHLORIDE SERPL-SCNC: 103 MMOL/L (ref 97–108)
CO2 SERPL-SCNC: 27 MMOL/L (ref 21–32)
COLOR UR: NORMAL
CREAT SERPL-MCNC: 1.01 MG/DL (ref 0.55–1.02)
DIAGNOSIS, 93000: NORMAL
ERYTHROCYTE [DISTWIDTH] IN BLOOD BY AUTOMATED COUNT: 13.8 % (ref 11.5–14.5)
GLOBULIN SER CALC-MCNC: 3.7 G/DL (ref 2–4)
GLUCOSE SERPL-MCNC: 126 MG/DL (ref 65–100)
GLUCOSE UR STRIP.AUTO-MCNC: NEGATIVE MG/DL
HCT VFR BLD AUTO: 40.7 % (ref 35–47)
HGB BLD-MCNC: 12.8 G/DL (ref 11.5–16)
HGB UR QL STRIP: NEGATIVE
KETONES UR QL STRIP.AUTO: NEGATIVE MG/DL
LEUKOCYTE ESTERASE UR QL STRIP.AUTO: NEGATIVE
MCH RBC QN AUTO: 28.2 PG (ref 26–34)
MCHC RBC AUTO-ENTMCNC: 31.4 G/DL (ref 30–36.5)
MCV RBC AUTO: 89.6 FL (ref 80–99)
NITRITE UR QL STRIP.AUTO: NEGATIVE
NRBC # BLD: 0 K/UL (ref 0–0.01)
NRBC BLD-RTO: 0 PER 100 WBC
P-R INTERVAL, ECG05: 162 MS
PH UR STRIP: 6.5 [PH] (ref 5–8)
PLATELET # BLD AUTO: 191 K/UL (ref 150–400)
PMV BLD AUTO: 11.2 FL (ref 8.9–12.9)
POTASSIUM SERPL-SCNC: 4.1 MMOL/L (ref 3.5–5.1)
PROT SERPL-MCNC: 7.7 G/DL (ref 6.4–8.2)
PROT UR STRIP-MCNC: NEGATIVE MG/DL
Q-T INTERVAL, ECG07: 378 MS
QRS DURATION, ECG06: 64 MS
QTC CALCULATION (BEZET), ECG08: 454 MS
RBC # BLD AUTO: 4.54 M/UL (ref 3.8–5.2)
SODIUM SERPL-SCNC: 136 MMOL/L (ref 136–145)
SP GR UR REFRACTOMETRY: 1.02 (ref 1–1.03)
TROPONIN I SERPL-MCNC: <0.05 NG/ML
UROBILINOGEN UR QL STRIP.AUTO: 1 EU/DL (ref 0.2–1)
VENTRICULAR RATE, ECG03: 87 BPM
WBC # BLD AUTO: 6.9 K/UL (ref 3.6–11)

## 2020-02-19 PROCEDURE — 85027 COMPLETE CBC AUTOMATED: CPT

## 2020-02-19 PROCEDURE — 83880 ASSAY OF NATRIURETIC PEPTIDE: CPT

## 2020-02-19 PROCEDURE — 84484 ASSAY OF TROPONIN QUANT: CPT

## 2020-02-19 PROCEDURE — 36415 COLL VENOUS BLD VENIPUNCTURE: CPT

## 2020-02-19 PROCEDURE — 99285 EMERGENCY DEPT VISIT HI MDM: CPT

## 2020-02-19 PROCEDURE — 80053 COMPREHEN METABOLIC PANEL: CPT

## 2020-02-19 PROCEDURE — 93005 ELECTROCARDIOGRAM TRACING: CPT

## 2020-02-19 PROCEDURE — 74011250637 HC RX REV CODE- 250/637: Performed by: EMERGENCY MEDICINE

## 2020-02-19 PROCEDURE — 81003 URINALYSIS AUTO W/O SCOPE: CPT

## 2020-02-19 PROCEDURE — 71046 X-RAY EXAM CHEST 2 VIEWS: CPT

## 2020-02-19 RX ORDER — FUROSEMIDE 20 MG/1
40 TABLET ORAL 2 TIMES DAILY
Qty: 30 TAB | Refills: 0 | Status: SHIPPED | OUTPATIENT
Start: 2020-02-19 | End: 2020-11-06 | Stop reason: ALTCHOICE

## 2020-02-19 RX ORDER — ACETAMINOPHEN 500 MG
1000 TABLET ORAL ONCE
Status: COMPLETED | OUTPATIENT
Start: 2020-02-19 | End: 2020-02-19

## 2020-02-19 RX ADMIN — ACETAMINOPHEN 1000 MG: 500 TABLET ORAL at 14:37

## 2020-02-19 NOTE — ED TRIAGE NOTES
Pt presents today for abdominal pain that \"happens all the time, for 3 months\". Pt states her stomach \"crushes\" her R leg. Pt denies N/V/D. Pt states has had recent constipation but took medicine last night. Pt states she has started peeing a lot. Pt denies burning with urination. Pt states her breathing \"scares her\" because she is out of breath a lot. Pt states her number one reason for coming in is her breathing and peeing and generalized pain. Pt has a PCP but has not been \"for a long time\".

## 2020-02-19 NOTE — ED PROVIDER NOTES
EMERGENCY DEPARTMENT HISTORY AND PHYSICAL EXAM      Date: 2/19/2020  Patient Name: Kulwant Low  Patient Age and Sex: 71 y.o. female     History of Presenting Illness     Chief Complaint   Patient presents with    Abdominal Pain     onset 2 months weight gain 45 lbs     Chest Pain     2 months       History Provided By: Patient     HPI: Kulwant Low 14-year-old female with past medical history of hypertension, sleep apnea presenting today with multiple complaints. The patient primarily complains that she notes lower extremity and abdominal swelling which she feels may be related to fluid. She has been having urinary frequency without dysuria. She states that her whole family has of heart failure. She is not on any diuretics per her report, no fevers, chills. She says she has intermittent chest pain and shortness of breath. Also complained that her legs hurt because her abdomen is sitting on it. There are no other complaints, changes, or physical findings at this time. PCP: Den Dsouza MD    No current facility-administered medications on file prior to encounter. Current Outpatient Medications on File Prior to Encounter   Medication Sig Dispense Refill    lisinopril (PRINIVIL, ZESTRIL) 20 mg tablet TAKE 1 TABLET BY MOUTH DAILY 90 Tab 1    DULoxetine (CYMBALTA) 30 mg capsule Take 1 Cap by mouth daily. 30 Cap 1    simvastatin (ZOCOR) 20 mg tablet TAKE 1 TABLET BY MOUTH EVERY NIGHT 90 Tab 1    potassium chloride SR (KLOR-CON 10) 10 mEq tablet TAKE 2 TABLETS BY MOUTH DAILY 180 Tab 1    omeprazole (PRILOSEC) 40 mg capsule TAKE 1 CAPSULE BY MOUTH DAILY 90 Cap 0    LORazepam (ATIVAN) 0.5 mg tablet TK 1 T PO QD PRA  0    QUEtiapine (SEROQUEL) 200 mg tablet TK 1 T PO HS PRF INSOMNIA  0    nystatin (MYCOSTATIN) topical cream Apply  to affected area two (2) times a day. 15 g 0    naproxen (NAPROSYN) 500 mg tablet Take 1 Tab by mouth every twelve (12) hours as needed for Pain.  20 Tab 0    [DISCONTINUED] furosemide (LASIX) 20 mg tablet Take 1 Tab by mouth two (2) times a day. TAKE 1 TABLET BY MOUTH DAILY 30 Tab 0    omeprazole (PRILOSEC) 40 mg capsule TAKE 1 CAPSULE BY MOUTH DAILY FOR 7 DAYS 90 Cap 0    temazepam (RESTORIL) 22.5 mg capsule Take 1 Cap by mouth nightly as needed for Sleep. Max Daily Amount: 22.5 mg. 30 Cap 0    DULoxetine (CYMBALTA) 60 mg capsule Take 1 Cap by mouth daily. 30 Cap 1    omeprazole (PRILOSEC) 20 mg capsule Take 1 Cap by mouth two (2) times a day. 180 Cap 1       Past History     Past Medical History:  Past Medical History:   Diagnosis Date    Arthritis     chronic pain related arthritis    Bipolar 1 disorder (HCC)     GERD (gastroesophageal reflux disease)     Hypertension     Other ill-defined conditions(799.89)     hyperlipidemia. Stomach abcess    Psychiatric disorder     panic attacks    Sleep apnea     Thromboembolus ([address]     last year L leg    Unspecified sleep apnea        Past Surgical History:  Past Surgical History:   Procedure Laterality Date    HX CHOLECYSTECTOMY      HX GI      gallbladder removed 2/2010    HX HYSTERECTOMY         Family History:  Family History   Problem Relation Age of Onset    Heart Disease Mother     Diabetes Mother     Arthritis-osteo Mother     High Cholesterol Mother     Heart Attack Sister     Diabetes Sister     Heart Disease Sister     Diabetes Sister     Arthritis-osteo Sister     High Cholesterol Sister     Schizophrenia Paternal Grandmother     Drug Abuse Sister        Social History:  Social History     Tobacco Use    Smoking status: Never Smoker    Smokeless tobacco: Never Used   Substance Use Topics    Alcohol use: No    Drug use: No       Allergies:  No Known Allergies      Review of Systems   Constitutional: No  fever,  No  headache  Skin: No  rash, No  jaundice  HEENT: No  nasal congestion, No  eye drainage.    Resp: No cough,  No  wheezing  CV: + chest pain, No  palpitations , + perhipheral edema  GI: No vomiting,  No  diarrhea.,  No  constipation  : No dysuria,  No  hematuria  MSK: No joint pain,  No  trauma  Neuro: No numbness, No  tingling  Psych: No suicidal, No  paranoid      Physical Exam     Patient Vitals for the past 12 hrs:   Temp Pulse Resp BP SpO2   02/19/20 1641 97.9 °F (36.6 °C) 84 18 163/88 95 %   02/19/20 1600    (!) 179/102 95 %   02/19/20 1530    164/90 93 %   02/19/20 1445    (!) 121/104 94 %   02/19/20 1430    105/79 96 %   02/19/20 1408     97 %   02/19/20 1337  86  197/88 96 %     General: alert, No acute distress  Eyes: EOMI, normal conjunctiva  ENT: moist mucous membranes. Neck: Active, full ROM of neck. Skin: No rashes. no jaundice              Lungs: Equal chest expansion. no respiratory distress. clear to auscultation bilaterally No accessory muscle usage  Heart: regular rate     no peripheral edema   2+ radial pulses and DPs bilaterally  Abd:  distended soft, nontender. No rebound tenderness. No guarding  Back: Full ROM  MSK: Full, active ROM in all 4 extremities. Neuro: Person, Place, Time and Situation; normal speech;   Psych: Cooperative with exam; Appropriate mood and affect             Diagnostic Study Results     Labs -     Recent Results (from the past 12 hour(s))   URINALYSIS W/ RFLX MICROSCOPIC    Collection Time: 02/19/20  4:05 PM   Result Value Ref Range    Color YELLOW/STRAW      Appearance CLEAR CLEAR      Specific gravity 1.017 1.003 - 1.030      pH (UA) 6.5 5.0 - 8.0      Protein NEGATIVE  NEG mg/dL    Glucose NEGATIVE  NEG mg/dL    Ketone NEGATIVE  NEG mg/dL    Bilirubin NEGATIVE  NEG      Blood NEGATIVE  NEG      Urobilinogen 1.0 0.2 - 1.0 EU/dL    Nitrites NEGATIVE  NEG      Leukocyte Esterase NEGATIVE  NEG         Radiologic Studies -   XR CHEST PA LAT   Final Result   Impression: No acute process.            CT Results  (Last 48 hours)    None        CXR Results  (Last 48 hours)               02/19/20 1141  XR CHEST PA LAT Final result Impression:  Impression: No acute process. Narrative:  Exam:  2 view chest       Indication: Chest pain. COMPARISON: 7/19/2016       PA and lateral views demonstrate normal heart size. There is no acute process in   the lung fields. The osseous structures are unremarkable. Medical Decision Making     Differential Diagnosis: ACS, pneumonia, pneumothorax, heart failure    I reviewed the vital signs, available nursing notes, past medical history, past surgical history, family history and social history and old medical records. On my interpretation, Laboratory workup is significant for urinalysis without evidence of infection, unremarkable electrolytes, negative troponin, BNP is only minimally elevated at 211  On my interpretation of the radiology studies evidence of pulmonary edema, or pneumonia  On my interpretation of the EKG normal sinus rhythm at a rate of 87, QTC is 454, no ST elevation or depression. Old T wave inversions in leads V2 and V3. Management/ED course: Patient presents today with primary complaints of lower extremity swelling. Unremarkable work-up with no evidence of fluid overload, no ischemic changes on EKG, and unremarkable electrolytes. Increase patient's diuretic, and encouraged to follow-up as an outpatient. ED Course:   Initial assessment performed. The patients presenting problems have been discussed, and they are in agreement with the care plan formulated and outlined with them. I have encouraged them to ask questions as they arise throughout their visit. Procedures:  0    Dispo: Discharged. The patient has been re-evaluated and is ready for discharge. Reviewed available results with patient. Counseled patient on diagnosis and care plan. Patient has expressed understanding, and all questions have been answered. Patient agrees with plan and agrees to follow up as recommended, or to return to the ED if their symptoms worsen.  Discharge instructions have been provided and explained to the patient, along with reasons to return to the ED. PLAN:  Discharge Medication List as of 2/19/2020  4:27 PM      CONTINUE these medications which have CHANGED    Details   furosemide (LASIX) 20 mg tablet Take 2 Tabs by mouth two (2) times a day. TAKE 1 TABLET BY MOUTH DAILY, Normal, Disp-30 Tab, R-0         CONTINUE these medications which have NOT CHANGED    Details   lisinopril (PRINIVIL, ZESTRIL) 20 mg tablet TAKE 1 TABLET BY MOUTH DAILY, Normal, Disp-90 Tab, R-1      !! DULoxetine (CYMBALTA) 30 mg capsule Take 1 Cap by mouth daily. , Normal, Disp-30 Cap, R-1      simvastatin (ZOCOR) 20 mg tablet TAKE 1 TABLET BY MOUTH EVERY NIGHT, Normal, Disp-90 Tab, R-1      potassium chloride SR (KLOR-CON 10) 10 mEq tablet TAKE 2 TABLETS BY MOUTH DAILY, Normal, Disp-180 Tab, R-1      !! omeprazole (PRILOSEC) 40 mg capsule TAKE 1 CAPSULE BY MOUTH DAILY, Normal, Disp-90 Cap, R-0      LORazepam (ATIVAN) 0.5 mg tablet TK 1 T PO QD PRA, Historical Med, R-0      QUEtiapine (SEROQUEL) 200 mg tablet TK 1 T PO HS PRF INSOMNIA, Historical Med, R-0      nystatin (MYCOSTATIN) topical cream Apply  to affected area two (2) times a day., Normal, Disp-15 g, R-0      naproxen (NAPROSYN) 500 mg tablet Take 1 Tab by mouth every twelve (12) hours as needed for Pain., Normal, Disp-20 Tab, R-0      !! omeprazole (PRILOSEC) 40 mg capsule TAKE 1 CAPSULE BY MOUTH DAILY FOR 7 DAYS, Normal**Patient requests 90 days supply**Disp-90 Cap, R-0      temazepam (RESTORIL) 22.5 mg capsule Take 1 Cap by mouth nightly as needed for Sleep. Max Daily Amount: 22.5 mg., Print, Disp-30 Cap, R-0      !! DULoxetine (CYMBALTA) 60 mg capsule Take 1 Cap by mouth daily. , Normal, Disp-30 Cap, R-1      !! omeprazole (PRILOSEC) 20 mg capsule Take 1 Cap by mouth two (2) times a day., Normal, Disp-180 Cap, R-1       !! - Potential duplicate medications found. Please discuss with provider.       1.   2.     Follow-up Information     Follow up With Specialties Details Why Contact Info    Niurka Turner MD [facility]  As needed [address] [facility]  [phone]          3. Return to ED if worse         Diagnosis     Clinical Impression:   1. Swelling    2. Essential hypertension with goal blood pressure less than 140/90        Attestations:  Alicia Marcial MD        Please note that this dictation was completed with [employer], the computer voice recognition software. Quite often unanticipated grammatical, syntax, homophones, and other interpretive errors are inadvertently transcribed by the computer software. Please disregard these errors. Please excuse any errors that have escaped final proofreading. Thank you.

## 2020-02-19 NOTE — ED NOTES
Pt discharged at this time. VSS at time of discharge. PIV x1 removed with no complications. Discharge instructions reviewed and follow up care discussed at this time. Pt verbalized understanding and denies any questions at this time. No prescriptions given. Pt dressed and ambulated out of ED at this time.  All care provided within pt best interest.

## 2020-02-25 ENCOUNTER — TELEPHONE (OUTPATIENT)
Dept: INTERNAL MEDICINE CLINIC | Age: 65
End: 2020-02-25

## 2020-02-25 NOTE — TELEPHONE ENCOUNTER
[address] [facility] Office Pool   Phone Number: [phone]             Appointment not available     Caller's first and last name and relationship to patient (if not the patient):       Best contact number:[phone]       Preferred date and time: Friday March 6,2020 at 8:30 am       Scheduled appointment date and time:       Reason for appointment: was seen in ER at [facility]. Pt has pains in legs and knees. Pt also has frequent urination. Requesting a f/up visit.        Details to clarify the request:       [id number]

## 2020-03-04 DIAGNOSIS — E78.5 HYPERLIPIDEMIA, UNSPECIFIED HYPERLIPIDEMIA TYPE: ICD-10-CM

## 2020-03-06 ENCOUNTER — TELEPHONE (OUTPATIENT)
Dept: INTERNAL MEDICINE CLINIC | Age: 65
End: 2020-03-06

## 2020-03-06 RX ORDER — SIMVASTATIN 20 MG/1
TABLET, FILM COATED ORAL
Qty: 90 TAB | Refills: 1 | Status: SHIPPED | OUTPATIENT
Start: 2020-03-06 | End: 2020-08-25

## 2020-03-06 NOTE — TELEPHONE ENCOUNTER
Pt states she missed appt due to transportation not showing. [facility] has yet to pick her up even pt states. Pt will call to reschedule.

## 2020-03-06 NOTE — TELEPHONE ENCOUNTER
Called, no answer left message to call office back. Left msg to call back to reschedule missed appt. Sent missed appt letter as well.

## 2020-03-06 NOTE — LETTER
3/6/2020 Mono Martin [address] [place] [address] [phone] Dear Ms. Mono Martin, We had an appointment reserved for you today and were concerned when you did not show or call within 24 hours to cancel the appointment. Our policy is to call patients two days prior to their appointment to remind them of the date and time. We perform these calls as a courtesy to our patients and to allow us the opportunity to rebook the time slot should the appointment not be necessary. Recognizing that everyones time is valuable and that appointment time is limited, we ask that you provide 24 hours notice if you are unable to keep your appointment. Please call us at your earliest convenience to reschedule your appointment as your provider felt it was important to see you. Thank you for your anticipated cooperation. The scheduling staff: [address] [facility] [address] [place] [address] 
[phone]

## 2020-03-26 ENCOUNTER — TELEPHONE (OUTPATIENT)
Dept: INTERNAL MEDICINE CLINIC | Age: 65
End: 2020-03-26

## 2020-03-26 NOTE — TELEPHONE ENCOUNTER
Identified patient 2 identifiers verified.   Patient placed on Telemedicine schedule with Raheem Cadet

## 2020-03-26 NOTE — TELEPHONE ENCOUNTER
----- Message from Jeffery Youngblood sent at 3/26/2020  8:35 AM EDT -----  Regarding: Dr. Randall Mercado: [phone]  Caller's first and last name: N/A  Reason for call: Pt stated that her dentist's office is closed until May due to COVID-19. Pt stated see has an infection in her tooth and would like to know if Dr. Kristy Valenzuela could prescribe her amoxicillin 500mg. Pt stated the Dentist is Dr. Ascencio at [facility] and the contact number is [phone].   Callback required yes/no and why: Yes  Best contact number(s): [phone]  Details to clarify the request: N/A

## 2020-03-27 ENCOUNTER — TELEPHONE (OUTPATIENT)
Dept: INTERNAL MEDICINE CLINIC | Age: 65
End: 2020-03-27

## 2020-03-27 ENCOUNTER — VIRTUAL VISIT (OUTPATIENT)
Dept: INTERNAL MEDICINE CLINIC | Age: 65
End: 2020-03-27

## 2020-03-27 DIAGNOSIS — K29.60 REFLUX GASTRITIS: ICD-10-CM

## 2020-03-27 DIAGNOSIS — K04.7 INFECTED TOOTH: Primary | ICD-10-CM

## 2020-03-27 RX ORDER — AMOXICILLIN 875 MG/1
875 TABLET, FILM COATED ORAL 2 TIMES DAILY
Qty: 20 TAB | Refills: 0 | Status: SHIPPED | OUTPATIENT
Start: 2020-03-27 | End: 2020-07-01 | Stop reason: ALTCHOICE

## 2020-03-27 RX ORDER — AMOXICILLIN 500 MG/1
CAPSULE ORAL
COMMUNITY
Start: 2019-12-21 | End: 2020-03-27 | Stop reason: DRUGHIGH

## 2020-03-27 RX ORDER — OMEPRAZOLE 40 MG/1
CAPSULE, DELAYED RELEASE ORAL
Qty: 90 CAP | Refills: 0 | Status: SHIPPED | OUTPATIENT
Start: 2020-03-27 | End: 2020-07-01 | Stop reason: SDUPTHER

## 2020-03-27 RX ORDER — NAPROXEN 500 MG/1
500 TABLET ORAL
Qty: 20 TAB | Refills: 0 | Status: SHIPPED | OUTPATIENT
Start: 2020-03-27 | End: 2020-08-07

## 2020-03-27 NOTE — TELEPHONE ENCOUNTER
----- Message from Jason Gallego sent at 3/27/2020  8:10 AM EDT -----  Regarding: Rk/telephone  Pt is returning a call from today. Pts number is [phone].

## 2020-03-27 NOTE — PROGRESS NOTES
This is an established visit conducted via video telemedicine. The patient has been instructed that this meets HIPAA criteria and acknowledges and agrees to this method of visitation. Moon Ramos MD  03/27/20  9:15 AM  SUBJECTIVE:   Ms. Nani Bueno is a 76 y.o. female who is here via video virtual visit for treatment of a tooth infection. Patient has lost most of her upper teeth and has a widened front tooth left in her mouth. This is the right front tooth. She reports it is tender to touch the tooth as well as palpate the palate near the tooth. She reports her pain is 6-7 out of 10. The pain started 7 days ago. She denies any past coming from the tooth or having a temperature. She has had problems like this in the past and the dentist has given her amoxicillin to treat the infection. Due to issues with the coronavirus is difficult for her to get an appointment with the dentist.  At this time, she is otherwise doing well and has brought no other complaints to my attention today. For a list of the medical issues addressed today, see the assessment and plan below. PMH:   Past Medical History:   Diagnosis Date    Arthritis     chronic pain related arthritis    Bipolar 1 disorder (HCC)     GERD (gastroesophageal reflux disease)     Hypertension     Other ill-defined conditions(799.89)     hyperlipidemia. Stomach abcess    Psychiatric disorder     panic attacks    Sleep apnea     Thromboembolus ([address]     last year L leg    Unspecified sleep apnea      PSH:  has a past surgical history that includes hx gi; hx cholecystectomy; and hx hysterectomy. All: has No Known Allergies. MEDS:   Current Outpatient Medications   Medication Sig    omeprazole (PRILOSEC) 40 mg capsule TAKE 1 CAPSULE BY MOUTH DAILY FOR 7 DAYS    naproxen (NAPROSYN) 500 mg tablet Take 1 Tab by mouth every twelve (12) hours as needed for Pain.  Indications: pain    amoxicillin (AMOXIL) 875 mg tablet Take 1 Tab by mouth two (2) times a day.  simvastatin (ZOCOR) 20 mg tablet TAKE 1 TABLET BY MOUTH EVERY NIGHT    furosemide (LASIX) 20 mg tablet Take 2 Tabs by mouth two (2) times a day. TAKE 1 TABLET BY MOUTH DAILY    lisinopril (PRINIVIL, ZESTRIL) 20 mg tablet TAKE 1 TABLET BY MOUTH DAILY    LORazepam (ATIVAN) 0.5 mg tablet TK 1 T PO QD PRA    nystatin (MYCOSTATIN) topical cream Apply  to affected area two (2) times a day.  DULoxetine (CYMBALTA) 60 mg capsule Take 1 Cap by mouth daily.  DULoxetine (CYMBALTA) 30 mg capsule Take 1 Cap by mouth daily.  potassium chloride SR (KLOR-CON 10) 10 mEq tablet TAKE 2 TABLETS BY MOUTH DAILY    QUEtiapine (SEROQUEL) 200 mg tablet TK 1 T PO HS PRF INSOMNIA    temazepam (RESTORIL) 22.5 mg capsule Take 1 Cap by mouth nightly as needed for Sleep. Max Daily Amount: 22.5 mg. No current facility-administered medications for this visit. FH: family history includes Arthritis-osteo in her mother and sister; Diabetes in her mother, sister, and sister; Drug Abuse in her sister; Heart Attack in her sister; Heart Disease in her mother and sister; High Cholesterol in her mother and sister; Schizophrenia in her paternal grandmother. SH:  reports that she has never smoked. She has never used smokeless tobacco. She reports that she does not drink alcohol or use drugs. Review of Systems - History obtained from the patient  General ROS: negative  Psychological ROS: negative  Ophthalmic ROS: negative  ENT ROS: tooth pain  Respiratory ROS: no cough, shortness of breath, or wheezing  Cardiovascular ROS: no chest pain or dyspnea on exertion  Gastrointestinal ROS: reflux pain, change in bowel habits, or black or bloody stools  Genito-Urinary ROS: negative  Musculoskeletal ROS: negative  Neurological ROS: negative  Dermatological ROS: negative    OBJECTIVE:   Vitals: There were no vitals taken for this visit. Gen: Pleasant 76 y.o.  female in NAD.     OP moist ,  Oral mucosa is pink as seen in the video. She has 1 front tooth in her upper gum. ASSESSMENT/ PLAN: Diagnoses and all orders for this visit:    1. Infected tooth  -     naproxen (NAPROSYN) 500 mg tablet; Take 1 Tab by mouth every twelve (12) hours as needed for Pain. Indications: pain  -     amoxicillin (AMOXIL) 875 mg tablet; Take 1 Tab by mouth two (2) times a day. 2. Reflux gastritis  -     omeprazole (PRILOSEC) 40 mg capsule; TAKE 1 CAPSULE BY MOUTH DAILY FOR 7 DAYS        ICD-10-CM ICD-9-CM    1. Infected tooth K04.7 522.4 naproxen (NAPROSYN) 500 mg tablet      amoxicillin (AMOXIL) 875 mg tablet   2. Reflux gastritis K29.60 535.40 omeprazole (PRILOSEC) 40 mg capsule      1. Infected tooth  Mrs. Shoshana Valle was given a prescription for amoxicillin to treat her tooth infection as well as Naprosyn to help reduce some of her pain. She was advised to follow-up with her dentist as soon as possible. Patient was advised to use Naprosyn sparingly to decrease irritation to her stomach and kidneys. 2.  Reflux gastritis  Patient was given a refill of the Prilosec 40 mg to help relieve her GERD symptoms. I have reviewed the patient's medications and risks/side effects/benefits were discussed. Diagnosis(-es) explained to patient and questions answered. Literature provided where appropriate. I have reviewed the patient's medications and risks/side effects/benefits were discussed. Diagnosis(-es) explained to patient and questions answered. Literature provided where appropriate.

## 2020-03-27 NOTE — PROGRESS NOTES
Identified pt with two pt identifiers(name and ). Reviewed record in preparation for visit and have obtained necessary documentation. Chief Complaint   Patient presents with    Medication Evaluation     Wants to get refill on Amoxicillan        There were no vitals taken for this visit. Health Maintenance Due   Topic    Colonoscopy     DTaP/Tdap/Td series (1 - Tdap)    Shingrix Vaccine Age 49> (1 of 2)    Breast Cancer Screen Mammogram     Medicare Yearly Exam     GLAUCOMA SCREENING Q2Y     Bone Densitometry (Dexa) Screening     Influenza Age 5 to Adult     Lipid Screen        Med Reconciliation: Completed    Coordination of Care Questionnaire:  :   1) Have you been to an emergency room, urgent care, or hospitalized since your last visit? If yes, where when, and reason for visit? Yes, 2020 [id number] [facility], Please see [facility]. 2. Have seen or consulted any other health care provider since your last visit? If yes, where when, and reason for visit? No       3) Do you have an Advanced Directive/ Living Will in place? No  If yes, do we have a copy on file   If no, would you like information     Patient is accompanied by self I have received verbal consent from Kulwant Low to discuss any/all medical information while they are present in the room.

## 2020-03-27 NOTE — TELEPHONE ENCOUNTER
Message ws left for patient to contact office if further assistance is needed. Patient had a virtual visit with Dr. Sam Monroe  This morning.

## 2020-03-30 RX ORDER — OMEPRAZOLE 40 MG/1
CAPSULE, DELAYED RELEASE ORAL
Qty: 90 CAP | Refills: 0 | Status: SHIPPED | OUTPATIENT
Start: 2020-03-30 | End: 2020-09-18 | Stop reason: SDUPTHER

## 2020-04-06 DIAGNOSIS — K04.7 INFECTED TOOTH: ICD-10-CM

## 2020-04-06 RX ORDER — AMOXICILLIN 875 MG/1
875 TABLET, FILM COATED ORAL 2 TIMES DAILY
Qty: 20 TAB | Refills: 0 | Status: CANCELLED | OUTPATIENT
Start: 2020-04-06

## 2020-04-07 NOTE — TELEPHONE ENCOUNTER
Identified patient 2 identifiers verified. Patient reports that she will have to  Find another Dentist, the office closed. Patient reports that the tooth is much better, after taking the ABT and stopped picking with it. No further assistance needed.

## 2020-05-04 DIAGNOSIS — I10 ESSENTIAL HYPERTENSION WITH GOAL BLOOD PRESSURE LESS THAN 140/90: ICD-10-CM

## 2020-05-05 RX ORDER — LISINOPRIL 20 MG/1
TABLET ORAL
Qty: 90 TAB | Refills: 1 | Status: SHIPPED | OUTPATIENT
Start: 2020-05-05 | End: 2020-07-17 | Stop reason: SDUPTHER

## 2020-06-17 DIAGNOSIS — K08.89 TOOTH PAIN: ICD-10-CM

## 2020-06-19 NOTE — TELEPHONE ENCOUNTER
----- Message from Joseph Alcantar sent at 6/18/2020  4:42 PM EDT -----  Regarding: Dr. Alvarez Dues: [phone] (if not patient): n/a  Relationship of caller (if not patient): n/a  Best contact number(s): [phone]  Name of medication and dosage if known: Amoxicillin  Is patient out of this medication (yes/no): yes  Pharmacy name: [facility] listed in chart? (yes/no): yes  Pharmacy phone number: n/a  Date of last visit: Friday, March 27, 2020 08:15 AM  Details to clarify the request: She advised that her dentist office is still not open and she would like another prescription sent in 1 last time.

## 2020-06-22 RX ORDER — AMOXICILLIN 500 MG/1
CAPSULE ORAL
Qty: 14 CAP | Refills: 0 | OUTPATIENT
Start: 2020-06-22

## 2020-06-26 NOTE — TELEPHONE ENCOUNTER
Identified patient 2 identifiers verified. Patient accepted an appointment   With Dr. Nilesh Arenas on July1, 2020.

## 2020-07-01 ENCOUNTER — VIRTUAL VISIT (OUTPATIENT)
Dept: INTERNAL MEDICINE CLINIC | Age: 65
End: 2020-07-01

## 2020-07-01 DIAGNOSIS — R73.09 ELEVATED HEMOGLOBIN A1C: ICD-10-CM

## 2020-07-01 DIAGNOSIS — Z12.11 COLON CANCER SCREENING: ICD-10-CM

## 2020-07-01 DIAGNOSIS — Z12.31 ENCOUNTER FOR SCREENING MAMMOGRAM FOR MALIGNANT NEOPLASM OF BREAST: ICD-10-CM

## 2020-07-01 DIAGNOSIS — Z13.31 SCREENING FOR DEPRESSION: ICD-10-CM

## 2020-07-01 DIAGNOSIS — Z00.00 MEDICARE ANNUAL WELLNESS VISIT, INITIAL: Primary | ICD-10-CM

## 2020-07-01 DIAGNOSIS — I10 ESSENTIAL HYPERTENSION WITH GOAL BLOOD PRESSURE LESS THAN 140/90: ICD-10-CM

## 2020-07-01 DIAGNOSIS — K08.89 PAIN IN A TOOTH OR TEETH: ICD-10-CM

## 2020-07-01 DIAGNOSIS — E78.5 HYPERLIPIDEMIA, UNSPECIFIED HYPERLIPIDEMIA TYPE: ICD-10-CM

## 2020-07-01 DIAGNOSIS — Z13.39 SCREENING FOR ALCOHOLISM: ICD-10-CM

## 2020-07-01 DIAGNOSIS — Z13.6 SCREENING FOR ISCHEMIC HEART DISEASE: ICD-10-CM

## 2020-07-01 RX ORDER — AMOXICILLIN 500 MG/1
500 CAPSULE ORAL 2 TIMES DAILY
Qty: 5 CAP | Refills: 1 | Status: SHIPPED | OUTPATIENT
Start: 2020-07-01 | End: 2020-07-17 | Stop reason: ALTCHOICE

## 2020-07-01 NOTE — PATIENT INSTRUCTIONS
Medicare Wellness Visit, Female The best way to live healthy is to have a lifestyle where you eat a well-balanced diet, exercise regularly, limit alcohol use, and quit all forms of tobacco/nicotine, if applicable. Regular preventive services are another way to keep healthy. Preventive services (vaccines, screening tests, monitoring & exams) can help personalize your care plan, which helps you manage your own care. Screening tests can find health problems at the earliest stages, when they are easiest to treat. [facility] follows the current, evidence-based guidelines published by the [facility]ney ([facility]) when recommending preventive services for our patients. Because we follow these guidelines, sometimes recommendations change over time as research supports it. (For example, mammograms used to be recommended annually. Even though Medicare will still pay for an annual mammogram, the newer guidelines recommend a mammogram every two years for women of average risk). Of course, you and your doctor may decide to screen more often for some diseases, based on your risk and your co-morbidities (chronic disease you are already diagnosed with). Preventive services for you include: - Medicare offers their members a free annual wellness visit, which is time for you and your primary care provider to discuss and plan for your preventive service needs. Take advantage of this benefit every year! 
-All adults over the age of 72 should receive the recommended pneumonia vaccines. Current USPSTF guidelines recommend a series of two vaccines for the best pneumonia protection.  
-All adults should have a flu vaccine yearly and a tetanus vaccine every 10 years.  
-All adults age 48 and older should receive the shingles vaccines (series of two vaccines). -All adults age 38-68 who are overweight should have a diabetes screening test once every three years. -All adults born between [birth year] and 1966 should be screened once for Hepatitis C. 
-Other screening tests and preventive services for persons with diabetes include: an eye exam to screen for diabetic retinopathy, a kidney function test, a foot exam, and stricter control over your cholesterol.  
-Cardiovascular screening for adults with routine risk involves an electrocardiogram (ECG) at intervals determined by your doctor.  
-Colorectal cancer screenings should be done for adults age 54-65 with no increased risk factors for colorectal cancer. There are a number of acceptable methods of screening for this type of cancer. Each test has its own benefits and drawbacks. Discuss with your doctor what is most appropriate for you during your annual wellness visit. The different tests include: colonoscopy (considered the best screening method), a fecal occult blood test, a fecal DNA test, and sigmoidoscopy. 
 
-A bone mass density test is recommended when a woman turns 65 to screen for osteoporosis. This test is only recommended one time, as a screening. Some providers will use this same test as a disease monitoring tool if you already have osteoporosis. -Breast cancer screenings are recommended every other year for women of normal risk, age 54-69. 
-Cervical cancer screenings for women over age 72 are only recommended with certain risk factors. Here is a list of your current Health Maintenance items (your personalized list of preventive services) with a due date: 
Health Maintenance Due Topic Date Due  
 Colonoscopy  03/05/1972  
 DTaP/Tdap/Td  (1 - Tdap) 03/05/1975  Shingles Vaccine (1 of 2) 03/05/2004  Mammogram  03/05/2004 [facility] Annual Well Visit  10/10/2018  Glaucoma Screening   03/05/2019  Bone Mineral Density   03/05/2019  Cholesterol Test   01/16/2020  Pneumococcal Vaccine (2 of 2 - PPSV23) 04/09/2020

## 2020-07-01 NOTE — PROGRESS NOTES
Ramesh Henderson is a 76 y.o. female, evaluated via audio-only technology on 7/1/2020 for Annual Wellness Visit; Hypertension; Cholesterol Problem; and Dental Injury  Ms. Jenni العراقي presents with several issues that need to be evaluated. Reports she has been under increased stress due to personal issues. This is affecting her sleep cycle. She does have sleep apnea and is supposed to be using a CPAP. She reports ongoing issues with access to dental care. She continues to have pain in her right lower jaw. Denies any swelling redness or pus around the gumline  of the affected teeth. Patient denies checking her blood pressure. She is overdue for lab evaluations related to her hyperlipidemia, hypertension, and elevated glucose levels. Assessment & Plan:   Diagnoses and all orders for this visit:      1. Hyperlipidemia, unspecified hyperlipidemia type  This patient is overdue for lipid evaluation. Currently taking simvastatin 20 mg every evening. CMP ordered today. Also ordered a lipid panel.  -     METABOLIC PANEL, COMPREHENSIVE    2. Essential hypertension with goal blood pressure less than 140/90   This patient was encouraged to have her blood pressure evaluated. She has been advised in the past to purchase a blood pressure cuff. Blood pressures in February were elevated well above the normal range. Some of her blood pressure elevations may be related to her chronic pain. Labs were ordered today to evaluate electrolytes and renal function.  -     METABOLIC PANEL, COMPREHENSIVE  -     CBC WITH AUTOMATED DIFF    3. Elevated hemoglobin A1c  This patient with history of an elevated hemoglobin A1c. Her last level was checked over a year ago and it was at 6.3. Lab orders were placed today for an A1c.  -     HEMOGLOBIN A1C WITH EAG    4. Pain in a tooth or teeth  This patient reports reports ongoing issues with her teeth. She has been unable to connect with her dentist due to [place] crisis.   She continued to have pain in her teeth. A prescription for amoxicillin was sent to her pharmacy. She was advised to follow-up with a dentist as soon as possible. -     amoxicillin (AMOXIL) 500 mg capsule; Take 1 Cap by mouth two (2) times a day. 12  Subjective:       Prior to Admission medications    Medication Sig Start Date End Date Taking? Authorizing Provider   amoxicillin (AMOXIL) 500 mg capsule Take 1 Cap by mouth two (2) times a day. 7/1/20  Yes Za Rodriguez MD   lisinopriL (PRINIVIL, ZESTRIL) 20 mg tablet TAKE 1 TABLET BY MOUTH DAILY 5/5/20  Yes Za Rodriguez MD   omeprazole (PRILOSEC) 40 mg capsule TAKE 1 CAPSULE BY MOUTH DAILY 3/30/20  Yes Za Rodriguez MD   simvastatin (ZOCOR) 20 mg tablet TAKE 1 TABLET BY MOUTH EVERY NIGHT 3/6/20  Yes Za Rodriguez MD   furosemide (LASIX) 20 mg tablet Take 2 Tabs by mouth two (2) times a day. TAKE 1 TABLET BY MOUTH DAILY 2/19/20  Yes Leola Mcbride MD   LORazepam (ATIVAN) 0.5 mg tablet TK 1 T PO QD PRA 11/12/19  Yes Other, MD Yue   QUEtiapine (SEROQUEL) 200 mg tablet TK 1 T PO HS PRF INSOMNIA 10/9/19  Yes Other, MD Yue   DULoxetine (CYMBALTA) 60 mg capsule Take 1 Cap by mouth daily. 3/6/19  Yes Rosanna Handy NP   DULoxetine (CYMBALTA) 30 mg capsule Take 1 Cap by mouth daily. 3/6/19  Yes Rosanna Handy NP   potassium chloride SR (KLOR-CON 10) 10 mEq tablet TAKE 2 TABLETS BY MOUTH DAILY 1/16/19  Yes Za Rodriguez MD   naproxen (NAPROSYN) 500 mg tablet Take 1 Tab by mouth every twelve (12) hours as needed for Pain. Indications: pain  Patient not taking: Reported on 7/1/2020 3/27/20   Za Rodriguez MD   nystatin (MYCOSTATIN) topical cream Apply  to affected area two (2) times a day. Patient not taking: Reported on 7/1/2020 11/16/19   Mikhail Ford MD   temazepam (RESTORIL) 22.5 mg capsule Take 1 Cap by mouth nightly as needed for Sleep. Max Daily Amount: 22.5 mg.   Patient not taking: Reported on 7/1/2020 3/6/19   Catalino Sabillon NP     Patient Active Problem List    Diagnosis Date Noted    Hypotension 08/16/2014     Priority: 1 - One    Obesity, morbid ([facility] 12/19/2017    Acute respiratory failure with hypoxia ([address] 06/03/2016    RLL pneumonia 06/03/2016    MURIEL on CPAP 06/03/2016    Hypokalemia 06/03/2016    MDD (major depressive disorder), recurrent episode, mild ([address] 01/11/2016    CHANDLER (acute kidney injury) ([address] 09/10/2015    UTI (urinary tract infection) 09/09/2015    Hyperlipidemia 09/09/2015    Pre-syncope 09/09/2015    Diverticulitis large intestine 08/16/2014    GABRIEL (generalized anxiety disorder) 07/11/2012    Pneumonia due to other gram-negative bacteria ([facility] 12/03/2011     Past Medical History:   Diagnosis Date    Arthritis     chronic pain related arthritis    Bipolar 1 disorder (HCC)     GERD (gastroesophageal reflux disease)     Hypertension     Other ill-defined conditions(799.89)     hyperlipidemia. Stomach abcess    Psychiatric disorder     panic attacks    Sleep apnea     Thromboembolus ([facility]     last year L leg    Unspecified sleep apnea      Past Surgical History:   Procedure Laterality Date    HX CHOLECYSTECTOMY      HX GI      gallbladder removed 2/2010    HX HYSTERECTOMY       Social History     Tobacco Use    Smoking status: Never Smoker    Smokeless tobacco: Never Used   Substance Use Topics    Alcohol use: No       Review of Systems   Constitutional: Negative. HENT:        Pain in teeth the right lower jaw. Eyes: Negative. Respiratory: Negative. Cardiovascular: Negative. Gastrointestinal: Negative. Genitourinary: Negative. Musculoskeletal: Positive for joint pain. Bilateral knee pain   Skin: Negative. Neurological: Negative. Psychiatric/Behavioral: Negative. No flowsheet data found.      Kailyn Abdi, who was evaluated through a patient-initiated, synchronous (real-time) audio only encounter, and/or her healthcare decision maker, is aware that it is a billable service, with coverage as determined by her insurance carrier. She provided verbal consent to proceed: Yes. She has not had a related appointment within my department in the past 7 days or scheduled within the next 24 hours.       Total Time: minutes: 11-20 minutes    Yassine Rivera MD

## 2020-07-01 NOTE — PROGRESS NOTES
This is an Initial Medicare Annual Wellness Exam (AWV) (Performed 12 months after IPPE or effective date of Medicare Part B enrollment, Once in a lifetime)    I have reviewed the patient's medical history in detail and updated the computerized patient record. History     Patient Active Problem List   Diagnosis Code    Pneumonia due to other gram-negative bacteria ([facility] J15.6    GABRIEL (generalized anxiety disorder) F41.1    Diverticulitis large intestine K57.32    Hypotension I95.9    UTI (urinary tract infection) N39.0    Hyperlipidemia E78.5    Pre-syncope R55    CHANDLER (acute kidney injury) ([facility] N17.9    MDD (major depressive disorder), recurrent episode, mild (HCC) F33.0    Acute respiratory failure with hypoxia ([facility]) J96.01    RLL pneumonia J18.9    MURIEL on CPAP G47.33, Z99.89    Hypokalemia E87.6    Obesity, morbid ([facility]) E66.01     Past Medical History:   Diagnosis Date    Arthritis     chronic pain related arthritis    Bipolar 1 disorder ([facility])     GERD (gastroesophageal reflux disease)     Hypertension     Other ill-defined conditions(799.89)     hyperlipidemia. Stomach abcess    Psychiatric disorder     panic attacks    Sleep apnea     Thromboembolus ([facility]     last year L leg    Unspecified sleep apnea       Past Surgical History:   Procedure Laterality Date    HX CHOLECYSTECTOMY      HX GI      gallbladder removed 2/2010    HX HYSTERECTOMY       Current Outpatient Medications   Medication Sig Dispense Refill    amoxicillin (AMOXIL) 500 mg capsule Take 1 Cap by mouth two (2) times a day. 5 Cap 1    lisinopriL (PRINIVIL, ZESTRIL) 20 mg tablet TAKE 1 TABLET BY MOUTH DAILY 90 Tab 1    omeprazole (PRILOSEC) 40 mg capsule TAKE 1 CAPSULE BY MOUTH DAILY 90 Cap 0    simvastatin (ZOCOR) 20 mg tablet TAKE 1 TABLET BY MOUTH EVERY NIGHT 90 Tab 1    furosemide (LASIX) 20 mg tablet Take 2 Tabs by mouth two (2) times a day.  TAKE 1 TABLET BY MOUTH DAILY 30 Tab 0    LORazepam (ATIVAN) 0.5 mg tablet TK 1 T PO QD PRA  0    QUEtiapine (SEROQUEL) 200 mg tablet TK 1 T PO HS PRF INSOMNIA  0    DULoxetine (CYMBALTA) 60 mg capsule Take 1 Cap by mouth daily. 30 Cap 1    DULoxetine (CYMBALTA) 30 mg capsule Take 1 Cap by mouth daily. 30 Cap 1    potassium chloride SR (KLOR-CON 10) 10 mEq tablet TAKE 2 TABLETS BY MOUTH DAILY 180 Tab 1    naproxen (NAPROSYN) 500 mg tablet Take 1 Tab by mouth every twelve (12) hours as needed for Pain. Indications: pain (Patient not taking: Reported on 7/1/2020) 20 Tab 0    nystatin (MYCOSTATIN) topical cream Apply  to affected area two (2) times a day. (Patient not taking: Reported on 7/1/2020) 15 g 0    temazepam (RESTORIL) 22.5 mg capsule Take 1 Cap by mouth nightly as needed for Sleep. Max Daily Amount: 22.5 mg. (Patient not taking: Reported on 7/1/2020) 30 Cap 0     No Known Allergies    Family History   Problem Relation Age of Onset    Heart Disease Mother     Diabetes Mother     Arthritis-osteo Mother     High Cholesterol Mother     Heart Attack Sister     Diabetes Sister     Heart Disease Sister     Diabetes Sister     Arthritis-osteo Sister     High Cholesterol Sister     Schizophrenia Paternal Grandmother     Drug Abuse Sister      Social History     Tobacco Use    Smoking status: Never Smoker    Smokeless tobacco: Never Used   Substance Use Topics    Alcohol use: No       Depression Risk Factor Screening:     3 most recent PHQ Screens 7/1/2020   PHQ Not Done -   Little interest or pleasure in doing things Not at all   Feeling down, depressed, irritable, or hopeless Not at all   Total Score PHQ 2 0       Alcohol Risk Factor Screening:   Do you average 1 drink per night or more than 7 drinks a week:  No    On any one occasion in the past three months have you have had more than 3 drinks containing alcohol:  No      Functional Ability and Level of Safety:   Hearing: Hearing is good. Activities of Daily Living:   The home contains: no safety equipment. Patient needs help with:  transportation     Ambulation: with mild difficulty      Fall Risk:  Fall Risk Assessment, last 12 mths 7/1/2020   Able to walk? Yes   Fall in past 12 months? No     Abuse Screen:  Patient is not abused       Cognitive Screening   Has your family/caregiver stated any concerns about your memory: no         Patient Care Team   Patient Care Team:  Lexy Pettit MD as PCP - General (Family Practice)  Lexy Pettit MD as PCP - [facility] Empaneled Provider  Brooke Miller MD (Gynecologic Oncology)  Isis Jon RN as Ambulatory Care Manager    Assessment/Plan   Education and counseling provided:  Are appropriate based on today's review and evaluation  End-of-Life planning (with patient's consent)  Pneumococcal Vaccine  Influenza Vaccine  Screening Mammography  Colorectal cancer screening tests  Cardiovascular screening blood test  Bone mass measurement (DEXA)  Screening for glaucoma    Diagnoses and all orders for this visit:    1. Medicare annual wellness visit, initial  -     [address]    2. Hyperlipidemia, unspecified hyperlipidemia type  -     METABOLIC PANEL, COMPREHENSIVE    3. Essential hypertension with goal blood pressure less than 809/87  -     METABOLIC PANEL, COMPREHENSIVE  -     CBC WITH AUTOMATED DIFF    4. Elevated hemoglobin A1c  -     HEMOGLOBIN A1C WITH EAG    5. Pain in a tooth or teeth  -     amoxicillin (AMOXIL) 500 mg capsule; Take 1 Cap by mouth two (2) times a day. 6. Screening for alcoholism  -     AL ANNUAL ALCOHOL SCREEN 15 MIN    7. Screening for depression  -     DEPRESSION SCREEN ANNUAL    8. Encounter for screening mammogram for malignant neoplasm of breast  -     SANDY MAMMO BI SCREENING INCL CAD; Future    9. Screening for ischemic heart disease  -     LIPID PANEL    10.  Colon cancer screening  -     REFERRAL TO GASTROENTEROLOGY         Health Maintenance Due   Topic Date Due  Colonoscopy  03/05/1972    DTaP/Tdap/Td series (1 - Tdap) 03/05/1975    Shingrix Vaccine Age 50> (1 of 2) 03/05/2004    Breast Cancer Screen Mammogram  03/05/2004    GLAUCOMA SCREENING Q2Y  03/05/2019    Bone Densitometry (Dexa) Screening  03/05/2019    Lipid Screen  01/16/2020    Pneumococcal 65+ years (2 of 2 - PPSV23) 04/09/2020     ACP-ACP planning continued today. I explained the purpose of the advance medical directive. Patient expressed interest in reviewing material.  I provided the patient with a \"Your Right to Decide\" booklet, [employer] Kit, and a copy of an Advance Directive. Patient agrees to providing a copy to the office when available.           Linh Haja, who was evaluated through a synchronous (real-time) audio only encounter, and/or her healthcare decision maker, is aware that it is a billable service, with coverage as determined by her insurance carrier. She provided verbal consent to proceed: Yes, and patient identification was verified. It was conducted pursuant to the emergency declaration under the [facility], [address] [place] [address] authority and the [employer] General Act. A caregiver was present when appropriate. Ability to conduct physical exam was limited. I was at home. The patient was at home.       Jerry Anderson MD

## 2020-07-01 NOTE — PROGRESS NOTES
Identified pt with two pt identifiers(name and ). Reviewed record in preparation for visit and have obtained necessary documentation. Chief Complaint   Patient presents with   [facility] Visit        There were no vitals taken for this visit. Health Maintenance Due   Topic    Colonoscopy     DTaP/Tdap/Td series (1 - Tdap)    Shingrix Vaccine Age 49> (1 of 2)    Breast Cancer Screen Mammogram     Medicare Yearly Exam     GLAUCOMA SCREENING Q2Y     Bone Densitometry (Dexa) Screening     Lipid Screen     Pneumococcal 65+ years (2 of 2 - PPSV23)       Med Reconciliation: Completed    Coordination of Care Questionnaire:  :   1) Have you been to an emergency room, urgent care, or hospitalized since your last visit? If yes, where when, and reason for visit? No       2. Have seen or consulted any other health care provider since your last visit? If yes, where when, and reason for visit? Yes, Dr. Salvatore Caal ([address] [place] [address] 10)       3) Do you have an Advanced Directive/ Living Will in place? No  If yes, do we have a copy on file   If no, would you like information       This is an established visit conducted via telemedicine. The patient has been instructed that this meets HIPAA criteria and acknowledges and agrees to this method of visitation.     Milton Adhikari  20  10:49 AM

## 2020-07-13 ENCOUNTER — TELEPHONE (OUTPATIENT)
Dept: INTERNAL MEDICINE CLINIC | Age: 65
End: 2020-07-13

## 2020-07-13 DIAGNOSIS — I10 ESSENTIAL HYPERTENSION WITH GOAL BLOOD PRESSURE LESS THAN 140/90: ICD-10-CM

## 2020-07-13 NOTE — TELEPHONE ENCOUNTER
Called, spoke with Pt. Two pt identifiers confirmed. Pt informed this needs to be discussed with Dr. Fidel Okeefe.   Pt offered and accepted virtual appt for Wednesday, July 15, 2020 09:00 AM with Dr. Fidel Okeefe.   Pt verbalized understanding of information discussed w/ no further questions at this time.

## 2020-07-13 NOTE — TELEPHONE ENCOUNTER
Patient states she needs a call back in reference to her medication, LisinopriL (PRINIVIL, ZESTRIL) 20 mg tablet that patient reports Alirio Foss has been taking 2 pills a day instead of 1 pill as this worked better for her & now she needs refill done\". Please call to discuss.  Thank you

## 2020-07-17 ENCOUNTER — VIRTUAL VISIT (OUTPATIENT)
Dept: INTERNAL MEDICINE CLINIC | Age: 65
End: 2020-07-17

## 2020-07-17 DIAGNOSIS — I10 ESSENTIAL HYPERTENSION WITH GOAL BLOOD PRESSURE LESS THAN 140/90: ICD-10-CM

## 2020-07-17 DIAGNOSIS — T14.8XXA ABRASION: Primary | ICD-10-CM

## 2020-07-17 RX ORDER — LISINOPRIL 20 MG/1
TABLET ORAL
Qty: 180 TAB | Refills: 1 | Status: SHIPPED | OUTPATIENT
Start: 2020-07-17 | End: 2020-11-02 | Stop reason: SDUPTHER

## 2020-07-17 RX ORDER — DIAPER,BRIEF,INFANT-TODD,DISP
EACH MISCELLANEOUS 2 TIMES DAILY
Qty: 30 G | Refills: 0 | Status: SHIPPED | OUTPATIENT
Start: 2020-07-17

## 2020-07-17 NOTE — PROGRESS NOTES
Danuta Vines is a 76 y.o. female who was seen by synchronous (real-time) audio-video technology on 7/17/2020 for Hypertension (f/u )      This patient calls and reports that her blood pressures been stable on current medication. She is asking for refills today. Her main concern is any irritation from friction between her thighs. Where the thighs rub together is now a raw area per the patient. Assessment & Plan:   Diagnoses and all orders for this visit:    1. Abrasion  -     hydrocortisone (CORTAID) 0.5 % topical cream; Apply  to affected area two (2) times a day. use thin layer  -     bacitracin-polymyxin b (Polysporin) 500-10,000 unit/gram ointment; Apply  to affected area two (2) times a day. 2. Essential hypertension with goal blood pressure less than 140/90  -     lisinopriL (PRINIVIL, ZESTRIL) 20 mg tablet; Take 2 tablets daily. This patient has a friction abrasion between her thighs. She was advised to use a topical antibiotic and an over-the-counter steroid. She was given refills of her blood pressure medicines and and she was advised to go to the emergency room    Subjective:       Prior to Admission medications    Medication Sig Start Date End Date Taking? Authorizing Provider   lisinopriL (PRINIVIL, ZESTRIL) 20 mg tablet Take 2 tablets daily. 7/17/20  Yes Elbert Grant MD   hydrocortisone (CORTAID) 0.5 % topical cream Apply  to affected area two (2) times a day. use thin layer 7/17/20  Yes Elbert Grant MD   bacitracin-polymyxin b (Polysporin) 500-10,000 unit/gram ointment Apply  to affected area two (2) times a day. 7/17/20  Yes Elbert Grant MD   omeprazole (PRILOSEC) 40 mg capsule TAKE 1 CAPSULE BY MOUTH DAILY 3/30/20  Yes Elbert Grant MD   simvastatin (ZOCOR) 20 mg tablet TAKE 1 TABLET BY MOUTH EVERY NIGHT 3/6/20  Yes Elbert Grant MD   furosemide (LASIX) 20 mg tablet Take 2 Tabs by mouth two (2) times a day.  TAKE 1 TABLET BY MOUTH DAILY 2/19/20  Yes Ashley Harry MD LORazepam (ATIVAN) 0.5 mg tablet TK 1 T PO QD PRA 11/12/19  Yes Other, MD Yue   QUEtiapine (SEROQUEL) 200 mg tablet TK 1 T PO HS PRF INSOMNIA 10/9/19  Yes Other, MD Yue   DULoxetine (CYMBALTA) 60 mg capsule Take 1 Cap by mouth daily. 3/6/19  Yes AlloccaCalvinan Serene, NP   DULoxetine (CYMBALTA) 30 mg capsule Take 1 Cap by mouth daily. 3/6/19  Yes Allocca Daniel Serene, NP   potassium chloride SR (KLOR-CON 10) 10 mEq tablet TAKE 2 TABLETS BY MOUTH DAILY 1/16/19  Yes India Echeverria MD   naproxen (NAPROSYN) 500 mg tablet Take 1 Tab by mouth every twelve (12) hours as needed for Pain. Indications: pain  Patient not taking: Reported on 7/1/2020 3/27/20   India Echeverria MD   nystatin (MYCOSTATIN) topical cream Apply  to affected area two (2) times a day. Patient not taking: Reported on 7/1/2020 11/16/19   Star Post MD   temazepam (RESTORIL) 22.5 mg capsule Take 1 Cap by mouth nightly as needed for Sleep. Max Daily Amount: 22.5 mg. Patient not taking: Reported on 7/1/2020 3/6/19   Leatha Oseguera NP         Review of Systems   Constitutional: Negative. HENT: Negative. Eyes: Negative. Respiratory: Negative. Cardiovascular: Negative. Gastrointestinal: Negative. Genitourinary: Negative. Musculoskeletal: Negative. Skin: Positive for rash. Abrasion both inner thighs     Neurological: Negative. Endo/Heme/Allergies: Negative. Psychiatric/Behavioral: Negative. Objective:   No flowsheet data found.      [INSTRUCTIONS:  \"[x]\" Indicates a positive item  \"[]\" Indicates a negative item  -- DELETE ALL ITEMS NOT EXAMINED]    Constitutional: [x] Appears well-developed and well-nourished [x] No apparent distress      [] Abnormal -     Mental status: [x] Alert and awake  [x] Oriented to person/place/time [x] Able to follow commands    [] Abnormal -     Eyes:   EOM    [x]  Normal    [] Abnormal -   Sclera  [x]  Normal    [] Abnormal -          Discharge [x]  None visible   [] Abnormal - HENT: [x] Normocephalic, atraumatic  [] Abnormal -   [x] Mouth/Throat: Mucous membranes are moist    External Ears [x] Normal  [] Abnormal -    Neck: [x] No visualized mass [] Abnormal -     Pulmonary/Chest: [x] Respiratory effort normal   [x] No visualized signs of difficulty breathing or respiratory distress        [] Abnormal -      Musculoskeletal:   [x] Normal gait with no signs of ataxia         [x] Normal range of motion of neck        [] Abnormal -     Neurological:        [x] No Facial Asymmetry (Cranial nerve 7 motor function) (limited exam due to video visit)          [x] No gaze palsy        [] Abnormal -          Skin:        [x] No significant exanthematous lesions or discoloration noted on facial skin         [] Abnormal -            Psychiatric:       [x] Normal Affect [] Abnormal -        [x] No Hallucinations    Other pertinent observable physical exam findings:-        We discussed the expected course, resolution and complications of the diagnosis(es) in detail. Medication risks, benefits, costs, interactions, and alternatives were discussed as indicated. I advised her to contact the office if her condition worsens, changes or fails to improve as anticipated. She expressed understanding with the diagnosis(es) and plan. Glory Dunbar, who was evaluated through a patient-initiated, synchronous (real-time) audio-video encounter, and/or her healthcare decision maker, is aware that it is a billable service, with coverage as determined by her insurance carrier. She provided verbal consent to proceed: Yes, and patient identification was verified. It was conducted pursuant to the emergency declaration under the [facility], [phone] waiver authority and the [employer] General Act. A caregiver was present when appropriate. Ability to conduct physical exam was limited. I was at home.  The patient was at home.      Toi Gómez MD

## 2020-07-17 NOTE — PROGRESS NOTES
Identified pt with two pt identifiers(name and ). Reviewed record in preparation for visit and have obtained necessary documentation. Chief Complaint   Patient presents with    Hypertension     f/u         There were no vitals taken for this visit. Health Maintenance Due   Topic    Colonoscopy     DTaP/Tdap/Td series (1 - Tdap)    Shingrix Vaccine Age 49> (1 of 2)    Breast Cancer Screen Mammogram     GLAUCOMA SCREENING Q2Y     Bone Densitometry (Dexa) Screening     Lipid Screen     Pneumococcal 65+ years (2 of 2 - PPSV23)       Med Reconciliation: Completed    Coordination of Care Questionnaire:  :   1) Have you been to an emergency room, urgent care, or hospitalized since your last visit? If yes, where when, and reason for visit? No       2. Have seen or consulted any other health care provider since your last visit? If yes, where when, and reason for visit? Yes, Dr. Anmol Jolly (Ortho)       3) Do you have an Advanced Directive/ Living Will in place? No  If yes, do we have a copy on file   If no, would you like information       This is an established visit conducted via telemedicine. The patient has been instructed that this meets HIPAA criteria and acknowledges and agrees to this method of visitation.     Alicia Jung  20  3:12 PM

## 2020-07-22 ENCOUNTER — TELEPHONE (OUTPATIENT)
Dept: INTERNAL MEDICINE CLINIC | Age: 65
End: 2020-07-22

## 2020-07-22 NOTE — TELEPHONE ENCOUNTER
HIPAA verified with daughter Mikey Farley patient is on her way to ED at [facility]  To evaluate wound. Patient has new open wounds and wound on thigh is getting worse.

## 2020-07-22 NOTE — TELEPHONE ENCOUNTER
Patient's Daughter, Jose R Baldwin states she needs a call back to discuss patient's wound that is at her [address]igh area & what to do as Vito Goldstein states it's getting worse & due to the area is difficult to wrap or put a Bandage on it. Please call to advise if patient needs to go to the ER or what to do.

## 2020-07-23 ENCOUNTER — HOSPITAL ENCOUNTER (EMERGENCY)
Age: 65
Discharge: HOME | End: 2020-07-23
Attending: EMERGENCY MEDICINE | Admitting: EMERGENCY MEDICINE
Payer: MEDICARE

## 2020-07-23 VITALS
DIASTOLIC BLOOD PRESSURE: 78 MMHG | OXYGEN SATURATION: 97 % | HEIGHT: 61 IN | BODY MASS INDEX: 55.32 KG/M2 | WEIGHT: 293 LBS | HEART RATE: 92 BPM | TEMPERATURE: 98.1 F | RESPIRATION RATE: 16 BRPM | SYSTOLIC BLOOD PRESSURE: 145 MMHG

## 2020-07-23 DIAGNOSIS — E66.01 OBESITY, MORBID (HCC): ICD-10-CM

## 2020-07-23 DIAGNOSIS — L98.9 SKIN LESION OF LEFT LEG: ICD-10-CM

## 2020-07-23 DIAGNOSIS — L98.9 SKIN LESION OF RIGHT LEG: ICD-10-CM

## 2020-07-23 DIAGNOSIS — Z51.89 VISIT FOR WOUND CHECK: Primary | ICD-10-CM

## 2020-07-23 PROCEDURE — 99283 EMERGENCY DEPT VISIT LOW MDM: CPT

## 2020-07-23 PROCEDURE — 74011000250 HC RX REV CODE- 250: Performed by: PHYSICIAN ASSISTANT

## 2020-07-23 PROCEDURE — 74011250637 HC RX REV CODE- 250/637: Performed by: PHYSICIAN ASSISTANT

## 2020-07-23 RX ORDER — OXYCODONE AND ACETAMINOPHEN 5; 325 MG/1; MG/1
1 TABLET ORAL
Status: COMPLETED | OUTPATIENT
Start: 2020-07-23 | End: 2020-07-23

## 2020-07-23 RX ORDER — DOXYCYCLINE HYCLATE 100 MG
100 TABLET ORAL
Status: COMPLETED | OUTPATIENT
Start: 2020-07-23 | End: 2020-07-23

## 2020-07-23 RX ORDER — DOXYCYCLINE HYCLATE 100 MG
100 TABLET ORAL 2 TIMES DAILY
Qty: 20 TAB | Refills: 0 | Status: SHIPPED | OUTPATIENT
Start: 2020-07-23 | End: 2020-08-02

## 2020-07-23 RX ORDER — TRAMADOL HYDROCHLORIDE 50 MG/1
50 TABLET ORAL
Qty: 8 TAB | Refills: 0 | Status: SHIPPED | OUTPATIENT
Start: 2020-07-23 | End: 2020-07-26

## 2020-07-23 RX ORDER — ONDANSETRON 4 MG/1
4 TABLET, ORALLY DISINTEGRATING ORAL
Status: COMPLETED | OUTPATIENT
Start: 2020-07-23 | End: 2020-07-23

## 2020-07-23 RX ADMIN — BACITRACIN ZINC, NEOMYCIN SULFATE, POLYMYXIN B SULFATE 1 PACKET: 3.5; 5000; 4 OINTMENT TOPICAL at 15:02

## 2020-07-23 RX ADMIN — ONDANSETRON 4 MG: 4 TABLET, ORALLY DISINTEGRATING ORAL at 15:01

## 2020-07-23 RX ADMIN — OXYCODONE HYDROCHLORIDE AND ACETAMINOPHEN 1 TABLET: 5; 325 TABLET ORAL at 15:01

## 2020-07-23 RX ADMIN — DOXYCYCLINE HYCLATE 100 MG: 100 TABLET, COATED ORAL at 15:00

## 2020-07-23 NOTE — DISCHARGE INSTRUCTIONS
Keep wounds clean and dry, complete antibiotics as prescribed. Follow up with primary care for recheck. Have also provided contact information for [facility] if needed. Return to the Emergency dept for any worsening pain, drainage, bleeding, fever.

## 2020-07-23 NOTE — ED NOTES
Pt wounds cleansed and dressed. I have reviewed discharge instructions with the patient. The patient verbalized understanding.

## 2020-07-23 NOTE — ED PROVIDER NOTES
EMERGENCY DEPARTMENT HISTORY AND PHYSICAL EXAM      Date: 7/23/2020  Patient Name: Kalvin Dandy    History of Presenting Illness     Chief Complaint   Patient presents with    Wound Check     wounds in upper inner posterior thighs x last week. pt has been trying to self treat, but is unable to reach it well. She is afraid it might be getting infected       History Provided By: Patient    HPI: Kalvin Dandy, 76 y.o. female presents to the Emergency Dept with c/o wounds to the posterior aspect of her LEs bilat x 1 week. She states she has been attempting to treat them but she has a hard time visualizing/reaching the area. She denied h/o similar sx in the past.  No bleeding/drainage from the wounds. She denied h/o diabetes or tobacco use. She denied fever/chills. No numbness/tingling. Pt is o/w healthy without cough, congestion, ST, shortness of breath, chest pain, N/V/D. Chief Complaint: wounds to posterior aspect of bilat LEs  Duration: 1 Weeks  Timing:  Acute  Location: posterior proximal LEs  Quality: Burning  Severity: Moderate  Modifying Factors: pt is morbidly obese and unable to visualize wounds, no trauma, denied DM  Associated Symptoms: denies any other associated signs or symptoms        There are no other complaints, changes, or physical findings at this time. PCP: Rogerio Hdz MD    Current Outpatient Medications   Medication Sig Dispense Refill    doxycycline (VIBRA-TABS) 100 mg tablet Take 1 Tab by mouth two (2) times a day for 10 days. 20 Tab 0    traMADoL (Ultram) 50 mg tablet Take 1 Tab by mouth every six (6) hours as needed for Pain for up to 3 days. Max Daily Amount: 200 mg. 8 Tab 0    lisinopriL (PRINIVIL, ZESTRIL) 20 mg tablet Take 2 tablets daily. 180 Tab 1    hydrocortisone (CORTAID) 0.5 % topical cream Apply  to affected area two (2) times a day.  use thin layer 30 g 0    bacitracin-polymyxin b (Polysporin) 500-10,000 unit/gram ointment Apply  to affected area two (2) times a day. 15 g 0    omeprazole (PRILOSEC) 40 mg capsule TAKE 1 CAPSULE BY MOUTH DAILY 90 Cap 0    naproxen (NAPROSYN) 500 mg tablet Take 1 Tab by mouth every twelve (12) hours as needed for Pain. Indications: pain (Patient not taking: Reported on 7/1/2020) 20 Tab 0    simvastatin (ZOCOR) 20 mg tablet TAKE 1 TABLET BY MOUTH EVERY NIGHT 90 Tab 1    furosemide (LASIX) 20 mg tablet Take 2 Tabs by mouth two (2) times a day. TAKE 1 TABLET BY MOUTH DAILY 30 Tab 0    LORazepam (ATIVAN) 0.5 mg tablet TK 1 T PO QD PRA  0    QUEtiapine (SEROQUEL) 200 mg tablet TK 1 T PO HS PRF INSOMNIA  0    nystatin (MYCOSTATIN) topical cream Apply  to affected area two (2) times a day. (Patient not taking: Reported on 7/1/2020) 15 g 0    temazepam (RESTORIL) 22.5 mg capsule Take 1 Cap by mouth nightly as needed for Sleep. Max Daily Amount: 22.5 mg. (Patient not taking: Reported on 7/1/2020) 30 Cap 0    DULoxetine (CYMBALTA) 60 mg capsule Take 1 Cap by mouth daily. 30 Cap 1    DULoxetine (CYMBALTA) 30 mg capsule Take 1 Cap by mouth daily. 30 Cap 1    potassium chloride SR (KLOR-CON 10) 10 mEq tablet TAKE 2 TABLETS BY MOUTH DAILY 180 Tab 1       Past History     Past Medical History:  Past Medical History:   Diagnosis Date    Arthritis     chronic pain related arthritis    Bipolar 1 disorder (HCC)     GERD (gastroesophageal reflux disease)     Hypertension     Other ill-defined conditions(799.89)     hyperlipidemia.  Stomach abcess    Psychiatric disorder     panic attacks    Sleep apnea     Thromboembolus ([address]     last year L leg    Unspecified sleep apnea        Past Surgical History:  Past Surgical History:   Procedure Laterality Date    HX CHOLECYSTECTOMY      HX GI      gallbladder removed 2/2010    HX HYSTERECTOMY         Family History:  Family History   Problem Relation Age of Onset    Heart Disease Mother     Diabetes Mother     Arthritis-osteo Mother     High Cholesterol Mother     Heart Attack Sister     Diabetes Sister     Heart Disease Sister     Diabetes Sister     Arthritis-osteo Sister     High Cholesterol Sister     Schizophrenia Paternal Grandmother     Drug Abuse Sister        Social History:  Social History     Tobacco Use    Smoking status: Never Smoker    Smokeless tobacco: Never Used   Substance Use Topics    Alcohol use: No    Drug use: No       Allergies:  No Known Allergies      Review of Systems   Review of Systems   Constitutional: Negative for chills and fever. HENT: Negative for congestion, rhinorrhea and sore throat. Respiratory: Negative for cough and shortness of breath. Cardiovascular: Negative for chest pain and palpitations. Gastrointestinal: Negative for diarrhea, nausea and vomiting. Endocrine: Negative for polydipsia, polyphagia and polyuria. Genitourinary: Negative for dysuria and hematuria. Musculoskeletal: Negative for arthralgias, myalgias, neck pain and neck stiffness. Skin: Positive for wound. Negative for rash. Allergic/Immunologic: Negative for food allergies and immunocompromised state. Neurological: Negative for dizziness and headaches. Psychiatric/Behavioral: Negative for agitation and confusion. The patient is nervous/anxious. All other systems reviewed and are negative. Physical Exam   Physical Exam  Vitals signs and nursing note reviewed. Constitutional:       General: She is not in acute distress. Appearance: Normal appearance. She is well-developed. She is obese. She is not ill-appearing, toxic-appearing or diaphoretic. HENT:      Head: Normocephalic and atraumatic. Nose: Nose normal.      Mouth/Throat:      Pharynx: No oropharyngeal exudate. Eyes:      General: No scleral icterus. Right eye: No discharge. Left eye: No discharge. Conjunctiva/sclera: Conjunctivae normal.   Neck:      Musculoskeletal: Normal range of motion and neck supple. Thyroid: No thyromegaly.       Vascular: No JVD.      Trachea: No tracheal deviation. Cardiovascular:      Rate and Rhythm: Normal rate and regular rhythm. Pulses: Normal pulses. Heart sounds: Normal heart sounds. Pulmonary:      Effort: Pulmonary effort is normal. No respiratory distress. Breath sounds: Normal breath sounds. No wheezing. Abdominal:      Palpations: Abdomen is soft. Tenderness: There is no abdominal tenderness. Musculoskeletal: Normal range of motion. General: Tenderness present. Comments: See SKIN exam   Skin:     General: Skin is warm and dry. Findings: Lesion present. Comments: Proximal aspect of bilat posterior LEs with multiple abrasions/lesions c/w friction excoriations, no fluctuance, no active bleeding, no erythema/warmth noted. NVI. Sensation grossly intact to light touch. Neurological:      Mental Status: She is alert and oriented to person, place, and time. Sensory: No sensory deficit. Motor: No weakness or abnormal muscle tone. Coordination: Coordination normal.   Psychiatric:         Mood and Affect: Mood normal.         Behavior: Behavior normal.         Judgment: Judgment normal.         Diagnostic Study Results     Labs -   No results found for this or any previous visit (from the past 12 hour(s)). Radiologic Studies -   No orders to display         Medical Decision Making   I am the first provider for this patient. I reviewed the vital signs, available nursing notes, past medical history, past surgical history, family history and social history. Vital Signs-Reviewed the patient's vital signs.   Patient Vitals for the past 12 hrs:   Temp Pulse Resp BP SpO2   07/23/20 1423 98.1 °F (36.7 °C) 92 16 145/78 97 %         Records Reviewed: Nursing Notes, Old Medical Records, Previous Radiology Studies and Previous Laboratory Studies    Provider Notes (Medical Decision Making):   Friction excoriation wounds, cellulitis, pressure ulcers    ED Course: Initial assessment performed. The patients presenting problems have been discussed, and they are in agreement with the care plan formulated and outlined with them. I have encouraged them to ask questions as they arise throughout their visit. DISCHARGE NOTE:  The care plan has been outline with the patient and/or family, who verbally conveyed understanding and agreement. Available results have been reviewed. Patient and/or family understand the follow up plan as outlined and discharge instructions. Should their condition deterioration at any time after discharge the patient agrees to return, follow up sooner than outlined or seek medical assistance at the closest Emergency Room as soon as possible. Questions have been answered. Discharge instructions and educational information regarding the patient's diagnosis as well a list of reasons why the patient would want to seek immediate medical attention, should their condition change, were reviewed directly with the patient/family        PLAN:  1. Discharge Medication List as of 7/23/2020  3:16 PM      START taking these medications    Details   doxycycline (VIBRA-TABS) 100 mg tablet Take 1 Tab by mouth two (2) times a day for 10 days. , Normal, Disp-20 Tab,R-0      traMADoL (Ultram) 50 mg tablet Take 1 Tab by mouth every six (6) hours as needed for Pain for up to 3 days. Max Daily Amount: 200 mg., Normal, Disp-8 Tab,R-0         CONTINUE these medications which have NOT CHANGED    Details   lisinopriL (PRINIVIL, ZESTRIL) 20 mg tablet Take 2 tablets daily. , Normal, Disp-180 Tab,R-1      hydrocortisone (CORTAID) 0.5 % topical cream Apply  to affected area two (2) times a day.  use thin layer, Normal, Disp-30 g,R-0      bacitracin-polymyxin b (Polysporin) 500-10,000 unit/gram ointment Apply  to affected area two (2) times a day., Normal, Disp-15 g,R-0      omeprazole (PRILOSEC) 40 mg capsule TAKE 1 CAPSULE BY MOUTH DAILY, Normal, Disp-90 Cap, R-0      naproxen (NAPROSYN) 500 mg tablet Take 1 Tab by mouth every twelve (12) hours as needed for Pain. Indications: pain, Normal, Disp-20 Tab, R-0      simvastatin (ZOCOR) 20 mg tablet TAKE 1 TABLET BY MOUTH EVERY NIGHT, Normal, Disp-90 Tab, R-1      furosemide (LASIX) 20 mg tablet Take 2 Tabs by mouth two (2) times a day. TAKE 1 TABLET BY MOUTH DAILY, Normal, Disp-30 Tab, R-0      LORazepam (ATIVAN) 0.5 mg tablet TK 1 T PO QD PRA, Historical Med, R-0      QUEtiapine (SEROQUEL) 200 mg tablet TK 1 T PO HS PRF INSOMNIA, Historical Med, R-0      nystatin (MYCOSTATIN) topical cream Apply  to affected area two (2) times a day., Normal, Disp-15 g, R-0      temazepam (RESTORIL) 22.5 mg capsule Take 1 Cap by mouth nightly as needed for Sleep. Max Daily Amount: 22.5 mg., Print, Disp-30 Cap, R-0      !! DULoxetine (CYMBALTA) 60 mg capsule Take 1 Cap by mouth daily. , Normal, Disp-30 Cap, R-1      !! DULoxetine (CYMBALTA) 30 mg capsule Take 1 Cap by mouth daily. , Normal, Disp-30 Cap, R-1      potassium chloride SR (KLOR-CON 10) 10 mEq tablet TAKE 2 TABLETS BY MOUTH DAILY, Normal, Disp-180 Tab, R-1       !! - Potential duplicate medications found. Please discuss with provider. 2.   Follow-up Information     Follow up With Specialties Details Why Contact Info    Joselin Pitt MD Family Medicine   [address] [place] [address]  [phone]      [facility]   [address] [place] [address] [address] [facility] [address] [phone]    [facility] EMERGENCY DEPT Emergency Medicine  If symptoms worsen [address]  [address] [facility]  [phone]        Return to ED if worse     Diagnosis     Clinical Impression:   1. Visit for wound check    2. Skin lesion of left leg    3. Skin lesion of right leg    4.  Obesity, morbid ([address]

## 2020-07-24 NOTE — TELEPHONE ENCOUNTER
Patient state she needs a call back in reference to her ED [facility] ER 7/23/20 visit for Inner Thigh Wounds & to discuss Plan of Care. Please call.  Thank you

## 2020-07-27 NOTE — TELEPHONE ENCOUNTER
Called, spoke with Pt. Two pt identifiers confirmed. Pt offered and accepted virtual appt for Wednesday, July 29, 2020 10:30 AM with Dr. Anahy Monroe.   Pt verbalized understanding of information discussed w/ no further questions at this time.

## 2020-07-29 ENCOUNTER — VIRTUAL VISIT (OUTPATIENT)
Dept: INTERNAL MEDICINE CLINIC | Age: 65
End: 2020-07-29

## 2020-07-29 DIAGNOSIS — K21.9 GASTROESOPHAGEAL REFLUX DISEASE WITHOUT ESOPHAGITIS: ICD-10-CM

## 2020-07-29 DIAGNOSIS — S81.801D LEG WOUND, RIGHT, SUBSEQUENT ENCOUNTER: Primary | ICD-10-CM

## 2020-07-29 DIAGNOSIS — S81.802D LEG WOUND, LEFT, SUBSEQUENT ENCOUNTER: ICD-10-CM

## 2020-07-29 NOTE — PROGRESS NOTES
Identified pt with two pt identifiers(name and ). Reviewed record in preparation for visit and have obtained necessary documentation. Chief Complaint   Patient presents with   [facility] ED Follow-up     Pt went to [facility] on 2020 for wound care        There were no vitals taken for this visit. Health Maintenance Due   Topic    Colonoscopy     DTaP/Tdap/Td series (1 - Tdap)    Shingrix Vaccine Age 49> (1 of 2)    Breast Cancer Screen Mammogram     GLAUCOMA SCREENING Q2Y     Bone Densitometry (Dexa) Screening     Lipid Screen     Pneumococcal 65+ years (2 of 2 - PPSV23)       Med Reconciliation: Completed    Coordination of Care Questionnaire:  :   1) Have you been to an emergency room, urgent care, or hospitalized since your last visit? If yes, where when, and reason for visit? Yes, see chief complaint. 2. Have seen or consulted any other health care provider since your last visit? If yes, where when, and reason for visit? No       3) Do you have an Advanced Directive/ Living Will in place? No  If yes, do we have a copy on file   If no, would you like information       This is an established visit conducted via telemedicine. The patient has been instructed that this meets HIPAA criteria and acknowledges and agrees to this method of visitation.     Jose Mancilla  20  10:48 AM

## 2020-07-29 NOTE — PROGRESS NOTES
Kailyn Abdi is a 76 y.o. female who was seen by synchronous (real-time) audio-video technology on 7/29/2020 for ED Follow-up (Pt went to [address] [facility] on 07/23/2020 for wound care)  She reports he wounds on her inner thighs are healing up . She has arthritis in knees and legs. She reports more prominent GERD symptoms. She decided to start taking her valsartan 40 mg twice daily. She denies any fever or chills. Assessment & Plan:   Diagnoses and all orders for this visit:    1. Leg wound, right, subsequent encounter    2. Leg wound, left, subsequent encounter    3. Gastroesophageal reflux disease without esophagitis  -     REFERRAL TO GASTROENTEROLOGY      Ms. Kira Haywood reports that she is doing well except for her GERD. She was advised to stop taking Prilosec 40 mg twice a day. She was advised in begin using Mylanta in the evening. She is also counseled at length about her diet. She is not been avoiding caffeine or acidic foods. Also given a referral to GI. Her leg wounds have almost healed with the current treatment regimen.  i    Subjective:       Prior to Admission medications    Medication Sig Start Date End Date Taking? Authorizing Provider   doxycycline (VIBRA-TABS) 100 mg tablet Take 1 Tab by mouth two (2) times a day for 10 days. 7/23/20 8/2/20 Yes Newton, Candido0 WAYLON Castaneda   lisinopriL (PRINIVIL, ZESTRIL) 20 mg tablet Take 2 tablets daily. 7/17/20  Yes Joselin Pitt MD   bacitracin-polymyxin b (Polysporin) 500-10,000 unit/gram ointment Apply  to affected area two (2) times a day. 7/17/20  Yes Joselin Pitt MD   omeprazole (PRILOSEC) 40 mg capsule TAKE 1 CAPSULE BY MOUTH DAILY 3/30/20  Yes Joselin Pitt MD   simvastatin (ZOCOR) 20 mg tablet TAKE 1 TABLET BY MOUTH EVERY NIGHT 3/6/20  Yes Joselin Pitt MD   furosemide (LASIX) 20 mg tablet Take 2 Tabs by mouth two (2) times a day.  TAKE 1 TABLET BY MOUTH DAILY 2/19/20  Yes Corona Clifton MD   LORazepam (ATIVAN) 0.5 mg tablet TK 1 T PO QD PRA 11/12/19 Yes Other, MD Yue   QUEtiapine (SEROQUEL) 200 mg tablet TK 1 T PO HS PRF INSOMNIA 10/9/19  Yes Other, MD Yue   DULoxetine (CYMBALTA) 60 mg capsule Take 1 Cap by mouth daily. 3/6/19  Yes Rosie Handy NP   DULoxetine (CYMBALTA) 30 mg capsule Take 1 Cap by mouth daily. 3/6/19  Yes Rosie Handy NP   potassium chloride SR (KLOR-CON 10) 10 mEq tablet TAKE 2 TABLETS BY MOUTH DAILY 1/16/19  Yes Ifeoma Carson MD   hydrocortisone (CORTAID) 0.5 % topical cream Apply  to affected area two (2) times a day. use thin layer 7/17/20   Ifeoma Carson MD   naproxen (NAPROSYN) 500 mg tablet Take 1 Tab by mouth every twelve (12) hours as needed for Pain. Indications: pain  Patient not taking: Reported on 7/1/2020 3/27/20   Ifeoma Carson MD   nystatin (MYCOSTATIN) topical cream Apply  to affected area two (2) times a day. Patient not taking: Reported on 7/1/2020 11/16/19   Rosalinda Gonzales MD   temazepam (RESTORIL) 22.5 mg capsule Take 1 Cap by mouth nightly as needed for Sleep. Max Daily Amount: 22.5 mg. Patient not taking: Reported on 7/1/2020 3/6/19   Mac Clonts, NP         Review of Systems   Constitutional: Negative. HENT: Negative. Eyes: Negative. Respiratory: Negative. Cardiovascular: Negative. Gastrointestinal: Positive for heartburn. Genitourinary: Negative. Musculoskeletal: Positive for back pain. Leg pain   Skin: Negative. Neurological: Negative. Psychiatric/Behavioral: Negative. Objective:   No flowsheet data found.      [INSTRUCTIONS:  \"[x]\" Indicates a positive item  \"[]\" Indicates a negative item  -- DELETE ALL ITEMS NOT EXAMINED]    Constitutional: [x] Appears well-developed and well-nourished [x] No apparent distress      [] Abnormal -     Mental status: [x] Alert and awake  [x] Oriented to person/place/time [x] Able to follow commands    [] Abnormal -     Eyes:   EOM    [x]  Normal    [] Abnormal -   Sclera  [x]  Normal    [] Abnormal - Discharge [x]  None visible   [] Abnormal -     HENT: [x] Normocephalic, atraumatic  [] Abnormal -   [x] Mouth/Throat: Mucous membranes are moist    External Ears [x] Normal  [] Abnormal -    Neck: [x] No visualized mass [] Abnormal -     Pulmonary/Chest: [x] Respiratory effort normal   [x] No visualized signs of difficulty breathing or respiratory distress        [] Abnormal -      Musculoskeletal:   [x] Normal gait with no signs of ataxia         [x] Normal range of motion of neck        [] Abnormal -     Neurological:        [x] No Facial Asymmetry (Cranial nerve 7 motor function) (limited exam due to video visit)          [x] No gaze palsy        [] Abnormal -          Skin:        [x] No significant exanthematous lesions or discoloration noted on facial skin         [] Abnormal -            Psychiatric:       [x] Normal Affect [] Abnormal -        [x] No Hallucinations    Other pertinent observable physical exam findings:-        We discussed the expected course, resolution and complications of the diagnosis(es) in detail. Medication risks, benefits, costs, interactions, and alternatives were discussed as indicated. I advised her to contact the office if her condition worsens, changes or fails to improve as anticipated. She expressed understanding with the diagnosis(es) and plan. Nneka Mckinney, who was evaluated through a patient-initiated, synchronous (real-time) audio-video encounter, and/or her healthcare decision maker, is aware that it is a billable service, with coverage as determined by her insurance carrier. She provided verbal consent to proceed: Yes, and patient identification was verified. It was conducted pursuant to the emergency declaration under the [address] [place] [address] authority and the [employer] and [employer] General Act. A caregiver was present when appropriate. Ability to conduct physical exam was limited.  I was at home. The patient was at home.       Sindy Higuera MD

## 2020-08-07 ENCOUNTER — HOSPITAL ENCOUNTER (OUTPATIENT)
Dept: WOUND CARE | Age: 65
Discharge: HOME | End: 2020-08-07
Admitting: SURGERY
Payer: MEDICAID

## 2020-08-07 VITALS
RESPIRATION RATE: 20 BRPM | SYSTOLIC BLOOD PRESSURE: 176 MMHG | TEMPERATURE: 96.9 F | DIASTOLIC BLOOD PRESSURE: 83 MMHG | HEART RATE: 98 BPM

## 2020-08-07 DIAGNOSIS — E66.01 OBESITY, MORBID (HCC): ICD-10-CM

## 2020-08-07 DIAGNOSIS — L97.122 NON-PRESSURE CHRONIC ULCER OF LEFT THIGH WITH FAT LAYER EXPOSED (HCC): ICD-10-CM

## 2020-08-07 DIAGNOSIS — L97.112 NON-PRESSURE CHRONIC ULCER OF RIGHT THIGH WITH FAT LAYER EXPOSED (HCC): ICD-10-CM

## 2020-08-07 PROCEDURE — 99203 OFFICE O/P NEW LOW 30 MIN: CPT | Performed by: SURGERY

## 2020-08-07 PROCEDURE — 99213 OFFICE O/P EST LOW 20 MIN: CPT

## 2020-08-07 RX ORDER — LORAZEPAM 0.5 MG/1
0.5 TABLET ORAL
COMMUNITY
End: 2020-10-10 | Stop reason: DRUGHIGH

## 2020-08-07 NOTE — H&P
[address]  HISTORY AND PHYSICAL    Name:  Ryann Aiken  MR#:  [id number]  :  1955  ACCOUNT #:  [de-identified]  ADMIT DATE:  2020      HISTORY:  The patient is a 79-year-old woman who is referred to wound care center regarding nonhealing wounds on the inner aspect of right and left thighs. The patient reports this has been present for 2-3 weeks. She went initially to an Ur[facility] and eventually to the [address] [place] [address]. She has received oral antibiotics. Her daughter has been trying to place dressings on the area, but it is difficult to keep them in place. The areas are somewhat painful. She had a small amount of bleeding. The patient also noted that she has some tenderness on the right posterior heel, but does not think she has a wound there. The patient does not have history of diabetes. She does have history of morbid obesity, sleep apnea, panic attacks and bipolar 1 disorder, hypertension, gastroesophageal reflux disease, arthritis. The patient reports that she can walk short distances with a walker, but is limited fairly severely by her arthritis. She does take a diuretic to help control leg edema. The patient has no history of cardiac symptoms or MI or coronary intervention. She denies shortness of breath at rest or with exertion. She does take a diuretic, but has been draining a good bit of fluid during the day. PHYSICAL EXAMINATION:  GENERAL:  The patient is a very obese woman in no acute distress. Last recorded weight was 389 pounds on 2020 with height 5 feet 1 inch. VITAL SIGNS:  Today blood pressure 176/83, pulse 98, respirations 20, temperature 96.9. HEAD AND NECK:  Examination showed no jaundice, mass or thyromegaly. LUNGS:  Clear bilaterally without rales, rhonchi or wheezes. HEART:  Regular without murmur, gallop or rub. NEUROLOGIC:  The patient is alert and oriented. She moves all extremities equally.   Facial movement is symmetrical.  Speech is normal.  EXTREMITIES:  Lower extremity examination revealed 2+ right dorsalis pedis and 2+ left posterior tibial pulse. There was thickening and scaliness of the skin of both heels without any obvious wound. There were some linear folds in the posterior right heel without clear evidence of an actual active fissure. There was 1+ pitting edema both lower legs without ulceration. Posteromedial aspects of the thighs proximally, where there is adiposity reveals on the left a cluster of the superficial ulcers 3 x 5.5 x 0.1 cm in dimension with most areas being partial-thickness skin loss and only very few areas being full thickness. On the mirror image opposite side right posteromedial proximal thigh, there was a cluster of ulcers 12 x 4 x 0.1 cm in dimension, again with some areas being partial-thickness and only very few areas being full thickness skin loss. There is no induration of the underlying adipose tissue. ASSESSMENT AND PLAN:  The area did not look overtly like yeast infection. Regarding the thighs, I have recommended using Calmoseptine apply topically daily. For the heels, I have recommended applying Vaseline twice per day to the heels to soften the skin and avoid fissure formation. Since the patient is taking a diuretic to reduce leg edema, I have recommended that she not drink large quantities of fluid between meals    The patient will follow up in 2 weeks. I explained that we may well modify treatment depending on response. FINAL DIAGNOSIS:  Nonpressure ulcers, right and left proximal posteromedial thighs, morbid obesity. Shirley Hassan MD      [id number]  D:  08/07/2020 9:31  T:  08/07/2020 10:42  JOB #:  [id number]

## 2020-08-07 NOTE — WOUND CARE
08/07/20 0900 Wound Thigh Left;Medial # 1 Date First Assessed/Time First Assessed: 08/07/20 0859   Present on Hospital Admission: Yes  Wound Approximate Age at First Assessment (Weeks): 2 weeks  Primary Wound Type: Friction  Location: Thigh  Wound Location Orientation: Left;Medial  Wound Desc. .. Dressing Type Open to air Wound Length (cm) 3 cm Wound Width (cm) 5.5 cm Wound Depth (cm) 0.1 cm Wound Surface Area (cm^2) 16.5 cm^2 Wound Volume (cm^3) 1.65 cm^3 Drainage Amount None Wound Odor None Veda-wound Assessment Excoriated Cleansing and Cleansing Agents  Normal saline Wound Thigh Right;Medial # 2 Date First Assessed/Time First Assessed: 08/07/20 0901   Present on Hospital Admission: Yes  Wound Approximate Age at First Assessment (Weeks): 2 weeks  Primary Wound Type: Friction  Location: Thigh  Wound Location Orientation: Right;Medial  Wound Steve. .. Dressing Type Open to air Wound Length (cm) 12 cm Wound Width (cm) 4 cm Wound Depth (cm) 0.1 cm Wound Surface Area (cm^2) 48 cm^2 Wound Volume (cm^3) 4.8 cm^3 Condition of Base Pink 
(clusters) Condition of Edges Open Drainage Amount None Wound Odor None Veda-wound Assessment Excoriated Cleansing and Cleansing Agents  Normal saline Visit Vitals /83 Pulse 98 Temp 96.9 °F (36.1 °C) Resp 20

## 2020-08-07 NOTE — DISCHARGE INSTRUCTIONS
Discharge Instructions/Wound [address] [id number] [address], [address] [facility]  Telephone: [phone]    NAME:  Nneka Mckinney  YOB: 1955  MEDICAL RECORD NUMBER:  [id number]  DATE:  8/7/2020      Cleanse Medial Thigh Wounds with Mild soap and water (or Normal Saline), Thoroughly pat dry. Apply Calmoseptine Ointment to wounds and periwound, leave open to air. Apply twice a day, and as needed. Right Heel-Moisturize w/Petroleum Jelly daily and as needed for dry skin. Avoid pressure on Right heel. Elevate legs on pillows, to offload/float heels while in bed. Follow Diet as prescribed:   [] Calorie Diabetic Diet: [x] No Added Salt  [] Increase Protein: [x] Other: Balanced, Heart Healthy Diet. [] Avoid drinking excess liquids. Limit drinking between meals. Return Appointment:  [x] Return Appointment: With Dr. Macarena Dai in [facility])  [] Ordered tests:      Electronically signed Lakisha Medel RN on 8/7/2020 at 9:13 AM     America Garcia 281: Should you experience any significant changes in your wound(s) or have questions about your wound care, please contact the [address] [place] [address] at [address] [place] [address] 8:00 am - 4:30. If you need help with your wound outside these hours and cannot wait until we are again available, contact your PCP or go to the hospital emergency room. PLEASE NOTE: IF YOU ARE UNABLE TO OBTAIN WOUND SUPPLIES, CONTINUE TO USE THE SUPPLIES YOU HAVE AVAILABLE UNTIL YOU ARE ABLE TO REACH US. IT IS MOST IMPORTANT TO KEEP THE WOUND COVERED AT ALL TIMES.      Physician Signature:_______________________    Date: ___________ Time:  ____________

## 2020-08-19 ENCOUNTER — HOSPITAL ENCOUNTER (EMERGENCY)
Age: 65
Discharge: HOME | End: 2020-08-19
Attending: EMERGENCY MEDICINE | Admitting: EMERGENCY MEDICINE
Payer: MEDICARE

## 2020-08-19 VITALS
HEIGHT: 62 IN | BODY MASS INDEX: 53.92 KG/M2 | RESPIRATION RATE: 18 BRPM | OXYGEN SATURATION: 96 % | WEIGHT: 293 LBS | HEART RATE: 96 BPM | TEMPERATURE: 98.1 F | DIASTOLIC BLOOD PRESSURE: 98 MMHG | SYSTOLIC BLOOD PRESSURE: 149 MMHG

## 2020-08-19 DIAGNOSIS — B37.9 YEAST INFECTION: Primary | ICD-10-CM

## 2020-08-19 PROCEDURE — 99283 EMERGENCY DEPT VISIT LOW MDM: CPT

## 2020-08-19 PROCEDURE — 74011250637 HC RX REV CODE- 250/637: Performed by: PHYSICIAN ASSISTANT

## 2020-08-19 RX ORDER — TRAMADOL HYDROCHLORIDE 50 MG/1
50 TABLET ORAL
Qty: 20 TAB | Refills: 0 | Status: SHIPPED | OUTPATIENT
Start: 2020-08-19 | End: 2020-08-22

## 2020-08-19 RX ORDER — TRAMADOL HYDROCHLORIDE 50 MG/1
50 TABLET ORAL
Status: COMPLETED | OUTPATIENT
Start: 2020-08-19 | End: 2020-08-19

## 2020-08-19 RX ORDER — NYSTATIN 100000 U/G
CREAM TOPICAL 2 TIMES DAILY
Qty: 15 G | Refills: 0 | Status: SHIPPED | OUTPATIENT
Start: 2020-08-19 | End: 2020-08-21 | Stop reason: SDUPTHER

## 2020-08-19 RX ORDER — NYSTATIN 10B UNIT
1 POWDER (EA) MISCELLANEOUS 2 TIMES DAILY
Qty: 1 EACH | Refills: 0 | Status: SHIPPED | OUTPATIENT
Start: 2020-08-19 | End: 2020-11-06 | Stop reason: SDUPTHER

## 2020-08-19 RX ADMIN — TRAMADOL HYDROCHLORIDE 50 MG: 50 TABLET, FILM COATED ORAL at 20:24

## 2020-08-19 NOTE — DISCHARGE INSTRUCTIONS
Patient Education        Candidiasis: Care Instructions  Your Care Instructions  Candidiasis (say \"xvk-snt-PU-uh-marlon\") is a yeast infection. Yeast normally lives in your body. But it can cause problems if your body's defenses don't work as they should. Some medicines can increase your chance of getting a yeast infection. These include antibiotics, steroids, and cancer drugs. And some diseases like AIDS and diabetes can make you more likely to get yeast infections. There are different types of yeast infections. Chaparro Garibay is a yeast infection in the mouth. It usually occurs in people with weak immune systems. It causes white patches inside the mouth and throat. Yeast infections of the skin usually occur in skin folds where the skin stays moist. They cause red, oozing patches on your skin. Babies can get these infections under the diaper. People who often wear gloves can get them on their hands. Many women get vaginal yeast infections. They are most common when women take antibiotics. These infections can cause the vagina to itch and burn. They also cause white discharge that looks like cottage cheese. In rare cases, yeast infects the blood. This can cause serious disease. This kind of infection is treated with medicine given through a needle into a vein (IV). After you start treatment, a yeast infection usually goes away quickly. But if your immune system is weak, the infection may come back. Tell your doctor if you get yeast infections often. Follow-up care is a key part of your treatment and safety. Be sure to make and go to all appointments, and call your doctor if you are having problems. It's also a good idea to know your test results and keep a list of the medicines you take. How can you care for yourself at home? · Take your medicines exactly as prescribed. Call your doctor if you think you are having a problem with your medicine. · Use antibiotics only as directed by your doctor.   · Eat yogurt with live cultures. It has bacteria called lactobacillus. It may help prevent some types of yeast infections. · Keep your skin clean and dry. Put powder on moist places. · If you are using a cream or suppository to treat a vaginal yeast infection, don't use condoms or a diaphragm. Use a different type of birth control. · Eat a healthy diet and get regular exercise. This will help keep your immune system strong. When should you call for help? Watch closely for changes in your health, and be sure to contact your doctor if:  · You do not get better as expected. Where can you learn more? Go to http://www.gray.com/  Enter R826 in the search box to learn more about \"Candidiasis: Care Instructions. \"  Current as of: November 8, 2019               Content Version: 12.5  © 8395-0640 [employer]. Care instructions adapted under license by [employer] (which disclaims liability or warranty for this information). If you have questions about a medical condition or this instruction, always ask your healthcare professional. [address] any warranty or liability for your use of this information.

## 2020-08-20 DIAGNOSIS — L97.122 NON-PRESSURE CHRONIC ULCER OF LEFT THIGH WITH FAT LAYER EXPOSED (HCC): ICD-10-CM

## 2020-08-20 DIAGNOSIS — L97.112 NON-PRESSURE CHRONIC ULCER OF RIGHT THIGH WITH FAT LAYER EXPOSED (HCC): Primary | ICD-10-CM

## 2020-08-20 NOTE — TELEPHONE ENCOUNTER
#[phone] pt went to ED on 8-19-20 for the wounds on inner thighs. They prescribed everything, but prescribed the nystatin powder in the industrial size which is $1600   Pt only wants the usual amount of this she gets. I can't reorder as it is still in the system in bulk amount. Please call in as soon as possible. Thanks.

## 2020-08-20 NOTE — TELEPHONE ENCOUNTER
PCP: Florance Aschoff, MD    Last appt: 07/29/2020  Future Appointments   Date Time Provider Noah Valero   8/21/2020 10:00 AM Neel Villavicencio MD [facility]       Requested Prescriptions     Pending Prescriptions Disp Refills    nystatin (MYCOSTATIN) powder 30 g 1     Sig: Apply 2 g to affected area two (2) times a day.

## 2020-08-20 NOTE — ED PROVIDER NOTES
EMERGENCY DEPARTMENT HISTORY AND PHYSICAL EXAM      Date: 8/19/2020  Patient Name: Jae Zamudio    History of Presenting Illness     Chief Complaint   Patient presents with    Wound Check     Pt reports wounds to both inner thighs and has apt with wund clinic in 2 weeks but states \"I cant take it that long\" Denies any fever, chills, n/v       History Provided By: Patient    HPI: Jae Zamudio, 76 y.o. female with significant PMHx for arthritis, GERD, HTN and bipolar, presents by POV to the ED with cc of a rash to her inner thighs. This rash has been present for several weeks. She was seen by her PCP as well as the wound care clinic. And has a subsequent appointment with wound care in several weeks. She has been on several rounds of Abx as well as both over the counter creams. The area is not improving. Thus, she put hydrocortisone on the area several days ago, which has worsened her Sx. She notes that since applying this cream the pain has increased and her skin is now burning. She denies fever, chills, nausea and vomiting. There are no other complaints, changes, or physical findings at this time. Social Hx: Tobacco (denies), EtOH (denies), Illicit drug use (denies)     PCP: Marilou Garcia MD    No current facility-administered medications on file prior to encounter. Current Outpatient Medications on File Prior to Encounter   Medication Sig Dispense Refill    LORazepam (Ativan) 0.5 mg tablet Take 0.5 mg by mouth two (2) times daily as needed for Anxiety.  lisinopriL (PRINIVIL, ZESTRIL) 20 mg tablet Take 2 tablets daily. 180 Tab 1    hydrocortisone (CORTAID) 0.5 % topical cream Apply  to affected area two (2) times a day. use thin layer 30 g 0    bacitracin-polymyxin b (Polysporin) 500-10,000 unit/gram ointment Apply  to affected area two (2) times a day.  15 g 0    omeprazole (PRILOSEC) 40 mg capsule TAKE 1 CAPSULE BY MOUTH DAILY 90 Cap 0    simvastatin (ZOCOR) 20 mg tablet TAKE 1 TABLET BY MOUTH EVERY NIGHT 90 Tab 1    furosemide (LASIX) 20 mg tablet Take 2 Tabs by mouth two (2) times a day. TAKE 1 TABLET BY MOUTH DAILY 30 Tab 0    QUEtiapine (SEROQUEL) 200 mg tablet TK 1 T PO HS PRF INSOMNIA  0    DULoxetine (CYMBALTA) 60 mg capsule Take 1 Cap by mouth daily. 30 Cap 1    DULoxetine (CYMBALTA) 30 mg capsule Take 1 Cap by mouth daily. 30 Cap 1    potassium chloride SR (KLOR-CON 10) 10 mEq tablet TAKE 2 TABLETS BY MOUTH DAILY 180 Tab 1       Past History     Past Medical History:  Past Medical History:   Diagnosis Date    Arthritis     chronic pain related arthritis    Bipolar 1 disorder (HCC)     GERD (gastroesophageal reflux disease)     Hypertension     Other ill-defined conditions(799.89)     hyperlipidemia. Stomach abcess    Psychiatric disorder     panic attacks    Sleep apnea     Thromboembolus ([address]     last year L leg    Unspecified sleep apnea        Past Surgical History:  Past Surgical History:   Procedure Laterality Date    HX CHOLECYSTECTOMY      HX GI      gallbladder removed 2/2010    HX HYSTERECTOMY         Family History:  Family History   Problem Relation Age of Onset    Heart Disease Mother     Diabetes Mother     Arthritis-osteo Mother     High Cholesterol Mother     Heart Attack Sister     Diabetes Sister     Heart Disease Sister     Diabetes Sister     Arthritis-osteo Sister     High Cholesterol Sister     Schizophrenia Paternal Grandmother     Drug Abuse Sister        Social History:  Social History     Tobacco Use    Smoking status: Never Smoker    Smokeless tobacco: Never Used   Substance Use Topics    Alcohol use: No    Drug use: No       Allergies:  No Known Allergies      Review of Systems   Review of Systems   Constitutional: Negative for chills, diaphoresis and fever. HENT: Negative for congestion, ear pain, rhinorrhea and sore throat. Respiratory: Negative for cough and shortness of breath.     Cardiovascular: Negative for chest pain. Gastrointestinal: Negative for abdominal pain, constipation, diarrhea, nausea and vomiting. Genitourinary: Negative for difficulty urinating, dysuria, frequency and hematuria. Musculoskeletal: Negative for arthralgias and myalgias. Skin: Positive for rash. Neurological: Negative for headaches. All other systems reviewed and are negative. Physical Exam   Physical Exam  Vitals signs and nursing note reviewed. Constitutional:       General: She is not in acute distress. Appearance: She is well-developed. She is obese. She is not diaphoretic. Comments: 76 y.o. -American female    HENT:      Head: Normocephalic and atraumatic. Eyes:      General:         Right eye: No discharge. Left eye: No discharge. Conjunctiva/sclera: Conjunctivae normal.   Neck:      Musculoskeletal: Normal range of motion and neck supple. Cardiovascular:      Rate and Rhythm: Normal rate and regular rhythm. Heart sounds: Normal heart sounds. No murmur. Pulmonary:      Effort: Pulmonary effort is normal. No respiratory distress. Breath sounds: Normal breath sounds. Skin:     General: Skin is warm and dry. Comments: Large areas of macerated skin to the inner thighs and panus folds with satellite lesions on opposing skin surfaces. There is no exposed adipose tissue. Neurological:      Mental Status: She is alert and oriented to person, place, and time. Psychiatric:         Behavior: Behavior normal.         Diagnostic Study Results     Labs - none    Radiologic Studies - none    Medical Decision Making   I am the first provider for this patient. I reviewed the vital signs, available nursing notes, past medical history, past surgical history, family history and social history. Vital Signs-Reviewed the patient's vital signs.   Patient Vitals for the past 12 hrs:   Temp Pulse Resp BP SpO2   08/19/20 1827 98.1 °F (36.7 °C) 96 18 (!) 149/98 96 %       Records Reviewed: Nursing Notes    Provider Notes (Medical Decision Making):   Patient presents to the ED with rash. Frontal diagnoses include dermatitis, yeast infection, contact dermatitis, eczema, pressure sores. The area does not look overly yeasty however the fact that it worsened with steroid creams and does have communicating satellite lesions arises this is potential concern. Per the patient she has not been treated with any antifungal creams recently but has been on nystatin creams and powders in the past that have worked for her. We will try antifungals and have the patient follow-up with the wound care clinic as previously arranged. He is also requested pain medications. ED Course:   Initial assessment performed. The patients presenting problems have been discussed, and they are in agreement with the care plan formulated and outlined with them. I have encouraged them to ask questions as they arise throughout their visit. Critical Care Time: None    Disposition:  DISCHARGE NOTE:  The pt is ready for discharge. The pt's signs, symptoms, diagnosis, and discharge instructions have been discussed and pt has conveyed their understanding. The pt is to follow up as recommended or return to ER should their symptoms worsen. Plan has been discussed and pt is in agreement. PLAN:  1. Current Discharge Medication List      START taking these medications    Details   nystatin (MYCOSTATIN) topical cream Apply  to affected area two (2) times a day. Qty: 15 g, Refills: 0      Nystatin, Bulk, 1 billion unit powd 1 Dose by Does Not Apply route two (2) times a day. Qty: 1 Each, Refills: 0      traMADoL (Ultram) 50 mg tablet Take 1 Tab by mouth every six (6) hours as needed for Pain for up to 3 days. Max Daily Amount: 200 mg.   Qty: 20 Tab, Refills: 0    Associated Diagnoses: Yeast infection         CONTINUE these medications which have NOT CHANGED    Details   LORazepam (Ativan) 0.5 mg tablet Take 0.5 mg by mouth two (2) times daily as needed for Anxiety. lisinopriL (PRINIVIL, ZESTRIL) 20 mg tablet Take 2 tablets daily. Qty: 180 Tab, Refills: 1    Associated Diagnoses: Essential hypertension with goal blood pressure less than 140/90      hydrocortisone (CORTAID) 0.5 % topical cream Apply  to affected area two (2) times a day. use thin layer  Qty: 30 g, Refills: 0    Associated Diagnoses: Abrasion      bacitracin-polymyxin b (Polysporin) 500-10,000 unit/gram ointment Apply  to affected area two (2) times a day. Qty: 15 g, Refills: 0    Associated Diagnoses: Abrasion      omeprazole (PRILOSEC) 40 mg capsule TAKE 1 CAPSULE BY MOUTH DAILY  Qty: 90 Cap, Refills: 0      simvastatin (ZOCOR) 20 mg tablet TAKE 1 TABLET BY MOUTH EVERY NIGHT  Qty: 90 Tab, Refills: 1    Associated Diagnoses: Hyperlipidemia, unspecified hyperlipidemia type      furosemide (LASIX) 20 mg tablet Take 2 Tabs by mouth two (2) times a day. TAKE 1 TABLET BY MOUTH DAILY  Qty: 30 Tab, Refills: 0    Associated Diagnoses: Essential hypertension with goal blood pressure less than 140/90      QUEtiapine (SEROQUEL) 200 mg tablet TK 1 T PO HS PRF INSOMNIA  Refills: 0      !! DULoxetine (CYMBALTA) 60 mg capsule Take 1 Cap by mouth daily. Qty: 30 Cap, Refills: 1    Associated Diagnoses: GABRIEL (generalized anxiety disorder); MDD (major depressive disorder), recurrent episode, mild ([facility] [address]      ! ! DULoxetine (CYMBALTA) 30 mg capsule Take 1 Cap by mouth daily. Qty: 30 Cap, Refills: 1      potassium chloride SR (KLOR-CON 10) 10 mEq tablet TAKE 2 TABLETS BY MOUTH DAILY  Qty: 180 Tab, Refills: 1    Associated Diagnoses: Essential hypertension with goal blood pressure less than 140/90       ! ! - Potential duplicate medications found. Please discuss with provider.         2.   Follow-up Information     Follow up With Specialties Details Why Contact Info    Kd Grimes MD Family Medicine In 3 days  [address] [facility]  [address] [address]  [phone]        3. Keep the area dry. Return to ED if worse     Diagnosis     Clinical Impression:   1. Yeast infection          Please note that this dictation was completed with [employer], the computer voice recognition software. Quite often unanticipated grammatical, syntax, homophones, and other interpretive errors are inadvertently transcribed by the computer software. Please disregards these errors. Please excuse any errors that have escaped final proofreading. This note will not be viewable in [address]

## 2020-08-20 NOTE — ED NOTES
Patient given printed discharge instructions reviewed by the MD. Patient understands instructions/follow up recommendations. Patient discharged out of ED via wheelchair with daughter.

## 2020-08-21 RX ORDER — NYSTATIN 100000 U/G
CREAM TOPICAL 2 TIMES DAILY
Qty: 15 G | Refills: 0 | Status: SHIPPED | OUTPATIENT
Start: 2020-08-21 | End: 2020-11-06 | Stop reason: SDUPTHER

## 2020-08-21 RX ORDER — NYSTATIN 100000 [USP'U]/G
2 POWDER TOPICAL 2 TIMES DAILY
Qty: 30 G | Refills: 1 | Status: SHIPPED | OUTPATIENT
Start: 2020-08-21 | End: 2020-09-15

## 2020-08-21 NOTE — TELEPHONE ENCOUNTER
Patient states she needs a call back in reference to getting Nystatin Cream & Powder for wound care under leg near vagina on left & right & is in Excruciating pain. Please call to advise what else to do for Wound care. Patient states she was in the [facility] ER on 8/19/20 & was not given good care or treatment at all.   Thank you

## 2020-08-25 ENCOUNTER — HOSPITAL ENCOUNTER (EMERGENCY)
Age: 65
Discharge: HOME | End: 2020-08-25
Attending: EMERGENCY MEDICINE | Admitting: EMERGENCY MEDICINE
Payer: MEDICARE

## 2020-08-25 VITALS
SYSTOLIC BLOOD PRESSURE: 142 MMHG | RESPIRATION RATE: 17 BRPM | HEART RATE: 94 BPM | OXYGEN SATURATION: 94 % | WEIGHT: 293 LBS | BODY MASS INDEX: 55.32 KG/M2 | HEIGHT: 61 IN | DIASTOLIC BLOOD PRESSURE: 66 MMHG | TEMPERATURE: 98.5 F

## 2020-08-25 DIAGNOSIS — E78.5 HYPERLIPIDEMIA, UNSPECIFIED HYPERLIPIDEMIA TYPE: ICD-10-CM

## 2020-08-25 DIAGNOSIS — L30.9 DERMATITIS: Primary | ICD-10-CM

## 2020-08-25 DIAGNOSIS — R52 PAIN: Primary | ICD-10-CM

## 2020-08-25 PROCEDURE — 99284 EMERGENCY DEPT VISIT MOD MDM: CPT

## 2020-08-25 PROCEDURE — 74011250637 HC RX REV CODE- 250/637: Performed by: PHYSICIAN ASSISTANT

## 2020-08-25 RX ORDER — TRAMADOL HYDROCHLORIDE 50 MG/1
50 TABLET ORAL
Qty: 12 TAB | Refills: 0 | Status: SHIPPED | OUTPATIENT
Start: 2020-08-25 | End: 2020-08-28

## 2020-08-25 RX ORDER — DOXYLAMINE SUCCINATE 25 MG
TABLET ORAL ONCE
Status: COMPLETED | OUTPATIENT
Start: 2020-08-25 | End: 2020-08-25

## 2020-08-25 RX ORDER — DOXYLAMINE SUCCINATE 25 MG
TABLET ORAL 2 TIMES DAILY
Qty: 15 G | Refills: 0 | Status: SHIPPED | OUTPATIENT
Start: 2020-08-25 | End: 2020-09-18 | Stop reason: SDUPTHER

## 2020-08-25 RX ORDER — TRAMADOL HYDROCHLORIDE 50 MG/1
50 TABLET ORAL
Qty: 12 TAB | Refills: 0 | Status: SHIPPED | OUTPATIENT
Start: 2020-08-25 | End: 2020-09-18 | Stop reason: SDUPTHER

## 2020-08-25 RX ORDER — SIMVASTATIN 20 MG/1
TABLET, FILM COATED ORAL
Qty: 90 TAB | Refills: 1 | Status: SHIPPED | OUTPATIENT
Start: 2020-08-25 | End: 2021-02-03 | Stop reason: SDUPTHER

## 2020-08-25 RX ORDER — TRAMADOL HYDROCHLORIDE 50 MG/1
50 TABLET ORAL
COMMUNITY
End: 2020-08-25 | Stop reason: SDUPTHER

## 2020-08-25 RX ORDER — TRAMADOL HYDROCHLORIDE 50 MG/1
50 TABLET ORAL
Status: COMPLETED | OUTPATIENT
Start: 2020-08-25 | End: 2020-08-25

## 2020-08-25 RX ORDER — LORAZEPAM 0.5 MG/1
0.5 TABLET ORAL
Status: COMPLETED | OUTPATIENT
Start: 2020-08-25 | End: 2020-08-25

## 2020-08-25 RX ADMIN — TRAMADOL HYDROCHLORIDE 50 MG: 50 TABLET, FILM COATED ORAL at 17:27

## 2020-08-25 RX ADMIN — LORAZEPAM 0.5 MG: 0.5 TABLET ORAL at 18:24

## 2020-08-25 RX ADMIN — MICONAZOLE NITRATE: 20 CREAM TOPICAL at 18:56

## 2020-08-25 NOTE — DISCHARGE INSTRUCTIONS
Patient Education        Yeast Skin Infection: Care Instructions  Your Care Instructions     Yeast normally lives on your skin. Sometimes too much yeast can overgrow in certain areas of the skin and cause an infection. The infection causes red, scaly, moist patches on your skin that may itch. Common areas for skin yeast infections are skin folds under the breasts or belly area. The warm and moist areas in the skin folds can make it easier for yeast to overgrow. Yeast infections also can be found on other parts of the body such as the groin or armpits. You will probably get a cream or ointment that contains an antifungal medicine. Examples of these are miconazole and clotrimazole. You put it on your skin to treat the infection. Your doctor may give you a prescription for the cream or ointment. Or you may be able to buy it without a prescription at most drugstores. If the infection is severe, the doctor will prescribe antifungal pills. A yeast infection usually goes away after about a week of treatment. But it's important to use the medicine for as long as your doctor tells you to. Follow-up care is a key part of your treatment and safety. Be sure to make and go to all appointments, and call your doctor if you are having problems. It's also a good idea to know your test results and keep a list of the medicines you take. How can you care for yourself at home? · Be safe with medicines. Take your medicines exactly as prescribed. Call your doctor if you think you are having a problem with your medicine. · Keep your skin clean and dry. Your doctor may suggest using powder that contains an antifungal medicine in the skin folds. · Wear loose clothing. When should you call for help? Call your doctor now or seek immediate medical care if:  · You have symptoms of infection, such as:  ? Increased pain, swelling, warmth, or redness. ? Red streaks leading from the area. ? Pus draining from the area. ?  A fever. Watch closely for changes in your health, and be sure to contact your doctor if:  · You do not get better as expected. Where can you learn more? Go to http://teddy-merlin.info/  Enter A142 in the search box to learn more about \"Yeast Skin Infection: Care Instructions. \"  Current as of: October 31, 2019               Content Version: 12.5  © 7107-5295 [employer]. Care instructions adapted under license by [employer] (which disclaims liability or warranty for this information). If you have questions about a medical condition or this instruction, always ask your healthcare professional. [address] any warranty or liability for your use of this information.

## 2020-08-25 NOTE — TELEPHONE ENCOUNTER
----- Message from Nick Boothe sent at 8/25/2020 10:35 AM EDT -----  Regarding: Dr. Erica Mejía: [phone] (if not patient): n/a  Relationship of caller (if not patient): n/a  Best contact number(s): [phone]  Name of medication and dosage if known: Tramadol 50 mg  Is patient out of this medication (yes/no): yes  Pharmacy name: [address] listed in chart? (yes/no): yes  Pharmacy phone number: [phone]  Date of last visit: 07/29/20  Details to clarify the request: Pt requesting refill because she is unable to get in to see wound care provider.     Message copied/pasted from [facility]

## 2020-08-25 NOTE — TELEPHONE ENCOUNTER
Caller (if not patient): n/a   Relationship of caller (if not patient): n/a   Best contact number(s): [phone]   Name of medication and dosage if known: Tramadol 50 mg   Is patient out of this medication (yes/no): yes   Pharmacy name: [facility] listed in chart? (yes/no): yes   Pharmacy phone number: [phone]   Date of last visit: 07/29/20   Details to clarify the request: Pt requesting refill because she is unable to get in to see wound care provider. Abdomen soft, non-tender and non-distended, no rebound, no guarding and no masses. no hepatosplenomegaly.

## 2020-08-25 NOTE — ED NOTES
[phone] Patient stated she is having a \"panic attack\" and requested her home medication of lorazepam. Notified [employer] PA.

## 2020-08-25 NOTE — TELEPHONE ENCOUNTER
PCP: Redd Pittman MD    Last appt: Visit date not found  Future Appointments   Date Time Provider Noah Valero   9/3/2020 10:15 AM Nacho Hyman MD [facility]   9/16/2020  2:00 PM Redd Pittman MD [facility] AMB       Requested Prescriptions     Pending Prescriptions Disp Refills    traMADoL (ULTRAM) 50 mg tablet        Sig: Take 1 Tab by mouth every six (6) hours as needed for Pain for up to 3 days. Max Daily Amount: 200 mg. Pt was prescribed Tramadol in the ED. Wants to know if she can a refill.

## 2020-08-25 NOTE — ED NOTES
1915 Patient discharged. Patient provided discharge instructions. Patient stated she did not have any questions or concerns. Patient ambulated to wheel chair. Patient stated her son was on the way and requested to be wheeled out to the waiting room to wait for him.

## 2020-08-25 NOTE — ED PROVIDER NOTES
EMERGENCY DEPARTMENT HISTORY AND PHYSICAL EXAM      Date: 8/25/2020  Patient Name: Rand Edwards    History of Presenting Illness     Chief Complaint   Patient presents with    Wound Check     Pt has bilateral wounds in her inner thighs for the past 3 weeks. Pt has been trying nystatin powder and creams at home but denies any relief. Pt states she is out of her pain medications and was told my doctor she needed to come in to get more pain meds. History Provided By: Patient    HPI: Rand Edwards, 76 y.o. female with PMHx significant for arthritis, bipolar disorder, GERD, hypertension, hyperlipidemia, presents to the ED with cc of rash for 3 weeks. She reports a painful rash between her legs. She cannot see the area so has had difficulty taking care of it. She has been prescribed antibiotics as well as topical nystatin. She thinks she may be having mild improvement with the nystatin but continues to have a lot of pain to the area. She has tried airing out the area and also applying a dressing over it. She was prescribed tramadol at her last visit and says that that worked very well for her pain. She has an appointment to see wound care in 1 week. She denies fevers. There are no other complaints, changes, or physical findings at this time. PCP: Norm Mario MD    No current facility-administered medications on file prior to encounter. Current Outpatient Medications on File Prior to Encounter   Medication Sig Dispense Refill    nystatin (MYCOSTATIN) powder Apply 2 g to affected area two (2) times a day. 30 g 1    nystatin (MYCOSTATIN) topical cream Apply  to affected area two (2) times a day. 15 g 0    LORazepam (Ativan) 0.5 mg tablet Take 0.5 mg by mouth two (2) times daily as needed for Anxiety.  lisinopriL (PRINIVIL, ZESTRIL) 20 mg tablet Take 2 tablets daily. 180 Tab 1    hydrocortisone (CORTAID) 0.5 % topical cream Apply  to affected area two (2) times a day.  use thin layer 30 g 0    omeprazole (PRILOSEC) 40 mg capsule TAKE 1 CAPSULE BY MOUTH DAILY 90 Cap 0    furosemide (LASIX) 20 mg tablet Take 2 Tabs by mouth two (2) times a day. TAKE 1 TABLET BY MOUTH DAILY 30 Tab 0    QUEtiapine (SEROQUEL) 200 mg tablet TK 1 T PO HS PRF INSOMNIA  0    DULoxetine (CYMBALTA) 60 mg capsule Take 1 Cap by mouth daily. 30 Cap 1    DULoxetine (CYMBALTA) 30 mg capsule Take 1 Cap by mouth daily. 30 Cap 1    potassium chloride SR (KLOR-CON 10) 10 mEq tablet TAKE 2 TABLETS BY MOUTH DAILY 180 Tab 1    simvastatin (ZOCOR) 20 mg tablet TAKE 1 TABLET BY MOUTH EVERY NIGHT 90 Tab 1    traMADoL (ULTRAM) 50 mg tablet Take 1 Tab by mouth every six (6) hours as needed for Pain for up to 3 days. Max Daily Amount: 200 mg. 12 Tab 0    Nystatin, Bulk, 1 billion unit powd 1 Dose by Does Not Apply route two (2) times a day. 1 Each 0    bacitracin-polymyxin b (Polysporin) 500-10,000 unit/gram ointment Apply  to affected area two (2) times a day. 15 g 0       Past History     Past Medical History:  Past Medical History:   Diagnosis Date    Arthritis     chronic pain related arthritis    Bipolar 1 disorder (HCC)     GERD (gastroesophageal reflux disease)     Hypertension     Other ill-defined conditions(799.89)     hyperlipidemia.  Stomach abcess    Psychiatric disorder     panic attacks    Sleep apnea     Thromboembolus ([address]     last year L leg    Unspecified sleep apnea        Past Surgical History:  Past Surgical History:   Procedure Laterality Date    HX CHOLECYSTECTOMY      HX GI      gallbladder removed 2/2010    HX HYSTERECTOMY         Family History:  Family History   Problem Relation Age of Onset    Heart Disease Mother     Diabetes Mother     Arthritis-osteo Mother     High Cholesterol Mother     Heart Attack Sister     Diabetes Sister     Heart Disease Sister     Diabetes Sister     Arthritis-osteo Sister     High Cholesterol Sister     Schizophrenia Paternal Grandmother     Drug Abuse Sister        Social History:  Social History     Tobacco Use    Smoking status: Never Smoker    Smokeless tobacco: Never Used   Substance Use Topics    Alcohol use: No    Drug use: No       Allergies:  No Known Allergies      Review of Systems   Review of Systems   Constitutional: Negative for chills and fever. HENT: Negative for ear pain and sore throat. Eyes: Negative for redness and visual disturbance. Respiratory: Negative for cough and shortness of breath. Cardiovascular: Negative for chest pain and palpitations. Gastrointestinal: Negative for abdominal pain, nausea and vomiting. Genitourinary: Negative for dysuria and hematuria. Musculoskeletal: Negative for back pain and gait problem. Skin: Positive for wound. Negative for rash. Neurological: Negative for dizziness and headaches. Psychiatric/Behavioral: Negative for behavioral problems and confusion. All other systems reviewed and are negative. Physical Exam   Physical Exam  Constitutional:       Appearance: She is obese. She is not toxic-appearing. HENT:      Head: Normocephalic and atraumatic. Mouth/Throat:      Mouth: Mucous membranes are moist.   Eyes:      Extraocular Movements: Extraocular movements intact. Pupils: Pupils are equal, round, and reactive to light. Neck:      Musculoskeletal: Normal range of motion and neck supple. Cardiovascular:      Rate and Rhythm: Normal rate and regular rhythm. Pulmonary:      Effort: Pulmonary effort is normal. No respiratory distress. Musculoskeletal: Normal range of motion. General: No deformity. Skin:     General: Skin is warm and dry. Comments: Erythematous rash between the legs with some areas of breakdown. Neurological:      General: No focal deficit present. Mental Status: She is alert and oriented to person, place, and time.    Psychiatric:         Behavior: Behavior normal.           Diagnostic Study Results     Labs - No results found for this or any previous visit (from the past 12 hour(s)). Radiologic Studies -   No orders to display     CT Results  (Last 48 hours)    None        CXR Results  (Last 48 hours)    None            Medical Decision Making   I am the first provider for this patient. I reviewed the vital signs, available nursing notes, past medical history, past surgical history, family history and social history. Vital Signs-Reviewed the patient's vital signs. Patient Vitals for the past 12 hrs:   Temp Pulse Resp BP SpO2   08/25/20 1800    142/66 94 %   08/25/20 1730    143/68 96 %   08/25/20 1700    139/62 94 %   08/25/20 1630    132/63 95 %   08/25/20 1627    143/70    08/25/20 1549 98.5 °F (36.9 °C) 94 17 (!) 175/92 95 %         Records Reviewed: Nursing Notes and Old Medical Records      Provider Notes (Medical Decision Making):   DDx: candida dermatitis, pressure ulcer, cellulitis    Wound appears most consistent with candida. Plan to prescribe miconazole since she has only had mild improvement with nystatin. Discussed wound care instructions. She has follow up at wound care clinic next week. ED Course:   Initial assessment performed. The patients presenting problems have been discussed, and they are in agreement with the care plan formulated and outlined with them. I have encouraged them to ask questions as they arise throughout their visit. Disposition:  5:35 PM    The patient has been re-evaluated and is ready for discharge. Reviewed available results with patient. Counseled patient on diagnosis and care plan. Patient has expressed understanding, and all questions have been answered. Patient agrees with plan and agrees to follow up as recommended, or to return to the ED if their symptoms worsen. Discharge instructions have been provided and explained to the patient, along with reasons to return to the ED. PLAN:  1.    Discharge Medication List as of 8/25/2020 5:51 PM      START taking these medications    Details   miconazole (MICOTIN) 2 % topical cream Apply  to affected area two (2) times a day., Normal, Disp-15 g,R-0         CONTINUE these medications which have CHANGED    Details   !! traMADoL (Ultram) 50 mg tablet Take 1 Tab by mouth every six (6) hours as needed for Pain for up to 3 days. Max Daily Amount: 200 mg., Normal, Disp-12 Tab,R-0      simvastatin (ZOCOR) 20 mg tablet TAKE 1 TABLET BY MOUTH EVERY NIGHT, Normal, Disp-90 Tab,R-1       !! - Potential duplicate medications found. Please discuss with provider. CONTINUE these medications which have NOT CHANGED    Details   nystatin (MYCOSTATIN) powder Apply 2 g to affected area two (2) times a day., Normal, Disp-30 g,R-1      nystatin (MYCOSTATIN) topical cream Apply  to affected area two (2) times a day., Normal, Disp-15 g,R-0      LORazepam (Ativan) 0.5 mg tablet Take 0.5 mg by mouth two (2) times daily as needed for Anxiety. , Historical Med      lisinopriL (PRINIVIL, ZESTRIL) 20 mg tablet Take 2 tablets daily. , Normal, Disp-180 Tab,R-1      hydrocortisone (CORTAID) 0.5 % topical cream Apply  to affected area two (2) times a day. use thin layer, Normal, Disp-30 g,R-0      omeprazole (PRILOSEC) 40 mg capsule TAKE 1 CAPSULE BY MOUTH DAILY, Normal, Disp-90 Cap, R-0      furosemide (LASIX) 20 mg tablet Take 2 Tabs by mouth two (2) times a day. TAKE 1 TABLET BY MOUTH DAILY, Normal, Disp-30 Tab, R-0      QUEtiapine (SEROQUEL) 200 mg tablet TK 1 T PO HS PRF INSOMNIA, Historical Med, R-0      !! DULoxetine (CYMBALTA) 60 mg capsule Take 1 Cap by mouth daily. , Normal, Disp-30 Cap, R-1      !! DULoxetine (CYMBALTA) 30 mg capsule Take 1 Cap by mouth daily. , Normal, Disp-30 Cap, R-1      potassium chloride SR (KLOR-CON 10) 10 mEq tablet TAKE 2 TABLETS BY MOUTH DAILY, Normal, Disp-180 Tab, R-1      !! traMADoL (ULTRAM) 50 mg tablet Take 1 Tab by mouth every six (6) hours as needed for Pain for up to 3 days.  Max Daily Amount: 200 mg., Normal, Disp-12 Tab,R-0      Nystatin, Bulk, 1 billion unit powd 1 Dose by Does Not Apply route two (2) times a day., Normal, Disp-1 Each,R-0      bacitracin-polymyxin b (Polysporin) 500-10,000 unit/gram ointment Apply  to affected area two (2) times a day., Normal, Disp-15 g,R-0       !! - Potential duplicate medications found. Please discuss with provider. 2.   Follow-up Information     Follow up With Specialties Details Why Contact Info    [facility] OP WOUND CARE Wound Care Go on 9/3/2020 as scheduled for further evaluation [address]  [phone]    [facility] EMERGENCY DEPT Emergency Medicine Go to If symptoms worsen [address] [place] [address]  [phone]        Return to ED if worse     Diagnosis     Clinical Impression:   1. Dermatitis            [facility].  [facility]

## 2020-09-03 ENCOUNTER — HOSPITAL ENCOUNTER (OUTPATIENT)
Dept: WOUND CARE | Age: 65
Discharge: HOME | End: 2020-09-03
Admitting: SURGERY
Payer: MEDICAID

## 2020-09-03 VITALS
TEMPERATURE: 96.9 F | RESPIRATION RATE: 21 BRPM | HEART RATE: 108 BPM | SYSTOLIC BLOOD PRESSURE: 142 MMHG | DIASTOLIC BLOOD PRESSURE: 79 MMHG

## 2020-09-03 DIAGNOSIS — B36.9 CUTANEOUS FUNGAL INFECTION: ICD-10-CM

## 2020-09-03 DIAGNOSIS — L97.122 NON-PRESSURE CHRONIC ULCER OF LEFT THIGH WITH FAT LAYER EXPOSED (HCC): ICD-10-CM

## 2020-09-03 DIAGNOSIS — E66.01 OBESITY, MORBID (HCC): ICD-10-CM

## 2020-09-03 DIAGNOSIS — L97.112 NON-PRESSURE CHRONIC ULCER OF RIGHT THIGH WITH FAT LAYER EXPOSED (HCC): ICD-10-CM

## 2020-09-03 PROCEDURE — 99212 OFFICE O/P EST SF 10 MIN: CPT

## 2020-09-03 PROCEDURE — 99213 OFFICE O/P EST LOW 20 MIN: CPT | Performed by: SURGERY

## 2020-09-03 RX ORDER — DOXYLAMINE SUCCINATE 25 MG
TABLET ORAL 2 TIMES DAILY
Qty: 42 G | Refills: 1 | Status: SHIPPED | OUTPATIENT
Start: 2020-09-03 | End: 2020-11-06 | Stop reason: ALTCHOICE

## 2020-09-03 NOTE — WOUND CARE
09/03/20 1031 Wound Thigh Left;Medial # 1 Date First Assessed/Time First Assessed: 08/07/20 0859   Present on Hospital Admission: Yes  Wound Approximate Age at First Assessment (Weeks): 2 weeks  Primary Wound Type: Friction  Location: Thigh  Wound Location Orientation: Left;Medial  Wound Desc. .. Dressing Type Open to air Wound Length (cm) 4.5 cm Wound Width (cm) 4.6 cm Wound Depth (cm) 0.1 cm Wound Surface Area (cm^2) 20.7 cm^2 Wound Volume (cm^3) 2.07 cm^3 Change in Wound Size % -25.45 Condition of Base Pink Condition of Edges Open Drainage Amount Scant Drainage Color Serosanguinous Wound Odor None Veda-wound Assessment Excoriated Cleansing and Cleansing Agents  Normal saline Wound Thigh Right;Medial # 2 Date First Assessed/Time First Assessed: 08/07/20 0901   Present on Hospital Admission: Yes  Wound Approximate Age at First Assessment (Weeks): 2 weeks  Primary Wound Type: Friction  Location: Thigh  Wound Location Orientation: Right;Medial  Wound Steve. .. Dressing Type Open to air Wound Length (cm) 12 cm Wound Width (cm) 5 cm Wound Depth (cm) 0.1 cm Wound Surface Area (cm^2) 60 cm^2 Wound Volume (cm^3) 6 cm^3 Change in Wound Size % -25 Condition of Base Pink Condition of Edges Open Drainage Amount Scant Drainage Color Serosanguinous Wound Odor None Veda-wound Assessment Excoriated Cleansing and Cleansing Agents  Normal saline Visit Vitals /79 (BP 1 Location: Right arm, BP Patient Position: At rest) Pulse (!) 108 Comment: taken manually; No chest pains nor palpitations Temp 96.9 °F (36.1 °C) Resp 21

## 2020-09-03 NOTE — DISCHARGE INSTRUCTIONS
Discharge Instructions/Wound [facility]  [address] [facility] [place] [address] [facility]  Telephone: [phone]    NAME:  Juan Torres  YOB: 1955  MEDICAL RECORD NUMBER:  [id number]  DATE:  9/3/2020      WOUND CARE ORDERS: Cleanse Medial Thigh Wounds with Mild soap and water (or Normal Saline), Thoroughly pat dry. Apply Nystatin Powder to affected areas in clinic today. Prescription for Miconazole Cream to patient's pharmacy. Patient to apply to wounds and periwounds two times a day, & leave open to air. Patient to schedule appointment with Dermatologist ASAP. RTC for MD follow with Dr. Hernesto Guy in 4 weeks. Return Appointment:  [x] Return Appointment: With Dr. Hernesto Guy in [address] [place] [address])  [] Ordered tests:      Electronically signed Madi Carr RN on 9/3/2020 at 11:23 AM     America Garcia 281: Should you experience any significant changes in your wound(s) or have questions about your wound care, please contact the [address] [place] [address] at [address] [place] [address] 8:00 am - 4:30. If you need help with your wound outside these hours and cannot wait until we are again available, contact your PCP or go to the hospital emergency room. PLEASE NOTE: IF YOU ARE UNABLE TO OBTAIN WOUND SUPPLIES, CONTINUE TO USE THE SUPPLIES YOU HAVE AVAILABLE UNTIL YOU ARE ABLE TO REACH US. IT IS MOST IMPORTANT TO KEEP THE WOUND COVERED AT ALL TIMES.      Physician Signature:_______________________    Date: ___________ Time:  ____________

## 2020-09-03 NOTE — PROGRESS NOTES
HISTORY:  The patient is a 76-year-old woman who is referred to wound care center regarding nonhealing wounds on the inner aspect of right and left thighs. The patient reports this has been present for 2-3 weeks. She went initially to an Urgent [facility] and eventually to the [facility] ER. She has received oral antibiotics. Her daughter has been trying to place dressings on the area, but it is difficult to keep them in place. The areas are somewhat painful. She had a small amount of bleeding. She went to the ER twice since last visit. I reviewed ER attending notes. She has used many topical creams and powders for fungus. She reports best results with miconazole cream.     The patient does not have history of diabetes. She does have history of morbid obesity, sleep apnea, panic attacks and bipolar 1 disorder, hypertension, gastroesophageal reflux disease, arthritis. The patient reports that she can walk short distances with a walker, but is limited fairly severely by her arthritis.     She does take a diuretic to help control leg edema.     The patient has no history of cardiac symptoms or MI or coronary intervention. She denies shortness of breath at rest or with exertion. She does take a diuretic, but has been draining a good bit of fluid during the day.     PHYSICAL EXAMINATION:  GENERAL:  The patient is a very obese woman in no acute distress. Last recorded weight was 389 pounds on 07/23/2020 with height 5 feet 1 inch. EXTREMITIES:  Lower extremity examination revealed 2+ right dorsalis pedis and 2+ left posterior tibial pulse. There was 1+ pitting edema both lower legs without ulceration.     Posteromedial aspects of the thighs proximally, where there is adiposity reveals on the left a cluster of the superficial ulcers 4.5 x 4.6 x 0.1 cm in dimension with most areas being partial-thickness skin loss and only very few areas being full thickness.   On the mirror image opposite side right posteromedial proximal thigh, there was a cluster of ulcers 12 x 5 x 0.1 cm in dimension, again with some areas being partial-thickness and only very few areas being full thickness skin loss. There is no induration of the underlying adipose tissue. Tissues appeared wet.     ASSESSMENT AND PLAN:  HISTORY:  The patient is a 57-year-old woman who is referred to wound care center regarding nonhealing wounds on the inner aspect of right and left thighs. The patient reports this has been present for 2-3 weeks. She went initially to an Urgent [facility] and eventually to the [facility] ER. She has received oral antibiotics. Her daughter has been trying to place dressings on the area, but it is difficult to keep them in place. The areas are somewhat painful. She had a small amount of bleeding.     The patient also noted that she has some tenderness on the right posterior heel, but does not think she has a wound there.     The patient does not have history of diabetes. She does have history of morbid obesity, sleep apnea, panic attacks and bipolar 1 disorder, hypertension, gastroesophageal reflux disease, arthritis. The patient reports that she can walk short distances with a walker, but is limited fairly severely by her arthritis.     She does take a diuretic to help control leg edema.     The patient has no history of cardiac symptoms or MI or coronary intervention. She denies shortness of breath at rest or with exertion. She does take a diuretic, but has been draining a good bit of fluid during the day.     PHYSICAL EXAMINATION:  GENERAL:  The patient is a very obese woman in no acute distress. Last recorded weight was 389 pounds on 07/23/2020 with height 5 feet 1 inch. VITAL SIGNS:  Today blood pressure 176/83, pulse 98, respirations 20, temperature 96.9. HEAD AND NECK:  Examination showed no jaundice, mass or thyromegaly. LUNGS:  Clear bilaterally without rales, rhonchi or wheezes.   HEART:  Regular without murmur, gallop or rub. NEUROLOGIC:  The patient is alert and oriented. She moves all extremities equally. Facial movement is symmetrical.  Speech is normal.  EXTREMITIES:  Lower extremity examination revealed 2+ right dorsalis pedis and 2+ left posterior tibial pulse. There was thickening and scaliness of the skin of both heels without any obvious wound. There were some linear folds in the posterior right heel without clear evidence of an actual active fissure. There was 1+ pitting edema both lower legs without ulceration.     Posteromedial aspects of the thighs proximally, where there is adiposity reveals on the left a cluster of the superficial ulcers 3 x 5.5 x 0.1 cm in dimension with most areas being partial-thickness skin loss and only very few areas being full thickness. On the mirror image opposite side right posteromedial proximal thigh, there was a cluster of ulcers 12 x 4 x 0.1 cm in dimension, again with some areas being partial-thickness and only very few areas being full thickness skin loss. There is no induration of the underlying adipose tissue.     ASSESSMENT AND PLAN:  This process appears more a dermatologic problem than a true wound care issue. It looks now more consistent with a fungal infection. I have strongly urged the patient to see a dermatologist as soon as possible for more expert advice. For now, I will re-order the miconazole cream to be applied BID.   She has run out of this cream.    She can see us again in 4 weeks in the [address] [place] [address], but I would favor management of this condition by a dermatologist.     FINAL DIAGNOSIS:  Nonpressure ulcers, right and left proximal posteromedial thighs, morbid obesity, possible fungal infection.          Toby Sullivan MD

## 2020-09-04 ENCOUNTER — TELEPHONE (OUTPATIENT)
Dept: INTERNAL MEDICINE CLINIC | Age: 65
End: 2020-09-04

## 2020-09-04 DIAGNOSIS — L08.9 SKIN INFECTION: Primary | ICD-10-CM

## 2020-09-04 PROBLEM — B36.9 CUTANEOUS FUNGAL INFECTION: Status: ACTIVE | Noted: 2020-09-04

## 2020-09-04 RX ORDER — NALOXONE HYDROCHLORIDE 4 MG/.1ML
SPRAY NASAL
Qty: 1 EACH | Refills: 1 | Status: SHIPPED | OUTPATIENT
Start: 2020-09-04

## 2020-09-04 RX ORDER — TRAMADOL HYDROCHLORIDE 50 MG/1
50 TABLET ORAL
Qty: 30 TAB | Refills: 0 | Status: SHIPPED | OUTPATIENT
Start: 2020-09-04 | End: 2020-09-15

## 2020-09-04 RX ORDER — SULFAMETHOXAZOLE AND TRIMETHOPRIM 800; 160 MG/1; MG/1
1 TABLET ORAL 2 TIMES DAILY
Qty: 20 TAB | Refills: 0 | Status: SHIPPED | OUTPATIENT
Start: 2020-09-04 | End: 2020-09-15

## 2020-09-04 NOTE — TELEPHONE ENCOUNTER
Denys MORGAN [facility] Front Office Pool                 Caller's first and last name and relationship (if not the patient): n/a   Best contact number(s): [phone]   What are the symptoms: Wounds on inner thigh that are very painful   Transfer successful - yes/no (include outcome): no   Transfer declined - yes/no (include reason): n/a   Was caller advised to seek appropriate level of care - yes/no: yes   Details to clarify the request: n/a     Copy//Paste ENVERA

## 2020-09-04 NOTE — TELEPHONE ENCOUNTER
#[phone] pt states she has wounds between her thighs. She states they are so painful. She needs a call back pertaining to this as soon as possible.  Thanks

## 2020-09-05 NOTE — TELEPHONE ENCOUNTER
Patient was called this evening and we discussed the current level of her wounds. She was advised to stop sitting on her backside. I recommended she get a body pillow the on her abdomen and let the legs be apart to reduce moisture on the wounds. Allowing the area to dry and apply medication will help to speed up her healing. She was also advised to use antibacterial soap for bathing and wet wipes after using the restroom. She was encouraged to try to keep the area dry. She is also advised to begin taking the prescribed antibiotic and to use some topical antibiotic ointment on the areas. Today she was given a prescription for tramadol due to her severe pain. I also prescribed Bactrim and she was advised to cut her potassium dose in half to reduce the risk for hyperkalemia.

## 2020-09-08 ENCOUNTER — HOSPITAL ENCOUNTER (INPATIENT)
Age: 65
LOS: 7 days | Discharge: HOME HEALTH | DRG: 175 | End: 2020-09-15
Attending: EMERGENCY MEDICINE | Admitting: INTERNAL MEDICINE
Payer: MEDICARE

## 2020-09-08 ENCOUNTER — APPOINTMENT (OUTPATIENT)
Dept: CT IMAGING | Age: 65
DRG: 175 | End: 2020-09-08
Attending: EMERGENCY MEDICINE
Payer: MEDICARE

## 2020-09-08 ENCOUNTER — APPOINTMENT (OUTPATIENT)
Dept: NUCLEAR MEDICINE | Age: 65
DRG: 175 | End: 2020-09-08
Attending: EMERGENCY MEDICINE
Payer: MEDICARE

## 2020-09-08 ENCOUNTER — APPOINTMENT (OUTPATIENT)
Dept: GENERAL RADIOLOGY | Age: 65
DRG: 175 | End: 2020-09-08
Attending: EMERGENCY MEDICINE
Payer: MEDICARE

## 2020-09-08 DIAGNOSIS — R09.02 HYPOXIA: ICD-10-CM

## 2020-09-08 DIAGNOSIS — N17.9 ACUTE RENAL FAILURE, UNSPECIFIED ACUTE RENAL FAILURE TYPE (HCC): Primary | ICD-10-CM

## 2020-09-08 LAB
ALBUMIN SERPL-MCNC: 3.2 G/DL (ref 3.5–5)
ALBUMIN SERPL-MCNC: 3.4 G/DL (ref 3.5–5)
ALBUMIN/GLOB SERPL: 0.9 {RATIO} (ref 1.1–2.2)
ALBUMIN/GLOB SERPL: 0.9 {RATIO} (ref 1.1–2.2)
ALP SERPL-CCNC: 74 U/L (ref 45–117)
ALP SERPL-CCNC: 82 U/L (ref 45–117)
ALT SERPL-CCNC: 24 U/L (ref 12–78)
ALT SERPL-CCNC: 26 U/L (ref 12–78)
ANION GAP SERPL CALC-SCNC: 5 MMOL/L (ref 5–15)
ANION GAP SERPL CALC-SCNC: 6 MMOL/L (ref 5–15)
APTT PPP: 23.3 SEC (ref 22.1–32)
ARTERIAL PATENCY WRIST A: ABNORMAL
ARTERIAL PATENCY WRIST A: YES
AST SERPL-CCNC: 30 U/L (ref 15–37)
AST SERPL-CCNC: 34 U/L (ref 15–37)
BASE DEFICIT BLD-SCNC: 3 MMOL/L
BASE DEFICIT BLD-SCNC: 5 MMOL/L
BASOPHILS # BLD: 0 K/UL (ref 0–0.1)
BASOPHILS NFR BLD: 0 % (ref 0–1)
BDY SITE: ABNORMAL
BDY SITE: ABNORMAL
BILIRUB SERPL-MCNC: 0.5 MG/DL (ref 0.2–1)
BILIRUB SERPL-MCNC: 0.6 MG/DL (ref 0.2–1)
BNP SERPL-MCNC: 428 PG/ML
BUN SERPL-MCNC: 38 MG/DL (ref 6–20)
BUN SERPL-MCNC: 38 MG/DL (ref 6–20)
BUN/CREAT SERPL: 12 (ref 12–20)
BUN/CREAT SERPL: 13 (ref 12–20)
CA-I BLD-SCNC: 1.26 MMOL/L (ref 1.12–1.32)
CA-I BLD-SCNC: 1.3 MMOL/L (ref 1.12–1.32)
CALCIUM SERPL-MCNC: 8.5 MG/DL (ref 8.5–10.1)
CALCIUM SERPL-MCNC: 8.9 MG/DL (ref 8.5–10.1)
CHLORIDE SERPL-SCNC: 102 MMOL/L (ref 97–108)
CHLORIDE SERPL-SCNC: 104 MMOL/L (ref 97–108)
CK MB CFR SERPL CALC: 2 % (ref 0–2.5)
CK MB SERPL-MCNC: 18.7 NG/ML (ref 5–25)
CK SERPL-CCNC: 930 U/L (ref 26–192)
CO2 SERPL-SCNC: 26 MMOL/L (ref 21–32)
CO2 SERPL-SCNC: 26 MMOL/L (ref 21–32)
CREAT SERPL-MCNC: 2.98 MG/DL (ref 0.55–1.02)
CREAT SERPL-MCNC: 3.1 MG/DL (ref 0.55–1.02)
DIFFERENTIAL METHOD BLD: ABNORMAL
EOSINOPHIL # BLD: 0.3 K/UL (ref 0–0.4)
EOSINOPHIL NFR BLD: 4 % (ref 0–7)
ERYTHROCYTE [DISTWIDTH] IN BLOOD BY AUTOMATED COUNT: 14.4 % (ref 11.5–14.5)
GAS FLOW.O2 O2 DELIVERY SYS: ABNORMAL L/MIN
GAS FLOW.O2 O2 DELIVERY SYS: ABNORMAL L/MIN
GAS FLOW.O2 SETTING OXYMISER: 2 L/M
GLOBULIN SER CALC-MCNC: 3.6 G/DL (ref 2–4)
GLOBULIN SER CALC-MCNC: 3.9 G/DL (ref 2–4)
GLUCOSE SERPL-MCNC: 84 MG/DL (ref 65–100)
GLUCOSE SERPL-MCNC: 86 MG/DL (ref 65–100)
HCO3 BLD-SCNC: 21.9 MMOL/L (ref 22–26)
HCO3 BLD-SCNC: 22.9 MMOL/L (ref 22–26)
HCT VFR BLD AUTO: 35.8 % (ref 35–47)
HGB BLD-MCNC: 11.3 G/DL (ref 11.5–16)
IMM GRANULOCYTES # BLD AUTO: 0 K/UL (ref 0–0.04)
IMM GRANULOCYTES NFR BLD AUTO: 0 % (ref 0–0.5)
LYMPHOCYTES # BLD: 1.8 K/UL (ref 0.8–3.5)
LYMPHOCYTES NFR BLD: 24 % (ref 12–49)
MAGNESIUM SERPL-MCNC: 1.9 MG/DL (ref 1.6–2.4)
MCH RBC QN AUTO: 29.4 PG (ref 26–34)
MCHC RBC AUTO-ENTMCNC: 31.6 G/DL (ref 30–36.5)
MCV RBC AUTO: 93 FL (ref 80–99)
MONOCYTES # BLD: 0.8 K/UL (ref 0–1)
MONOCYTES NFR BLD: 11 % (ref 5–13)
NEUTS SEG # BLD: 4.5 K/UL (ref 1.8–8)
NEUTS SEG NFR BLD: 61 % (ref 32–75)
NRBC # BLD: 0 K/UL (ref 0–0.01)
NRBC BLD-RTO: 0 PER 100 WBC
PCO2 BLD: 45.8 MMHG (ref 35–45)
PCO2 BLD: 47.7 MMHG (ref 35–45)
PH BLD: 7.27 [PH] (ref 7.35–7.45)
PH BLD: 7.31 [PH] (ref 7.35–7.45)
PLATELET # BLD AUTO: 177 K/UL (ref 150–400)
PMV BLD AUTO: 11.5 FL (ref 8.9–12.9)
PO2 BLD: 62 MMHG (ref 80–100)
PO2 BLD: 88 MMHG (ref 80–100)
POTASSIUM SERPL-SCNC: 4.3 MMOL/L (ref 3.5–5.1)
POTASSIUM SERPL-SCNC: 4.6 MMOL/L (ref 3.5–5.1)
PROT SERPL-MCNC: 6.8 G/DL (ref 6.4–8.2)
PROT SERPL-MCNC: 7.3 G/DL (ref 6.4–8.2)
RBC # BLD AUTO: 3.85 M/UL (ref 3.8–5.2)
SAO2 % BLD: 88 % (ref 92–97)
SAO2 % BLD: 96 % (ref 92–97)
SODIUM SERPL-SCNC: 134 MMOL/L (ref 136–145)
SODIUM SERPL-SCNC: 135 MMOL/L (ref 136–145)
SPECIMEN TYPE: ABNORMAL
SPECIMEN TYPE: ABNORMAL
THERAPEUTIC RANGE,PTTT: NORMAL SECS (ref 58–77)
TOTAL RESP. RATE, ITRR: 20
TOTAL RESP. RATE, ITRR: 20
TROPONIN I SERPL-MCNC: <0.05 NG/ML
TROPONIN I SERPL-MCNC: <0.05 NG/ML
WBC # BLD AUTO: 7.5 K/UL (ref 3.6–11)

## 2020-09-08 PROCEDURE — 74011250636 HC RX REV CODE- 250/636: Performed by: INTERNAL MEDICINE

## 2020-09-08 PROCEDURE — 82553 CREATINE MB FRACTION: CPT

## 2020-09-08 PROCEDURE — 65660000000 HC RM CCU STEPDOWN

## 2020-09-08 PROCEDURE — 71045 X-RAY EXAM CHEST 1 VIEW: CPT

## 2020-09-08 PROCEDURE — 74011250637 HC RX REV CODE- 250/637: Performed by: EMERGENCY MEDICINE

## 2020-09-08 PROCEDURE — 82803 BLOOD GASES ANY COMBINATION: CPT

## 2020-09-08 PROCEDURE — 85025 COMPLETE CBC W/AUTO DIFF WBC: CPT

## 2020-09-08 PROCEDURE — 80053 COMPREHEN METABOLIC PANEL: CPT

## 2020-09-08 PROCEDURE — 93005 ELECTROCARDIOGRAM TRACING: CPT

## 2020-09-08 PROCEDURE — 74011250636 HC RX REV CODE- 250/636: Performed by: EMERGENCY MEDICINE

## 2020-09-08 PROCEDURE — 84484 ASSAY OF TROPONIN QUANT: CPT

## 2020-09-08 PROCEDURE — 36600 WITHDRAWAL OF ARTERIAL BLOOD: CPT

## 2020-09-08 PROCEDURE — 74011250637 HC RX REV CODE- 250/637: Performed by: INTERNAL MEDICINE

## 2020-09-08 PROCEDURE — 99285 EMERGENCY DEPT VISIT HI MDM: CPT

## 2020-09-08 PROCEDURE — 3E0U33Z INTRODUCTION OF ANTI-INFLAMMATORY INTO JOINTS, PERCUTANEOUS APPROACH: ICD-10-PCS | Performed by: GENERAL ACUTE CARE HOSPITAL

## 2020-09-08 PROCEDURE — 85730 THROMBOPLASTIN TIME PARTIAL: CPT

## 2020-09-08 PROCEDURE — 82550 ASSAY OF CK (CPK): CPT

## 2020-09-08 PROCEDURE — 36415 COLL VENOUS BLD VENIPUNCTURE: CPT

## 2020-09-08 PROCEDURE — 83880 ASSAY OF NATRIURETIC PEPTIDE: CPT

## 2020-09-08 PROCEDURE — 70450 CT HEAD/BRAIN W/O DYE: CPT

## 2020-09-08 PROCEDURE — 83735 ASSAY OF MAGNESIUM: CPT

## 2020-09-08 PROCEDURE — 74011000250 HC RX REV CODE- 250: Performed by: INTERNAL MEDICINE

## 2020-09-08 RX ORDER — ACETAMINOPHEN 650 MG/1
650 SUPPOSITORY RECTAL
Status: DISCONTINUED | OUTPATIENT
Start: 2020-09-08 | End: 2020-09-15 | Stop reason: HOSPADM

## 2020-09-08 RX ORDER — DULOXETIN HYDROCHLORIDE 30 MG/1
60 CAPSULE, DELAYED RELEASE ORAL DAILY
Status: DISCONTINUED | OUTPATIENT
Start: 2020-09-09 | End: 2020-09-15 | Stop reason: HOSPADM

## 2020-09-08 RX ORDER — ATORVASTATIN CALCIUM 10 MG/1
10 TABLET, FILM COATED ORAL
Status: DISCONTINUED | OUTPATIENT
Start: 2020-09-08 | End: 2020-09-15 | Stop reason: HOSPADM

## 2020-09-08 RX ORDER — HEPARIN SODIUM 5000 [USP'U]/ML
10000 INJECTION, SOLUTION INTRAVENOUS; SUBCUTANEOUS ONCE
Status: COMPLETED | OUTPATIENT
Start: 2020-09-08 | End: 2020-09-08

## 2020-09-08 RX ORDER — POLYETHYLENE GLYCOL 3350 17 G/17G
17 POWDER, FOR SOLUTION ORAL DAILY PRN
Status: DISCONTINUED | OUTPATIENT
Start: 2020-09-08 | End: 2020-09-15 | Stop reason: HOSPADM

## 2020-09-08 RX ORDER — PANTOPRAZOLE SODIUM 40 MG/1
40 TABLET, DELAYED RELEASE ORAL
Status: DISCONTINUED | OUTPATIENT
Start: 2020-09-09 | End: 2020-09-15 | Stop reason: HOSPADM

## 2020-09-08 RX ORDER — MECLIZINE HCL 12.5 MG 12.5 MG/1
25 TABLET ORAL
Status: COMPLETED | OUTPATIENT
Start: 2020-09-08 | End: 2020-09-08

## 2020-09-08 RX ORDER — PROMETHAZINE HYDROCHLORIDE 25 MG/1
12.5 TABLET ORAL
Status: DISCONTINUED | OUTPATIENT
Start: 2020-09-08 | End: 2020-09-15 | Stop reason: HOSPADM

## 2020-09-08 RX ORDER — SODIUM CHLORIDE 0.9 % (FLUSH) 0.9 %
5-40 SYRINGE (ML) INJECTION EVERY 8 HOURS
Status: DISCONTINUED | OUTPATIENT
Start: 2020-09-08 | End: 2020-09-15 | Stop reason: HOSPADM

## 2020-09-08 RX ORDER — HEPARIN SODIUM 10000 [USP'U]/100ML
8-36 INJECTION, SOLUTION INTRAVENOUS
Status: DISCONTINUED | OUTPATIENT
Start: 2020-09-08 | End: 2020-09-11

## 2020-09-08 RX ORDER — SODIUM CHLORIDE 0.9 % (FLUSH) 0.9 %
5-40 SYRINGE (ML) INJECTION AS NEEDED
Status: DISCONTINUED | OUTPATIENT
Start: 2020-09-08 | End: 2020-09-15 | Stop reason: HOSPADM

## 2020-09-08 RX ORDER — TRAMADOL HYDROCHLORIDE 50 MG/1
50 TABLET ORAL
Status: DISCONTINUED | OUTPATIENT
Start: 2020-09-08 | End: 2020-09-09

## 2020-09-08 RX ORDER — ENOXAPARIN SODIUM 100 MG/ML
30 INJECTION SUBCUTANEOUS DAILY
Status: DISCONTINUED | OUTPATIENT
Start: 2020-09-09 | End: 2020-09-08 | Stop reason: ALTCHOICE

## 2020-09-08 RX ORDER — MICONAZOLE NITRATE 20 MG/G
CREAM TOPICAL 2 TIMES DAILY
Status: DISCONTINUED | OUTPATIENT
Start: 2020-09-09 | End: 2020-09-14

## 2020-09-08 RX ORDER — SODIUM CHLORIDE 9 MG/ML
75 INJECTION, SOLUTION INTRAVENOUS CONTINUOUS
Status: DISCONTINUED | OUTPATIENT
Start: 2020-09-08 | End: 2020-09-10

## 2020-09-08 RX ORDER — ACETAMINOPHEN 325 MG/1
650 TABLET ORAL
Status: DISCONTINUED | OUTPATIENT
Start: 2020-09-08 | End: 2020-09-15 | Stop reason: HOSPADM

## 2020-09-08 RX ORDER — QUETIAPINE FUMARATE 100 MG/1
200 TABLET, FILM COATED ORAL
Status: DISCONTINUED | OUTPATIENT
Start: 2020-09-09 | End: 2020-09-15 | Stop reason: HOSPADM

## 2020-09-08 RX ORDER — ONDANSETRON 2 MG/ML
4 INJECTION INTRAMUSCULAR; INTRAVENOUS
Status: DISCONTINUED | OUTPATIENT
Start: 2020-09-08 | End: 2020-09-15 | Stop reason: HOSPADM

## 2020-09-08 RX ORDER — DULOXETIN HYDROCHLORIDE 30 MG/1
30 CAPSULE, DELAYED RELEASE ORAL DAILY
Status: DISCONTINUED | OUTPATIENT
Start: 2020-09-09 | End: 2020-09-15 | Stop reason: HOSPADM

## 2020-09-08 RX ORDER — METHOCARBAMOL 750 MG/1
750 TABLET, FILM COATED ORAL
Status: COMPLETED | OUTPATIENT
Start: 2020-09-08 | End: 2020-09-08

## 2020-09-08 RX ORDER — ELECTROLYTES/DEXTROSE
SOLUTION, ORAL ORAL 2 TIMES DAILY
Status: DISCONTINUED | OUTPATIENT
Start: 2020-09-09 | End: 2020-09-15 | Stop reason: HOSPADM

## 2020-09-08 RX ORDER — LORAZEPAM 0.5 MG/1
0.5 TABLET ORAL
Status: DISCONTINUED | OUTPATIENT
Start: 2020-09-08 | End: 2020-09-15 | Stop reason: HOSPADM

## 2020-09-08 RX ADMIN — METHOCARBAMOL TABLETS 750 MG: 750 TABLET, COATED ORAL at 20:23

## 2020-09-08 RX ADMIN — MECLIZINE 25 MG: 12.5 TABLET ORAL at 18:28

## 2020-09-08 RX ADMIN — PROMETHAZINE HYDROCHLORIDE 12.5 MG: 25 TABLET ORAL at 22:43

## 2020-09-08 RX ADMIN — LORAZEPAM 0.5 MG: 0.5 TABLET ORAL at 21:13

## 2020-09-08 RX ADMIN — SODIUM CHLORIDE 500 ML: 900 INJECTION, SOLUTION INTRAVENOUS at 21:09

## 2020-09-08 RX ADMIN — TRAMADOL HYDROCHLORIDE 50 MG: 50 TABLET, FILM COATED ORAL at 22:43

## 2020-09-08 RX ADMIN — POLYMYXIN B SULFATE, BACITRACIN ZINC, NEOMYCIN SULFATE: 5000; 3.5; 4 OINTMENT TOPICAL at 22:49

## 2020-09-08 RX ADMIN — HEPARIN SODIUM 8 UNITS/KG/HR: 10000 INJECTION, SOLUTION INTRAVENOUS at 21:45

## 2020-09-08 RX ADMIN — ATORVASTATIN CALCIUM 10 MG: 10 TABLET, FILM COATED ORAL at 21:50

## 2020-09-08 RX ADMIN — SODIUM CHLORIDE 1000 ML: 900 INJECTION, SOLUTION INTRAVENOUS at 20:23

## 2020-09-08 RX ADMIN — HEPARIN SODIUM 10000 UNITS: 5000 INJECTION, SOLUTION INTRAVENOUS; SUBCUTANEOUS at 21:44

## 2020-09-08 RX ADMIN — SODIUM CHLORIDE 125 ML/HR: 900 INJECTION, SOLUTION INTRAVENOUS at 23:46

## 2020-09-08 NOTE — ED NOTES
Bedside and Verbal shift change report given to [facility] (oncoming nurse) by Mckenzie Tolentino (offgoing nurse).  Report included the following information SBAR, KARDEX, RESULTS, PROCEDURES, .

## 2020-09-08 NOTE — ED NOTES
Pt called EMS from her home for sx of intermittent, room-spinning dizziness.   Pt denies chest pain, unusual sob, but does complain of nausea with one episode of vomiting today

## 2020-09-08 NOTE — ED NOTES
Bedside shift change report given to [address] and [address] (oncoming nurse) by Diana Hudson  (offgoing nurse). Report included the following information SBAR, ED Summary, MAR and Recent Results.

## 2020-09-08 NOTE — ED NOTES
Respiratory at bedside at this time for ABG    1938: Radiology called at this time for [facility]    2255: attempted to call report to Gen Surg no answer, this nurse will transport up and attempt bedside report    2308: TRANSFER - OUT REPORT:    Verbal report given to [facility] OF ALEJANDRO, RN (name) on [facility]  being transferred to Gen Surg (unit) for routine progression of care       Report consisted of patients Situation, Background, Assessment and   Recommendations(SBAR). Information from the following report(s) SBAR, Kardex, ED Summary, [facility] and Recent Results was reviewed with the receiving nurse. Lines:   Peripheral IV 09/08/20 Left; Inner Forearm (Active)   Site Assessment Clean, dry, & intact 09/08/20 1826   Phlebitis Assessment 0 09/08/20 1826   Infiltration Assessment 0 09/08/20 1826   Dressing Status Clean, dry, & intact 09/08/20 1826   Dressing Type Transparent 09/08/20 1826   Hub Color/Line Status Pink 09/08/20 1826       Peripheral IV 09/08/20 Right Forearm (Active)   Site Assessment Clean, dry, & intact 09/08/20 2155   Phlebitis Assessment 0 09/08/20 2155   Infiltration Assessment 0 09/08/20 2155   Dressing Status Clean, dry, & intact 09/08/20 2155   Dressing Type Tape;Transparent 09/08/20 2155   Hub Color/Line Status Pink;Flushed;Patent 09/08/20 2155   Alcohol Cap Used No 09/08/20 2155        Opportunity for questions and clarification was provided.       Patient transported with:   Monitor  Registered Nurse

## 2020-09-09 ENCOUNTER — APPOINTMENT (OUTPATIENT)
Dept: CT IMAGING | Age: 65
DRG: 175 | End: 2020-09-09
Attending: INTERNAL MEDICINE
Payer: MEDICARE

## 2020-09-09 ENCOUNTER — APPOINTMENT (OUTPATIENT)
Dept: NON INVASIVE DIAGNOSTICS | Age: 65
DRG: 175 | End: 2020-09-09
Attending: INTERNAL MEDICINE
Payer: MEDICARE

## 2020-09-09 ENCOUNTER — TELEPHONE (OUTPATIENT)
Dept: INTERNAL MEDICINE CLINIC | Age: 65
End: 2020-09-09

## 2020-09-09 ENCOUNTER — APPOINTMENT (OUTPATIENT)
Dept: ULTRASOUND IMAGING | Age: 65
DRG: 175 | End: 2020-09-09
Attending: INTERNAL MEDICINE
Payer: MEDICARE

## 2020-09-09 LAB
ALBUMIN SERPL-MCNC: 3.9 G/DL (ref 3.5–5)
ALBUMIN/GLOB SERPL: 0.9 {RATIO} (ref 1.1–2.2)
ALP SERPL-CCNC: 92 U/L (ref 45–117)
ALT SERPL-CCNC: 34 U/L (ref 12–78)
ANION GAP SERPL CALC-SCNC: 5 MMOL/L (ref 5–15)
APPEARANCE UR: CLEAR
APTT PPP: 48.1 SEC (ref 22.1–32)
APTT PPP: 58.7 SEC (ref 22.1–32)
AST SERPL-CCNC: 62 U/L (ref 15–37)
ATRIAL RATE: 95 BPM
BASOPHILS # BLD: 0 K/UL (ref 0–0.1)
BASOPHILS NFR BLD: 1 % (ref 0–1)
BILIRUB SERPL-MCNC: 0.9 MG/DL (ref 0.2–1)
BILIRUB UR QL: NEGATIVE
BUN SERPL-MCNC: 28 MG/DL (ref 6–20)
BUN/CREAT SERPL: 15 (ref 12–20)
CALCIUM SERPL-MCNC: 9.3 MG/DL (ref 8.5–10.1)
CALCULATED P AXIS, ECG09: 16 DEGREES
CALCULATED R AXIS, ECG10: 41 DEGREES
CALCULATED T AXIS, ECG11: 58 DEGREES
CHLORIDE SERPL-SCNC: 107 MMOL/L (ref 97–108)
CO2 SERPL-SCNC: 24 MMOL/L (ref 21–32)
COLOR UR: NORMAL
CREAT SERPL-MCNC: 1.92 MG/DL (ref 0.55–1.02)
D DIMER PPP FEU-MCNC: 1.02 MG/L FEU (ref 0–0.65)
DIAGNOSIS, 93000: NORMAL
DIFFERENTIAL METHOD BLD: NORMAL
EOSINOPHIL # BLD: 0.4 K/UL (ref 0–0.4)
EOSINOPHIL NFR BLD: 7 % (ref 0–7)
ERYTHROCYTE [DISTWIDTH] IN BLOOD BY AUTOMATED COUNT: 14.3 % (ref 11.5–14.5)
GLOBULIN SER CALC-MCNC: 4.5 G/DL (ref 2–4)
GLUCOSE BLD STRIP.AUTO-MCNC: 102 MG/DL (ref 65–100)
GLUCOSE SERPL-MCNC: 90 MG/DL (ref 65–100)
GLUCOSE UR STRIP.AUTO-MCNC: NEGATIVE MG/DL
HCT VFR BLD AUTO: 38.3 % (ref 35–47)
HGB BLD-MCNC: 11.9 G/DL (ref 11.5–16)
HGB UR QL STRIP: NEGATIVE
IMM GRANULOCYTES # BLD AUTO: 0 K/UL (ref 0–0.04)
IMM GRANULOCYTES NFR BLD AUTO: 0 % (ref 0–0.5)
KETONES UR QL STRIP.AUTO: NEGATIVE MG/DL
LEUKOCYTE ESTERASE UR QL STRIP.AUTO: NEGATIVE
LYMPHOCYTES # BLD: 1.9 K/UL (ref 0.8–3.5)
LYMPHOCYTES NFR BLD: 35 % (ref 12–49)
MCH RBC QN AUTO: 28.8 PG (ref 26–34)
MCHC RBC AUTO-ENTMCNC: 31.1 G/DL (ref 30–36.5)
MCV RBC AUTO: 92.7 FL (ref 80–99)
MONOCYTES # BLD: 0.5 K/UL (ref 0–1)
MONOCYTES NFR BLD: 9 % (ref 5–13)
NEUTS SEG # BLD: 2.6 K/UL (ref 1.8–8)
NEUTS SEG NFR BLD: 48 % (ref 32–75)
NITRITE UR QL STRIP.AUTO: NEGATIVE
NRBC # BLD: 0 K/UL (ref 0–0.01)
NRBC BLD-RTO: 0 PER 100 WBC
P-R INTERVAL, ECG05: 138 MS
PH UR STRIP: 6 [PH] (ref 5–8)
PLATELET # BLD AUTO: 188 K/UL (ref 150–400)
PMV BLD AUTO: 11.2 FL (ref 8.9–12.9)
POTASSIUM SERPL-SCNC: 4.5 MMOL/L (ref 3.5–5.1)
PROT SERPL-MCNC: 8.4 G/DL (ref 6.4–8.2)
PROT UR STRIP-MCNC: NEGATIVE MG/DL
Q-T INTERVAL, ECG07: 348 MS
QRS DURATION, ECG06: 68 MS
QTC CALCULATION (BEZET), ECG08: 437 MS
RBC # BLD AUTO: 4.13 M/UL (ref 3.8–5.2)
SERVICE CMNT-IMP: ABNORMAL
SODIUM SERPL-SCNC: 136 MMOL/L (ref 136–145)
SP GR UR REFRACTOMETRY: 1.01 (ref 1–1.03)
THERAPEUTIC RANGE,PTTT: ABNORMAL SECS (ref 58–77)
THERAPEUTIC RANGE,PTTT: ABNORMAL SECS (ref 58–77)
TROPONIN I SERPL-MCNC: <0.05 NG/ML
UROBILINOGEN UR QL STRIP.AUTO: 0.2 EU/DL (ref 0.2–1)
VENTRICULAR RATE, ECG03: 95 BPM
WBC # BLD AUTO: 5.5 K/UL (ref 3.6–11)

## 2020-09-09 PROCEDURE — 74011250637 HC RX REV CODE- 250/637: Performed by: INTERNAL MEDICINE

## 2020-09-09 PROCEDURE — 71250 CT THORAX DX C-: CPT

## 2020-09-09 PROCEDURE — 65660000000 HC RM CCU STEPDOWN

## 2020-09-09 PROCEDURE — 97530 THERAPEUTIC ACTIVITIES: CPT | Performed by: PHYSICAL THERAPIST

## 2020-09-09 PROCEDURE — 74011250636 HC RX REV CODE- 250/636: Performed by: INTERNAL MEDICINE

## 2020-09-09 PROCEDURE — 80053 COMPREHEN METABOLIC PANEL: CPT

## 2020-09-09 PROCEDURE — 85379 FIBRIN DEGRADATION QUANT: CPT

## 2020-09-09 PROCEDURE — 82962 GLUCOSE BLOOD TEST: CPT

## 2020-09-09 PROCEDURE — 94760 N-INVAS EAR/PLS OXIMETRY 1: CPT

## 2020-09-09 PROCEDURE — 97165 OT EVAL LOW COMPLEX 30 MIN: CPT

## 2020-09-09 PROCEDURE — 97161 PT EVAL LOW COMPLEX 20 MIN: CPT | Performed by: PHYSICAL THERAPIST

## 2020-09-09 PROCEDURE — 81003 URINALYSIS AUTO W/O SCOPE: CPT

## 2020-09-09 PROCEDURE — 36415 COLL VENOUS BLD VENIPUNCTURE: CPT

## 2020-09-09 PROCEDURE — 84484 ASSAY OF TROPONIN QUANT: CPT

## 2020-09-09 PROCEDURE — 77010033678 HC OXYGEN DAILY

## 2020-09-09 PROCEDURE — 85025 COMPLETE CBC W/AUTO DIFF WBC: CPT

## 2020-09-09 PROCEDURE — 74011000250 HC RX REV CODE- 250: Performed by: INTERNAL MEDICINE

## 2020-09-09 PROCEDURE — 74176 CT ABD & PELVIS W/O CONTRAST: CPT

## 2020-09-09 PROCEDURE — 93970 EXTREMITY STUDY: CPT

## 2020-09-09 PROCEDURE — 97530 THERAPEUTIC ACTIVITIES: CPT

## 2020-09-09 PROCEDURE — 85730 THROMBOPLASTIN TIME PARTIAL: CPT

## 2020-09-09 PROCEDURE — 97535 SELF CARE MNGMENT TRAINING: CPT

## 2020-09-09 RX ORDER — LIDOCAINE 4 G/100G
2 PATCH TOPICAL EVERY 24 HOURS
Status: DISCONTINUED | OUTPATIENT
Start: 2020-09-09 | End: 2020-09-15 | Stop reason: HOSPADM

## 2020-09-09 RX ORDER — HEPARIN SODIUM 5000 [USP'U]/ML
5000 INJECTION, SOLUTION INTRAVENOUS; SUBCUTANEOUS AS NEEDED
Status: DISCONTINUED | OUTPATIENT
Start: 2020-09-09 | End: 2020-09-11

## 2020-09-09 RX ORDER — TRAMADOL HYDROCHLORIDE 50 MG/1
25 TABLET ORAL
Status: DISCONTINUED | OUTPATIENT
Start: 2020-09-09 | End: 2020-09-15 | Stop reason: HOSPADM

## 2020-09-09 RX ORDER — HEPARIN SODIUM 5000 [USP'U]/ML
10000 INJECTION, SOLUTION INTRAVENOUS; SUBCUTANEOUS AS NEEDED
Status: DISCONTINUED | OUTPATIENT
Start: 2020-09-09 | End: 2020-09-11

## 2020-09-09 RX ADMIN — HYDROCORTISONE: 1 CREAM TOPICAL at 09:00

## 2020-09-09 RX ADMIN — POLYMYXIN B SULFATE, BACITRACIN ZINC, NEOMYCIN SULFATE: 5000; 3.5; 4 OINTMENT TOPICAL at 17:31

## 2020-09-09 RX ADMIN — POLYMYXIN B SULFATE, BACITRACIN ZINC, NEOMYCIN SULFATE: 5000; 3.5; 4 OINTMENT TOPICAL at 09:00

## 2020-09-09 RX ADMIN — Medication 10 ML: at 06:54

## 2020-09-09 RX ADMIN — ACETAMINOPHEN 650 MG: 325 TABLET ORAL at 15:16

## 2020-09-09 RX ADMIN — LORAZEPAM 0.5 MG: 0.5 TABLET ORAL at 11:03

## 2020-09-09 RX ADMIN — HEPARIN SODIUM 5000 UNITS: 5000 INJECTION INTRAVENOUS; SUBCUTANEOUS at 22:56

## 2020-09-09 RX ADMIN — HYDROCORTISONE: 1 CREAM TOPICAL at 17:30

## 2020-09-09 RX ADMIN — HEPARIN SODIUM 8 UNITS/KG/HR: 10000 INJECTION, SOLUTION INTRAVENOUS at 17:39

## 2020-09-09 RX ADMIN — MICONAZOLE NITRATE: 20 CREAM TOPICAL at 11:04

## 2020-09-09 RX ADMIN — SODIUM CHLORIDE 75 ML/HR: 900 INJECTION, SOLUTION INTRAVENOUS at 20:50

## 2020-09-09 RX ADMIN — DULOXETINE HYDROCHLORIDE 60 MG: 30 CAPSULE, DELAYED RELEASE ORAL at 09:30

## 2020-09-09 RX ADMIN — MICONAZOLE NITRATE: 20 CREAM TOPICAL at 09:00

## 2020-09-09 RX ADMIN — Medication 10 ML: at 17:41

## 2020-09-09 RX ADMIN — Medication 10 ML: at 22:38

## 2020-09-09 RX ADMIN — ATORVASTATIN CALCIUM 10 MG: 10 TABLET, FILM COATED ORAL at 22:38

## 2020-09-09 RX ADMIN — Medication 10 ML: at 05:22

## 2020-09-09 RX ADMIN — SODIUM CHLORIDE 125 ML/HR: 900 INJECTION, SOLUTION INTRAVENOUS at 07:46

## 2020-09-09 RX ADMIN — TRAMADOL HYDROCHLORIDE 25 MG: 50 TABLET, FILM COATED ORAL at 11:03

## 2020-09-09 RX ADMIN — PANTOPRAZOLE SODIUM 40 MG: 40 TABLET, DELAYED RELEASE ORAL at 06:54

## 2020-09-09 RX ADMIN — DULOXETINE HYDROCHLORIDE 30 MG: 30 CAPSULE, DELAYED RELEASE ORAL at 09:30

## 2020-09-09 RX ADMIN — TRAMADOL HYDROCHLORIDE 50 MG: 50 TABLET, FILM COATED ORAL at 05:21

## 2020-09-09 RX ADMIN — QUETIAPINE FUMARATE 200 MG: 100 TABLET ORAL at 22:38

## 2020-09-09 RX ADMIN — QUETIAPINE FUMARATE 200 MG: 100 TABLET ORAL at 00:58

## 2020-09-09 RX ADMIN — TRAMADOL HYDROCHLORIDE 25 MG: 50 TABLET, FILM COATED ORAL at 17:32

## 2020-09-09 NOTE — PROGRESS NOTES
Physician Progress Note      Akiko Luna  CSN #:                  [id number]  :                       1955  ADMIT DATE:       2020 3:07 PM  [address] [place] DATE:  RESPONDING  PROVIDER #:        Aym Morton MD          QUERY TEXT:    Dr Jesse Cerda:    Pt admitted with Hypoxia and has respiratory failure w/ hypoxia documented by Pulmonologist. If possible, please document in progress notes and discharge summary regarding this diagnosis: The medical record reflects the following:  Risk Factors: *65yoF w/ BMI>40, morbid obesity, MURIEL off CPAP, not on home O2  Clinical Indicators: p/w dizziness last several days , MARTINS last 2 days, increasing SOB worse on exertion  In ED, found to be hypoxic with pulse ox in 80s which improved on 2 L of nasal cannula     RR 27 - 21  89% O2 sats  RA  SAT  86% -97 % RR 15 - 28  2LNC RR 28  97% sats  Dyspnea w/ exertion & @ rest  20 1530  --  --RR 25  95 HR  Treatment: supplemental O2  keep sats > 93, w/u PE, pulmonology c/s. Thank you,  Jana Fernandez, RN, [facility] [phone]  Options provided:  -- Acute respiratory failure with hypoxia  -- Chronic respiratory failure with hypoxia  -- Not in respiratory failure with hypoxia  -- Hypoxia only  -- Other - I will add my own diagnosis  -- Disagree - Not applicable / Not valid  -- Disagree - Clinically unable to determine / Unknown  -- Refer to Clinical Documentation Reviewer    PROVIDER RESPONSE TEXT:    This patient is in acute respiratory failure with hypoxia.     Query created by: Nathalia Norman on 2020 3:06 PM      Electronically signed by:  Amy Morton MD 2020 5:54 PM

## 2020-09-09 NOTE — PROGRESS NOTES
General Surgery End of Shift Nursing Note    Bedside shift change report given to Charo Viveros RN (oncoming nurse) by Ariadne Durbin (offgoing nurse). Report included the following information SBAR, Kardex, Intake/Output, MAR and Recent Results. Shift worked:   7p-7a   Summary of shift:    Pt arrived to floor about 2345, heparin rate verified and dual skin completed. Vitals and assessment complete, BP soft upon arrival but has since risen. Pt MEWs of 3, see progress note. Unable to draw labs this shift, left VM for PICC team to draw them this AM. Pt c/o pain in knees/legs medicated x1. Current regimen not alleviating pain adequately per the patient. Pt on tele, running back and forth between NSR and sinus tach. Issues for physician to address:   Pain control for legs     Number times ambulated in hallway past shift: 0    Number of times OOB to chair past shift: 0    Pain Management:  Current medication: Tramadol  Patient states pain is manageable on current pain medication: NO    GI:    Current diet:  DIET RENAL Regular    Tolerating current diet: YES  Passing flatus: YES  Last Bowel Movement: 9/8/20   Appearance: unknown    Patient Safety:    Falls Score: 3  Bed Alarm On? No  Sitter?  No    Felix Vizcarra

## 2020-09-09 NOTE — PROGRESS NOTES
Vitals w/ MEWS Score (last day)     Date/Time MEWS Score Pulse Resp Temp BP Level of Consciousness SpO2    09/09/20 0000    100              09/08/20 2345  3  100  24  97.9 °F (36.6 °C)  98/56  Alert  93 %    09/08/20 2300  3  100  28  97.8 °F (36.6 °C)  92/64  Alert  97 %    09/08/20 2252    (!) 102  21    91/46        09/08/20 2157    99  21    90/59        09/08/20 1915  3  96  23  98.3 °F (36.8 °C)  94/42  Alert  96 %    09/08/20 1901    96  23    97/51        09/08/20 1900    97  22            09/08/20 1802          113/73    92 %    09/08/20 1800              92 %    09/08/20 1700    94  21    126/58    (!) 89 %    09/08/20 1645    96  27    124/54        09/08/20 1630    94  25    111/65    (!) 87 %    09/08/20 1600    94  20    108/61    (!) 86 %    09/08/20 1545    95  24    97/53        09/08/20 15:42:52              92 %    09/08/20 1540    97  15    104/67  Alert  92 %    09/08/20 1530    95  25    104/67    (!) 89 %            MEWs currently 3. Pt stable and resting.  Will continue to monitor

## 2020-09-09 NOTE — PROGRESS NOTES
Problem: Falls - Risk of  Goal: *Absence of Falls  Description: Document Candice Motley Fall Risk and appropriate interventions in the flowsheet.   Outcome: Progressing Towards Goal  Note: Fall Risk Interventions:  Mobility Interventions: Communicate number of staff needed for ambulation/transfer, Patient to call before getting OOB, PT Consult for mobility concerns, Utilize walker, cane, or other assistive device         Medication Interventions: Patient to call before getting OOB, Teach patient to arise slowly    Elimination Interventions: Call light in reach, Patient to call for help with toileting needs, Toileting schedule/hourly rounds, Toilet paper/wipes in reach              Problem: Patient Education: Go to Patient Education Activity  Goal: Patient/Family Education  Outcome: Progressing Towards Goal

## 2020-09-09 NOTE — PROGRESS NOTES
Hospitalist Progress Note    NAME: Brenda Todd   :  1955   MRN:  [id number]       Assessment / Plan:        Acute hypoxemic respiratory with hypoxemia  Possible Acute PE  MURIEL/OHS  Patient presented with sob and hypoxia  Ct chest/ap/p with no acute pathology. Doppler of legs negative for DVT  Patient can't undergo VQ lung scan due to weight > 300 lbs. CTA chest not possible due to renal failure. Continue heparin drip. Pulmonology team is following. Acute renal failure  Patient takes lisinopril and lasix at home  Cr 3.1 on admit. Not controlled. Slowly improving, continue gentle hydration with Iv fluids. Close monitor. Bipolar disorder  Stable. Continue home duloxetine and seroquel    Severe DJD  Significant knee pain. Lidocaine patch to knees ordered. GERD  HTN  Continue home statin, PPI. 40 or above Morbid obesity / Body mass index is 71.8 kg/m². Code status: Full  Prophylaxis: heparin drip  Recommended Disposition: Home w/Family     Subjective:     Chief Complaint / Reason for Physician Visit  \"patient with sob on minimal exertion. No nausea. No vomiting. She reports chronic knee pain bilaterally\". Discussed with RN events overnight. Review of Systems:  Except as documented all systems reviewed and negative. Objective:     VITALS:   Last 24hrs VS reviewed since prior progress note.  Most recent are:  Patient Vitals for the past 24 hrs:   Temp Pulse Resp BP SpO2   20 1443 98.2 °F (36.8 °C) 75 18 120/62 98 %   20 1045 98.6 °F (37 °C) 78 18 115/65 97 %   20 0726 98.4 °F (36.9 °C) 98 18 112/68 94 %   20 0401 98.1 °F (36.7 °C) (!) 105 22 110/66 92 %   20 0400  (!) 105      20 0000  100      20 2345 97.9 °F (36.6 °C) 100 24 98/56 93 %   20 2300 97.8 °F (36.6 °C) 100 28 92/64 97 %   20 2252  (!) 102 21 91/46    20 2157  99 21 90/59    20 1915 98.3 °F (36.8 °C) 96 23 94/42 96 %   20 1901  96 23 97/51    09/08/20 1900  97 22     09/08/20 1802    113/73 92 %   09/08/20 1800     92 %       Intake/Output Summary (Last 24 hours) at 9/9/2020 1754  Last data filed at 9/8/2020 2346  Gross per 24 hour   Intake 27.83 ml   Output    Net 27.83 ml        PHYSICAL EXAM:  General: Very obese. Alert. cooperative, no acute distress    EENT:  Anicteric sclerae. Resp:  CTA bilaterally, no wheezing or rales. No accessory muscle use  CV:  Regular rate. S1S2 present,  No edema  GI:  Soft, Non distended, Non tender.  +Bowel sounds  Neurologic:  Alert and oriented X 3, normal speech,   Psych:   not agitated  Skin:  No rashes. No jaundice    Reviewed most current lab test results and cultures  YES  Reviewed most current radiology test results   YES  Review and summation of old records today    NO  Reviewed patient's current orders and MAR    YES  PMH/SH reviewed - no change compared to H&P  ________________________________________________________________________________  Zaida Heller MD     Procedures: see electronic medical records for all procedures/Xrays and details which were not copied into this note but were reviewed prior to creation of Plan. LABS:  I reviewed today's most current labs and imaging studies.   Pertinent labs include:  Recent Labs     09/09/20  0945 09/08/20  1819   WBC 5.5 7.5   HGB 11.9 11.3*   HCT 38.3 35.8    177     Recent Labs     09/09/20  0945 09/08/20  2104 09/08/20  1819    135* 134*   K 4.5 4.3 4.6    104 102   CO2 24 26 26   GLU 90 84 86   BUN 28* 38* 38*   CREA 1.92* 2.98* 3.10*   CA 9.3 8.5 8.9   MG  --   --  1.9   ALB 3.9 3.2* 3.4*   TBILI 0.9 0.6 0.5   ALT 34 24 26       Signed: Zaida Heller MD

## 2020-09-09 NOTE — PROGRESS NOTES
Problem: Mobility Impaired (Adult and Pediatric)  Goal: *Acute Goals and Plan of Care (Insert Text)  Description: FUNCTIONAL STATUS PRIOR TO ADMISSION: Patient was modified independent using a rolling walker for functional mobility. States she was ambulatory with RW but was having some intermittent knee pain that has just recently gotten worse. Was performing own ADL's and iADLs. HOME SUPPORT PRIOR TO ADMISSION: The patient lived with grandson (30 year old) but did not require assistance. Physical Therapy Goals  Initiated 9/9/2020  1. Patient will move from supine to sit and sit to supine  in bed with minimal assistance within 7 day(s). 2.  Patient will transfer from bed to chair and chair to bed with minimal assistance using the least restrictive device within 7 day(s). 3.  Patient will perform sit to stand with moderate assistance  within 7 day(s). 4.  Patient will ambulate with minimal assistance for 20 feet with the least restrictive device within 7 day(s). Outcome: Progressing Towards Goal   PHYSICAL THERAPY EVALUATION  Patient: Sandoval Corona (76 y.o. female)  Date: 9/9/2020  Primary Diagnosis: Hypoxia [R09.02]  Acute renal failure (ARF) ([facility] [address] [N17.9]        Precautions:   Fall    ASSESSMENT  Based on the objective data described below, the patient presents with decreased functional mobility from baseline level of function. Patient currently limited by body habitus, SOB, LE pain, decreased strength, gait difficulty and decreased activity tolerance. Currently needing maxA for supine to sit and needing maxA to scoot to EOB. Sit to stand with maxA x 2 but once up was able to take a few steps to [facility]. Patient currently below her functional baseline and may need SNF rehab at [place] to progress her to more independent level of function prior to DC home. She has limited support at home.     Other factors to consider for discharge: at risk for falls, below functional baseline, morbid obesity, limited assist at home     Patient will benefit from skilled therapy intervention to address the above noted impairments. PLAN :  Recommendations and Planned Interventions: bed mobility training, transfer training, gait training, therapeutic exercises, neuromuscular re-education, patient and family training/education, and therapeutic activities      Frequency/Duration: Patient will be followed by physical therapy:  4 times a week to address goals. Recommendation for discharge: (in order for the patient to meet his/her long term goals)  Therapy up to 5 days/week in SNF setting        IF patient discharges home will need the following DME: patient owns DME required for discharge         SUBJECTIVE:   Patient stated I also help my grandbaby a few days a week. I used to be a  and I want to make sure she is meeting her milestones    OBJECTIVE DATA SUMMARY:   HISTORY:    Past Medical History:   Diagnosis Date    Arthritis     chronic pain related arthritis    Bipolar 1 disorder ([facility] [address]     GERD (gastroesophageal reflux disease)     Hypertension     Other ill-defined conditions(799.89)     hyperlipidemia.  Stomach abcess    Psychiatric disorder     panic attacks    Sleep apnea     Thromboembolus ([facility] [address]     last year L leg    Unspecified sleep apnea      Past Surgical History:   Procedure Laterality Date    HX CHOLECYSTECTOMY      HX GI      gallbladder removed 2/2010    HX HYSTERECTOMY         Personal factors and/or comorbidities impacting plan of care:     Home Situation  Home Environment: Private residence  # Steps to Enter: 1  One/Two Story Residence: Two story  Living Alone: No  Support Systems: Family member(s)  Patient Expects to be Discharged to[de-identified] Private residence  Current DME Used/Available at Home: Grab bars, Walker, rolling  Tub or Shower Type: Tub/Shower combination    EXAMINATION/PRESENTATION/DECISION MAKING:   Critical Behavior:  Neurologic State: Alert, Appropriate for age  Orientation Level: Oriented X4  Cognition: Appropriate decision making, Appropriate for age attention/concentration, Appropriate safety awareness, Follows commands  Safety/Judgement: Awareness of environment, Insight into deficits, Home safety  Hearing: Auditory  Auditory Impairment: None    Range Of Motion:  AROM: Generally decreased, functional           PROM: Generally decreased, functional           Strength:    Strength: Generally decreased, functional                    Tone & Sensation:   Tone: Normal              Sensation: Intact               Coordination:  Coordination: Generally decreased, functional  Vision:   Corrective Lenses: Glasses  Functional Mobility:  Bed Mobility:  Rolling: Maximum assistance;Assist x2  Supine to Sit: Maximum assistance;Assist x2  Sit to Supine: Maximum assistance;Assist x2  Scooting: Maximum assistance  Transfers:  Sit to Stand: Maximum assistance;Assist x2  Stand to Sit: Maximum assistance;Assist x2                       Balance:   Sitting: Intact; With support  Standing: Impaired; With support  Standing - Static: Good;Fair  Standing - Dynamic : Fair  Ambulation/Gait Training:  Distance (ft): 4 Feet (ft)(steps to [facility])  Assistive Device: Gait belt;Walker, rolling  Ambulation - Level of Assistance: Contact guard assistance;Assist x2     Gait Description (WDL): Exceptions to WDL  Gait Abnormalities: Decreased step clearance        Base of Support: Center of gravity altered; Widened     Speed/Leda: Slow  Step Length: Left shortened;Right shortened          Pain Rating:  Reports pain in B knees but does not rate    Activity Tolerance:   Fair and requires rest breaks  Please refer to the flowsheet for vital signs taken during this treatment.     After treatment patient left in no apparent distress:   Supine in bed, Call bell within reach, and Caregiver / family present    COMMUNICATION/EDUCATION:   The patients plan of care was discussed with: Physical therapist, Occupational therapist, and Registered nurse. Fall prevention education was provided and the patient/caregiver indicated understanding., Patient/family have participated as able in goal setting and plan of care. , and Patient/family agree to work toward stated goals and plan of care.     Thank you for this referral.  Ira Glover, PT, DPT   Time Calculation: 31 mins

## 2020-09-09 NOTE — TELEPHONE ENCOUNTER
----- Message from Naima Ernst sent at 9/8/2020  7:47 PM EDT -----  Regarding: Dr. Dom Goodrich telephone  Caller's first and last name: Mindy Walsh (son)  Reason for call: Lucilla Mcardle states his mother is at [facility] today. He's not sure why she's there, because, he's currently at work. However, he believes it's regarding a wound because she's in wound care section of the hospital. Son would like to discuss pt's condition because he's not sure exactly what's going on but he would like to discuss things he's experiencing with his mother. Callback required yes/no and why: Yes   Best contact number(s): [phone]  Details to clarify the request: pt. has been to the ER 3-4 times within the last three weeks. Caller is not sure if he's on the HIPPA form.

## 2020-09-09 NOTE — PROGRESS NOTES
Nuclear Med - Due patient size 5'1 and 380 lbs the scanning table can't accommodate the VQ study (Perf Only)   No -COVID test    ED Dr. Lamine Jacinto and Radiologist

## 2020-09-09 NOTE — CONSULTS
[facility] Pulmonary Consult Service Note  Pulmonary, Critical Care, and Sleep Medicine    Name: Nneka Mckinney MRN: [id number]   : 1955 Hospital: [facility]   Date: 2020  Admission date: 2020 Hospital Day: 2       Subjective/Interval History:   Seen earlier today on rounds. Pt is unstable and acutely ill. Medical records and data reviewed. Hospital Problems  Date Reviewed: 2020          Codes Class Noted POA    Hypoxia ICD-10-CM: R09.02  ICD-9-CM: 799.02  2020 Unknown        Acute renal failure (ARF) ([facility] [address] ICD-10-CM: N17.9  ICD-9-CM: 584.9  2020 Unknown              IMPRESSION:   1. Acute respiratory failure with Hypoxia? Baseline-at risk for restrictive thoracic ventilatory defect and hypoventilation- asked to see pt for possible PE- now on IV heparin; too large for VQ scan; challenging case  2. Dyspnea- pt aware weight an issue  3. MURIEL off CPAP- last sleep study three yrs ago? 4. Acute renal failure. on Lasix and lisinopril. Precludes IV contrast, CTA chest  5. Bipolar disorder continue with the Seroquel, duloxetine  6. Dyslipidemia continue statins   7. Bilateral thigh ulcers. 8. GERD (gastroesophageal reflux disease)  9. Hypertension  10. Morbid Obesity Body mass index is 71.8 kg/m². pt claims from [employer]  11. Severe DJD  12. Multiorgan dysfunction as outlined above: Pt has one or more acute or chronic illnesses with severe exacerbation with progression or side effects of treatment that poses a threat to life or bodily function  13. Additional workup outlined below  14. Pt is requiring Drug therapy requiring intensive monitoring for toxicity  15. Pt is unstable, unpredictable needing inpatient monitoring; is acutely ill and at high risk of sudden decline and decompensation with severe consequenses and continued end organ dysfunction and failure  16. Prognosis guarded       RECOMMENDATIONS/PLAN:   1. Stat D dimer  2.  Dopplers of legs.   3. IV heparin for now   4. Monitor for any sx to suggest viral infections  5. Supplemental O2 to keep sats > 93%  6. Labs to follow electrolytes, renal function and and blood counts  7. Glucose monitoring and SSI  8. Bronchial hygiene with respiratory therapy techniques, bronchodilators  9. DVT, SUP prophylaxis  10. Pt needs IV fluids with additives and Drug therapy requiring intensive monitoring for toxicity  11. Prescription drug management with home med reconciliation reviewed          [x] High complexity decision making was performed  [x] See my orders for details      Subjective/Initial History:     I was asked by Gilbert Alves MD to see Geovanna Franco  a 76 y.o.   female in consultation for a chief complaint of hypoxia. Possible PE    Excerpts from admission 9/8/2020 or consult notes as follows:     \" Geovanna Franco is a 76 y.o. female with past medical history of hypertension bipolar disorder, depression, history of DVT presents with several days history of dizziness and lightheadedness, no vertigo. As per the patient she has been dizzy for the last several days mostly on exertion and climbing up stairs. Recently the symptoms have got worse. Along with the current symptoms the patient also noted increasing shortness of breath worse on exertion. Denies chest pain positive nausea . no headaches or visual disturbance or weakness of the arms or legs. Patient came to the ER for further evaluation. In the ER the patient was found to be hypoxic with pulse ox in 80s which improved on 2 L of nasal cannula. Also on the labs patient was found to have acute renal failure with creatinine of 3.1. Patient's baseline creatinine is 1.0 in February 2020. VQ scan was ordered which could not be done because of the patient's weight. Patient has currently been started on heparin for a possible diagnosis of PE. At this time patient is comfortable. No other medical concerns at this time.  Patient denies dysuria, diarrhea, vomiting, fever or chills. She also denies tinnitus or headache. She denies rectal bleeding or dark stools. \"    Pt with known DVT history but no details. Known MURIEL but not on CPAP per her sleep doc? No details. I met her two yrs ago when her mother Lencho Negro passed. Not sleeping well and uses Seroquel but reports her weight is higher on this agent. Lives in [place] with upstairs bedroom. Not on home O2. Lives with 31 yo grandson. No known covid contacts. No cough, fever, anosmia. Sx started three weeks ago      No Known Allergies     MAR reviewed and pertinent medications noted or modified as needed     Current Facility-Administered Medications   Medication    neomycin-bacitracin-polymyxin (NEOSPORIN) ointment    DULoxetine (CYMBALTA) capsule 30 mg    DULoxetine (CYMBALTA) capsule 60 mg    hydrocortisone-aloe vera 1 % topical cream    LORazepam (ATIVAN) tablet 0.5 mg    miconazole (SECURA) 2 % extra thick cream    pantoprazole (PROTONIX) tablet 40 mg    QUEtiapine (SEROquel) tablet 200 mg    atorvastatin (LIPITOR) tablet 10 mg    traMADoL (ULTRAM) tablet 50 mg    sodium chloride (NS) flush 5-40 mL    sodium chloride (NS) flush 5-40 mL    acetaminophen (TYLENOL) tablet 650 mg    Or    acetaminophen (TYLENOL) suppository 650 mg    polyethylene glycol (MIRALAX) packet 17 g    promethazine (PHENERGAN) tablet 12.5 mg    Or    ondansetron (ZOFRAN) injection 4 mg    0.9% sodium chloride infusion    heparin (porcine) 25,000 units in 0.45% saline 250 ml infusion      Patient PCP: Za Rodriguez MD  PMH:  has a past medical history of Arthritis, Bipolar 1 disorder ([address] GERD (gastroesophageal reflux disease), Hypertension, Other ill-defined conditions(799.89), Psychiatric disorder, Sleep apnea, Thromboembolus ([address] and Unspecified sleep apnea. PSH:   has a past surgical history that includes hx gi; hx cholecystectomy; and hx hysterectomy.    FHX: family history includes Arthritis-osteo in her mother and sister; Diabetes in her mother, sister, and sister; Drug Abuse in her sister; Heart Attack in her sister; Heart Disease in her mother and sister; High Cholesterol in her mother and sister; Schizophrenia in her paternal grandmother. SHX:  reports that she has never smoked. She has never used smokeless tobacco. She reports that she does not drink alcohol or use drugs. ROS:A comprehensive review of systems was negative except for that written in the HPI. Objective:     Vital Signs: Telemetry:     Intake/Output:   Visit Vitals  /68   Pulse 98   Temp 98.4 °F (36.9 °C)   Resp 18   Ht 5' 1\" (1.549 m)   Wt (!) 172.4 kg (380 lb)   SpO2 94%   BMI 71.80 kg/m²       Temp (24hrs), Av.1 °F (36.7 °C), Min:97.8 °F (36.6 °C), Max:98.4 °F (36.9 °C)        O2 Device: Room air         Wt Readings from Last 4 Encounters:   20 (!) 172.4 kg (380 lb)   20 (!) 170.1 kg (375 lb)   20 (!) 170.1 kg (375 lb)   20 (!) 176.4 kg (389 lb)          Intake/Output Summary (Last 24 hours) at 2020 1003  Last data filed at 2020 2346  Gross per 24 hour   Intake 27.83 ml   Output    Net 27.83 ml       Last shift:      No intake/output data recorded. Last 3 shifts:  1901 -  0700  In: 27.8 [I.V.:27.8]  Out: -        Physical Exam:   General:  female; NC;  HEENT: NCAT, poor dentition, lips and mucosa dry  Eyes: anicteric; conjunctiva clear  Neck: no nodes, no cuff leak, no accessory MM use. Chest: no deformity,   Cardiac: regular rate, rhythm; no murmur  Lungs: distant breath sounds; no wheezes, no rales, no rhonchi  Abd: soft, NT, hypoactive BS, OBESE  Ext: no edema; no joint swelling;  No clubbing  : NO murray, clear urine  Neuro: fluent, follows commands  Psych- no agitation, oriented to person;   Skin: warm, dry, no cyanosis;   Pulses: 1-2+ Bilateral pedal, radial  Capillary: brisk; pale    Labs:    Recent Labs     20  0945 09/08/20 2104 09/08/20 1819   WBC 5.5  --  7.5   HGB 11.9  --  11.3*     --  177   APTT  --  23.3  --      Recent Labs     09/08/20 2104 09/08/20 1819   * 134*   K 4.3 4.6    102   CO2 26 26   GLU 84 86   BUN 38* 38*   CREA 2.98* 3.10*   CA 8.5 8.9   MG  --  1.9   ALB 3.2* 3.4*   ALT 24 26     No results for input(s): PH, PCO2, PO2, HCO3, FIO2 in the last 72 hours. Recent Labs     09/08/20 2104 09/08/20 1819   CKNDX  --  2.0   TROIQ <0.05 <0.05     No results found for: BNPP, BNP   Lab Results   Component Value Date/Time    Culture result: ENTEROCOCCUS FAECIUM GROUP D  (PREDOMINATING)   09/09/2015 05:12 PM    Culture result: KLEBSIELLA PNEUMONIAE  (15,000 COLONIES/ML)   09/09/2015 05:12 PM    Culture result: ENTEROCOCCUS FAECIUM GROUP D 07/16/2015 01:05 PM    Culture result: MIXED UROGENITAL JIN ISOLATED 07/16/2015 01:05 PM     Lab Results   Component Value Date/Time    TSH 0.52 09/10/2015 03:53 AM       Imaging:    CXR Results  (Last 48 hours)               09/08/20 1759  XR CHEST PORT Final result    Impression:  IMPRESSION: No acute findings. Narrative:  EXAM: XR CHEST PORT       INDICATION: hypoxia       COMPARISON: 2/19/2020       FINDINGS: A portable AP radiograph of the chest was obtained at 1751 hours. There is no pneumothorax or pleural effusion. No consolidation or pulmonary   edema is shown. Mild cardiac contour enlargement is again noted. No mediastinal   or hilar enlargement is suggested. The bones and soft tissues are grossly   within normal limits. Results from [place] encounter on 09/08/20   CT HEAD WO CONT    Narrative EXAM: CT HEAD WO CONT    INDICATION: dizzy    COMPARISON: CT 3/29/2017. CONTRAST: None. TECHNIQUE: Unenhanced CT of the head was performed using 5 mm images. Brain and  bone windows were generated. Coronal and sagittal reformats.  CT dose reduction  was achieved through use of a standardized protocol tailored for this  examination and automatic exposure control for dose modulation. FINDINGS:  The ventricles and sulci are normal in size, shape and configuration. . There is  no significant white matter disease. There is no intracranial hemorrhage,  extra-axial collection, or mass effect. The basilar cisterns are open. No CT  evidence of acute infarct. The bone windows demonstrate no abnormalities with the exception of periapical  lucency of one of the roots of probably the first left molar. The visualized  portions of the paranasal sinuses and mastoid air cells are clear. Impression IMPRESSION:     1. No acute intracranial findings. 2. Left posterior maxillary periapical lucency, probable periapical abscess.            This care involved high complexity medical decision making: I personally:  · Reviewed the flowsheet and previous days notes  · Reviewed and summarized records or history from previous days note or discussions with staff, family  · High Risk Drug therapy requiring intensive monitoring for toxicity: eg steroids, pressors, antibiotics  · Reviewed and/or ordered Clinical lab tests  · Reviewed images and/or ordered Radiology tests  · discussed my assessment/management with : Nursing, for coordination of care    Darryl Elizondo MD

## 2020-09-09 NOTE — PROGRESS NOTES
Problem: Self Care Deficits Care Plan (Adult)  Goal: *Acute Goals and Plan of Care (Insert Text)  Description:   FUNCTIONAL STATUS PRIOR TO ADMISSION: Pt reports residing in 2 story home with 1 ADEEL, grandson lives with patient, with pt reporting Mod Saxton with ADLs/IADLs at [facility]. Tub-shower combo, no seated surface, grab bars present. HOME SUPPORT: The patient lived with grandson but did not require assist.    Occupational Therapy Goals  Initiated 9/9/2020  1. Patient will perform RW grooming with modified independence within 7 day(s). 2.  Patient will perform EOB/RW lower body dressing with minimal assistance within 7 day(s). 3.  Patient will perform EOB bathing with moderate assistance within 7 day(s). 4.  Patient will perform toilet transfers at [facility] with moderate assistance within 7 day(s). 5.  Patient will perform all aspects of RW toileting with moderate assistance  within 7 day(s). 6.  Patient will utilize energy conservation techniques during functional activities with Min verbal cues within 7 day(s). Outcome: Progressing Towards Goal    OCCUPATIONAL THERAPY EVALUATION  Patient: Brenda Todd (38 y.o. female)  Date: 9/9/2020  Primary Diagnosis: Hypoxia [R09.02]  Acute renal failure (ARF) ([facility] [address] [N17.9]       Precautions:  Fall    ASSESSMENT  Based on the objective data described below, the patient presents with decreased functional mobility/balance, decreased strength/endurance, decreased full body reaching, decreased activity tolerance and high c/o BLE pain, all of which limit pt's ability to complete self-care routine at level congruent with PLOF. Currently, pt is Independent for self-feeding, S/U for inclined grooming, Min A for UB dressing, Max A for bathing and total A for LE dressing and toileting. Pt reports living in 2 story home, ascending 12 interior steps daily and reports Mod Saxton with ADLs/IADLs at .    Pt states her 25 y.o. grandson lives with her and is able to provide limited PRN assist.  Pt's body habitus limits full body reaching at baseline, with pt reporting \"burning\" sensation at wound at groin; nursing aware and following. Pt noted with low activity tolerance during evaluation; however, motivated to participate in therapy. Pt benefits from skilled OT to address functional deficits in an overall attempt at maximizing pt's highest level of safe functional independence prior to discharge from acute hospitalization, with reporting therapist recommending SNF level rehab up discharge. Current Level of Function Impacting Discharge (ADLs/self-care): Independent for self-feeding, S/U for inclined grooming, Min A for UB dressing, Max A for bathing and total A for LE dressing and toileting    Functional Outcome Measure: The patient scored 30/100 on the Barthel Index outcome measure. Other factors to consider for discharge: none     Patient will benefit from skilled therapy intervention to address the above noted impairments. PLAN :  Recommendations and Planned Interventions: self care training, functional mobility training, therapeutic exercise, balance training, therapeutic activities, endurance activities and patient education    Frequency/Duration: Patient will be followed by occupational therapy 5 times a week to address goals. Recommendation for discharge: (in order for the patient to meet his/her long term goals)  Therapy up to 5 days/week in SNF setting    This discharge recommendation:  Has been made in collaboration with the attending provider and/or case management    IF patient discharges home will need the following DME: transfer bench       SUBJECTIVE:   Patient stated My legs just gave out recently.     OBJECTIVE DATA SUMMARY:   HISTORY:   Past Medical History:   Diagnosis Date    Arthritis     chronic pain related arthritis    Bipolar 1 disorder (HCC)     GERD (gastroesophageal reflux disease)     Hypertension     Other ill-defined conditions(799.89)     hyperlipidemia. Stomach abcess    Psychiatric disorder     panic attacks    Sleep apnea     Thromboembolus ([address]     last year L leg    Unspecified sleep apnea      Past Surgical History:   Procedure Laterality Date    HX CHOLECYSTECTOMY      HX GI      gallbladder removed 2/2010    HX HYSTERECTOMY         Expanded or extensive additional review of patient history:     Home Situation  Home Environment: Private residence  # Steps to Enter: 1  One/Two Story Residence: Two story  Living Alone: No  Support Systems: Family member(s)  Patient Expects to be Discharged to[de-identified] Private residence  Current DME Used/Available at Home: Grab bars, Walker, rolling  Tub or Shower Type: Tub/Shower combination    Hand dominance: Right    EXAMINATION OF PERFORMANCE DEFICITS:  Cognitive/Behavioral Status:  Neurologic State: Alert; Appropriate for age  Orientation Level: Oriented X4  Cognition: Appropriate decision making; Appropriate for age attention/concentration; Appropriate safety awareness; Follows commands  Perception: Appears intact  Perseveration: No perseveration noted  Safety/Judgement: Awareness of environment; Insight into deficits;Home safety    Hearing: Auditory  Auditory Impairment: None    Vision/Perceptual:    Corrective Lenses: Glasses    Range of Motion:  AROM: Generally decreased, functional  PROM: Generally decreased, functional    Strength:  Strength: Generally decreased, functional    Coordination:  Coordination: Generally decreased, functional  Fine Motor Skills-Upper: Left Intact; Right Intact    Gross Motor Skills-Upper: Left Intact; Right Intact    Tone & Sensation:  Tone: Normal  Sensation: Intact    Balance:  Sitting: Intact; With support  Standing: Impaired; With support  Standing - Static: Good;Fair  Standing - Dynamic : Fair    Functional Mobility and Transfers for ADLs:  Bed Mobility:  Rolling: Maximum assistance;Assist x2  Supine to Sit: Maximum assistance;Assist x2  Sit to Supine: Maximum assistance;Assist x2  Scooting: Maximum assistance    Transfers:  Sit to Stand: Maximum assistance;Assist x2  Stand to Sit: Maximum assistance;Assist x2    ADL Assessment:  Feeding: Independent    Oral Facial Hygiene/Grooming: Setup    Bathing: Maximum assistance    Upper Body Dressing: Minimum assistance    Lower Body Dressing: Total assistance    Toileting: Total assistance    ADL Intervention and task modifications:  Patient instructed and indicated understanding the benefits of maintaining activity tolerance, functional mobility, and independence with self care tasks during acute stay  to ensure safe return home and to baseline. Encouraged patient to increase frequency and duration OOB, be out of bed for all meals, perform daily ADLs (as approved by RN/MD regarding bathing etc), and performing functional mobility to/from bathroom. Pt educated on safe transfer techniques, with specific emphasis on proper hand placement to push up from seated surface rather than attempt to pull self up, fully positioning self in-front of desired seated location, feeling chair on back of legs and reaching back with 1-2 UE to slowly lower self to seated position. Cognitive Retraining  Safety/Judgement: Awareness of environment; Insight into deficits;Home safety    Functional Measure:  Barthel Index:    Bathin  Bladder: 5  Bowels: 5  Groomin  Dressin  Feeding: 10  Mobility: 0  Stairs: 0  Toilet Use: 0  Transfer (Bed to Chair and Back): 5  Total: 30/100        The Barthel ADL Index: Guidelines  1. The index should be used as a record of what a patient does, not as a record of what a patient could do. 2. The main aim is to establish degree of independence from any help, physical or verbal, however minor and for whatever reason. 3. The need for supervision renders the patient not independent. 4. A patient's performance should be established using the best available evidence.  Asking the patient, friends/relatives and nurses are the usual sources, but direct observation and common sense are also important. However direct testing is not needed. 5. Usually the patient's performance over the preceding 24-48 hours is important, but occasionally longer periods will be relevant. 6. Middle categories imply that the patient supplies over 50 per cent of the effort. 7. Use of aids to be independent is allowed. Unique Ibrahim., Barthel, D.W. ([id number]). Functional evaluation: the Barthel Index. [facility] (14)2. ROYAL Gomez, Renetta Carlos, Doug Gamboa., [facility], [facility] (). Measuring the change indisability after inpatient rehabilitation; comparison of the responsiveness of the Barthel Index and Functional Randolph Measure. Journal of Neurology, Neurosurgery, and Psychiatry, 66(4), 196-942. Tahmina Sigala, NIKITA, NATASHA Gonzalez, & Libia Estrada M.A. (2004.) Assessment of post-stroke quality of life in cost-effectiveness studies: The usefulness of the Barthel Index and the EuroQoL-5D. Quality of Life Research, 15, 519-25     Occupational Therapy Evaluation Charge Determination   History Examination Decision-Making   LOW Complexity : Brief history review  HIGH Complexity : 5 or more performance deficits relating to physical, cognitive , or psychosocial skils that result in activity limitations and / or participation restrictions LOW Complexity : No comorbidities that affect functional and no verbal or physical assistance needed to complete eval tasks       Based on the above components, the patient evaluation is determined to be of the following complexity level: LOW   Pain Ratin/10 in bilateral legs; nursing aware and following    Activity Tolerance:   Fair, Poor, requires frequent rest breaks and observed SOB with activity  Please refer to the flowsheet for vital signs taken during this treatment.     After treatment patient left in no apparent distress:    Supine in bed, Call bell within reach, Bed / chair alarm activated and Side rails x 3    COMMUNICATION/EDUCATION:   The patients plan of care was discussed with: Physical therapist, Registered nurse and Case management. Home safety education was provided and the patient/caregiver indicated understanding., Patient/family have participated as able in goal setting and plan of care. and Patient/family agree to work toward stated goals and plan of care. This patients plan of care is appropriate for delegation to [facility].     Thank you for this referral.  Rochell Runner, OT  Time Calculation: 31 mins

## 2020-09-09 NOTE — WOUND CARE
Wound Care consult for wounds in between the thighs (left and right):  Patient has been seen in the past by the outpatient wound center and has recommended a dermatologist for the patient. To date, she has not gone yet. She was admitted here on 9-8-20 for \"dizziness for a few days as well as difficulty breathing (when she becomes paniced). Patient could not find her call bell this afternoon and started to panic and yelled out, spilling her tea on the floor and she said she was very dizzy. I helped her find the call bell and the phone and clean up the tea. House-keeping was called to mop the floor. Assessment of the medial thigh wounds: Partial thickness pink wound beds with surrounding redness but no fungal rash. Patient has been using an anti-fungal cream for the past 30 days and it is successful so far. Patient is very large (BMI is 71+). Her thighs rub together when she is able to walk, causing trauma to the skin. This is compounded by the increased moisture from the incontinence. She now has a urinary catheter in place. Treatment recommendation while in the hospital:  Continue the antifungal cream but will have the Secura anti-fungal cream ordered because this has a larger % of zinc oxide and it thick - will remain on the skin better). Will protect from moisture. Plan: Patient will most likely benefit from a Hydrocolloid dressing applied to each side of her thigh. This will adhere to the skin and keep it from rubbing together. This can be applied after a few days of the thick antifungal cream. 
Note: We have [employer] Hydrocolloid here - located on the Surgical Telemetry unit supply room 1305 on the top left shelf.   
Love Nichols RN, BSN, [employer]

## 2020-09-09 NOTE — H&P
History & Physical  Shannon Gant MD    Date of admission: 9/8/2020    Patient name: Jovanna Valle  MRN: [id number]  YOB: 1955  Age: 76 y.o. Primary care provider:  Redd Pittman MD     Source of Information: patient, medical records                                  Chief complaint: Dizziness since last several days worse this a.m. Dyspnea on exertion since the last 2 days    History of present illness  Jovanna Valle is a 76 y.o. female with past medical history of hypertension bipolar disorder, depression, history of DVT presents with several days history of dizziness and lightheadedness, no vertigo. As per the patient she has been dizzy for the last several days mostly on exertion and climbing up stairs. Recently the symptoms have got worse. Along with the current symptoms the patient also noted increasing shortness of breath worse on exertion. Denies chest pain positive nausea . no headaches or visual disturbance or weakness of the arms or legs. Patient came to the ER for further evaluation. In the ER the patient was found to be hypoxic with pulse ox in 80s which improved on 2 L of nasal cannula. Also on the labs patient was found to have acute renal failure with creatinine of 3.1. Patient's baseline creatinine is 1.0 in February 2020. VQ scan was ordered which could not be done because of the patient's weight. Patient has currently been started on heparin for a possible diagnosis of PE. At this time patient is comfortable. No other medical concerns at this time    Past Medical History:   Diagnosis Date    Arthritis     chronic pain related arthritis    Bipolar 1 disorder (HCC)     GERD (gastroesophageal reflux disease)     Hypertension     Other ill-defined conditions(799.89)     hyperlipidemia.  Stomach abcess    Psychiatric disorder     panic attacks    Sleep apnea     Thromboembolus ([facility] [address] last year L leg    Unspecified sleep apnea       Past Surgical History:   Procedure Laterality Date    HX CHOLECYSTECTOMY      HX GI      gallbladder removed 2/2010    HX HYSTERECTOMY       Prior to Admission medications    Medication Sig Start Date End Date Taking? Authorizing Provider   traMADoL (ULTRAM) 50 mg tablet Take 1 Tab by mouth every eight (8) hours as needed for Pain for up to 10 days. Max Daily Amount: 150 mg. 9/4/20 9/14/20  Allyssa Kong MD   trimethoprim-sulfamethoxazole (BACTRIM DS, SEPTRA DS) 160-800 mg per tablet Take 1 Tab by mouth two (2) times a day for 10 days. 9/4/20 9/14/20  Allyssa Kong MD   naloxone [facility]) 4 mg/actuation nasal spray Use 1 spray intranasally, then discard. Repeat with new spray every 2 min as needed for opioid overdose symptoms, alternating nostrils. 9/4/20   Allyssa Kong MD   miconazole (MICOTIN) 2 % topical cream Apply  to affected area two (2) times a day. 9/3/20   Justen Stuart MD   simvastatin (ZOCOR) 20 mg tablet TAKE 1 TABLET BY MOUTH EVERY NIGHT 8/25/20   Agatha Street MD   miconazole (MICOTIN) 2 % topical cream Apply  to affected area two (2) times a day. 8/25/20   Aminata Kingston PA   nystatin (MYCOSTATIN) powder Apply 2 g to affected area two (2) times a day. 8/21/20   Allyssa Kong MD   nystatin (MYCOSTATIN) topical cream Apply  to affected area two (2) times a day. 8/21/20   Allyssa Kong MD   Nystatin, Bulk, 1 billion unit powd 1 Dose by Does Not Apply route two (2) times a day. 8/19/20   WAYLON Barrientos   LORazepam (Ativan) 0.5 mg tablet Take 0.5 mg by mouth two (2) times daily as needed for Anxiety. Provider, Historical   lisinopriL (PRINIVIL, ZESTRIL) 20 mg tablet Take 2 tablets daily. 7/17/20   Allyssa Kong MD   hydrocortisone (CORTAID) 0.5 % topical cream Apply  to affected area two (2) times a day.  use thin layer 7/17/20   Allyssa Kong MD   bacitracin-polymyxin b (Polysporin) 500-10,000 unit/gram ointment Apply  to affected area two (2) times a day. 7/17/20   Miranda Stiles MD   omeprazole (PRILOSEC) 40 mg capsule TAKE 1 CAPSULE BY MOUTH DAILY 3/30/20   Miranda Stiles MD   furosemide (LASIX) 20 mg tablet Take 2 Tabs by mouth two (2) times a day. TAKE 1 TABLET BY MOUTH DAILY 2/19/20   Finn Casillas MD   QUEtiapine (SEROQUEL) 200 mg tablet TK 1 T PO HS PRF INSOMNIA 10/9/19   Yue Rodriguez MD   DULoxetine (CYMBALTA) 60 mg capsule Take 1 Cap by mouth daily. 3/6/19   Allocca, Kenna Gorman, NP   DULoxetine (CYMBALTA) 30 mg capsule Take 1 Cap by mouth daily. 3/6/19   Luis Carvajal NP   potassium chloride SR (KLOR-CON 10) 10 mEq tablet TAKE 2 TABLETS BY MOUTH DAILY 1/16/19   Miranda Stiles MD     No Known Allergies   Family History   Problem Relation Age of Onset    Heart Disease Mother     Diabetes Mother     Arthritis-osteo Mother     High Cholesterol Mother     Heart Attack Sister     Diabetes Sister     Heart Disease Sister     Diabetes Sister     Arthritis-osteo Sister     High Cholesterol Sister     Schizophrenia Paternal Grandmother     Drug Abuse Sister          Social history  Patient resides  x  Independently      With family care      Assisted living      SNF    Ambulates  x  Independently      With cane       Assisted walker         Alcohol history   x  None     Social     Chronic   Smoking history    None   x  Former smoker     Current smoker     Denies illicit drug use. Social History     Tobacco Use   Smoking Status Never Smoker   Smokeless Tobacco Never Used       Code status  x  Full code     DNR/DNI        Code status discussed with the patient/caregivers. Full Code    Review of systems  Pertinent items are noted in the History of Present Illness. The remainder of the review of systems was reviewed and is noncontributory.     Physical Examination   Visit Vitals  BP 94/42   Pulse 96   Temp 98.3 °F (36.8 °C)   Resp 23   Ht 5' 1\" (1.549 m)   Wt (!) 172.4 kg (380 lb)   SpO2 96% BMI 71.80 kg/m²          O2 Device: Room air    Gen: awake, NAD, cooperative, pleasant  Head: NCAT  EENT: PERRL, EOMI, MMM, oropharynx benign  Neck: supple, no masses  Lungs: CTAB, no w/r/r  CVS: Regular rhythm, normal rate, S1S2, no m/r/g appreciated  Abd: +BS, soft, NT, ND  Ext: no edema, +pulses  MSK: moves all extremities  Neuro: AOx3, CN II-XII grossly intact, NFD, responds appropriately to questions and commands  Skin: no rashes, lesions,b/l ulcers noted in medial aspect of thigh     Data Review    EK Hour Results:  Recent Results (from the past 24 hour(s))   EKG, 12 LEAD, INITIAL    Collection Time: 20  4:56 PM   Result Value Ref Range    Ventricular Rate 95 BPM    Atrial Rate 95 BPM    P-R Interval 138 ms    QRS Duration 68 ms    Q-T Interval 348 ms    QTC Calculation (Bezet) 437 ms    Calculated P Axis 16 degrees    Calculated R Axis 41 degrees    Calculated T Axis 58 degrees    Diagnosis       Normal sinus rhythm  Nonspecific T wave abnormality  When compared with ECG of 2020 10:53,  No significant change was found     CBC WITH AUTOMATED DIFF    Collection Time: 20  6:19 PM   Result Value Ref Range    WBC 7.5 3.6 - 11.0 K/uL    RBC 3.85 3.80 - 5.20 M/uL    HGB 11.3 (L) 11.5 - 16.0 g/dL    HCT 35.8 35.0 - 47.0 %    MCV 93.0 80.0 - 99.0 FL    MCH 29.4 26.0 - 34.0 PG    MCHC 31.6 30.0 - 36.5 g/dL    RDW 14.4 11.5 - 14.5 %    PLATELET 379 947 - 084 K/uL    MPV 11.5 8.9 - 12.9 FL    NRBC 0.0 0  WBC    ABSOLUTE NRBC 0.00 0.00 - 0.01 K/uL    NEUTROPHILS 61 32 - 75 %    LYMPHOCYTES 24 12 - 49 %    MONOCYTES 11 5 - 13 %    EOSINOPHILS 4 0 - 7 %    BASOPHILS 0 0 - 1 %    IMMATURE GRANULOCYTES 0 0.0 - 0.5 %    ABS. NEUTROPHILS 4.5 1.8 - 8.0 K/UL    ABS. LYMPHOCYTES 1.8 0.8 - 3.5 K/UL    ABS. MONOCYTES 0.8 0.0 - 1.0 K/UL    ABS. EOSINOPHILS 0.3 0.0 - 0.4 K/UL    ABS. BASOPHILS 0.0 0.0 - 0.1 K/UL    ABS. IMM.  GRANS. 0.0 0.00 - 0.04 K/UL    DF AUTOMATED     METABOLIC PANEL, COMPREHENSIVE    Collection Time: 09/08/20  6:19 PM   Result Value Ref Range    Sodium 134 (L) 136 - 145 mmol/L    Potassium 4.6 3.5 - 5.1 mmol/L    Chloride 102 97 - 108 mmol/L    CO2 26 21 - 32 mmol/L    Anion gap 6 5 - 15 mmol/L    Glucose 86 65 - 100 mg/dL    BUN 38 (H) 6 - 20 MG/DL    Creatinine 3.10 (H) 0.55 - 1.02 MG/DL    BUN/Creatinine ratio 12 12 - 20      GFR est AA 18 (L) >60 ml/min/1.73m2    GFR est non-AA 15 (L) >60 ml/min/1.73m2    Calcium 8.9 8.5 - 10.1 MG/DL    Bilirubin, total 0.5 0.2 - 1.0 MG/DL    ALT (SGPT) 26 12 - 78 U/L    AST (SGOT) 30 15 - 37 U/L    Alk. phosphatase 82 45 - 117 U/L    Protein, total 7.3 6.4 - 8.2 g/dL    Albumin 3.4 (L) 3.5 - 5.0 g/dL    Globulin 3.9 2.0 - 4.0 g/dL    A-G Ratio 0.9 (L) 1.1 - 2.2     TROPONIN I    Collection Time: 09/08/20  6:19 PM   Result Value Ref Range    Troponin-I, Qt. <0.05 <0.05 ng/mL   CK W/ REFLX CKMB    Collection Time: 09/08/20  6:19 PM   Result Value Ref Range     (H) 26 - 192 U/L   MAGNESIUM    Collection Time: 09/08/20  6:19 PM   Result Value Ref Range    Magnesium 1.9 1.6 - 2.4 mg/dL   NT-PRO BNP    Collection Time: 09/08/20  6:19 PM   Result Value Ref Range    NT pro- (H) <125 PG/ML   CK-MB,QUANT. Collection Time: 09/08/20  6:19 PM   Result Value Ref Range    CK - MB 18.7 (H) <3.6 NG/ML    CK-MB Index 2.0 0.0 - 2.5     POC EG7    Collection Time: 09/08/20  7:40 PM   Result Value Ref Range    Calcium, ionized (POC) 1.30 1.12 - 1.32 mmol/L    pH (POC) 7.31 (L) 7.35 - 7.45      pCO2 (POC) 45.8 (H) 35.0 - 45.0 MMHG    pO2 (POC) 88 80 - 100 MMHG    HCO3 (POC) 22.9 22 - 26 MMOL/L    Base deficit (POC) 3 mmol/L    sO2 (POC) 96 92 - 97 %    Site RIGHT BRACHIAL      Device: NASAL CANNULA      Flow rate (POC) 2 L/M    Allens test (POC) YES      Specimen type (POC) ARTERIAL      Total resp.  rate 20       Recent Labs     09/08/20  1819   WBC 7.5   HGB 11.3*   HCT 35.8        Recent Labs     09/08/20  1819   *   K 4.6    CO2 26   GLU 86   BUN 38*   CREA 3.10*   CA 8.9   MG 1.9   ALB 3.4*   ALT 26       Imaging  CXR negative     Assessment and Plan   Active Problems:    Hypoxia (9/8/2020) patient needs to be ruled out of pulmonary embolism will start the patient on on heparin drip. pulmonary consult. Reevaluation in the morning. Acute renal failure. Patient is currently on Lasix and lisinopril. Will hold off the meds for now nephrology consult. Also order renal ultrasound. Hydrate the patient. Monitor the renal function      Bipolar disorder continue with the Seroquel, duloxetine  Dyslipidemia continue statins    Bilateral thigh ulcers. Wounds have clean base no signs of cellulitis or necrosis.   Wound consult    MURIEL off CPAP  Activity: as tolerated    Consultations: Nephrology/ pulmonary  Anticipated disposition: TBD       Signed by: Devika Adhikari MD    September 8, 2020 at 8:52 PM

## 2020-09-09 NOTE — ED PROVIDER NOTES
EMERGENCY DEPARTMENT HISTORY AND PHYSICAL EXAM      Date: 9/8/2020  Patient Name: Rand Edwards    History of Presenting Illness     Chief Complaint   Patient presents with    Dizziness     room spinning since this morning       History Provided By: Patient    HPI: Rand Edwards, 76 y.o. female presents to the ED with cc of dizziness. Patient states she has had a spinning sensation since this morning. She has occasional shortness of breath with exertion. She denies chest pain, cough, fever or chills. Patient denies dysuria, diarrhea, vomiting, fever or chills. She also denies tinnitus or headache. She denies rectal bleeding or dark stools. There are no other complaints, changes, or physical findings at this time. PCP: Norm Mario MD    No current facility-administered medications on file prior to encounter. Current Outpatient Medications on File Prior to Encounter   Medication Sig Dispense Refill    traMADoL (ULTRAM) 50 mg tablet Take 1 Tab by mouth every eight (8) hours as needed for Pain for up to 10 days. Max Daily Amount: 150 mg. 30 Tab 0    trimethoprim-sulfamethoxazole (BACTRIM DS, SEPTRA DS) 160-800 mg per tablet Take 1 Tab by mouth two (2) times a day for 10 days. 20 Tab 0    naloxone (NARCAN) 4 mg/actuation nasal spray Use 1 spray intranasally, then discard. Repeat with new spray every 2 min as needed for opioid overdose symptoms, alternating nostrils. 1 Each 1    miconazole (MICOTIN) 2 % topical cream Apply  to affected area two (2) times a day. 42 g 1    simvastatin (ZOCOR) 20 mg tablet TAKE 1 TABLET BY MOUTH EVERY NIGHT 90 Tab 1    miconazole (MICOTIN) 2 % topical cream Apply  to affected area two (2) times a day. 15 g 0    nystatin (MYCOSTATIN) powder Apply 2 g to affected area two (2) times a day. 30 g 1    nystatin (MYCOSTATIN) topical cream Apply  to affected area two (2) times a day.  15 g 0    Nystatin, Bulk, 1 billion unit powd 1 Dose by Does Not Apply route two (2) times a day. 1 Each 0    LORazepam (Ativan) 0.5 mg tablet Take 0.5 mg by mouth two (2) times daily as needed for Anxiety.  lisinopriL (PRINIVIL, ZESTRIL) 20 mg tablet Take 2 tablets daily. 180 Tab 1    hydrocortisone (CORTAID) 0.5 % topical cream Apply  to affected area two (2) times a day. use thin layer 30 g 0    bacitracin-polymyxin b (Polysporin) 500-10,000 unit/gram ointment Apply  to affected area two (2) times a day. 15 g 0    omeprazole (PRILOSEC) 40 mg capsule TAKE 1 CAPSULE BY MOUTH DAILY 90 Cap 0    furosemide (LASIX) 20 mg tablet Take 2 Tabs by mouth two (2) times a day. TAKE 1 TABLET BY MOUTH DAILY 30 Tab 0    QUEtiapine (SEROQUEL) 200 mg tablet TK 1 T PO HS PRF INSOMNIA  0    DULoxetine (CYMBALTA) 60 mg capsule Take 1 Cap by mouth daily. 30 Cap 1    DULoxetine (CYMBALTA) 30 mg capsule Take 1 Cap by mouth daily. 30 Cap 1    potassium chloride SR (KLOR-CON 10) 10 mEq tablet TAKE 2 TABLETS BY MOUTH DAILY 180 Tab 1       Past History     Past Medical History:  Past Medical History:   Diagnosis Date    Arthritis     chronic pain related arthritis    Bipolar 1 disorder (HCC)     GERD (gastroesophageal reflux disease)     Hypertension     Other ill-defined conditions(799.89)     hyperlipidemia.  Stomach abcess    Psychiatric disorder     panic attacks    Sleep apnea     Thromboembolus ([address]     last year L leg    Unspecified sleep apnea        Past Surgical History:  Past Surgical History:   Procedure Laterality Date    HX CHOLECYSTECTOMY      HX GI      gallbladder removed 2/2010    HX HYSTERECTOMY         Family History:  Family History   Problem Relation Age of Onset    Heart Disease Mother     Diabetes Mother     Arthritis-osteo Mother     High Cholesterol Mother     Heart Attack Sister     Diabetes Sister     Heart Disease Sister     Diabetes Sister     Arthritis-osteo Sister     High Cholesterol Sister     Schizophrenia Paternal Grandmother     Drug Abuse Sister Social History:  Social History     Tobacco Use    Smoking status: Never Smoker    Smokeless tobacco: Never Used   Substance Use Topics    Alcohol use: No    Drug use: No       Allergies:  No Known Allergies      Review of Systems   Review of Systems   Constitutional: Negative for fever. HENT: Negative for congestion. Eyes: Negative. Respiratory: Positive for shortness of breath. Cardiovascular: Negative for chest pain. Gastrointestinal: Negative for abdominal pain. Endocrine: Negative for heat intolerance. Genitourinary: Negative. Musculoskeletal: Negative for back pain. Skin: Negative for rash. Allergic/Immunologic: Negative for immunocompromised state. Neurological: Positive for dizziness. Hematological: Does not bruise/bleed easily. Psychiatric/Behavioral: Negative. All other systems reviewed and are negative. Physical Exam   Physical Exam  Vitals signs and nursing note reviewed. Constitutional:       General: She is not in acute distress. Appearance: She is well-developed. She is obese. HENT:      Head: Normocephalic. Eyes:      Extraocular Movements: Extraocular movements intact. Pupils: Pupils are equal, round, and reactive to light. Neck:      Musculoskeletal: Normal range of motion and neck supple. Cardiovascular:      Rate and Rhythm: Normal rate and regular rhythm. Pulses: Normal pulses. Heart sounds: Normal heart sounds. Pulmonary:      Effort: Pulmonary effort is normal.      Breath sounds: Normal breath sounds. Abdominal:      General: Bowel sounds are normal.      Palpations: Abdomen is soft. Tenderness: There is no abdominal tenderness. Musculoskeletal: Normal range of motion. Skin:     General: Skin is warm and dry. Neurological:      General: No focal deficit present. Mental Status: She is alert and oriented to person, place, and time.    Psychiatric:         Mood and Affect: Mood normal. Behavior: Behavior normal.         Diagnostic Study Results     Labs -     Recent Results (from the past 12 hour(s))   EKG, 12 LEAD, INITIAL    Collection Time: 09/08/20  4:56 PM   Result Value Ref Range    Ventricular Rate 95 BPM    Atrial Rate 95 BPM    P-R Interval 138 ms    QRS Duration 68 ms    Q-T Interval 348 ms    QTC Calculation (Bezet) 437 ms    Calculated P Axis 16 degrees    Calculated R Axis 41 degrees    Calculated T Axis 58 degrees    Diagnosis       Normal sinus rhythm  Nonspecific T wave abnormality  When compared with ECG of 19-FEB-2020 10:53,  No significant change was found     CBC WITH AUTOMATED DIFF    Collection Time: 09/08/20  6:19 PM   Result Value Ref Range    WBC 7.5 3.6 - 11.0 K/uL    RBC 3.85 3.80 - 5.20 M/uL    HGB 11.3 (L) 11.5 - 16.0 g/dL    HCT 35.8 35.0 - 47.0 %    MCV 93.0 80.0 - 99.0 FL    MCH 29.4 26.0 - 34.0 PG    MCHC 31.6 30.0 - 36.5 g/dL    RDW 14.4 11.5 - 14.5 %    PLATELET 412 475 - 122 K/uL    MPV 11.5 8.9 - 12.9 FL    NRBC 0.0 0  WBC    ABSOLUTE NRBC 0.00 0.00 - 0.01 K/uL    NEUTROPHILS 61 32 - 75 %    LYMPHOCYTES 24 12 - 49 %    MONOCYTES 11 5 - 13 %    EOSINOPHILS 4 0 - 7 %    BASOPHILS 0 0 - 1 %    IMMATURE GRANULOCYTES 0 0.0 - 0.5 %    ABS. NEUTROPHILS 4.5 1.8 - 8.0 K/UL    ABS. LYMPHOCYTES 1.8 0.8 - 3.5 K/UL    ABS. MONOCYTES 0.8 0.0 - 1.0 K/UL    ABS. EOSINOPHILS 0.3 0.0 - 0.4 K/UL    ABS. BASOPHILS 0.0 0.0 - 0.1 K/UL    ABS. IMM.  GRANS. 0.0 0.00 - 0.04 K/UL    DF AUTOMATED     METABOLIC PANEL, COMPREHENSIVE    Collection Time: 09/08/20  6:19 PM   Result Value Ref Range    Sodium 134 (L) 136 - 145 mmol/L    Potassium 4.6 3.5 - 5.1 mmol/L    Chloride 102 97 - 108 mmol/L    CO2 26 21 - 32 mmol/L    Anion gap 6 5 - 15 mmol/L    Glucose 86 65 - 100 mg/dL    BUN 38 (H) 6 - 20 MG/DL    Creatinine 3.10 (H) 0.55 - 1.02 MG/DL    BUN/Creatinine ratio 12 12 - 20      GFR est AA 18 (L) >60 ml/min/1.73m2    GFR est non-AA 15 (L) >60 ml/min/1.73m2    Calcium 8.9 8.5 - 10.1 MG/DL    Bilirubin, total 0.5 0.2 - 1.0 MG/DL    ALT (SGPT) 26 12 - 78 U/L    AST (SGOT) 30 15 - 37 U/L    Alk. phosphatase 82 45 - 117 U/L    Protein, total 7.3 6.4 - 8.2 g/dL    Albumin 3.4 (L) 3.5 - 5.0 g/dL    Globulin 3.9 2.0 - 4.0 g/dL    A-G Ratio 0.9 (L) 1.1 - 2.2     TROPONIN I    Collection Time: 09/08/20  6:19 PM   Result Value Ref Range    Troponin-I, Qt. <0.05 <0.05 ng/mL   CK W/ REFLX CKMB    Collection Time: 09/08/20  6:19 PM   Result Value Ref Range     (H) 26 - 192 U/L   MAGNESIUM    Collection Time: 09/08/20  6:19 PM   Result Value Ref Range    Magnesium 1.9 1.6 - 2.4 mg/dL   NT-PRO BNP    Collection Time: 09/08/20  6:19 PM   Result Value Ref Range    NT pro- (H) <125 PG/ML   CK-MB,QUANT. Collection Time: 09/08/20  6:19 PM   Result Value Ref Range    CK - MB 18.7 (H) <3.6 NG/ML    CK-MB Index 2.0 0.0 - 2.5     POC EG7    Collection Time: 09/08/20  7:40 PM   Result Value Ref Range    Calcium, ionized (POC) 1.30 1.12 - 1.32 mmol/L    pH (POC) 7.31 (L) 7.35 - 7.45      pCO2 (POC) 45.8 (H) 35.0 - 45.0 MMHG    pO2 (POC) 88 80 - 100 MMHG    HCO3 (POC) 22.9 22 - 26 MMOL/L    Base deficit (POC) 3 mmol/L    sO2 (POC) 96 92 - 97 %    Site RIGHT BRACHIAL      Device: NASAL CANNULA      Flow rate (POC) 2 L/M    Allens test (POC) YES      Specimen type (POC) ARTERIAL      Total resp. rate 20     POC EG7    Collection Time: 09/08/20  8:56 PM   Result Value Ref Range    Calcium, ionized (POC) 1.26 1.12 - 1.32 mmol/L    pH (POC) 7.27 (L) 7.35 - 7.45      pCO2 (POC) 47.7 (H) 35.0 - 45.0 MMHG    pO2 (POC) 62 (L) 80 - 100 MMHG    HCO3 (POC) 21.9 (L) 22 - 26 MMOL/L    Base deficit (POC) 5 mmol/L    sO2 (POC) 88 (L) 92 - 97 %    Site RIGHT BRACHIAL      Device: ROOM AIR      Allens test (POC) N/A      Specimen type (POC) ARTERIAL      Total resp.  rate 20     TROPONIN I    Collection Time: 09/08/20  9:04 PM   Result Value Ref Range    Troponin-I, Qt. <0.05 <1.26 ng/mL   METABOLIC PANEL, COMPREHENSIVE Collection Time: 09/08/20  9:04 PM   Result Value Ref Range    Sodium 135 (L) 136 - 145 mmol/L    Potassium 4.3 3.5 - 5.1 mmol/L    Chloride 104 97 - 108 mmol/L    CO2 26 21 - 32 mmol/L    Anion gap 5 5 - 15 mmol/L    Glucose 84 65 - 100 mg/dL    BUN 38 (H) 6 - 20 MG/DL    Creatinine 2.98 (H) 0.55 - 1.02 MG/DL    BUN/Creatinine ratio 13 12 - 20      GFR est AA 19 (L) >60 ml/min/1.73m2    GFR est non-AA 16 (L) >60 ml/min/1.73m2    Calcium 8.5 8.5 - 10.1 MG/DL    Bilirubin, total 0.6 0.2 - 1.0 MG/DL    ALT (SGPT) 24 12 - 78 U/L    AST (SGOT) 34 15 - 37 U/L    Alk. phosphatase 74 45 - 117 U/L    Protein, total 6.8 6.4 - 8.2 g/dL    Albumin 3.2 (L) 3.5 - 5.0 g/dL    Globulin 3.6 2.0 - 4.0 g/dL    A-G Ratio 0.9 (L) 1.1 - 2.2     PTT    Collection Time: 09/08/20  9:04 PM   Result Value Ref Range    aPTT 23.3 22.1 - 32.0 sec    aPTT, therapeutic range     58.0 - 77.0 SECS       Radiologic Studies -   XR CHEST PORT   Final Result   IMPRESSION: No acute findings. CT HEAD WO CONT   Final Result   IMPRESSION:       1. No acute intracranial findings. 2. Left posterior maxillary periapical lucency, probable periapical abscess. CT Results  (Last 48 hours)               09/08/20 1735  CT HEAD WO CONT Final result    Impression:  IMPRESSION:        1. No acute intracranial findings. 2. Left posterior maxillary periapical lucency, probable periapical abscess. Narrative:  EXAM: CT HEAD WO CONT       INDICATION: dizzy       COMPARISON: CT 3/29/2017. CONTRAST: None. TECHNIQUE: Unenhanced CT of the head was performed using 5 mm images. Brain and   bone windows were generated. Coronal and sagittal reformats. CT dose reduction   was achieved through use of a standardized protocol tailored for this   examination and automatic exposure control for dose modulation. FINDINGS:   The ventricles and sulci are normal in size, shape and configuration. . There is   no significant white matter disease. There is no intracranial hemorrhage,   extra-axial collection, or mass effect. The basilar cisterns are open. No CT   evidence of acute infarct. The bone windows demonstrate no abnormalities with the exception of periapical   lucency of one of the roots of probably the first left molar. The visualized   portions of the paranasal sinuses and mastoid air cells are clear. CXR Results  (Last 48 hours)               09/08/20 1759  XR CHEST PORT Final result    Impression:  IMPRESSION: No acute findings. Narrative:  EXAM: XR CHEST PORT       INDICATION: hypoxia       COMPARISON: 2/19/2020       FINDINGS: A portable AP radiograph of the chest was obtained at 1751 hours. There is no pneumothorax or pleural effusion. No consolidation or pulmonary   edema is shown. Mild cardiac contour enlargement is again noted. No mediastinal   or hilar enlargement is suggested. The bones and soft tissues are grossly   within normal limits. Medical Decision Making   I am the first provider for this patient. I reviewed the vital signs, available nursing notes, past medical history, past surgical history, family history and social history. Vital Signs-Reviewed the patient's vital signs. Patient Vitals for the past 12 hrs:   Temp Pulse Resp BP SpO2   09/08/20 1915 98.3 °F (36.8 °C) 96 23 94/42 96 %   09/08/20 1901  96 23 97/51    09/08/20 1900  97 22     09/08/20 1802    113/73 92 %   09/08/20 1800     92 %   09/08/20 1700  94 21 126/58 (!) 89 %   09/08/20 1645  96 27 124/54    09/08/20 1630  94 25 111/65 (!) 87 %   09/08/20 1600  94 20 108/61 (!) 86 %   09/08/20 1545  95 24 97/53    09/08/20 1542     92 %   09/08/20 1540  97 15 104/67 92 %   09/08/20 1530  95 25 104/67 (!) 89 %       EKG interpretation: (Preliminary)  Rhythm: normal sinus rhythm; and regular .  Rate (approx.): 95; Axis: normal; WI interval: normal; QRS interval: normal ; ST/T wave: non-specific changes; Other findings: unchanged from previous ekg. Records Reviewed: Nursing Notes, Old Medical Records, Previous electrocardiograms, Previous Radiology Studies and Previous Laboratory Studies    Provider Notes (Medical Decision Making):   Vertigo, TIA, CVA, anemia, arrhythmia, CHF, pulmonary embolism, CAD    ED Course:   Initial assessment performed. The patients presenting problems have been discussed, and they are in agreement with the care plan formulated and outlined with them. I have encouraged them to ask questions as they arise throughout their visit. Progress note:    I had ordered a VQ scan for the patient because of her hypoxia, but her weight and girth prohibit V/Q at our facility. I informed Dr. Jason Hernandez, hospitalist.      Consult note:      Patient is being admitted by Dr. Rosa Lopez Time:   CRITICAL CARE NOTE :    9:23 PM    IMPENDING DETERIORATION -Respiratory, Cardiovascular, CNS and Metabolic  ASSOCIATED RISK FACTORS - Hypoxia, Dysrhythmia, Metabolic changes and CNS Decompensation  MANAGEMENT- Bedside Assessment and Supervision of Care  INTERPRETATION -  Xrays, CT Scan, ECG and Blood Pressure  INTERVENTIONS - hemodynamic mngmt and Metobolic interventions  CASE REVIEW - Hospitalist and Nursing  TREATMENT RESPONSE -Unchanged   PERFORMED BY - Self    NOTES   :  I have spent 35 minutes of critical care time involved in lab review, consultations with specialist, family decision- making, bedside attention and documentation. This time excludes time spent in any separate billed procedures. During this entire length of time I was immediately available to the patient . Maegan Wagner MD      Disposition:  admit    DISCHARGE PLAN:  1. Current Discharge Medication List        2. Follow-up Information    None       3. Return to ED if worse     Diagnosis     Clinical Impression:   1. Acute renal failure, unspecified acute renal failure type ([address]    2. Hypoxia        Attestations:    Nubia Curry MD    Please note that this dictation was completed with [employer], the computer voice recognition software. Quite often unanticipated grammatical, syntax, homophones, and other interpretive errors are inadvertently transcribed by the computer software. Please disregard these errors. Please excuse any errors that have escaped final proofreading. Thank you.

## 2020-09-09 NOTE — CONSULTS
[facility] Progress Note        NAME: Adriano Rahman       :  1955       MRN:  [id number]     Date/Time: 2020    Risk of deterioration: low       Assessment:    Plan:  CHANDLER  HX of HTN with lower bp  Resp failure  Obesity  Bipolar  Diverticular disease Creatinine improved to 1.9 off ace/diuretic/bactrim. Has severe diverticulosis on ct without evidence of hydro/stones. UA ordered, awaiting results  Hx of UTIs in past (klebsiella)  ua was bland in -not sending serologies at this point.  (+) fam hx of esrd-mother/sister  Ns at 76 cc/hr     Asked to see for CHANDLER, creatinine up to 3 on admission. Was on bactrim as of 9.5-with ace/diuretic. Feeling better today. Dizziness improved. Chart reviewed. Subjective:     Chief Complaint:  I'm a lot better    Review of Systems: no n/v/cp/f/c (-) dysuria    Objective:     VITALS:   Last 24hrs VS reviewed since prior progress note.  Most recent are:  Visit Vitals  /65   Pulse 78   Temp 98.6 °F (37 °C)   Resp 18   Ht 5' 1\" (1.549 m)   Wt (!) 172.4 kg (380 lb)   SpO2 97%   BMI 71.80 kg/m²     SpO2 Readings from Last 6 Encounters:   20 97%   20 94%   20 96%   20 97%   20 95%   19 96%            Intake/Output Summary (Last 24 hours) at 2020 1130  Last data filed at 2020 2346  Gross per 24 hour   Intake 27.83 ml   Output    Net 27.83 ml        Telemetry Reviewed    PHYSICAL EXAM:    General   well developed, well nourished, appears stated age, in no acute distress  EENT  Normocephalic, Atraumatic, PERRLA, EOMI, sclera clear, nares clear, pharynx normal  Respiratory   Clear To Auscultation bilaterally -poor pt effort  Cardiology  Regular Rate and Rythmn  Abdominal  Soft, non-tender, non-distended,obese, some edema in her pannus  Extremities  No clubbing, cyanosis,   (+) edema/ulcer            Lab Data Reviewed: (see below)    Medications Reviewed: (see below)    PMH/SH reviewed - no change compared to H&P  __________________________________________________    Attending Physician: Shlebie Dee MD     ____________________________________________________  MEDICATIONS:  Current Facility-Administered Medications   Medication Dose Route Frequency    lidocaine 4 % patch 2 Patch  2 Patch TransDERmal Q24H    traMADoL (ULTRAM) tablet 25 mg  25 mg Oral Q12H PRN    neomycin-bacitracin-polymyxin (NEOSPORIN) ointment   Topical BID    DULoxetine (CYMBALTA) capsule 30 mg  30 mg Oral DAILY    DULoxetine (CYMBALTA) capsule 60 mg  60 mg Oral DAILY    hydrocortisone-aloe vera 1 % topical cream   Topical BID    LORazepam (ATIVAN) tablet 0.5 mg  0.5 mg Oral BID PRN    miconazole (SECURA) 2 % extra thick cream   Topical BID    pantoprazole (PROTONIX) tablet 40 mg  40 mg Oral ACB    QUEtiapine (SEROquel) tablet 200 mg  200 mg Oral QHS    atorvastatin (LIPITOR) tablet 10 mg  10 mg Oral QHS    sodium chloride (NS) flush 5-40 mL  5-40 mL IntraVENous Q8H    sodium chloride (NS) flush 5-40 mL  5-40 mL IntraVENous PRN    acetaminophen (TYLENOL) tablet 650 mg  650 mg Oral Q6H PRN    Or    acetaminophen (TYLENOL) suppository 650 mg  650 mg Rectal Q6H PRN    polyethylene glycol (MIRALAX) packet 17 g  17 g Oral DAILY PRN    promethazine (PHENERGAN) tablet 12.5 mg  12.5 mg Oral Q6H PRN    Or    ondansetron (ZOFRAN) injection 4 mg  4 mg IntraVENous Q6H PRN    0.9% sodium chloride infusion  75 mL/hr IntraVENous CONTINUOUS    heparin (porcine) 25,000 units in 0.45% saline 250 ml infusion  8-36 Units/kg/hr IntraVENous TITRATE        LABS:  Recent Labs     09/09/20  0945 09/08/20  1819   WBC 5.5 7.5   HGB 11.9 11.3*   HCT 38.3 35.8    177     Recent Labs     09/09/20 0945 09/08/20 2104 09/08/20  1819    135* 134*   K 4.5 4.3 4.6    104 102   CO2 24 26 26   BUN 28* 38* 38*   CREA 1.92* 2.98* 3.10*   GLU 90 84 86   CA 9.3 8.5 8.9   MG  --   --  1.9     Recent Labs     09/09/20 0945 09/08/20 2104 09/08/20  1819   ALT 34 24 26   AP 92 74 82   TBILI 0.9 0.6 0.5   TP 8.4* 6.8 7.3   ALB 3.9 3.2* 3.4*   GLOB 4.5* 3.6 3.9     Recent Labs     09/08/20 2104   APTT 23.3      No results for input(s): FE, TIBC, PSAT, FERR in the last 72 hours. No results for input(s): PH, PCO2, PO2 in the last 72 hours.   Recent Labs     09/09/20  0945 09/08/20 2104 09/08/20 1819   CKNDX  --   --  2.0   TROIQ <0.05 <0.05 <0.05     Lab Results   Component Value Date/Time    Glucose (POC) 110 (H) 03/29/2017 10:04 AM    Glucose (POC) 117 (H) 08/19/2014 06:32 AM

## 2020-09-10 ENCOUNTER — APPOINTMENT (OUTPATIENT)
Dept: NON INVASIVE DIAGNOSTICS | Age: 65
DRG: 175 | End: 2020-09-10
Attending: INTERNAL MEDICINE
Payer: MEDICARE

## 2020-09-10 ENCOUNTER — APPOINTMENT (OUTPATIENT)
Dept: GENERAL RADIOLOGY | Age: 65
DRG: 175 | End: 2020-09-10
Attending: INTERNAL MEDICINE
Payer: MEDICARE

## 2020-09-10 LAB
ALBUMIN SERPL-MCNC: 3 G/DL (ref 3.5–5)
ALBUMIN/GLOB SERPL: 0.8 {RATIO} (ref 1.1–2.2)
ALP SERPL-CCNC: 77 U/L (ref 45–117)
ALT SERPL-CCNC: 31 U/L (ref 12–78)
ANION GAP SERPL CALC-SCNC: 4 MMOL/L (ref 5–15)
APTT PPP: 111 SEC (ref 22.1–32)
APTT PPP: 33.2 SEC (ref 22.1–32)
APTT PPP: 36.5 SEC (ref 22.1–32)
APTT PPP: 46.5 SEC (ref 22.1–32)
APTT PPP: 56.7 SEC (ref 22.1–32)
AST SERPL-CCNC: 42 U/L (ref 15–37)
BASOPHILS # BLD: 0 K/UL (ref 0–0.1)
BASOPHILS NFR BLD: 1 % (ref 0–1)
BILIRUB SERPL-MCNC: 0.6 MG/DL (ref 0.2–1)
BUN SERPL-MCNC: 17 MG/DL (ref 6–20)
BUN/CREAT SERPL: 16 (ref 12–20)
CALCIUM SERPL-MCNC: 9 MG/DL (ref 8.5–10.1)
CHLORIDE SERPL-SCNC: 109 MMOL/L (ref 97–108)
CO2 SERPL-SCNC: 26 MMOL/L (ref 21–32)
CREAT SERPL-MCNC: 1.09 MG/DL (ref 0.55–1.02)
DIFFERENTIAL METHOD BLD: ABNORMAL
ECHO AV AREA PEAK VELOCITY: 2.57 CM2
ECHO AV AREA/BSA PEAK VELOCITY: 1 CM2/M2
ECHO AV PEAK GRADIENT: 12.64 MMHG
ECHO AV PEAK VELOCITY: 177.75 CM/S
ECHO EST RA PRESSURE: 10 MMHG
ECHO LA TO AORTIC ROOT RATIO: 1.27
ECHO LV E' LATERAL VELOCITY: 11.38 CM/S
ECHO LV E' SEPTAL VELOCITY: 9.77 CM/S
ECHO LV INTERNAL DIMENSION DIASTOLIC: 4.86 CM (ref 3.9–5.3)
ECHO LV INTERNAL DIMENSION SYSTOLIC: 3.17 CM
ECHO LV IVSD: 1.21 CM (ref 0.6–0.9)
ECHO LV MASS 2D: 249.1 G (ref 67–162)
ECHO LV MASS INDEX 2D: 101.6 G/M2 (ref 43–95)
ECHO LV POSTERIOR WALL DIASTOLIC: 1.38 CM (ref 0.6–0.9)
ECHO LVOT DIAM: 2.04 CM
ECHO LVOT PEAK GRADIENT: 7.72 MMHG
ECHO LVOT PEAK VELOCITY: 138.96 CM/S
ECHO MV A VELOCITY: 144.29 CM/S
ECHO MV E DECELERATION TIME (DT): 0.24 S
ECHO MV E VELOCITY: 124.05 CM/S
ECHO MV E/A RATIO: 0.86
ECHO MV E/E' LATERAL: 10.9
ECHO MV E/E' RATIO (AVERAGED): 11.8
ECHO MV E/E' SEPTAL: 12.7
ECHO PV PEAK INSTANTANEOUS GRADIENT SYSTOLIC: 7.18 MMHG
ECHO PV REGURGITANT MAX VELOCITY: 133.94 CM/S
ECHO PV REGURGITANT MAX VELOCITY: 468.55 CM/S
ECHO RIGHT VENTRICULAR SYSTOLIC PRESSURE (RVSP): 47.72 MMHG
ECHO TV REGURGITANT MAX VELOCITY: 307.1 CM/S
ECHO TV REGURGITANT PEAK GRADIENT: 37.72 MMHG
EOSINOPHIL # BLD: 0.4 K/UL (ref 0–0.4)
EOSINOPHIL NFR BLD: 7 % (ref 0–7)
ERYTHROCYTE [DISTWIDTH] IN BLOOD BY AUTOMATED COUNT: 14.1 % (ref 11.5–14.5)
GLOBULIN SER CALC-MCNC: 3.7 G/DL (ref 2–4)
GLUCOSE SERPL-MCNC: 103 MG/DL (ref 65–100)
HCT VFR BLD AUTO: 33.9 % (ref 35–47)
HGB BLD-MCNC: 10.7 G/DL (ref 11.5–16)
IMM GRANULOCYTES # BLD AUTO: 0 K/UL (ref 0–0.04)
IMM GRANULOCYTES NFR BLD AUTO: 0 % (ref 0–0.5)
LYMPHOCYTES # BLD: 3 K/UL (ref 0.8–3.5)
LYMPHOCYTES NFR BLD: 46 % (ref 12–49)
MCH RBC QN AUTO: 28.7 PG (ref 26–34)
MCHC RBC AUTO-ENTMCNC: 31.6 G/DL (ref 30–36.5)
MCV RBC AUTO: 90.9 FL (ref 80–99)
MONOCYTES # BLD: 0.7 K/UL (ref 0–1)
MONOCYTES NFR BLD: 12 % (ref 5–13)
NEUTS SEG # BLD: 2.1 K/UL (ref 1.8–8)
NEUTS SEG NFR BLD: 34 % (ref 32–75)
NRBC # BLD: 0 K/UL (ref 0–0.01)
NRBC BLD-RTO: 0 PER 100 WBC
PHOSPHATE SERPL-MCNC: 2.3 MG/DL (ref 2.6–4.7)
PLATELET # BLD AUTO: 174 K/UL (ref 150–400)
PMV BLD AUTO: 11 FL (ref 8.9–12.9)
POTASSIUM SERPL-SCNC: 4.4 MMOL/L (ref 3.5–5.1)
PROT SERPL-MCNC: 6.7 G/DL (ref 6.4–8.2)
RBC # BLD AUTO: 3.73 M/UL (ref 3.8–5.2)
SODIUM SERPL-SCNC: 139 MMOL/L (ref 136–145)
THERAPEUTIC RANGE,PTTT: ABNORMAL SECS (ref 58–77)
WBC # BLD AUTO: 6.2 K/UL (ref 3.6–11)

## 2020-09-10 PROCEDURE — 74011250636 HC RX REV CODE- 250/636: Performed by: INTERNAL MEDICINE

## 2020-09-10 PROCEDURE — 94760 N-INVAS EAR/PLS OXIMETRY 1: CPT

## 2020-09-10 PROCEDURE — 65660000000 HC RM CCU STEPDOWN

## 2020-09-10 PROCEDURE — 77010033678 HC OXYGEN DAILY

## 2020-09-10 PROCEDURE — 97116 GAIT TRAINING THERAPY: CPT

## 2020-09-10 PROCEDURE — 74011000250 HC RX REV CODE- 250: Performed by: INTERNAL MEDICINE

## 2020-09-10 PROCEDURE — 84100 ASSAY OF PHOSPHORUS: CPT

## 2020-09-10 PROCEDURE — 97530 THERAPEUTIC ACTIVITIES: CPT | Performed by: OCCUPATIONAL THERAPIST

## 2020-09-10 PROCEDURE — 73560 X-RAY EXAM OF KNEE 1 OR 2: CPT

## 2020-09-10 PROCEDURE — 97530 THERAPEUTIC ACTIVITIES: CPT

## 2020-09-10 PROCEDURE — 85730 THROMBOPLASTIN TIME PARTIAL: CPT

## 2020-09-10 PROCEDURE — 93306 TTE W/DOPPLER COMPLETE: CPT

## 2020-09-10 PROCEDURE — 74011250637 HC RX REV CODE- 250/637: Performed by: INTERNAL MEDICINE

## 2020-09-10 PROCEDURE — 85025 COMPLETE CBC W/AUTO DIFF WBC: CPT

## 2020-09-10 PROCEDURE — 74011000258 HC RX REV CODE- 258: Performed by: INTERNAL MEDICINE

## 2020-09-10 PROCEDURE — 36415 COLL VENOUS BLD VENIPUNCTURE: CPT

## 2020-09-10 PROCEDURE — 80053 COMPREHEN METABOLIC PANEL: CPT

## 2020-09-10 PROCEDURE — 97535 SELF CARE MNGMENT TRAINING: CPT | Performed by: OCCUPATIONAL THERAPIST

## 2020-09-10 RX ORDER — SODIUM CHLORIDE 450 MG/100ML
50 INJECTION, SOLUTION INTRAVENOUS CONTINUOUS
Status: DISCONTINUED | OUTPATIENT
Start: 2020-09-10 | End: 2020-09-10

## 2020-09-10 RX ADMIN — HEPARIN SODIUM 7 UNITS/KG/HR: 10000 INJECTION, SOLUTION INTRAVENOUS at 08:43

## 2020-09-10 RX ADMIN — MICONAZOLE NITRATE: 20 CREAM TOPICAL at 18:21

## 2020-09-10 RX ADMIN — ATORVASTATIN CALCIUM 10 MG: 10 TABLET, FILM COATED ORAL at 21:56

## 2020-09-10 RX ADMIN — DULOXETINE HYDROCHLORIDE 60 MG: 30 CAPSULE, DELAYED RELEASE ORAL at 08:37

## 2020-09-10 RX ADMIN — SODIUM CHLORIDE 50 ML/HR: 450 INJECTION, SOLUTION INTRAVENOUS at 08:14

## 2020-09-10 RX ADMIN — DULOXETINE HYDROCHLORIDE 30 MG: 30 CAPSULE, DELAYED RELEASE ORAL at 08:37

## 2020-09-10 RX ADMIN — HEPARIN SODIUM 10 UNITS/KG/HR: 10000 INJECTION, SOLUTION INTRAVENOUS at 16:02

## 2020-09-10 RX ADMIN — LORAZEPAM 0.5 MG: 0.5 TABLET ORAL at 21:56

## 2020-09-10 RX ADMIN — Medication 10 ML: at 06:46

## 2020-09-10 RX ADMIN — ACETAMINOPHEN 650 MG: 325 TABLET ORAL at 21:57

## 2020-09-10 RX ADMIN — POLYMYXIN B SULFATE, BACITRACIN ZINC, NEOMYCIN SULFATE: 5000; 3.5; 4 OINTMENT TOPICAL at 08:12

## 2020-09-10 RX ADMIN — QUETIAPINE FUMARATE 200 MG: 100 TABLET ORAL at 21:56

## 2020-09-10 RX ADMIN — Medication 10 ML: at 16:05

## 2020-09-10 RX ADMIN — TRAMADOL HYDROCHLORIDE 25 MG: 50 TABLET, FILM COATED ORAL at 14:39

## 2020-09-10 RX ADMIN — POLYMYXIN B SULFATE, BACITRACIN ZINC, NEOMYCIN SULFATE: 5000; 3.5; 4 OINTMENT TOPICAL at 18:21

## 2020-09-10 RX ADMIN — HYDROCORTISONE: 1 CREAM TOPICAL at 08:11

## 2020-09-10 RX ADMIN — PANTOPRAZOLE SODIUM 40 MG: 40 TABLET, DELAYED RELEASE ORAL at 07:33

## 2020-09-10 RX ADMIN — Medication 10 ML: at 21:57

## 2020-09-10 RX ADMIN — MICONAZOLE NITRATE: 20 CREAM TOPICAL at 08:10

## 2020-09-10 NOTE — PROGRESS NOTES
Problem: Falls - Risk of  Goal: *Absence of Falls  Description: Document Bindu Madison Fall Risk and appropriate interventions in the flowsheet.   Outcome: Progressing Towards Goal  Note: Fall Risk Interventions:  Mobility Interventions: Communicate number of staff needed for ambulation/transfer, Patient to call before getting OOB, Utilize walker, cane, or other assistive device         Medication Interventions: Patient to call before getting OOB, Teach patient to arise slowly    Elimination Interventions: Call light in reach, Patient to call for help with toileting needs, Toilet paper/wipes in reach, Toileting schedule/hourly rounds              Problem: Patient Education: Go to Patient Education Activity  Goal: Patient/Family Education  Outcome: Progressing Towards Goal     Problem: Patient Education: Go to Patient Education Activity  Goal: Patient/Family Education  Outcome: Progressing Towards Goal     Problem: Patient Education: Go to Patient Education Activity  Goal: Patient/Family Education  Outcome: Progressing Towards Goal

## 2020-09-10 NOTE — PROGRESS NOTES
Problem: Self Care Deficits Care Plan (Adult)  Goal: *Acute Goals and Plan of Care (Insert Text)  Description:   FUNCTIONAL STATUS PRIOR TO ADMISSION: Pt reports residing in 2 story home with 1 ADEEL, grandson lives with patient, with pt reporting [place] with [place]Ls/IADLs at [facility]. Tub-shower combo, no seated surface, grab bars present. HOME SUPPORT: The patient lived with grandson but did not require assist.    Occupational Therapy Goals  Initiated 9/9/2020  1. Patient will perform RW grooming with modified independence within 7 day(s). 2.  Patient will perform EOB/RW lower body dressing with minimal assistance within 7 day(s). 3.  Patient will perform EOB bathing with moderate assistance within 7 day(s). 4.  Patient will perform toilet transfers at [facility] with moderate assistance within 7 day(s). 5.  Patient will perform all aspects of RW toileting with moderate assistance  within 7 day(s). 6.  Patient will utilize energy conservation techniques during functional activities with Min verbal cues within 7 day(s). Outcome: Progressing Towards Goal    OCCUPATIONAL THERAPY TREATMENT  Patient: Alisa Strauss (35 y.o. female)  Date: 9/10/2020  Diagnosis: Hypoxia [R09.02]  Acute renal failure (ARF) ([facility] [address] [N17.9]   <principal problem not specified>       Precautions: Fall  Chart, occupational therapy assessment, plan of care, and goals were reviewed. ASSESSMENT  Patient initially resistant to therapy, but was agreeable to participate after some coaxing. Her overall function has significantly improved, now performing functional mobility at a supervision to CGA level, ambulating with a RW. In regards to ADLs she refused to attempt to roya her socks, but was able to perform toileting and seated grooming at a supervision level. Cueing needed for safety during all functional mobility. Patient SOB during activity, but VSS on 1 L NC.  At this time she continues to benefit from acute OT and she will need HHOT, PT and supervision after discharge. PLAN :  Patient continues to benefit from skilled intervention to address the above impairments. Continue treatment per established plan of care. to address goals. Recommendation for discharge: (in order for the patient to meet his/her long term goals)  Occupational therapy at least 2 days/week in the home AND ensure assist and/or supervision for safety with ADLs and functional mobility. Equipment recommendations for successful discharge (if) home:None. Has needed DME         OBJECTIVE DATA SUMMARY:   Cognitive/Behavioral Status:  Neurologic State: Alert  Orientation Level: Oriented X4  Cognition: Follows commands;Decreased attention/concentration        Safety/Judgement: Decreased awareness of need for safety    Functional Mobility and Transfers for ADLs:  Bed Mobility:  Rolling: Supervision; Additional time  Supine to Sit: Supervision; Additional time  Scooting: Supervision    Transfers:  Sit to Stand: Contact guard assistance  Functional Transfers  Toilet Transfer : Contact guard assistance(ambulating with RW to/from [facility])  Cues: Verbal cues provided; Tactile cues provided;Visual cues provided  Bed to Chair: Contact guard assistance(ambulating with a RW)    Balance:  Sitting: Intact  Standing: Impaired; With support  Standing - Static: Good  Standing - Dynamic : Fair    ADL Intervention:  Grooming  Position Performed: Seated in chair  Washing Hands: Supervision;Set-up    Lower Body Dressing Assistance  Socks: Total assistance (dependent)    Toileting  Toileting Assistance: Supervision;Set-up  Bladder Hygiene: Supervision;Set-up  Bowel Hygiene: Supervision;Set-up  Clothing Management: Supervision  Cues: Verbal cues provided(for safety)    Cognitive Retraining  Safety/Judgement: Decreased awareness of need for safety      Activity Tolerance:   Fair  Please refer to the flowsheet for vital signs taken during this treatment.     After treatment patient left in no apparent distress:   Sitting in chair and Call bell within reach    COMMUNICATION/COLLABORATION:   The patients plan of care was discussed with: Physical Therapist and Registered Nurse    Martin Menard, OTR/L  Time Calculation: 33 mins

## 2020-09-10 NOTE — PROGRESS NOTES
[facility]monary Consult Service Note  Pulmonary, Critical Care, and Sleep Medicine    Name: Swati Beck MRN: [id number]   : 1955 Hospital: [address]   Date: 9/10/2020  Admission date: 2020 Hospital Day: 3       Subjective/Interval History:   Seen earlier today on rounds. Pt is unstable and acutely ill. Medical records and data reviewed. 9/10 snoring loudly with apneas when I walked in. Easily aroused. No notes inour office since . Sleep study  [facility] in . Pt without CPAP machine due to no coverage? Has Medicaid, Medicare. Has has  before in 2016 while admitted for pneumonia. Now on IV heparin. No mention of DVT in that note. Pt reports blood clot 12 yrs ago but only on infections for a few weeks? expalined need for close followup. When she did not bring her machine in to a sleep visit, her sleep doc told her not to worry, that she did not need it. I think pt misinterpreted that as meaning she did not need it ever and stopped wearing it and then CPAP removed. D dimer 1.02( 0-0.65)    Dopplers technically difficult : no obvious DVt but the common femoral, femoral and popliteal vein(s) were imaged in the transverse and longitudinal planes. The vessels showed normal color filling and compressibility. Doppler interrogation of the veins showed phasic and spontaneous flow. The great saphenous (thigh) and posterior tibial vein(s) were not well visualized. IMPRESSION:   1. Acute respiratory failure with Hypoxia? Baseline-at risk for restrictive thoracic ventilatory defect and hypoventilation- asked to see pt for possible PE- now on IV heparin; too large for VQ scan; challenging case  2. Dyspnea- pt aware weight an issue  3. MURIEL off CPAP- last sleep study 6 yrs ago- [facility]  4. Acute renal failure. on Lasix and lisinopril. Precludes IV contrast, CTA chest  5. Bipolar disorder continue with the Seroquel, duloxetine  6.  Dyslipidemia continue statins   7. Bilateral thigh ulcers. 8. GERD (gastroesophageal reflux disease)  9. Hypertension  10. Morbid Obesity Body mass index is 74.23 kg/m². pt claims from Seroquel  11. Severe DJD  12. Multiorgan dysfunction as outlined above: Pt has one or more acute or chronic illnesses with severe exacerbation with progression or side effects of treatment that poses a threat to life or bodily function  13. Additional workup outlined below  14. Pt is requiring Drug therapy requiring intensive monitoring for toxicity  15. Prognosis guarded       RECOMMENDATIONS/PLAN:   1. Mobilze- and check oxygen levels,  2. If reasonable, may stop IV heparin  3. Wean O2 off if possible   4. Monitor for any sx to suggest viral infections  5. Supplemental O2 to keep sats > 93%  6. Labs to follow electrolytes, renal function and and blood counts  7. Glucose monitoring and SSI  8. Bronchial hygiene with respiratory therapy techniques, bronchodilators  9. DVT, SUP prophylaxis  10. Pt needs IV fluids with additives and Drug therapy requiring intensive monitoring for toxicity  11. Prescription drug management with home med reconciliation reviewed          [x] High complexity decision making was performed  [x] See my orders for details      Subjective/Initial History:     I was asked by Opal Gordillo MD to see Sandoval Corona  a 76 y.o.   female in consultation for a chief complaint of hypoxia. Possible PE    Excerpts from admission 9/8/2020 or consult notes as follows:     \" Sandoval Corona is a 76 y.o. female with past medical history of hypertension bipolar disorder, depression, history of DVT presents with several days history of dizziness and lightheadedness, no vertigo. As per the patient she has been dizzy for the last several days mostly on exertion and climbing up stairs. Recently the symptoms have got worse. Along with the current symptoms the patient also noted increasing shortness of breath worse on exertion. Denies chest pain positive nausea . no headaches or visual disturbance or weakness of the arms or legs. Patient came to the ER for further evaluation. In the ER the patient was found to be hypoxic with pulse ox in 80s which improved on 2 L of nasal cannula. Also on the labs patient was found to have acute renal failure with creatinine of 3.1. Patient's baseline creatinine is 1.0 in February 2020. VQ scan was ordered which could not be done because of the patient's weight. Patient has currently been started on heparin for a possible diagnosis of PE. At this time patient is comfortable. No other medical concerns at this time. Patient denies dysuria, diarrhea, vomiting, fever or chills. She also denies tinnitus or headache. She denies rectal bleeding or dark stools. \"    Pt with known DVT history but no details. Known MURIEL but not on CPAP per her sleep doc? No details. I met her two yrs ago when her mother Ed Simms passed. Not sleeping well and uses Seroquel but reports her weight is higher on this agent. Lives in [place] with upstairs bedroom. Not on home O2. Lives with 33 yo grandson. No known covid contacts. No cough, fever, anosmia.  Sx started three weeks ago      No Known Allergies     MAR reviewed and pertinent medications noted or modified as needed     Current Facility-Administered Medications   Medication    lidocaine 4 % patch 2 Patch    traMADoL (ULTRAM) tablet 25 mg    heparin (porcine) injection 5,000 Units    Or    heparin (porcine) injection 10,000 Units    neomycin-bacitracin-polymyxin (NEOSPORIN) ointment    DULoxetine (CYMBALTA) capsule 30 mg    DULoxetine (CYMBALTA) capsule 60 mg    hydrocortisone-aloe vera 1 % topical cream    LORazepam (ATIVAN) tablet 0.5 mg    miconazole (SECURA) 2 % extra thick cream    pantoprazole (PROTONIX) tablet 40 mg    QUEtiapine (SEROquel) tablet 200 mg    atorvastatin (LIPITOR) tablet 10 mg    sodium chloride (NS) flush 5-40 mL    sodium chloride (NS) flush 5-40 mL    acetaminophen (TYLENOL) tablet 650 mg    Or    acetaminophen (TYLENOL) suppository 650 mg    polyethylene glycol (MIRALAX) packet 17 g    promethazine (PHENERGAN) tablet 12.5 mg    Or    ondansetron (ZOFRAN) injection 4 mg    heparin (porcine) 25,000 units in 0.45% saline 250 ml infusion      Patient PCP: Joselin Pitt MD  PMH:  has a past medical history of Arthritis, Bipolar 1 disorder ([address] GERD (gastroesophageal reflux disease), Hypertension, Other ill-defined conditions(799.89), Psychiatric disorder, Sleep apnea, Thromboembolus ([address] and Unspecified sleep apnea. PSH:   has a past surgical history that includes hx gi; hx cholecystectomy; and hx hysterectomy. FHX: family history includes Arthritis-osteo in her mother and sister; Diabetes in her mother, sister, and sister; Drug Abuse in her sister; Heart Attack in her sister; Heart Disease in her mother and sister; High Cholesterol in her mother and sister; Schizophrenia in her paternal grandmother. SHX:  reports that she has never smoked. She has never used smokeless tobacco. She reports that she does not drink alcohol or use drugs. ROS:A comprehensive review of systems was negative except for that written in the HPI.     Objective:     Vital Signs: Telemetry:     Intake/Output:   Visit Vitals  /71   Pulse (!) 108   Temp 98.1 °F (36.7 °C)   Resp 15   Ht 5' (1.524 m)   Wt (!) 172.4 kg (380 lb 1.2 oz)   SpO2 92%   BMI 74.23 kg/m²       Temp (24hrs), Av.1 °F (36.7 °C), Min:97.8 °F (36.6 °C), Max:98.4 °F (36.9 °C)        O2 Device: Room air         Wt Readings from Last 4 Encounters:   09/10/20 (!) 172.4 kg (380 lb 1.2 oz)   20 (!) 170.1 kg (375 lb)   20 (!) 170.1 kg (375 lb)   20 (!) 176.4 kg (389 lb)          Intake/Output Summary (Last 24 hours) at 9/10/2020 1148  Last data filed at 9/10/2020 0650  Gross per 24 hour   Intake 2810.25 ml   Output 2300 ml   Net 510.25 ml       Last shift: No intake/output data recorded. Last 3 shifts: 09/08 1901 - 09/10 0700  In: 2838.1 [I.V.:2838.1]  Out: 2300 [Urine:2300]       Physical Exam:   General:  female; NC;  HEENT: NCAT, poor dentition, lips and mucosa dry  Eyes: anicteric; conjunctiva clear  Neck: no nodes, no cuff leak, no accessory MM use. Chest: no deformity,   Cardiac: regular rate, rhythm; no murmur  Lungs: distant breath sounds; no wheezes, no rales, no rhonchi  Abd: soft, NT, hypoactive BS, OBESE  Ext: no edema; no joint swelling; No clubbing  : NO murray, clear urine  Neuro: fluent, follows commands  Psych- no agitation, oriented to person;   Skin: warm, dry, no cyanosis;   Pulses: 1-2+ Bilateral pedal, radial  Capillary: brisk; pale    Labs:    Recent Labs     09/10/20  0643 09/10/20  0346 09/09/20  1938 09/09/20  0945  09/08/20  1819   WBC  --  6.2  --   --  5.5  --  7.5   HGB  --  10.7*  --   --  11.9  --  11.3*   PLT  --  174  --   --  188  --  177   APTT 46.5* 111.0* 48.1*   < >  --    < >  --     < > = values in this interval not displayed. Recent Labs     09/10/20  0346 09/09/20 0945 09/08/20  2104 09/08/20  1819    136 135* 134*   K 4.4 4.5 4.3 4.6   * 107 104 102   CO2 26 24 26 26   * 90 84 86   BUN 17 28* 38* 38*   CREA 1.09* 1.92* 2.98* 3.10*   CA 9.0 9.3 8.5 8.9   MG  --   --   --  1.9   PHOS 2.3*  --   --   --    ALB 3.0* 3.9 3.2* 3.4*   ALT 31 34 24 26     No results for input(s): PH, PCO2, PO2, HCO3, FIO2 in the last 72 hours.   Recent Labs     09/09/20  0945 09/08/20  2104 09/08/20  1819   CKNDX  --   --  2.0   TROIQ <0.05 <0.05 <0.05     No results found for: BNPP, BNP   Lab Results   Component Value Date/Time    Culture result: ENTEROCOCCUS FAECIUM GROUP D  (PREDOMINATING)   09/09/2015 05:12 PM    Culture result: KLEBSIELLA PNEUMONIAE  (15,000 COLONIES/ML)   09/09/2015 05:12 PM    Culture result: ENTEROCOCCUS FAECIUM GROUP D 07/16/2015 01:05 PM    Culture result: MIXED UROGENITAL JIN ISOLATED 07/16/2015 01:05 PM     Lab Results   Component Value Date/Time    TSH 0.52 09/10/2015 03:53 AM       Imaging:    CXR Results  (Last 48 hours)               09/08/20 1759  XR CHEST PORT Final result    Impression:  IMPRESSION: No acute findings. Narrative:  EXAM: XR CHEST PORT       INDICATION: hypoxia       COMPARISON: 2/19/2020       FINDINGS: A portable AP radiograph of the chest was obtained at 1751 hours. There is no pneumothorax or pleural effusion. No consolidation or pulmonary   edema is shown. Mild cardiac contour enlargement is again noted. No mediastinal   or hilar enlargement is suggested. The bones and soft tissues are grossly   within normal limits. Results from [place] encounter on 09/08/20   CT ABD PELV WO CONT    Narrative EXAM:  CT CHEST WO CONT, CT ABD PELV WO CONT  INDICATION:   hypoxia  COMPARISON: CT chest 7/7/2016, CT ABD 2/27/2018, CT abdomen pelvis 5/27/2015. [place] [facility] TECHNIQUE:   Multislice helical CT was performed from the thoracic inlet to the pubic  symphysis was performed without Isovue intravenous contrast administration. Oral  contrast was not administered. Contiguous 5 mm axial images were reconstructed  and lung and soft tissue windows were generated. Coronal and sagittal  reformations were generated. CT dose reduction was achieved through the use of a standardized protocol  tailored for this examination and automatic exposure control for dose  modulation. Limited evaluation of the solid organs due to the lack of IV contrast and  patient body habitus. FINDINGS:  CHEST:  Thyroid: Unremarkable. .  Mediastinum/juarez: No pathologically enlarged lymph nodes. [place] [facility] Heart/vessels: The heart is mildly enlarged. No pericardial effusion. Aortic  valve calcifications. .  Lungs/Pleura: Limited detailed evaluation due to respiratory artifact and  patient body habitus. Mild scattered hypoventilatory changes.  No gross evidence  of pneumonia, nodule, or mass. No pleural effusion or pneumothorax. .    ABDOMEN:  Liver: Grossly unremarkable. .  Gallbladder/Biliary: Gallbladder surgically absent. No evidence of biliary  dilation. [place] Spleen: No splenomegaly or evidence of a suspicious lesion. .  Pancreas: Mild to moderate atrophy diffusely. No evidence of a focal lesion or  inflammatory changes. .  Adrenals: Unremarkable. .  Kidneys: Unremarkable. No evidence of hydronephrosis, nephrolithiasis, or  contour deforming mass. .  Ureters: Unremarkable. .  Bladder: Unremarkable. .  Gastrointestinal tract: Small hiatal hernia. No evidence of pathologic bowel  distention or focal wall thickening. Extensive diverticulosis throughout the  colon. .  Peritoneum: No free fluid or pneumoperitoneum. [place] Retroperitoneum: Unremarkable. No adenopathy. Mild to moderate atherosclerotic  calcifications of the aorta without aneurysmal dilation. Reproductive System: Uterus is surgically absent. No adnexal lesion. .  Bones and soft tissues: No evidence of acute or aggressive osseous abnormality. Impression IMPRESSION:  1. No evidence of an acute thoracic or intra-abdominal process. 2. Extensive colonic diverticulosis without evidence of diverticulitis.          This care involved high complexity medical decision making: I personally:  · Reviewed the flowsheet and previous days notes  · Reviewed and summarized records or history from previous days note or discussions with staff, family  · High Risk Drug therapy requiring intensive monitoring for toxicity: eg steroids, pressors, antibiotics  · Reviewed and/or ordered Clinical lab tests  · Reviewed images and/or ordered Radiology tests  · discussed my assessment/management with : Nursing, for coordination of care    Adrain Apgar, MD

## 2020-09-10 NOTE — TELEPHONE ENCOUNTER
Call placed to patient 's son Kavitha Hedrick, on HIPAA on file. Kavitha Agent was in Dr. Guzman Arzola will call office with concerns later.

## 2020-09-10 NOTE — PROGRESS NOTES
Hospitalist Progress Note    NAME: Geovanna Franco   :  1955   MRN:  [id number]       Assessment / Plan:        Acute hypoxemic respiratory with hypoxemia  Possible Acute PE  MURIEL/OHS  Patient presented with sob and hypoxia  Ct chest/ap/p with no acute pathology. Doppler of legs negative for DVT  Patient can't undergo VQ lung scan due to weight > 300 lbs. CTA chest not possible due to renal failure on admit, Cr of 3. Pulmonology team is following. Patient on 1L NC, and heparin. Cr normalized, will consider ordering CTA chest tomorrow if kidney function remains stable. Acute renal failure  Patient takes lisinopril and lasix at home. Cr 3.1 on admit. Resolved with IVF. Close monitor. Bipolar disorder  Stable. Continue home duloxetine and seroquel    Severe DJD  Patient c/o Knee pain and Lidocaine patch to knees ordered. Tramadol prn. GERD  HTN  Continue home statin, PPI. 40 or above Morbid obesity / Body mass index is 74.23 kg/m². Code status: Full  Prophylaxis: heparin drip  Recommended Disposition: Home w/Family     Subjective:     Chief Complaint / Reason for Physician Visit  \"Patient reports sob is improved. No nausea. No vomiting. \". Discussed with RN events overnight. Review of Systems:  Except as documented all systems reviewed and negative. Objective:     VITALS:   Last 24hrs VS reviewed since prior progress note.  Most recent are:  Patient Vitals for the past 24 hrs:   Temp Pulse Resp BP SpO2   09/10/20 1451 97.6 °F (36.4 °C) 97 16 154/72 96 %   09/10/20 1226  (!) 109   90 %   09/10/20 1210  (!) 118   93 %   09/10/20 1205 97.9 °F (36.6 °C) (!) 110 16 140/75 91 %   09/10/20 1006    130/71    09/10/20 0756 98.1 °F (36.7 °C) (!) 108 15 130/71 92 %   09/10/20 0747 98 °F (36.7 °C) (!) 107 16 145/72 98 %   09/10/20 0400  99      09/10/20 0234 97.8 °F (36.6 °C) 99 18 135/67 93 %   09/10/20 0018 98.4 °F (36.9 °C) 100 18 136/64 90 %   09/10/20 0000  100    09/09/20 2000  100      09/09/20 1914 98.2 °F (36.8 °C) 100 18 132/61 91 %       Intake/Output Summary (Last 24 hours) at 9/10/2020 1547  Last data filed at 9/10/2020 1500  Gross per 24 hour   Intake 3757.14 ml   Output 2900 ml   Net 857.14 ml        PHYSICAL EXAM:  General: Very obese. Alert. cooperative, no acute distress    EENT:  Anicteric sclerae. Resp:  CTA bilaterally, no wheezing or rales. No accessory muscle use  CV:  Regular rate. S1S2 present,  distant heart sounds. No edema  GI:  Soft, Non distended, Non tender.  +Bowel sounds  Neurologic:  Alert and oriented X 3, normal speech,   Psych:   not agitated  Skin:  No rashes. No jaundice    Reviewed most current lab test results and cultures  YES  Reviewed most current radiology test results   YES  Review and summation of old records today    NO  Reviewed patient's current orders and MAR    YES  PMH/SH reviewed - no change compared to H&P  ________________________________________________________________________________  Jake Pabon MD     Procedures: see electronic medical records for all procedures/Xrays and details which were not copied into this note but were reviewed prior to creation of Plan. LABS:  I reviewed today's most current labs and imaging studies.   Pertinent labs include:  Recent Labs     09/10/20  0346 09/09/20  0945 09/08/20  1819   WBC 6.2 5.5 7.5   HGB 10.7* 11.9 11.3*   HCT 33.9* 38.3 35.8    188 177     Recent Labs     09/10/20  0346 09/09/20  0945 09/08/20  2104 09/08/20  1819    136 135* 134*   K 4.4 4.5 4.3 4.6   * 107 104 102   CO2 26 24 26 26   * 90 84 86   BUN 17 28* 38* 38*   CREA 1.09* 1.92* 2.98* 3.10*   CA 9.0 9.3 8.5 8.9   MG  --   --   --  1.9   PHOS 2.3*  --   --   --    ALB 3.0* 3.9 3.2* 3.4*   TBILI 0.6 0.9 0.6 0.5   ALT 31 34 24 26       Signed: Jake Pabon MD

## 2020-09-10 NOTE — PROGRESS NOTES
Problem: Mobility Impaired (Adult and Pediatric)  Goal: *Acute Goals and Plan of Care (Insert Text)  Description: FUNCTIONAL STATUS PRIOR TO ADMISSION: Patient was modified independent using a rolling walker for functional mobility. States she was ambulatory with RW but was having some intermittent knee pain that has just recently gotten worse. Was performing own ADL's and iADLs. HOME SUPPORT PRIOR TO ADMISSION: The patient lived with grandson (30 year old) but did not require assistance. Physical Therapy Goals  Initiated 9/9/2020  1. Patient will move from supine to sit and sit to supine  in bed with minimal assistance within 7 day(s). 2.  Patient will transfer from bed to chair and chair to bed with minimal assistance using the least restrictive device within 7 day(s). 3.  Patient will perform sit to stand with moderate assistance  within 7 day(s). 4.  Patient will ambulate with minimal assistance for 20 feet with the least restrictive device within 7 day(s). Outcome: Progressing Towards Goal   PHYSICAL THERAPY TREATMENT  Patient: Susana Mckenzie (10 y.o. female)  Date: 9/10/2020  Diagnosis: Hypoxia [R09.02]  Acute renal failure (ARF) ([facility] [address] [N17.9]   <principal problem not specified>       Precautions: Fall  Chart, physical therapy assessment, plan of care and goals were reviewed. ASSESSMENT  Patient continues with skilled PT services and is progressing towards goals. PT & OT came today to work with pt and she was not agreeable at first. Patient was wanting therapists to put socks on, stating she had two sores on her buttocks and it hurt to sit up etc.  Pt eventually performed all of the functional mobility skills with only supervision or CGA.   She simply must move her own way and in her own time which is not traditional nor the most efficient but it is how she is used to moving  Pt ambulated to [facility], used it, cleaned self while therapist stood outside door  She then proceeded to ambulate 18 ft to a chair brought into the room . Pt is limited in her endurance needing 1 liter of O2 but sats were 93 and 90 with activity. By the end of the treatment pts attitude improved and she thanked therapists for helping her. Pt needs skilled care to improve endurance and learn stairs. Nurse notified . Current Level of Function Impacting Discharge (mobility/balance): limited endurance, gait distance    Other factors to consider for discharge: obesity, steps  Patient states that she lives in a townhouse and goes up and down steps frequently. PLAN :  Patient continues to benefit from skilled intervention to address the above impairments. Continue treatment per established plan of care. to address goals. Recommendation for discharge: (in order for the patient to meet his/her long term goals)  Physical therapy at least 2 days/week in the home     This discharge recommendation:  Has not yet been discussed the attending provider and/or case management    IF patient discharges home will need the following DME: to be determined (TBD)       SUBJECTIVE:   Patient stated I do all this stuff at home by myself and I don't need you all! Lydia Dickson     OBJECTIVE DATA SUMMARY:   Critical Behavior:  Neurologic State: Alert  Orientation Level: Oriented X4  Cognition: Follows commands, Decreased attention/concentration  Safety/Judgement: Decreased awareness of need for safety  Functional Mobility Training:  Bed Mobility:  Rolling: Supervision; Additional time  Supine to Sit: Supervision; Additional time     Scooting: Supervision        Transfers:  Sit to Stand: Contact guard assistance  Stand to Sit: Contact guard assistance        Bed to Chair: Contact guard assistance(ambulating with a RW)                    Balance:  Sitting: Intact  Standing: Impaired; With support  Standing - Static: Good  Standing - Dynamic : Fair  Ambulation/Gait Training:  Distance (ft): 18 Feet (ft)  Assistive Device: Walker, rolling  Ambulation - Level of Assistance: Supervision;Stand-by assistance        Gait Abnormalities: Decreased step clearance        Base of Support: Widened     Speed/Leda: Accelerated this was because pt was anxious to get to [facility] and then to chair - tended to rush  Step Length: Right shortened;Left shortened                    Stairs: not previously on stated goals but will need to attempt steps if she goes home which seems quite possible now              Therapeutic Exercises: Ankle pumps, knee extensions discussed with pt - need to keep quads strong, however pt did not want to participate in exercise after all of the transfer activity  Pain Rating:      Activity Tolerance:   Fair and observed SOB with activity  Please refer to the flowsheet for vital signs taken during this treatment. After treatment patient left in no apparent distress:   Sitting in chair    COMMUNICATION/COLLABORATION:   The patients plan of care was discussed with: Occupational therapist and Registered nurse.      Nick Yang, PT   Time Calculation: 32 mins

## 2020-09-10 NOTE — PROGRESS NOTES
5043 Lab called to relay PTT was 111  0450 Heparin drip stopped,Telehospitalist notified.         Will redraw PTT at 0645, will continue to monitor

## 2020-09-10 NOTE — PROGRESS NOTES
General Surgery End of Shift Nursing Note    Bedside shift change report given to Via Jil Lopez, RN (oncoming nurse) by Leonid Hoang (offgoing nurse). Report included the following information SBAR, Kardex, Intake/Output, MAR and Recent Results. Shift worked:   7p-7a   Summary of shift:    Pt c/o difficulty breathing, Lift team pulled her up in the bed and that was somewhat alleviated. PTT with morning labs came back 111. See progress note, Heparin on hold until PTT under 86, see MAR. Pain under control this shift. Pt on tele bordering NSR and sinus tach. Pt appetite is voracious, would like \"appropriate food\", I instructed her on how to call dietary prior to meals. Duque draining appropriately, CHG care complete. Issues for physician to address:   Pt son requests COVID test, pt states she has had NO known exposure to anyone with COVID. Number times ambulated in hallway past shift: 0    Number of times OOB to chair past shift: 0    Pain Management:  Current medication: see MAR  Patient states pain is manageable on current pain medication: YES    GI:    Current diet:  DIET RENAL Regular    Tolerating current diet: YES  Passing flatus: YES  Last Bowel Movement: yesterday   Appearance: formed    Patient Safety:    Falls Score: 3  Bed Alarm On? No  Sitter?  No    Gayle Dan

## 2020-09-10 NOTE — PROGRESS NOTES
[facility] Progress Note        NAME: Ramesh Henderson       :  1955       MRN:  [id number]     Date/Time: 9/10/2020    Risk of deterioration: low       Assessment:    Plan:  CHANDLER  HX of HTN with lower bp  Resp failure  Obesity  Bipolar  Diverticular disease Creatinine improved from 1.9 to 1.1 off ace/diuretic/bactrim. Has severe diverticulosis on ct without evidence of hydro/stones. UA bland  Hx of UTIs in past (klebsiella)  ua was bland in -not sending serologies at this point.  (+) fam hx of esrd-mother/sister      Will see again upon request. Resume ace in the next day. Subjective:     Chief Complaint:  I'm a lot better    Review of Systems: no n/v/cp/f/c (-) dysuria    Objective:     VITALS:   Last 24hrs VS reviewed since prior progress note.  Most recent are:  Visit Vitals  /75   Pulse (!) 110   Temp 97.9 °F (36.6 °C)   Resp 16   Ht 5' (1.524 m)   Wt (!) 172.4 kg (380 lb 1.2 oz)   SpO2 91%   BMI 74.23 kg/m²     SpO2 Readings from Last 6 Encounters:   09/10/20 91%   20 94%   20 96%   20 97%   20 95%   19 96%            Intake/Output Summary (Last 24 hours) at 9/10/2020 1220  Last data filed at 9/10/2020 4343  Gross per 24 hour   Intake 2810.25 ml   Output 2300 ml   Net 510.25 ml        Telemetry Reviewed    PHYSICAL EXAM:    General   well developed, well nourished, appears stated age, in no acute distress  EENT  Normocephalic, Atraumatic, PERRLA, EOMI, sclera clear, nares clear, pharynx normal  Respiratory   Clear To Auscultation bilaterally -poor pt effort  Cardiology  Regular Rate and Rythmn  Abdominal  Soft, non-tender, non-distended,obese, some edema in her pannus  Extremities  No clubbing, cyanosis,   (+) edema/ulcer            Lab Data Reviewed: (see below)    Medications Reviewed: (see below)    PMH/SH reviewed - no change compared to H&P  __________________________________________________    Attending Physician: Juan Manuel Alvarez MD ____________________________________________________  MEDICATIONS:  Current Facility-Administered Medications   Medication Dose Route Frequency    lidocaine 4 % patch 2 Patch  2 Patch TransDERmal Q24H    traMADoL (ULTRAM) tablet 25 mg  25 mg Oral Q12H PRN    heparin (porcine) injection 5,000 Units  5,000 Units IntraVENous PRN    Or    heparin (porcine) injection 10,000 Units  10,000 Units IntraVENous PRN    neomycin-bacitracin-polymyxin (NEOSPORIN) ointment   Topical BID    DULoxetine (CYMBALTA) capsule 30 mg  30 mg Oral DAILY    DULoxetine (CYMBALTA) capsule 60 mg  60 mg Oral DAILY    hydrocortisone-aloe vera 1 % topical cream   Topical BID    LORazepam (ATIVAN) tablet 0.5 mg  0.5 mg Oral BID PRN    miconazole (SECURA) 2 % extra thick cream   Topical BID    pantoprazole (PROTONIX) tablet 40 mg  40 mg Oral ACB    QUEtiapine (SEROquel) tablet 200 mg  200 mg Oral QHS    atorvastatin (LIPITOR) tablet 10 mg  10 mg Oral QHS    sodium chloride (NS) flush 5-40 mL  5-40 mL IntraVENous Q8H    sodium chloride (NS) flush 5-40 mL  5-40 mL IntraVENous PRN    acetaminophen (TYLENOL) tablet 650 mg  650 mg Oral Q6H PRN    Or    acetaminophen (TYLENOL) suppository 650 mg  650 mg Rectal Q6H PRN    polyethylene glycol (MIRALAX) packet 17 g  17 g Oral DAILY PRN    promethazine (PHENERGAN) tablet 12.5 mg  12.5 mg Oral Q6H PRN    Or    ondansetron (ZOFRAN) injection 4 mg  4 mg IntraVENous Q6H PRN    heparin (porcine) 25,000 units in 0.45% saline 250 ml infusion  8-36 Units/kg/hr IntraVENous TITRATE        LABS:  Recent Labs     09/10/20  0346 09/09/20  0945   WBC 6.2 5.5   HGB 10.7* 11.9   HCT 33.9* 38.3    188     Recent Labs     09/10/20  0346 09/09/20  0945 09/08/20  2104 09/08/20  1819    136 135* 134*   K 4.4 4.5 4.3 4.6   * 107 104 102   CO2 26 24 26 26   BUN 17 28* 38* 38*   CREA 1.09* 1.92* 2.98* 3.10*   * 90 84 86   CA 9.0 9.3 8.5 8.9   MG  --   --   --  1.9   PHOS 2.3*  --   -- --      Recent Labs     09/10/20  0346 09/09/20  0945 09/08/20 2104   ALT 31 34 24   AP 77 92 74   TBILI 0.6 0.9 0.6   TP 6.7 8.4* 6.8   ALB 3.0* 3.9 3.2*   GLOB 3.7 4.5* 3.6     Recent Labs     09/10/20  1057 09/10/20  0643 09/10/20  0346   APTT 33.2* 46.5* 111.0*      No results for input(s): FE, TIBC, PSAT, FERR in the last 72 hours. No results for input(s): PH, PCO2, PO2 in the last 72 hours.   Recent Labs     09/09/20  0945 09/08/20 2104 09/08/20 1819   CKNDX  --   --  2.0   TROIQ <0.05 <0.05 <0.05     Lab Results   Component Value Date/Time    Glucose (POC) 102 (H) 09/09/2020 09:34 PM    Glucose (POC) 110 (H) 03/29/2017 10:04 AM    Glucose (POC) 117 (H) 08/19/2014 06:32 AM

## 2020-09-10 NOTE — PROGRESS NOTES
Reason for Admission:   Hypoxia/ARF                  RUR Score: 24                 PCP: First and Last name:  David Haider MD   Name of Practice:    Are you a current patient: Yes/No: Yes   Approximate date of last visit: Feb 2020   Can you participate in a virtual visit if needed:     Do you (patient/family) have any concerns for transition/discharge? None at this time                Plan for utilizing home health:  TBD     Current Advanced Directive/Advance Care Plan:              Transition of Care Plan:  CM met with pt. Patient up in chair. Prior to admission, pt was independent with ADL/IADL. Patient denies past HH/Rehab. Patient reports using a walker at home, but denies home oxygen use. Patients support system includes, 32 yr old grandson, daughter, son and 3 nieces and nephews. Patient does not have any concerns at this time. CM will continue to follow. PCP- Dr Gera Greer on [address]    Care Management Interventions  PCP Verified by CM: Jan Joseph MD)  Mode of Transport at Discharge: Other (see comment)(daughter)  Transition of Care Consult (CM Consult): Discharge Planning  Discharge Durable Medical Equipment: No(walker)  Physical Therapy Consult: Yes  Occupational Therapy Consult: Yes  Speech Therapy Consult: No  Current Support Network: Other(Lives in a two story townhouse with 32 yr old grandson. 1 ADEEL home and 9 steps to bedroom)  Confirm Follow Up Transport: Family(daughter or son)  Discharge Location  Discharge Placement: (Anticipate home)      Tomasa Gilmore  Ext [phone]    ARIANA Plan:     *Home vs home w/HH   *daughter to transport at d/c       CM informed pt of therapy's recommendation of HH and pt requested that CM send a referral to [facility]. CM will continue to follow.     Tomasa [id number]

## 2020-09-11 ENCOUNTER — APPOINTMENT (OUTPATIENT)
Dept: CT IMAGING | Age: 65
DRG: 175 | End: 2020-09-11
Attending: HOSPITALIST
Payer: MEDICARE

## 2020-09-11 ENCOUNTER — TELEPHONE (OUTPATIENT)
Dept: INTERNAL MEDICINE CLINIC | Age: 65
End: 2020-09-11

## 2020-09-11 ENCOUNTER — HOME HEALTH ADMISSION (OUTPATIENT)
Dept: HOME HEALTH SERVICES | Facility: HOME HEALTH | Age: 65
End: 2020-09-11

## 2020-09-11 LAB
ANION GAP SERPL CALC-SCNC: 2 MMOL/L (ref 5–15)
APTT PPP: 67.8 SEC (ref 22.1–32)
BASOPHILS # BLD: 0 K/UL (ref 0–0.1)
BASOPHILS NFR BLD: 1 % (ref 0–1)
BUN SERPL-MCNC: 10 MG/DL (ref 6–20)
BUN/CREAT SERPL: 10 (ref 12–20)
CALCIUM SERPL-MCNC: 9.2 MG/DL (ref 8.5–10.1)
CHLORIDE SERPL-SCNC: 109 MMOL/L (ref 97–108)
CO2 SERPL-SCNC: 23 MMOL/L (ref 21–32)
CREAT SERPL-MCNC: 0.96 MG/DL (ref 0.55–1.02)
DIFFERENTIAL METHOD BLD: ABNORMAL
EOSINOPHIL # BLD: 0.6 K/UL (ref 0–0.4)
EOSINOPHIL NFR BLD: 9 % (ref 0–7)
ERYTHROCYTE [DISTWIDTH] IN BLOOD BY AUTOMATED COUNT: 14.1 % (ref 11.5–14.5)
GLUCOSE SERPL-MCNC: 107 MG/DL (ref 65–100)
HCT VFR BLD AUTO: 33.1 % (ref 35–47)
HGB BLD-MCNC: 10.8 G/DL (ref 11.5–16)
IMM GRANULOCYTES # BLD AUTO: 0 K/UL (ref 0–0.04)
IMM GRANULOCYTES NFR BLD AUTO: 0 % (ref 0–0.5)
LYMPHOCYTES # BLD: 3 K/UL (ref 0.8–3.5)
LYMPHOCYTES NFR BLD: 42 % (ref 12–49)
MCH RBC QN AUTO: 29.2 PG (ref 26–34)
MCHC RBC AUTO-ENTMCNC: 32.6 G/DL (ref 30–36.5)
MCV RBC AUTO: 89.5 FL (ref 80–99)
MONOCYTES # BLD: 0.7 K/UL (ref 0–1)
MONOCYTES NFR BLD: 9 % (ref 5–13)
NEUTS SEG # BLD: 2.8 K/UL (ref 1.8–8)
NEUTS SEG NFR BLD: 39 % (ref 32–75)
NRBC # BLD: 0 K/UL (ref 0–0.01)
NRBC BLD-RTO: 0 PER 100 WBC
PLATELET # BLD AUTO: 180 K/UL (ref 150–400)
PMV BLD AUTO: 11.5 FL (ref 8.9–12.9)
POTASSIUM SERPL-SCNC: 4.5 MMOL/L (ref 3.5–5.1)
RBC # BLD AUTO: 3.7 M/UL (ref 3.8–5.2)
SODIUM SERPL-SCNC: 134 MMOL/L (ref 136–145)
THERAPEUTIC RANGE,PTTT: ABNORMAL SECS (ref 58–77)
WBC # BLD AUTO: 7.2 K/UL (ref 3.6–11)

## 2020-09-11 PROCEDURE — 80048 BASIC METABOLIC PNL TOTAL CA: CPT

## 2020-09-11 PROCEDURE — 74011250637 HC RX REV CODE- 250/637: Performed by: INTERNAL MEDICINE

## 2020-09-11 PROCEDURE — 71275 CT ANGIOGRAPHY CHEST: CPT

## 2020-09-11 PROCEDURE — 65660000000 HC RM CCU STEPDOWN

## 2020-09-11 PROCEDURE — 94760 N-INVAS EAR/PLS OXIMETRY 1: CPT

## 2020-09-11 PROCEDURE — 74011250636 HC RX REV CODE- 250/636: Performed by: NURSE PRACTITIONER

## 2020-09-11 PROCEDURE — 85730 THROMBOPLASTIN TIME PARTIAL: CPT

## 2020-09-11 PROCEDURE — 74011250636 HC RX REV CODE- 250/636: Performed by: INTERNAL MEDICINE

## 2020-09-11 PROCEDURE — 36415 COLL VENOUS BLD VENIPUNCTURE: CPT

## 2020-09-11 PROCEDURE — 74011250636 HC RX REV CODE- 250/636: Performed by: HOSPITALIST

## 2020-09-11 PROCEDURE — 74011000250 HC RX REV CODE- 250: Performed by: INTERNAL MEDICINE

## 2020-09-11 PROCEDURE — 85025 COMPLETE CBC W/AUTO DIFF WBC: CPT

## 2020-09-11 PROCEDURE — 74011000636 HC RX REV CODE- 636: Performed by: HOSPITALIST

## 2020-09-11 PROCEDURE — 77010033678 HC OXYGEN DAILY

## 2020-09-11 PROCEDURE — 74011000250 HC RX REV CODE- 250: Performed by: NURSE PRACTITIONER

## 2020-09-11 RX ORDER — LIDOCAINE HYDROCHLORIDE 10 MG/ML
10 INJECTION, SOLUTION EPIDURAL; INFILTRATION; INTRACAUDAL; PERINEURAL ONCE
Status: COMPLETED | OUTPATIENT
Start: 2020-09-11 | End: 2020-09-11

## 2020-09-11 RX ORDER — ENOXAPARIN SODIUM 100 MG/ML
40 INJECTION SUBCUTANEOUS EVERY 12 HOURS
Status: DISCONTINUED | OUTPATIENT
Start: 2020-09-11 | End: 2020-09-11 | Stop reason: ALTCHOICE

## 2020-09-11 RX ORDER — SODIUM CHLORIDE 0.9 % (FLUSH) 0.9 %
10 SYRINGE (ML) INJECTION
Status: COMPLETED | OUTPATIENT
Start: 2020-09-11 | End: 2020-09-11

## 2020-09-11 RX ORDER — HEPARIN SODIUM 5000 [USP'U]/ML
7500 INJECTION, SOLUTION INTRAVENOUS; SUBCUTANEOUS EVERY 8 HOURS
Status: DISCONTINUED | OUTPATIENT
Start: 2020-09-11 | End: 2020-09-11

## 2020-09-11 RX ORDER — METHYLPREDNISOLONE ACETATE 40 MG/ML
40 INJECTION, SUSPENSION INTRA-ARTICULAR; INTRALESIONAL; INTRAMUSCULAR; SOFT TISSUE ONCE
Status: COMPLETED | OUTPATIENT
Start: 2020-09-11 | End: 2020-09-11

## 2020-09-11 RX ADMIN — METHYLPREDNISOLONE ACETATE 40 MG: 40 INJECTION, SUSPENSION INTRA-ARTICULAR; INTRALESIONAL; INTRAMUSCULAR; SOFT TISSUE at 14:30

## 2020-09-11 RX ADMIN — HEPARIN SODIUM 11 UNITS/KG/HR: 10000 INJECTION, SOLUTION INTRAVENOUS at 06:30

## 2020-09-11 RX ADMIN — Medication 10 ML: at 15:43

## 2020-09-11 RX ADMIN — MICONAZOLE NITRATE: 20 CREAM TOPICAL at 19:19

## 2020-09-11 RX ADMIN — TRAMADOL HYDROCHLORIDE 25 MG: 50 TABLET, FILM COATED ORAL at 19:48

## 2020-09-11 RX ADMIN — HYDROCORTISONE: 1 CREAM TOPICAL at 07:55

## 2020-09-11 RX ADMIN — LORAZEPAM 0.5 MG: 0.5 TABLET ORAL at 18:50

## 2020-09-11 RX ADMIN — POLYMYXIN B SULFATE, BACITRACIN ZINC, NEOMYCIN SULFATE: 5000; 3.5; 4 OINTMENT TOPICAL at 19:19

## 2020-09-11 RX ADMIN — DULOXETINE HYDROCHLORIDE 30 MG: 30 CAPSULE, DELAYED RELEASE ORAL at 09:48

## 2020-09-11 RX ADMIN — Medication 10 ML: at 06:33

## 2020-09-11 RX ADMIN — QUETIAPINE FUMARATE 200 MG: 100 TABLET ORAL at 21:11

## 2020-09-11 RX ADMIN — ENOXAPARIN SODIUM 180 MG: 100 INJECTION SUBCUTANEOUS at 18:56

## 2020-09-11 RX ADMIN — POLYMYXIN B SULFATE, BACITRACIN ZINC, NEOMYCIN SULFATE: 5000; 3.5; 4 OINTMENT TOPICAL at 17:05

## 2020-09-11 RX ADMIN — LIDOCAINE HYDROCHLORIDE 10 ML: 10 INJECTION, SOLUTION EPIDURAL; INFILTRATION; INTRACAUDAL; PERINEURAL at 14:30

## 2020-09-11 RX ADMIN — ATORVASTATIN CALCIUM 10 MG: 10 TABLET, FILM COATED ORAL at 21:11

## 2020-09-11 RX ADMIN — Medication 10 ML: at 17:19

## 2020-09-11 RX ADMIN — MICONAZOLE NITRATE: 20 CREAM TOPICAL at 07:56

## 2020-09-11 RX ADMIN — PANTOPRAZOLE SODIUM 40 MG: 40 TABLET, DELAYED RELEASE ORAL at 06:33

## 2020-09-11 RX ADMIN — IOPAMIDOL 100 ML: 755 INJECTION, SOLUTION INTRAVENOUS at 15:43

## 2020-09-11 RX ADMIN — DULOXETINE HYDROCHLORIDE 60 MG: 30 CAPSULE, DELAYED RELEASE ORAL at 09:48

## 2020-09-11 RX ADMIN — Medication 10 ML: at 21:11

## 2020-09-11 NOTE — PROGRESS NOTES
[facility] Pulmonary Consult Service Note  Pulmonary, Critical Care, and Sleep Medicine    Name: Margarita Hager MRN: [id number]   : 1955 Hospital: [facility]   Date: 2020  Admission date: 2020 Hospital Day: 4       Subjective/Interval History:   Seen earlier today on rounds. Pt is unstable and acutely ill. Medical records and data reviewed. 9/10 snoring loudly with apneas when I walked in. Easily aroused. No notes inour office since . Sleep study  [facility] in . Pt without CPAP machine due to no coverage? Has Medicaid, Medicare. Has has  before in 2016 while admitted for pneumonia. Now on IV heparin. No mention of DVT in that note. Pt reports blood clot 12 yrs ago but only on infections for a few weeks? expalined need for close followup. When she did not bring her machine in to a sleep visit, her sleep doc told her not to worry, that she did not need it. I think pt misinterpreted that as meaning she did not need it ever and stopped wearing it and then CPAP removed. D dimer 1.02( 0-0.65)    Dopplers technically difficult : no obvious DVt but the common femoral, femoral and popliteal vein(s) were imaged in the transverse and longitudinal planes. The vessels showed normal color filling and compressibility. Doppler interrogation of the veins showed phasic and spontaneous flow. The great saphenous (thigh) and posterior tibial vein(s) were not well visualized.  falls asleep very easily. No chest pain. Knows sleep apnea very bad and worse this past yr.previoius used for DME. No hcest pain. Now OOBin chair and hard to wake up. Labs reviewed . Renal function better. Called CT scan weight limit about      IMPRESSION:   1. Acute respiratory failure with Hypoxia?  Baseline-at risk for restrictive thoracic ventilatory defect and hypoventilation- asked to see pt for possible PE- now on IV heparin; too large for VQ scan; challenging case; Cr better  2. Dyspnea- pt aware weight an issue  3. MURIEL off CPAP- last sleep study 6 yrs ago- [facility]  4. Acute renal failure better  5. Bipolar disorder continue with the Seroquel, duloxetine  6. Dyslipidemia continue statins   7. Bilateral thigh ulcers. 8. GERD (gastroesophageal reflux disease)  9. Hypertension  10. Morbid Obesity Body mass index is 74.23 kg/m². pt claims from Seroquel  11. Severe DJD  12. Multiorgan dysfunction as outlined above: Pt has one or more acute or chronic illnesses with severe exacerbation with progression or side effects of treatment that poses a threat to life or bodily function  13. Additional workup outlined below  14. Pt is requiring Drug therapy requiring intensive monitoring for toxicity  15. Prognosis guarded       RECOMMENDATIONS/PLAN:   1. Mobilze- and check oxygen levels,may need home O2  2. may stop IV heparin   3. Avoid over diuressi after discharge  4. Consult [facility] Rehan Friedman for her knee injections  5. CTA to rule out PE to settle the issue  6. She will call our office for sleep clinic appointmentmnt and Sleep study ASAP  7. Wean O2 off if possible   8. Supplemental O2 to keep sats > 93%  9. Labs to follow electrolytes, renal function and and blood counts  10. Pt needs IV fluids with additives and Drug therapy requiring intensive monitoring for toxicity  11. Prescription drug management with home med reconciliation reviewed          [x] High complexity decision making was performed  [x] See my orders for details      Subjective/Initial History:     I was asked by Paolo Morales MD to see Mansoor Meredith  a 76 y.o.   female in consultation for a chief complaint of hypoxia. Possible PE    Excerpts from admission 9/8/2020 or consult notes as follows:     \" Mansoor Meredith is a 76 y.o. female with past medical history of hypertension bipolar disorder, depression, history of DVT presents with several days history of dizziness and lightheadedness, no vertigo.   As per the patient she has been dizzy for the last several days mostly on exertion and climbing up stairs. Recently the symptoms have got worse. Along with the current symptoms the patient also noted increasing shortness of breath worse on exertion. Denies chest pain positive nausea . no headaches or visual disturbance or weakness of the arms or legs. Patient came to the ER for further evaluation. In the ER the patient was found to be hypoxic with pulse ox in 80s which improved on 2 L of nasal cannula. Also on the labs patient was found to have acute renal failure with creatinine of 3.1. Patient's baseline creatinine is 1.0 in February 2020. VQ scan was ordered which could not be done because of the patient's weight. Patient has currently been started on heparin for a possible diagnosis of PE. At this time patient is comfortable. No other medical concerns at this time. Patient denies dysuria, diarrhea, vomiting, fever or chills. She also denies tinnitus or headache. She denies rectal bleeding or dark stools. \"    Pt with known DVT history but no details. Known MURIEL but not on CPAP per her sleep doc? No details. I met her two yrs ago when her mother Fay Wilkerson passed. Not sleeping well and uses Seroquel but reports her weight is higher on this agent. Lives in [place] with upstairs bedroom. Not on home O2. Lives with 33 yo grandson. No known covid contacts. No cough, fever, anosmia.  Sx started three weeks ago      No Known Allergies     MAR reviewed and pertinent medications noted or modified as needed     Current Facility-Administered Medications   Medication    methylPREDNISolone acetate (DEPO-MEDROL) 40 mg/mL injection 40 mg    lidocaine (PF) (XYLOCAINE) 10 mg/mL (1 %) injection 10 mL    enoxaparin (LOVENOX) injection 40 mg    iopamidoL (ISOVUE-370) 76 % injection 100 mL    sodium chloride (NS) flush 10 mL    lidocaine 4 % patch 2 Patch    traMADoL (ULTRAM) tablet 25 mg    heparin (porcine) injection 10,000 Units    neomycin-bacitracin-polymyxin (NEOSPORIN) ointment    DULoxetine (CYMBALTA) capsule 30 mg    DULoxetine (CYMBALTA) capsule 60 mg    hydrocortisone-aloe vera 1 % topical cream    LORazepam (ATIVAN) tablet 0.5 mg    miconazole (SECURA) 2 % extra thick cream    pantoprazole (PROTONIX) tablet 40 mg    QUEtiapine (SEROquel) tablet 200 mg    atorvastatin (LIPITOR) tablet 10 mg    sodium chloride (NS) flush 5-40 mL    sodium chloride (NS) flush 5-40 mL    acetaminophen (TYLENOL) tablet 650 mg    Or    acetaminophen (TYLENOL) suppository 650 mg    polyethylene glycol (MIRALAX) packet 17 g    promethazine (PHENERGAN) tablet 12.5 mg    Or    ondansetron (ZOFRAN) injection 4 mg      Patient PCP: Norm Mario MD  PMH:  has a past medical history of Arthritis, Bipolar 1 disorder ([facility] [address] GERD (gastroesophageal reflux disease), Hypertension, Other ill-defined conditions(799.89), Psychiatric disorder, Sleep apnea, Thromboembolus ([facility] [address] and Unspecified sleep apnea. PSH:   has a past surgical history that includes hx gi; hx cholecystectomy; and hx hysterectomy. FHX: family history includes Arthritis-osteo in her mother and sister; Diabetes in her mother, sister, and sister; Drug Abuse in her sister; Heart Attack in her sister; Heart Disease in her mother and sister; High Cholesterol in her mother and sister; Schizophrenia in her paternal grandmother. SHX:  reports that she has never smoked. She has never used smokeless tobacco. She reports that she does not drink alcohol or use drugs. ROS:A comprehensive review of systems was negative except for that written in the HPI.     Objective:     Vital Signs: Telemetry:     Intake/Output:   Visit Vitals  /85   Pulse 92   Temp 97.4 °F (36.3 °C)   Resp 16   Ht 5' (1.524 m)   Wt (!) 172.4 kg (380 lb 1.2 oz)   SpO2 98%   BMI 74.23 kg/m²       Temp (24hrs), Av.7 °F (36.5 °C), Min:97.4 °F (36.3 °C), Max:98.1 °F (36.7 °C)        O2 Device: Nasal cannula O2 Flow Rate (L/min): 1 l/min       Wt Readings from Last 4 Encounters:   09/10/20 (!) 172.4 kg (380 lb 1.2 oz)   08/25/20 (!) 170.1 kg (375 lb)   08/19/20 (!) 170.1 kg (375 lb)   07/23/20 (!) 176.4 kg (389 lb)          Intake/Output Summary (Last 24 hours) at 9/11/2020 1152  Last data filed at 9/11/2020 0751  Gross per 24 hour   Intake 476.89 ml   Output 2100 ml   Net -1623.11 ml       Last shift:      09/11 0701 - 09/11 1900  In: 221 [I.V.:221]  Out: -   Last 3 shifts: 09/09 1901 - 09/11 0700  In: 1356.6 [P.O.:100; I.V.:1256.6]  Out: 4400 [Urine:4400]       Physical Exam:   General:  female; NC;  HEENT: NCAT, poor dentition, lips and mucosa dry  Eyes: anicteric; conjunctiva clear  Neck: no nodes, no cuff leak, no accessory MM use. Chest: no deformity,   Cardiac: regular rate, rhythm; no murmur  Lungs: distant breath sounds; no wheezes, no rales, no rhonchi  Abd: soft, NT, hypoactive BS, OBESE  Ext: no edema; no joint swelling; No clubbing  : NO murray, clear urine  Neuro: fluent, follows commands  Psych- no agitation, oriented to person;   Skin: warm, dry, no cyanosis;   Pulses: 1-2+ Bilateral pedal, radial  Capillary: brisk; pale    Labs:    Recent Labs     09/11/20  0508 09/10/20  2212 09/10/20  1430  09/10/20  0346  09/09/20  0945   WBC 7.2  --   --   --  6.2  --  5.5   HGB 10.8*  --   --   --  10.7*  --  11.9     --   --   --  174  --  188   APTT 67.8* 56.7* 36.5*   < > 111.0*   < >  --     < > = values in this interval not displayed.      Recent Labs     09/11/20  0508 09/10/20  0346 09/09/20  0945 09/08/20  2104 09/08/20  1819   * 139 136 135* 134*   K 4.5 4.4 4.5 4.3 4.6   * 109* 107 104 102   CO2 23 26 24 26 26   * 103* 90 84 86   BUN 10 17 28* 38* 38*   CREA 0.96 1.09* 1.92* 2.98* 3.10*   CA 9.2 9.0 9.3 8.5 8.9   MG  --   --   --   --  1.9   PHOS  --  2.3*  --   --   --    ALB  --  3.0* 3.9 3.2* 3.4*   ALT  --  31 34 24 26     No results for input(s): PH, PCO2, PO2, HCO3, FIO2 in the last 72 hours. Recent Labs     09/09/20  0945 09/08/20  2104 09/08/20  1819   CKNDX  --   --  2.0   TROIQ <0.05 <0.05 <0.05     No results found for: BNPP, BNP   Lab Results   Component Value Date/Time    Culture result: ENTEROCOCCUS FAECIUM GROUP D  (PREDOMINATING)   09/09/2015 05:12 PM    Culture result: KLEBSIELLA PNEUMONIAE  (15,000 COLONIES/ML)   09/09/2015 05:12 PM    Culture result: ENTEROCOCCUS FAECIUM GROUP D 07/16/2015 01:05 PM    Culture result: MIXED UROGENITAL JIN ISOLATED 07/16/2015 01:05 PM     Lab Results   Component Value Date/Time    TSH 0.52 09/10/2015 03:53 AM       Imaging:    CXR Results  (Last 48 hours)    None          Results from [place] encounter on 09/08/20   CT ABD PELV WO CONT    Narrative EXAM:  CT CHEST WO CONT, CT ABD PELV WO CONT  INDICATION:   hypoxia  COMPARISON: CT chest 7/7/2016, CT ABD 2/27/2018, CT abdomen pelvis 5/27/2015. Melvenia Jason TECHNIQUE:   Multislice helical CT was performed from the thoracic inlet to the pubic  symphysis was performed without Isovue intravenous contrast administration. Oral  contrast was not administered. Contiguous 5 mm axial images were reconstructed  and lung and soft tissue windows were generated. Coronal and sagittal  reformations were generated. CT dose reduction was achieved through the use of a standardized protocol  tailored for this examination and automatic exposure control for dose  modulation. Limited evaluation of the solid organs due to the lack of IV contrast and  patient body habitus. FINDINGS:  CHEST:  Thyroid: Unremarkable. .  Mediastinum/juarez: No pathologically enlarged lymph nodes. Melvenia Jason Heart/vessels: The heart is mildly enlarged. No pericardial effusion. Aortic  valve calcifications. .  Lungs/Pleura: Limited detailed evaluation due to respiratory artifact and  patient body habitus. Mild scattered hypoventilatory changes. No gross evidence  of pneumonia, nodule, or mass.  No pleural effusion or pneumothorax. .    ABDOMEN:  Liver: Grossly unremarkable. .  Gallbladder/Biliary: Gallbladder surgically absent. No evidence of biliary  dilation. Geofm Dowse Spleen: No splenomegaly or evidence of a suspicious lesion. .  Pancreas: Mild to moderate atrophy diffusely. No evidence of a focal lesion or  inflammatory changes. .  Adrenals: Unremarkable. .  Kidneys: Unremarkable. No evidence of hydronephrosis, nephrolithiasis, or  contour deforming mass. .  Ureters: Unremarkable. .  Bladder: Unremarkable. .  Gastrointestinal tract: Small hiatal hernia. No evidence of pathologic bowel  distention or focal wall thickening. Extensive diverticulosis throughout the  colon. .  Peritoneum: No free fluid or pneumoperitoneum. Geofm Dowse Retroperitoneum: Unremarkable. No adenopathy. Mild to moderate atherosclerotic  calcifications of the aorta without aneurysmal dilation. Reproductive System: Uterus is surgically absent. No adnexal lesion. .  Bones and soft tissues: No evidence of acute or aggressive osseous abnormality. Impression IMPRESSION:  1. No evidence of an acute thoracic or intra-abdominal process. 2. Extensive colonic diverticulosis without evidence of diverticulitis.          This care involved high complexity medical decision making: I personally:  · Reviewed the flowsheet and previous days notes  · Reviewed and summarized records or history from previous days note or discussions with staff, family  · High Risk Drug therapy requiring intensive monitoring for toxicity: eg steroids, pressors, antibiotics  · Reviewed and/or ordered Clinical lab tests  · Reviewed images and/or ordered Radiology tests  · discussed my assessment/management with : Nursing, for coordination of care    Ezequiel Robertson MD

## 2020-09-11 NOTE — PROGRESS NOTES
General Surgery End of Shift Nursing Note    Bedside shift change report given to Erik Mead RN (oncoming nurse) by Jeremiah Olvera RN (offgoing nurse). Report included the following information SBAR, Kardex and Intake/Output. Shift worked:   [id number]   Summary of shift:    Patient worked with PT/OT and was up to the chair for several hours. Her pain associated with wound care was treated with PRN medication. She tolerated her diet and had 2 formed BM's. aPTT's were drawn and adjusted per Pharmacist Elsy Small verbal confirmation. Wound care performed per orders.     Issues for physician to address:   None      Number times ambulated in hallway past shift: 0    Number of times OOB to chair past shift: 2    Pain Management:  Current medication: See MAR   Patient states pain is manageable on current pain medication: YES    GI:    Current diet:  DIET RENAL Regular    Tolerating current diet: YES  Passing flatus: YES  Last Bowel Movement: today   Appearance: Formed, brown     Patient Safety:    Falls Score: 3    Gib Gitelman, RN

## 2020-09-11 NOTE — PROGRESS NOTES
Patient is currently off the floor for a CT scan. Will follow up later today if able.     Janet Small, PT

## 2020-09-11 NOTE — PROCEDURES
Procedure, risks, benefits, & alternatives explained to patient, who voiced understanding and agreed to proceed with the arthrocentesis. Consent obtained.t Area prepped and draped in a sterile fashion. Patient placed in position of comfort with knee comfortably extended. Small gauge needle introduced into joint space in a direction parallel to the plane of the posterior surface of the patella in a lateral position. DepoMedrol/ lido injected.  Pt tolerated procedure well

## 2020-09-11 NOTE — TELEPHONE ENCOUNTER
Suzan Tucker               Patient return call     Caller's first and last name and relationship (if not the patient):Tariq Mcconnell Duty, son       Best contact number(s):[phone]       Whose call is being returned: not sure       Details to clarify the request: regarding questions for the doctor       Sebastian

## 2020-09-11 NOTE — PROGRESS NOTES
Occupational Therapy    Chart reviewed and RN cleared pt for OT. Patient stated she doesn't have time for therapy right now and asked OT to return after lunch. Patient states she is frustrated and trying to contact her grandson about her apartment. Will attempt to see later today or tomorrow.      Galo Teixeira, OTR/L

## 2020-09-11 NOTE — PROGRESS NOTES
General Surgery End of Shift Nursing Note    Bedside shift change report given to [facility] (oncoming nurse) by Pablito Armas (offgoing nurse). Report included the following information SBAR, Kardex, Intake/Output and Recent Results. Shift worked:   [phone]   Summary of shift:    Patient was sent down for CXR for cracking sound in the left knee. Medicated with tylenol for pain. Vitals were stable. PT/INR drawn for heparin drip. Rates and dose adjusted and verified per protocol and  with pharmacist.    Issues for physician to address:        Number times ambulated in hallway past shift: 0    Number of times OOB to chair past shift: 0    Pain Management:  Current medication: tylenol  Patient states pain is manageable on current pain medication: YES    GI:    Current diet:  DIET RENAL Regular    Tolerating current diet: YES  Passing flatus: YES  Last Bowel Movement: yesterday   Appearance:     Respiratory:    Incentive Spirometer at bedside: NO  Patient instructed on use: NO    Patient Safety:    Falls Score: 3  Bed Alarm On? No  Sitter?  No    Zena Ascencio

## 2020-09-11 NOTE — PROGRESS NOTES
Hospitalist Progress Note    NAME: Brenda Todd   :  1955   MRN:  [id number]       Assessment / Plan:        Acute hypoxemic respiratory with hypoxemia  Possible Acute PE  MURIEL/OHS  Patient presented with sob and hypoxia  Ct chest/ap/p with no acute pathology. Doppler of legs negative for DVT  Patient can't undergo VQ lung scan due to weight > 300 lbs. CTA chest not possible due to renal failure on admit, Cr of 3. Pulmonology team is following. Patient on 1L NC, and heparin. Cr normalized, will consider ordering CTA chest tomorrow if kidney function remains stable. Acute renal failure  Patient takes lisinopril and lasix at home. Cr 3.1 on admit. Resolved with IVF. Close monitor. Bipolar disorder  Stable. Continue home duloxetine and seroquel    Severe DJD  Patient c/o Knee pain and Lidocaine patch to knees ordered. Tramadol prn. GERD  HTN  Continue home statin, PPI.    :   Feels better . Now off Heparin drip  I ordered CTA as her renal function is normal . Echo showed   · LV: Estimated LVEF is 65 - 70%. Visually measured ejection fraction. Mild concentric hypertrophy. Hyperdynamic systolic function. · PA: Mild pulmonary hypertension. Pulmonary arterial systolic pressure is 47 mmHg. · Echo study was technically difficulty. · Image quality for this study was suboptimal    40 or above Morbid obesity / Body mass index is 74.23 kg/m². Code status: Full  Prophylaxis: heparin drip  Recommended Disposition: Home w/Family     Subjective:     Chief Complaint / Reason for Physician Visit  \"Patient reports sob is improved. No nausea. No vomiting. \". Discussed with RN events overnight. Has knee pain Ortho consulted for cortisone shot    Review of Systems:  Except as documented all systems reviewed and negative. Objective:     VITALS:   Last 24hrs VS reviewed since prior progress note.  Most recent are:  Patient Vitals for the past 24 hrs:   Temp Pulse Resp BP SpO2   20 0720 97.8 °F (36.6 °C) 92 16 152/83 93 %   09/11/20 0245 98.1 °F (36.7 °C) 91 16 134/64 95 %   09/11/20 0000  98      09/10/20 2350 97.4 °F (36.3 °C) 95 16 133/69 93 %   09/10/20 1951 97.9 °F (36.6 °C) 96 16 144/70 99 %   09/10/20 1451 97.6 °F (36.4 °C) 97 16 154/72 96 %   09/10/20 1226  (!) 109   90 %   09/10/20 1210  (!) 118   93 %   09/10/20 1205 97.9 °F (36.6 °C) (!) 110 16 140/75 91 %       Intake/Output Summary (Last 24 hours) at 9/11/2020 1048  Last data filed at 9/11/2020 0751  Gross per 24 hour   Intake 476.89 ml   Output 2100 ml   Net -1623.11 ml        PHYSICAL EXAM:  General: Very obese. Alert. cooperative, no acute distress    EENT:  Anicteric sclerae. Resp:  CTA bilaterally, no wheezing or rales. No accessory muscle use  CV:  Regular rate. S1S2 present,  distant heart sounds. No edema  GI:  Soft, Non distended, Non tender.  +Bowel sounds  Neurologic:  Alert and oriented X 3, normal speech,   Psych:   not agitated  Skin:  No rashes. No jaundice    Reviewed most current lab test results and cultures  YES  Reviewed most current radiology test results   YES  Review and summation of old records today    NO  Reviewed patient's current orders and MAR    YES  PMH/SH reviewed - no change compared to H&P  ________________________________________________________________________________  Katie Caro MD     Procedures: see electronic medical records for all procedures/Xrays and details which were not copied into this note but were reviewed prior to creation of Plan. LABS:  I reviewed today's most current labs and imaging studies.   Pertinent labs include:  Recent Labs     09/11/20  0508 09/10/20  0346 09/09/20  0945   WBC 7.2 6.2 5.5   HGB 10.8* 10.7* 11.9   HCT 33.1* 33.9* 38.3    174 188     Recent Labs     09/11/20  0508 09/10/20  0346 09/09/20  0945 09/08/20  2104 09/08/20  1819   * 139 136 135* 134*   K 4.5 4.4 4.5 4.3 4.6   * 109* 107 104 102   CO2 23 26 24 26 26   * 103* 90 84 86   BUN 10 17 28* 38* 38*   CREA 0.96 1.09* 1.92* 2.98* 3.10*   CA 9.2 9.0 9.3 8.5 8.9   MG  --   --   --   --  1.9   PHOS  --  2.3*  --   --   --    ALB  --  3.0* 3.9 3.2* 3.4*   TBILI  --  0.6 0.9 0.6 0.5   ALT  --  31 34 24 26       Signed: Tyshawn Hernandez MD

## 2020-09-11 NOTE — CONSULTS
ORTHOPAEDIC CONSULT NOTE    Subjective:     Date of Consultation:  September 11, 2020      Rand Edwards is a 76 y.o. female who is being seen for bilat knee pain with hx of OA in both knees. She reports she has seen by Dr. Eduardo Carias and had injections in both knee in the past, unsure of the date of the last round of injections. Pt had an appt today with OVA for bilat knee injections, unfortunately she has been admitted for resp failure and not able to make it to her appt. Patient Active Problem List    Diagnosis Date Noted    Hypoxia 09/08/2020    Acute renal failure (ARF) ([address] 09/08/2020    Cutaneous fungal infection 09/04/2020    Non-pressure chronic ulcer of right thigh with fat layer exposed ([address] 08/07/2020    Non-pressure chronic ulcer of left thigh with fat layer exposed ([address] 08/07/2020    Obesity, morbid ([address] 12/19/2017    MURIEL on CPAP 06/03/2016    Hypokalemia 06/03/2016    MDD (major depressive disorder), recurrent episode, mild ([address] 01/11/2016    Hyperlipidemia 09/09/2015    Pre-syncope 09/09/2015    GABRIEL (generalized anxiety disorder) 07/11/2012     Family History   Problem Relation Age of Onset    Heart Disease Mother     Diabetes Mother     Arthritis-osteo Mother     High Cholesterol Mother     Heart Attack Sister     Diabetes Sister     Heart Disease Sister     Diabetes Sister    Threasa Sujata Sister     High Cholesterol Sister     Schizophrenia Paternal Grandmother     Drug Abuse Sister       Social History     Tobacco Use    Smoking status: Never Smoker    Smokeless tobacco: Never Used   Substance Use Topics    Alcohol use: No     Past Medical History:   Diagnosis Date    Arthritis     chronic pain related arthritis    Bipolar 1 disorder (HCC)     GERD (gastroesophageal reflux disease)     Hypertension     Other ill-defined conditions(799.89)     hyperlipidemia.  Stomach abcess    Psychiatric disorder     panic attacks    Sleep apnea     Thrombo[address]ol[address] [facility] [address]     last year L leg    Unspecified sleep apnea       Past Surgical History:   Procedure Laterality Date    HX CHOLECYSTECTOMY      HX GI      gallbladder removed 2/2010    HX HYSTERECTOMY        Prior to Admission medications    Medication Sig Start Date End Date Taking? Authorizing Provider   traMADoL (ULTRAM) 50 mg tablet Take 1 Tab by mouth every eight (8) hours as needed for Pain for up to 10 days. Max Daily Amount: 150 mg. 9/4/20 9/14/20  Lavonne Clark MD   trimethoprim-sulfamethoxazole (BACTRIM DS, SEPTRA DS) 160-800 mg per tablet Take 1 Tab by mouth two (2) times a day for 10 days. 9/4/20 9/14/20  Lavonne Clark MD   naloxone [facility]) 4 mg/actuation nasal spray Use 1 spray intranasally, then discard. Repeat with new spray every 2 min as needed for opioid overdose symptoms, alternating nostrils. 9/4/20   Lavonne Clark MD   miconazole (MICOTIN) 2 % topical cream Apply  to affected area two (2) times a day. 9/3/20   Shalom Loco MD   simvastatin (ZOCOR) 20 mg tablet TAKE 1 TABLET BY MOUTH EVERY NIGHT 8/25/20   Manju Verma MD   miconazole (MICOTIN) 2 % topical cream Apply  to affected area two (2) times a day. 8/25/20   [place], [place]   nystatin (MYCOSTATIN) powder Apply 2 g to affected area two (2) times a day. 8/21/20   Lavonne Clark MD   nystatin (MYCOSTATIN) topical cream Apply  to affected area two (2) times a day. 8/21/20   Lavonne Clark MD   Nystatin, Bulk, 1 billion unit powd 1 Dose by Does Not Apply route two (2) times a day. 8/19/20   WAYLON Worthington   LORazepam (Ativan) 0.5 mg tablet Take 0.5 mg by mouth two (2) times daily as needed for Anxiety. Provider, [place]   lisinopriL (PRINIVIL, ZESTRIL) 20 mg tablet Take 2 tablets daily. 7/17/20   Lavonne Clark MD   hydrocortisone (CORTAID) 0.5 % topical cream Apply  to affected area two (2) times a day.  use thin layer 7/17/20   Lavonne Clark MD   bacitracin-polymyxin b (Polysporin) 500-10,000 unit/gram ointment Apply  to affected area two (2) times a day. 7/17/20   Allyssa Kong MD   omeprazole (PRILOSEC) 40 mg capsule TAKE 1 CAPSULE BY MOUTH DAILY 3/30/20   Allyssa Kong MD   furosemide (LASIX) 20 mg tablet Take 2 Tabs by mouth two (2) times a day. TAKE 1 TABLET BY MOUTH DAILY 2/19/20   Mechelle Winter MD   QUEtiapine (SEROQUEL) 200 mg tablet TK 1 T PO HS PRF INSOMNIA 10/9/19   Yue Rodriguez MD   DULoxetine (CYMBALTA) 60 mg capsule Take 1 Cap by mouth daily. 3/6/19   Nieves Handy NP   DULoxetine (CYMBALTA) 30 mg capsule Take 1 Cap by mouth daily.  3/6/19   Nieves Handy NP   potassium chloride SR (KLOR-CON 10) 10 mEq tablet TAKE 2 TABLETS BY MOUTH DAILY 1/16/19   Allyssa Kong MD     Current Facility-Administered Medications   Medication Dose Route Frequency    methylPREDNISolone acetate (DEPO-MEDROL) 40 mg/mL injection 40 mg  40 mg IntraMUSCular ONCE    lidocaine (PF) (XYLOCAINE) 10 mg/mL (1 %) injection 10 mL  10 mL IntraDERMal ONCE    enoxaparin (LOVENOX) injection 40 mg  40 mg SubCUTAneous Q12H    iopamidoL (ISOVUE-370) 76 % injection 100 mL  100 mL IntraVENous RAD ONCE    sodium chloride (NS) flush 10 mL  10 mL IntraVENous RAD ONCE    heparin (porcine) injection 7,500 Units  7,500 Units SubCUTAneous Q8H    lidocaine 4 % patch 2 Patch  2 Patch TransDERmal Q24H    traMADoL (ULTRAM) tablet 25 mg  25 mg Oral Q12H PRN    heparin (porcine) injection 10,000 Units  10,000 Units IntraVENous PRN    neomycin-bacitracin-polymyxin (NEOSPORIN) ointment   Topical BID    DULoxetine (CYMBALTA) capsule 30 mg  30 mg Oral DAILY    DULoxetine (CYMBALTA) capsule 60 mg  60 mg Oral DAILY    hydrocortisone-aloe vera 1 % topical cream   Topical BID    LORazepam (ATIVAN) tablet 0.5 mg  0.5 mg Oral BID PRN    miconazole (SECURA) 2 % extra thick cream   Topical BID    pantoprazole (PROTONIX) tablet 40 mg  40 mg Oral ACB    QUEtiapine (SEROquel) tablet 200 mg  200 mg Oral QHS    atorvastatin (LIPITOR) tablet 10 mg  10 mg Oral QHS    sodium chloride (NS) flush 5-40 mL  5-40 mL IntraVENous Q8H    sodium chloride (NS) flush 5-40 mL  5-40 mL IntraVENous PRN    acetaminophen (TYLENOL) tablet 650 mg  650 mg Oral Q6H PRN    Or    acetaminophen (TYLENOL) suppository 650 mg  650 mg Rectal Q6H PRN    polyethylene glycol (MIRALAX) packet 17 g  17 g Oral DAILY PRN    promethazine (PHENERGAN) tablet 12.5 mg  12.5 mg Oral Q6H PRN    Or    ondansetron (ZOFRAN) injection 4 mg  4 mg IntraVENous Q6H PRN      No Known Allergies     Review of Systems:  A comprehensive review of systems was negative except for that written in the HPI. Mental Status: no dementia    Objective:     Patient Vitals for the past 8 hrs:   BP Temp Pulse Resp SpO2   20 1050 142/85 97.4 °F (36.3 °C) 92 16 98 %   20 0720 152/83 97.8 °F (36.6 °C) 92 16 93 %     Temp (24hrs), Av.7 °F (36.5 °C), Min:97.4 °F (36.3 °C), Max:98.1 °F (36.7 °C)      Gen: Well-developed,  in no acute distress, obese  Musc: Bilat LE - + limited ROM due to pain and size, no knee effusions or erythema, NVI    Skin: No skin breakdown noted.  Skin warm, pink, dry  Neuro: Cranial nerves are grossly intact, no focal motor weakness, follows commands appropriately   Psych: Good insight, oriented to person, place and time, alert    Imaging Review: all images reviewed     Labs:   Recent Results (from the past 24 hour(s))   PTT    Collection Time: 09/10/20  2:30 PM   Result Value Ref Range    aPTT 36.5 (H) 22.1 - 32.0 sec    aPTT, therapeutic range     58.0 - 77.0 SECS   PTT    Collection Time: 09/10/20 10:12 PM   Result Value Ref Range    aPTT 56.7 (H) 22.1 - 32.0 sec    aPTT, therapeutic range     58.0 - 77.0 SECS   PTT    Collection Time: 20  5:08 AM   Result Value Ref Range    aPTT 67.8 (H) 22.1 - 32.0 sec    aPTT, therapeutic range     58.0 - 78.0 SECS   METABOLIC PANEL, BASIC    Collection Time: 20  5:08 AM   Result Value Ref Range    Sodium 134 (L) 136 - 145 mmol/L    Potassium 4.5 3.5 - 5.1 mmol/L    Chloride 109 (H) 97 - 108 mmol/L    CO2 23 21 - 32 mmol/L    Anion gap 2 (L) 5 - 15 mmol/L    Glucose 107 (H) 65 - 100 mg/dL    BUN 10 6 - 20 MG/DL    Creatinine 0.96 0.55 - 1.02 MG/DL    BUN/Creatinine ratio 10 (L) 12 - 20      GFR est AA >60 >60 ml/min/1.73m2    GFR est non-AA 58 (L) >60 ml/min/1.73m2    Calcium 9.2 8.5 - 10.1 MG/DL   CBC WITH AUTOMATED DIFF    Collection Time: 09/11/20  5:08 AM   Result Value Ref Range    WBC 7.2 3.6 - 11.0 K/uL    RBC 3.70 (L) 3.80 - 5.20 M/uL    HGB 10.8 (L) 11.5 - 16.0 g/dL    HCT 33.1 (L) 35.0 - 47.0 %    MCV 89.5 80.0 - 99.0 FL    MCH 29.2 26.0 - 34.0 PG    MCHC 32.6 30.0 - 36.5 g/dL    RDW 14.1 11.5 - 14.5 %    PLATELET 081 402 - 437 K/uL    MPV 11.5 8.9 - 12.9 FL    NRBC 0.0 0  WBC    ABSOLUTE NRBC 0.00 0.00 - 0.01 K/uL    NEUTROPHILS 39 32 - 75 %    LYMPHOCYTES 42 12 - 49 %    MONOCYTES 9 5 - 13 %    EOSINOPHILS 9 (H) 0 - 7 %    BASOPHILS 1 0 - 1 %    IMMATURE GRANULOCYTES 0 0.0 - 0.5 %    ABS. NEUTROPHILS 2.8 1.8 - 8.0 K/UL    ABS. LYMPHOCYTES 3.0 0.8 - 3.5 K/UL    ABS. MONOCYTES 0.7 0.0 - 1.0 K/UL    ABS. EOSINOPHILS 0.6 (H) 0.0 - 0.4 K/UL    ABS. BASOPHILS 0.0 0.0 - 0.1 K/UL    ABS. IMM.  GRANS. 0.0 0.00 - 0.04 K/UL    DF AUTOMATED           Impression:     Patient Active Problem List    Diagnosis Date Noted    Hypoxia 09/08/2020    Acute renal failure (ARF) ([address] 09/08/2020    Cutaneous fungal infection 09/04/2020    Non-pressure chronic ulcer of right thigh with fat layer exposed ([facility] 08/07/2020    Non-pressure chronic ulcer of left thigh with fat layer exposed ([facility] [facility] 08/07/2020    Obesity, morbid ([facility] [facility] 12/19/2017    MURIEL on CPAP 06/03/2016    Hypokalemia 06/03/2016    MDD (major depressive disorder), recurrent episode, mild ([facility] 01/11/2016    Hyperlipidemia 09/09/2015    Pre-syncope 09/09/2015    GABRIEL (generalized anxiety disorder) 07/11/2012     Active Problems:    Hypoxia (9/8/2020) Acute renal failure (ARF) ([facility] (9/8/2020)        Plan:   -  Pt is stable orthopaedically  -  bilat knee OA - no contraindication to setriod injections (WBC WNL, afebrile, no documented sepsis) will inject the L knee only, per chart review with OVA pt had the R knee injected on 8/23/20  -  Follow up with Dr. Tiffany Kee PRN       Dr. Day Quiet aware and agrees with plan as above.         Rose Marie Casanova, NP  Orthopedic Nurse Practitioner   [place]

## 2020-09-11 NOTE — TELEPHONE ENCOUNTER
HIPAA verified with patient's son Jaleel Clark.  Patient has been admitted to the hospital to rule put PE.

## 2020-09-11 NOTE — PROGRESS NOTES
Occupational Therapy    Attempted to see patient after lunch. Patient was off the floor for CT. Will follow back with patient tomorrow.      SELENE Sparks/MEHRAN

## 2020-09-11 NOTE — PROGRESS NOTES
Problem: Falls - Risk of  Goal: *Absence of Falls  Description: Document Loren Lyles Fall Risk and appropriate interventions in the flowsheet. Outcome: Progressing Towards Goal  Note: Fall Risk Interventions:  Mobility Interventions: Communicate number of staff needed for ambulation/transfer, OT consult for ADLs, PT Consult for mobility concerns, Patient to call before getting OOB         Medication Interventions: Evaluate medications/consider consulting pharmacy, Patient to call before getting OOB    Elimination Interventions: Call light in reach, Patient to call for help with toileting needs              Problem: Pressure Injury - Risk of  Goal: *Prevention of pressure injury  Description: Document Inder Scale and appropriate interventions in the flowsheet.   Outcome: Progressing Towards Goal  Note: Pressure Injury Interventions:       Moisture Interventions: Absorbent underpads, Apply protective barrier, creams and emollients    Activity Interventions: Increase time out of bed, PT/OT evaluation    Mobility Interventions: HOB 30 degrees or less, Pressure redistribution bed/mattress (bed type)    Nutrition Interventions: Document food/fluid/supplement intake    Friction and Shear Interventions: Apply protective barrier, creams and emollients, Minimize layers, Lift team/patient mobility team

## 2020-09-11 NOTE — PROGRESS NOTES
Telemed: Patient states she was turning in bed and heard a crack in left knee. Please order something for pain ASAP. Plan: Chart reviewed and case discussed with RN.  XR left knee, she has tolerated tramadol in the past and this will be ordered

## 2020-09-12 LAB
ANION GAP SERPL CALC-SCNC: 3 MMOL/L (ref 5–15)
BUN SERPL-MCNC: 10 MG/DL (ref 6–20)
BUN/CREAT SERPL: 9 (ref 12–20)
CALCIUM SERPL-MCNC: 9.7 MG/DL (ref 8.5–10.1)
CHLORIDE SERPL-SCNC: 103 MMOL/L (ref 97–108)
CO2 SERPL-SCNC: 29 MMOL/L (ref 21–32)
CREAT SERPL-MCNC: 1.08 MG/DL (ref 0.55–1.02)
ERYTHROCYTE [DISTWIDTH] IN BLOOD BY AUTOMATED COUNT: 14 % (ref 11.5–14.5)
GLUCOSE BLD STRIP.AUTO-MCNC: 105 MG/DL (ref 65–100)
GLUCOSE SERPL-MCNC: 125 MG/DL (ref 65–100)
HCT VFR BLD AUTO: 38.3 % (ref 35–47)
HGB BLD-MCNC: 12.5 G/DL (ref 11.5–16)
MCH RBC QN AUTO: 29 PG (ref 26–34)
MCHC RBC AUTO-ENTMCNC: 32.6 G/DL (ref 30–36.5)
MCV RBC AUTO: 88.9 FL (ref 80–99)
NRBC # BLD: 0.02 K/UL (ref 0–0.01)
NRBC BLD-RTO: 0.2 PER 100 WBC
PLATELET # BLD AUTO: 211 K/UL (ref 150–400)
PMV BLD AUTO: 11.2 FL (ref 8.9–12.9)
POTASSIUM SERPL-SCNC: 4.4 MMOL/L (ref 3.5–5.1)
RBC # BLD AUTO: 4.31 M/UL (ref 3.8–5.2)
SERVICE CMNT-IMP: ABNORMAL
SODIUM SERPL-SCNC: 135 MMOL/L (ref 136–145)
WBC # BLD AUTO: 8 K/UL (ref 3.6–11)

## 2020-09-12 PROCEDURE — 74011250636 HC RX REV CODE- 250/636: Performed by: HOSPITALIST

## 2020-09-12 PROCEDURE — 94760 N-INVAS EAR/PLS OXIMETRY 1: CPT

## 2020-09-12 PROCEDURE — 65660000000 HC RM CCU STEPDOWN

## 2020-09-12 PROCEDURE — 77010033678 HC OXYGEN DAILY

## 2020-09-12 PROCEDURE — 80048 BASIC METABOLIC PNL TOTAL CA: CPT

## 2020-09-12 PROCEDURE — 74011250637 HC RX REV CODE- 250/637: Performed by: INTERNAL MEDICINE

## 2020-09-12 PROCEDURE — 74011250637 HC RX REV CODE- 250/637: Performed by: HOSPITALIST

## 2020-09-12 PROCEDURE — 36415 COLL VENOUS BLD VENIPUNCTURE: CPT

## 2020-09-12 PROCEDURE — 74011000250 HC RX REV CODE- 250: Performed by: INTERNAL MEDICINE

## 2020-09-12 PROCEDURE — 74011250636 HC RX REV CODE- 250/636: Performed by: INTERNAL MEDICINE

## 2020-09-12 PROCEDURE — 82962 GLUCOSE BLOOD TEST: CPT

## 2020-09-12 PROCEDURE — 85027 COMPLETE CBC AUTOMATED: CPT

## 2020-09-12 RX ORDER — IPRATROPIUM BROMIDE AND ALBUTEROL SULFATE 2.5; .5 MG/3ML; MG/3ML
3 SOLUTION RESPIRATORY (INHALATION)
Status: DISCONTINUED | OUTPATIENT
Start: 2020-09-12 | End: 2020-09-12

## 2020-09-12 RX ORDER — HYDRALAZINE HYDROCHLORIDE 20 MG/ML
10 INJECTION INTRAMUSCULAR; INTRAVENOUS ONCE
Status: COMPLETED | OUTPATIENT
Start: 2020-09-12 | End: 2020-09-12

## 2020-09-12 RX ORDER — IPRATROPIUM BROMIDE AND ALBUTEROL SULFATE 2.5; .5 MG/3ML; MG/3ML
3 SOLUTION RESPIRATORY (INHALATION)
Status: DISCONTINUED | OUTPATIENT
Start: 2020-09-12 | End: 2020-09-15 | Stop reason: HOSPADM

## 2020-09-12 RX ADMIN — Medication 10 ML: at 06:57

## 2020-09-12 RX ADMIN — DULOXETINE HYDROCHLORIDE 60 MG: 30 CAPSULE, DELAYED RELEASE ORAL at 08:30

## 2020-09-12 RX ADMIN — Medication 10 ML: at 14:58

## 2020-09-12 RX ADMIN — TRAMADOL HYDROCHLORIDE 25 MG: 50 TABLET, FILM COATED ORAL at 08:13

## 2020-09-12 RX ADMIN — ENOXAPARIN SODIUM 180 MG: 100 INJECTION SUBCUTANEOUS at 06:00

## 2020-09-12 RX ADMIN — QUETIAPINE FUMARATE 200 MG: 100 TABLET ORAL at 21:32

## 2020-09-12 RX ADMIN — POLYMYXIN B SULFATE, BACITRACIN ZINC, NEOMYCIN SULFATE: 5000; 3.5; 4 OINTMENT TOPICAL at 10:32

## 2020-09-12 RX ADMIN — Medication 10 ML: at 21:32

## 2020-09-12 RX ADMIN — TRAMADOL HYDROCHLORIDE 25 MG: 50 TABLET, FILM COATED ORAL at 21:49

## 2020-09-12 RX ADMIN — DULOXETINE HYDROCHLORIDE 30 MG: 30 CAPSULE, DELAYED RELEASE ORAL at 08:31

## 2020-09-12 RX ADMIN — HYDROCORTISONE: 1 CREAM TOPICAL at 08:38

## 2020-09-12 RX ADMIN — POLYMYXIN B SULFATE, BACITRACIN ZINC, NEOMYCIN SULFATE: 5000; 3.5; 4 OINTMENT TOPICAL at 17:51

## 2020-09-12 RX ADMIN — MICONAZOLE NITRATE: 20 CREAM TOPICAL at 17:52

## 2020-09-12 RX ADMIN — APIXABAN 10 MG: 5 TABLET, FILM COATED ORAL at 17:42

## 2020-09-12 RX ADMIN — ACETAMINOPHEN 650 MG: 325 TABLET ORAL at 17:58

## 2020-09-12 RX ADMIN — PANTOPRAZOLE SODIUM 40 MG: 40 TABLET, DELAYED RELEASE ORAL at 06:58

## 2020-09-12 RX ADMIN — HYDROCORTISONE: 1 CREAM TOPICAL at 18:00

## 2020-09-12 RX ADMIN — HYDRALAZINE HYDROCHLORIDE 10 MG: 20 INJECTION INTRAMUSCULAR; INTRAVENOUS at 00:52

## 2020-09-12 RX ADMIN — ATORVASTATIN CALCIUM 10 MG: 10 TABLET, FILM COATED ORAL at 21:32

## 2020-09-12 RX ADMIN — LORAZEPAM 0.5 MG: 0.5 TABLET ORAL at 18:51

## 2020-09-12 RX ADMIN — MICONAZOLE NITRATE: 20 CREAM TOPICAL at 08:38

## 2020-09-12 NOTE — PROGRESS NOTES
telemed: Patient has a blood pressure of 162/92, has been consistently high since 1800 today. please review chart and order BP meds as needed.     Plan: Chart reviiewed and hydralazine

## 2020-09-12 NOTE — PROGRESS NOTES
[facility] Consult Service Note  Pulmonary, Critical Care, and Sleep Medicine    Name: Manjeet Hamm MRN: [id number]   : 1955 Hospital: [facility]   Date: 2020  Admission date: 2020 Hospital Day: 5       Subjective/Interval History:   Seen earlier today on rounds. Pt is unstable and acutely ill. Medical records and data reviewed. 9/10 snoring loudly with apneas when I walked in. Easily aroused. No notes inour office since . Sleep study  [facility] in . Pt without CPAP machine due to no coverage? Has Medicaid, Medicare. Has has  before in 2016 while admitted for pneumonia. Now on IV heparin. No mention of DVT in that note. Pt reports blood clot 12 yrs ago but only on infections for a few weeks? expalined need for close followup. When she did not bring her machine in to a sleep visit, her sleep doc told her not to worry, that she did not need it. I think pt misinterpreted that as meaning she did not need it ever and stopped wearing it and then CPAP removed. D dimer 1.02( 0-0.65)    Dopplers technically difficult : no obvious DVt but the common femoral, femoral and popliteal vein(s) were imaged in the transverse and longitudinal planes. The vessels showed normal color filling and compressibility. Doppler interrogation of the veins showed phasic and spontaneous flow. The great saphenous (thigh) and posterior tibial vein(s) were not well visualized.  falls asleep very easily. No chest pain. Knows sleep apnea very bad and worse this past yr.previoius used for DME. No hcest pain. Now OOBin chair and hard to wake up. Labs reviewed . Renal function better. Called CT scan weight limit about    : CTA with + PE. Started on eliquis. States she is feeling well; had some issues with hypertension overnight. IMPRESSION:   1. Acute respiratory failure with Hypoxia?  Baseline-at risk for restrictive thoracic ventilatory defect and hypoventilation- asked to see pt for possible PE- now on IV heparin; too large for VQ scan; challenging case; Cr better  2. Acute PE; 2nd VTE event (prior remote DVT)  3. Dyspnea- pt aware weight an issue  4. MURIEL off CPAP- last sleep study 6 yrs ago- [facility]  5. Acute renal failure better  6. Bipolar disorder continue with the Seroquel, duloxetine  7. Dyslipidemia continue statins   8. Bilateral thigh ulcers. 9. GERD (gastroesophageal reflux disease)  10. Hypertension  11. Morbid Obesity Body mass index is 74.23 kg/m². pt claims from Seroquel  12. Severe DJD  13. Multiorgan dysfunction as outlined above: Pt has one or more acute or chronic illnesses with severe exacerbation with progression or side effects of treatment that poses a threat to life or bodily function  14. Additional workup outlined below  15. Pt is requiring Drug therapy requiring intensive monitoring for toxicity  16. Prognosis guarded       RECOMMENDATIONS/PLAN:   1. Mobilze- and check oxygen levels,may need home O2  2. Eliquis started; recommend lifelong anticoagulation given recurrent evnts  3. S/p knee injection yesterday  4. She will call our office for sleep clinic appointmentmnt and Sleep study ASAP  5. Wean O2 off if possible   6. Labs to follow electrolytes, renal function and and blood counts    Will see again Monday          [x] High complexity decision making was performed  [x] See my orders for details      Subjective/Initial History:     I was asked by Marilyn Devi MD to see Mukund Rosa  a 76 y.o.   female in consultation for a chief complaint of hypoxia. Possible PE    Excerpts from admission 9/8/2020 or consult notes as follows:     \" Mukund Rosa is a 76 y.o. female with past medical history of hypertension bipolar disorder, depression, history of DVT presents with several days history of dizziness and lightheadedness, no vertigo.   As per the patient she has been dizzy for the last several days mostly on exertion and climbing up stairs. Recently the symptoms have got worse. Along with the current symptoms the patient also noted increasing shortness of breath worse on exertion. Denies chest pain positive nausea . no headaches or visual disturbance or weakness of the arms or legs. Patient came to the ER for further evaluation. In the ER the patient was found to be hypoxic with pulse ox in 80s which improved on 2 L of nasal cannula. Also on the labs patient was found to have acute renal failure with creatinine of 3.1. Patient's baseline creatinine is 1.0 in February 2020. VQ scan was ordered which could not be done because of the patient's weight. Patient has currently been started on heparin for a possible diagnosis of PE. At this time patient is comfortable. No other medical concerns at this time. Patient denies dysuria, diarrhea, vomiting, fever or chills. She also denies tinnitus or headache. She denies rectal bleeding or dark stools. \"    Pt with known DVT history but no details. Known MURIEL but not on CPAP per her sleep doc? No details. I met her two yrs ago when her mother Mark Parker passed. Not sleeping well and uses Seroquel but reports her weight is higher on this agent. Lives in [place] with upstairs bedroom. Not on home O2. Lives with 31 yo grandson. No known covid contacts. No cough, fever, anosmia.  Sx started three weeks ago      No Known Allergies     MAR reviewed and pertinent medications noted or modified as needed     Current Facility-Administered Medications   Medication    apixaban (ELIQUIS) tablet 10 mg    Followed by   Lizzie Laguerre ON 9/19/2020] apixaban (ELIQUIS) tablet 5 mg    albuterol-ipratropium (DUO-NEB) 2.5 MG-0.5 MG/3 ML    lidocaine 4 % patch 2 Patch    traMADoL (ULTRAM) tablet 25 mg    neomycin-bacitracin-polymyxin (NEOSPORIN) ointment    DULoxetine (CYMBALTA) capsule 30 mg    DULoxetine (CYMBALTA) capsule 60 mg    hydrocortisone-aloe vera 1 % topical cream    LORazepam (ATIVAN) tablet 0.5 mg    miconazole (SECURA) 2 % extra thick cream    pantoprazole (PROTONIX) tablet 40 mg    QUEtiapine (SEROquel) tablet 200 mg    atorvastatin (LIPITOR) tablet 10 mg    sodium chloride (NS) flush 5-40 mL    sodium chloride (NS) flush 5-40 mL    acetaminophen (TYLENOL) tablet 650 mg    Or    acetaminophen (TYLENOL) suppository 650 mg    polyethylene glycol (MIRALAX) packet 17 g    promethazine (PHENERGAN) tablet 12.5 mg    Or    ondansetron (ZOFRAN) injection 4 mg      Patient PCP: Anika Page MD  PMH:  has a past medical history of Arthritis, Bipolar 1 disorder ([facility] [place] [address] GERD (gastroesophageal reflux disease), Hypertension, Other ill-defined conditions(799.89), Psychiatric disorder, Sleep apnea, Thromboembolus ([facility] [place] [address] and Unspecified sleep apnea. PSH:   has a past surgical history that includes hx gi; hx cholecystectomy; and hx hysterectomy. FHX: family history includes Arthritis-osteo in her mother and sister; Diabetes in her mother, sister, and sister; Drug Abuse in her sister; Heart Attack in her sister; Heart Disease in her mother and sister; High Cholesterol in her mother and sister; Schizophrenia in her paternal grandmother. SHX:  reports that she has never smoked. She has never used smokeless tobacco. She reports that she does not drink alcohol or use drugs. ROS:A comprehensive review of systems was negative except for that written in the HPI.     Objective:     Vital Signs: Telemetry:     Intake/Output:   Visit Vitals  BP (!) 152/80   Pulse 87   Temp 98.6 °F (37 °C)   Resp 18   Ht 5' (1.524 m)   Wt (!) 172.4 kg (380 lb 1.2 oz)   SpO2 96%   BMI 74.23 kg/m²       Temp (24hrs), Av.9 °F (36.6 °C), Min:97.3 °F (36.3 °C), Max:98.6 °F (37 °C)        O2 Device: Nasal cannula O2 Flow Rate (L/min): 2 l/min       Wt Readings from Last 4 Encounters:   09/10/20 (!) 172.4 kg (380 lb 1.2 oz)   20 (!) 170.1 kg (375 lb)   20 (!) 170.1 kg (375 lb)   20 (!) 176.4 kg (389 lb) Intake/Output Summary (Last 24 hours) at 9/12/2020 0839  Last data filed at 9/12/2020 0711  Gross per 24 hour   Intake 120 ml   Output 2525 ml   Net -2405 ml       Last shift:      09/12 0701 - 09/12 1900  In: -   Out: 300 [Urine:300]  Last 3 shifts: 09/10 1901 - 09/12 0700  In: 420 [P.O.:120; I.V.:300]  Out: 3325 [Urine:3325]       Physical Exam:   General:  female; NC;  HEENT: NCAT, poor dentition, lips and mucosa dry  Eyes: anicteric; conjunctiva clear  Neck: no nodes, no cuff leak, no accessory MM use. Chest: no deformity,   Cardiac: regular rate, rhythm; no murmur  Lungs: distant breath sounds; no wheezes, no rales, no rhonchi  Abd: soft, NT, hypoactive BS, OBESE  Ext: no edema; no joint swelling; No clubbing  : NO murray, clear urine  Neuro: fluent, follows commands  Psych- no agitation, oriented to person;   Skin: warm, dry, no cyanosis;   Pulses: 1-2+ Bilateral pedal, radial  Capillary: brisk; pale    Labs:    Recent Labs     09/12/20  0316 09/11/20  0508 09/10/20  2212 09/10/20  1430  09/10/20  0346   WBC 8.0 7.2  --   --   --  6.2   HGB 12.5 10.8*  --   --   --  10.7*    180  --   --   --  174   APTT  --  67.8* 56.7* 36.5*   < > 111.0*    < > = values in this interval not displayed. Recent Labs     09/12/20 0316 09/11/20  0508 09/10/20  0346 09/09/20  0945   * 134* 139 136   K 4.4 4.5 4.4 4.5    109* 109* 107   CO2 29 23 26 24   * 107* 103* 90   BUN 10 10 17 28*   CREA 1.08* 0.96 1.09* 1.92*   CA 9.7 9.2 9.0 9.3   PHOS  --   --  2.3*  --    ALB  --   --  3.0* 3.9   ALT  --   --  31 34     No results for input(s): PH, PCO2, PO2, HCO3, FIO2 in the last 72 hours.   Recent Labs     09/09/20  0945   TROIQ <0.05     No results found for: BNPP, BNP   Lab Results   Component Value Date/Time    Culture result: ENTEROCOCCUS FAECIUM GROUP D  (PREDOMINATING)   09/09/2015 05:12 PM    Culture result: KLEBSIELLA PNEUMONIAE  (15,000 COLONIES/ML)   09/09/2015 05:12 PM    Culture result: ENTEROCOCCUS FAECIUM GROUP D 07/16/2015 01:05 PM    Culture result: MIXED UROGENITAL JIN ISOLATED 07/16/2015 01:05 PM     Lab Results   Component Value Date/Time    TSH 0.52 09/10/2015 03:53 AM       Imaging:    CXR Results  (Last 48 hours)    None          Results from [place] encounter on 09/08/20   CTA CHEST W OR W WO CONT    Narrative INDICATION:   hypoxia rule out PE    COMPARISON: None. TECHNIQUE:   Precontrast  images were obtained to localize the volume for acquisition. Multislice helical CT arteriography was performed from the diaphragm to the  thoracic inlet during uneventful rapid bolus intravenous administration of 100  mL of Isovue-370. Lung and soft tissue windows were generated. Post processing  was performed and coronal reformatted images were also generated. 3 D imaging  was performed. CT dose reduction was achieved through use of a standardized  protocol tailored for this examination and automatic exposure control for dose  modulation. FINDINGS:  There are areas of discoid atelectasis. There is evidence of pulmonary emboli to the right lower lobe    There is no mediastinal or hilar adenopathy or mass. The aorta enhances normally  without evidence of aneurysm or dissection. There is lipomatous hypertrophy of  the interatrial septum    The visualized portions of the upper abdominal organs are normal. There is a  small hiatal hernia      Impression IMPRESSION:   1. Study is positive for pulmonary emboli to the right lower lobe. Results called to the nurse by myself. There are areas of discoid atelectasis bilaterally.          This care involved high complexity medical decision making: I personally:  · Reviewed the flowsheet and previous days notes  · Reviewed and summarized records or history from previous days note or discussions with staff, family  · High Risk Drug therapy requiring intensive monitoring for toxicity: eg steroids, pressors, antibiotics  · Reviewed and/or ordered Clinical lab tests  · Reviewed images and/or ordered Radiology tests  · discussed my assessment/management with : Nursing, for coordination of care    Delon Wynn DO

## 2020-09-12 NOTE — PROGRESS NOTES
Hospitalist Progress Note    NAME: Glory Dunbar   :  1955   MRN:  [id number]       Assessment / Plan:        Acute hypoxemic respiratory with hypoxemia  Possible Acute PE  MURIEL/OHS  Patient presented with sob and hypoxia  Ct chest/ap/p with no acute pathology. Doppler of legs negative for DVT  Patient can't undergo VQ lung scan due to weight > 300 lbs. CTA chest not possible due to renal failure on admit, Cr of 3. Pulmonology team is following. Patient on 1L NC, and heparin. Cr normalized, will consider ordering CTA chest tomorrow if kidney function remains stable. Acute renal failure  Patient takes lisinopril and lasix at home. Cr 3.1 on admit. Resolved with IVF. Close monitor. Bipolar disorder  Stable. Continue home duloxetine and seroquel    Severe DJD  Patient c/o Knee pain and Lidocaine patch to knees ordered. Tramadol prn. GERD  HTN  Continue home statin, PPI.    :   Feels better . Now off Heparin drip  I ordered CTA as her renal function is normal . Echo showed   · LV: Estimated LVEF is 65 - 70%. Visually measured ejection fraction. Mild concentric hypertrophy. Hyperdynamic systolic function. · PA: Mild pulmonary hypertension. Pulmonary arterial systolic pressure is 47 mmHg. · Echo study was technically difficulty. · Image quality for this study was suboptimal       :  CTA positive for PE was started on Lovenox transitioned to NOAC. She gets easily short of breath may need home Oxygen. ABG/ walk test pending. 40 or above Morbid obesity / Body mass index is 74.23 kg/m². Code status: Full  Prophylaxis: heparin drip  Recommended Disposition: Home w/Family     Subjective:     Chief Complaint / Reason for Physician Visit  \"Patient reports sob is improved. No nausea. No vomiting. \". Discussed with RN events overnight. Has knee pain Ortho consulted for cortisone shot    Review of Systems:  Except as documented all systems reviewed and negative.       Objective: VITALS:   Last 24hrs VS reviewed since prior progress note. Most recent are:  Patient Vitals for the past 24 hrs:   Temp Pulse Resp BP SpO2   09/12/20 1211 98.9 °F (37.2 °C) 78 17 104/80 99 %   09/12/20 1107 98.2 °F (36.8 °C) 78 17 (!) 150/87 96 %   09/12/20 0821 98.6 °F (37 °C) 87 18 (!) 152/80 96 %   09/12/20 0757 98.1 °F (36.7 °C) 87 18 (!) 140/74 99 %   09/12/20 0303 97.6 °F (36.4 °C) (!) 103 18 (!) 144/80 96 %   09/12/20 0000  (!) 107      09/11/20 2225 97.8 °F (36.6 °C) 89 20 (!) 162/94 94 %   09/11/20 2000  87      09/11/20 1959 97.9 °F (36.6 °C) 82 16 (!) 151/91 96 %   09/11/20 1846 97.3 °F (36.3 °C) 81 16 (!) 173/100 93 %   09/11/20 1409 98.1 °F (36.7 °C) 90 16 (!) 145/74 98 %       Intake/Output Summary (Last 24 hours) at 9/12/2020 1314  Last data filed at 9/12/2020 0933  Gross per 24 hour   Intake 120 ml   Output 2125 ml   Net -2005 ml        PHYSICAL EXAM:  General: Very obese. Alert. cooperative, no acute distress    EENT:  Anicteric sclerae. Resp:  CTA bilaterally, no wheezing or rales. No accessory muscle use  CV:  Regular rate. S1S2 present,  distant heart sounds. No edema  GI:  Soft, Non distended, Non tender.  +Bowel sounds  Neurologic:  Alert and oriented X 3, normal speech,   Psych:   not agitated  Skin:  No rashes. No jaundice    Reviewed most current lab test results and cultures  YES  Reviewed most current radiology test results   YES  Review and summation of old records today    NO  Reviewed patient's current orders and MAR    YES  PMH/SH reviewed - no change compared to H&P  ________________________________________________________________________________  Rocky Turner MD     Procedures: see electronic medical records for all procedures/Xrays and details which were not copied into this note but were reviewed prior to creation of Plan. LABS:  I reviewed today's most current labs and imaging studies.   Pertinent labs include:  Recent Labs     09/12/20  0316 09/11/20  3404 09/10/20  0346   WBC 8.0 7.2 6.2   HGB 12.5 10.8* 10.7*   HCT 38.3 33.1* 33.9*    180 174     Recent Labs     09/12/20  0316 09/11/20  0508 09/10/20  0346   * 134* 139   K 4.4 4.5 4.4    109* 109*   CO2 29 23 26   * 107* 103*   BUN 10 10 17   CREA 1.08* 0.96 1.09*   CA 9.7 9.2 9.0   PHOS  --   --  2.3*   ALB  --   --  3.0*   TBILI  --   --  0.6   ALT  --   --  31       Signed: Conception MD Aston

## 2020-09-12 NOTE — ROUTINE PROCESS
Bedside and Verbal shift change report given to Rita Bennett (oncoming nurse) by Tracy Beasley (offgoing nurse). Report included the following information SBAR, Kardex, Procedure Summary, Intake/Output, MAR and Cardiac Rhythm NSR - tach Pt had no complaints of pain. Night shift nurse advised O2 challenge needs to be completed tomorrow. CT of the chest completed; see results. PT came to work with patient but she refused. Explained the importance for discharge planning; pt states she will work with them tomorrow. Pt up with 2 assist and the walker to the recliner and bathroom. Ativan given once for anxiety. JADE elevated when anxious but otherwise asymptomatic. Heparin drip discontinued and pt now on subq Lovenox. Dual skin check done at both beginning and end of shift.

## 2020-09-12 NOTE — PROGRESS NOTES
General Surgery End of Shift Nursing Note    Bedside shift change report given to [facility] Laurie Allen (oncoming nurse) by Vivian Rodríguez (offgoing nurse). Report included the following information SBAR, Kardex and Intake/Output. Shift worked:   [id number]   Summary of shift:    Patient emotional during shift, provided  reassurance. Woundcare completed to buttocks and thighs. Arterial blood gas to be drawn 9/13/20. Evaluated patient for home oxygen, see note. Issues for physician to address:   None     Number times ambulated in hallway past shift: 0    Number of times OOB to chair past shift: 2    Pain Management:  Current medication: Tylenol, Tramadol  Patient states pain is manageable on current pain medication: YES    GI:    Current diet:  DIET RENAL Regular    Tolerating current diet: YES  Passing flatus: YES  Last Bowel Movement: today   Appearance: MICHELE    Respiratory:    Incentive Spirometer at bedside: NO  Patient instructed on use: NO    Patient Safety:    Falls Score: 3  Bed Alarm On? No  Sitter?  Not applicable    Antonella Jackson

## 2020-09-12 NOTE — PROGRESS NOTES
Problem: Falls - Risk of  Goal: *Absence of Falls  Description: Document Sayra Douglas Fall Risk and appropriate interventions in the flowsheet. Outcome: Progressing Towards Goal  Note: Fall Risk Interventions:  Mobility Interventions: Communicate number of staff needed for ambulation/transfer         Medication Interventions: Teach patient to arise slowly    Elimination Interventions: Call light in reach              Problem: Patient Education: Go to Patient Education Activity  Goal: Patient/Family Education  Outcome: Progressing Towards Goal     Problem: Patient Education: Go to Patient Education Activity  Goal: Patient/Family Education  Outcome: Progressing Towards Goal     Problem: Patient Education: Go to Patient Education Activity  Goal: Patient/Family Education  Outcome: Progressing Towards Goal     Problem: Pressure Injury - Risk of  Goal: *Prevention of pressure injury  Description: Document Inder Scale and appropriate interventions in the flowsheet.   Outcome: Progressing Towards Goal  Note: Pressure Injury Interventions:       Moisture Interventions: Internal/External urinary devices, Maintain skin hydration (lotion/cream), Minimize layers, Moisture barrier    Activity Interventions: Increase time out of bed    Mobility Interventions: HOB 30 degrees or less    Nutrition Interventions: Document food/fluid/supplement intake    Friction and Shear Interventions: HOB 30 degrees or less                Problem: Patient Education: Go to Patient Education Activity  Goal: Patient/Family Education  Outcome: Progressing Towards Goal

## 2020-09-12 NOTE — PROGRESS NOTES
Problem: Falls - Risk of  Goal: *Absence of Falls  Description: Document Jennifer Barr Fall Risk and appropriate interventions in the flowsheet. Outcome: Progressing Towards Goal  Note: Fall Risk Interventions:  Mobility Interventions: Communicate number of staff needed for ambulation/transfer         Medication Interventions: Teach patient to arise slowly    Elimination Interventions: Call light in reach              Problem: Pressure Injury - Risk of  Goal: *Prevention of pressure injury  Description: Document Inder Scale and appropriate interventions in the flowsheet.   Outcome: Progressing Towards Goal  Note: Pressure Injury Interventions:       Moisture Interventions: Internal/External urinary devices    Activity Interventions: Increase time out of bed, PT/OT evaluation    Mobility Interventions: HOB 30 degrees or less, PT/OT evaluation    Nutrition Interventions: Document food/fluid/supplement intake    Friction and Shear Interventions: HOB 30 degrees or less, Lift sheet, Lift team/patient mobility team, Minimize layers

## 2020-09-12 NOTE — PROGRESS NOTES
Patient sat's 96% on room air at rest.  Patient sat's 80% on room air during ambulation. Patient sat's 76 % on 2 liters during ambulation.   Patient sat's 95% on 2 liters at rest.

## 2020-09-12 NOTE — PROGRESS NOTES
General Surgery End of Shift Nursing Note    Bedside shift change report given to Saurav Thornton (oncoming nurse) by Stas Soto (offgoing nurse). Report included the following information SBAR, Kardex, Intake/Output, MAR and Recent Results. Shift worked:   [id number]   Summary of shift:   Patient was medicated with tramadol for pain. Patient was medicated with hydralazine for elevated BP. Patient had 1 BM overnight. Issues for physician to address:        Number times ambulated in hallway past shift: 0    Number of times OOB to chair past shift: 2    Pain Management:  Current medication: tramdol  Patient states pain is manageable on current pain medication: YES    GI:    Current diet:  DIET RENAL Regular    Tolerating current diet: YES  Passing flatus: YES  Last Bowel Movement: yesterday   Appearance: soft    Respiratory:    Incentive Spirometer at bedside: YES  Patient instructed on use: YES    Patient Safety:    Falls Score: 3  Bed Alarm On? No  Sitter? No    Lidia Pagan

## 2020-09-12 NOTE — PROGRESS NOTES
Hospitalist paged about elevated BP from 1800 and asked to review chart @ 2718  . Awaiting feedback.

## 2020-09-13 LAB
ANION GAP SERPL CALC-SCNC: 3 MMOL/L (ref 5–15)
ARTERIAL PATENCY WRIST A: ABNORMAL
BASE EXCESS BLD CALC-SCNC: 5 MMOL/L
BDY SITE: ABNORMAL
BUN SERPL-MCNC: 12 MG/DL (ref 6–20)
BUN/CREAT SERPL: 12 (ref 12–20)
CA-I BLD-SCNC: 1.29 MMOL/L (ref 1.12–1.32)
CALCIUM SERPL-MCNC: 9.1 MG/DL (ref 8.5–10.1)
CHLORIDE SERPL-SCNC: 102 MMOL/L (ref 97–108)
CO2 SERPL-SCNC: 32 MMOL/L (ref 21–32)
CREAT SERPL-MCNC: 0.97 MG/DL (ref 0.55–1.02)
ERYTHROCYTE [DISTWIDTH] IN BLOOD BY AUTOMATED COUNT: 14.1 % (ref 11.5–14.5)
GAS FLOW.O2 O2 DELIVERY SYS: ABNORMAL L/MIN
GLUCOSE SERPL-MCNC: 110 MG/DL (ref 65–100)
HCO3 BLD-SCNC: 29.4 MMOL/L (ref 22–26)
HCT VFR BLD AUTO: 36 % (ref 35–47)
HGB BLD-MCNC: 11.5 G/DL (ref 11.5–16)
MCH RBC QN AUTO: 29 PG (ref 26–34)
MCHC RBC AUTO-ENTMCNC: 31.9 G/DL (ref 30–36.5)
MCV RBC AUTO: 90.7 FL (ref 80–99)
NRBC # BLD: 0 K/UL (ref 0–0.01)
NRBC BLD-RTO: 0 PER 100 WBC
PCO2 BLD: 46.3 MMHG (ref 35–45)
PH BLD: 7.41 [PH] (ref 7.35–7.45)
PLATELET # BLD AUTO: 194 K/UL (ref 150–400)
PMV BLD AUTO: 11.1 FL (ref 8.9–12.9)
PO2 BLD: 64 MMHG (ref 80–100)
POTASSIUM SERPL-SCNC: 3.7 MMOL/L (ref 3.5–5.1)
RBC # BLD AUTO: 3.97 M/UL (ref 3.8–5.2)
SAO2 % BLD: 92 % (ref 92–97)
SODIUM SERPL-SCNC: 137 MMOL/L (ref 136–145)
SPECIMEN TYPE: ABNORMAL
TOTAL RESP. RATE, ITRR: 20
WBC # BLD AUTO: 7.7 K/UL (ref 3.6–11)

## 2020-09-13 PROCEDURE — 36415 COLL VENOUS BLD VENIPUNCTURE: CPT

## 2020-09-13 PROCEDURE — 74011000250 HC RX REV CODE- 250: Performed by: INTERNAL MEDICINE

## 2020-09-13 PROCEDURE — 74011250637 HC RX REV CODE- 250/637: Performed by: HOSPITALIST

## 2020-09-13 PROCEDURE — 94760 N-INVAS EAR/PLS OXIMETRY 1: CPT

## 2020-09-13 PROCEDURE — 85027 COMPLETE CBC AUTOMATED: CPT

## 2020-09-13 PROCEDURE — 82803 BLOOD GASES ANY COMBINATION: CPT

## 2020-09-13 PROCEDURE — 80048 BASIC METABOLIC PNL TOTAL CA: CPT

## 2020-09-13 PROCEDURE — 74011250637 HC RX REV CODE- 250/637: Performed by: INTERNAL MEDICINE

## 2020-09-13 PROCEDURE — 77010033678 HC OXYGEN DAILY

## 2020-09-13 PROCEDURE — 65660000000 HC RM CCU STEPDOWN

## 2020-09-13 PROCEDURE — 36600 WITHDRAWAL OF ARTERIAL BLOOD: CPT

## 2020-09-13 RX ORDER — DIPHENHYDRAMINE HCL 25 MG
25 CAPSULE ORAL
Status: DISCONTINUED | OUTPATIENT
Start: 2020-09-13 | End: 2020-09-15 | Stop reason: HOSPADM

## 2020-09-13 RX ADMIN — DIPHENHYDRAMINE HYDROCHLORIDE 25 MG: 25 CAPSULE ORAL at 10:47

## 2020-09-13 RX ADMIN — APIXABAN 10 MG: 5 TABLET, FILM COATED ORAL at 09:40

## 2020-09-13 RX ADMIN — LORAZEPAM 0.5 MG: 0.5 TABLET ORAL at 20:14

## 2020-09-13 RX ADMIN — HYDROCORTISONE: 1 CREAM TOPICAL at 17:16

## 2020-09-13 RX ADMIN — TRAMADOL HYDROCHLORIDE 25 MG: 50 TABLET, FILM COATED ORAL at 12:07

## 2020-09-13 RX ADMIN — POLYMYXIN B SULFATE, BACITRACIN ZINC, NEOMYCIN SULFATE: 5000; 3.5; 4 OINTMENT TOPICAL at 09:41

## 2020-09-13 RX ADMIN — QUETIAPINE FUMARATE 200 MG: 100 TABLET ORAL at 21:36

## 2020-09-13 RX ADMIN — PANTOPRAZOLE SODIUM 40 MG: 40 TABLET, DELAYED RELEASE ORAL at 07:02

## 2020-09-13 RX ADMIN — MICONAZOLE NITRATE: 20 CREAM TOPICAL at 17:16

## 2020-09-13 RX ADMIN — Medication 10 ML: at 17:16

## 2020-09-13 RX ADMIN — POLYMYXIN B SULFATE, BACITRACIN ZINC, NEOMYCIN SULFATE: 5000; 3.5; 4 OINTMENT TOPICAL at 17:16

## 2020-09-13 RX ADMIN — MICONAZOLE NITRATE: 20 CREAM TOPICAL at 09:41

## 2020-09-13 RX ADMIN — Medication 10 ML: at 07:01

## 2020-09-13 RX ADMIN — DULOXETINE HYDROCHLORIDE 30 MG: 30 CAPSULE, DELAYED RELEASE ORAL at 09:40

## 2020-09-13 RX ADMIN — Medication 10 ML: at 21:37

## 2020-09-13 RX ADMIN — APIXABAN 10 MG: 5 TABLET, FILM COATED ORAL at 17:15

## 2020-09-13 RX ADMIN — ATORVASTATIN CALCIUM 10 MG: 10 TABLET, FILM COATED ORAL at 21:36

## 2020-09-13 RX ADMIN — DIPHENHYDRAMINE HYDROCHLORIDE 25 MG: 25 CAPSULE ORAL at 17:15

## 2020-09-13 RX ADMIN — HYDROCORTISONE: 1 CREAM TOPICAL at 09:42

## 2020-09-13 RX ADMIN — DULOXETINE HYDROCHLORIDE 60 MG: 30 CAPSULE, DELAYED RELEASE ORAL at 09:40

## 2020-09-13 NOTE — PROGRESS NOTES
General Surgery End of Shift Nursing Note    Bedside shift change report given to Amrik Jones  (oncoming nurse) by Catrina Luna (offgoing nurse). Report included the following information SBAR, Kardex, Procedure Summary, Intake/Output, MAR and Recent Results. Shift worked:   7a-7p   Summary of shift:    Pt medicated for pain as needed. Pt up to chair several times. Wound care completed as ordered. Duque draining jana urine.     Issues for physician to address:        Number times ambulated in hallway past shift: 0    Number of times OOB to chair past shift: 3    Pain Management:  Current medication: Tramadol   Patient states pain is manageable on current pain medication: YES    GI:    Current diet:  DIET CARDIAC Regular    Tolerating current diet: YES  Passing flatus: YES  Last Bowel Movement: today          Edson Rhoades

## 2020-09-13 NOTE — PROGRESS NOTES
.General Surgery End of Shift Nursing Note    Bedside shift change report given to Becky Mckeon RN  (oncoming nurse) by Bryce Peter RN  (offgoing nurse). Report included the following information SBAR, Kardex, MAR and Recent Results. Shift worked:   7p-7a   Summary of shift:    Patient c/o headache, was given tramadol to relieve pain. Patient also requested to stay in the chair all night, as the specialty bed \"increased her anxiety and made her uncomfortable. \" No other issues occurred during shift    Issues for physician to address:  None     Number times ambulated in hallway past shift: 0   Number of times OOB to chair past shift: 0    Pain Management:  Current medication: Tramadol   Patient states pain is manageable on current pain medication: yes     GI:    Current diet:  DIET RENAL Regular    Tolerating current diet: yes   Passing flatus: yes   Last Bowel Movement: unknown    Appearance: unknown     Respiratory:    Incentive Spirometer at bedside: no   Patient instructed on use: no     Patient Safety:    Falls Score: 3  Bed Alarm On? no  Sitter?  No     Sesar Freire

## 2020-09-13 NOTE — PROGRESS NOTES
Problem: Falls - Risk of  Goal: *Absence of Falls  Description: Document Selam Patches Fall Risk and appropriate interventions in the flowsheet. Outcome: Progressing Towards Goal  Note: Fall Risk Interventions:  Mobility Interventions: Communicate number of staff needed for ambulation/transfer         Medication Interventions: Teach patient to arise slowly, Patient to call before getting OOB    Elimination Interventions: Call light in reach, Patient to call for help with toileting needs              Problem: Patient Education: Go to Patient Education Activity  Goal: Patient/Family Education  Outcome: Progressing Towards Goal     Problem: Patient Education: Go to Patient Education Activity  Goal: Patient/Family Education  Outcome: Progressing Towards Goal     Problem: Patient Education: Go to Patient Education Activity  Goal: Patient/Family Education  Outcome: Progressing Towards Goal     Problem: Pressure Injury - Risk of  Goal: *Prevention of pressure injury  Description: Document Inder Scale and appropriate interventions in the flowsheet.   Outcome: Progressing Towards Goal  Note: Pressure Injury Interventions:       Moisture Interventions: Absorbent underpads, Internal/External urinary devices, Maintain skin hydration (lotion/cream), Minimize layers    Activity Interventions: Increase time out of bed, PT/OT evaluation    Mobility Interventions: Float heels, PT/OT evaluation    Nutrition Interventions: Document food/fluid/supplement intake    Friction and Shear Interventions: HOB 30 degrees or less                Problem: Patient Education: Go to Patient Education Activity  Goal: Patient/Family Education  Outcome: Progressing Towards Goal

## 2020-09-13 NOTE — PROGRESS NOTES
Hospitalist Progress Note    NAME: Kailyn Abdi   :  1955   MRN:  [id number]       Assessment / Plan:        Acute hypoxemic respiratory with hypoxemia  Possible Acute PE  MURIEL/OHS  Patient presented with sob and hypoxia  Ct chest/ap/p with no acute pathology. Doppler of legs negative for DVT  Patient can't undergo VQ lung scan due to weight > 300 lbs. CTA chest not possible due to renal failure on admit, Cr of 3. Pulmonology team is following. Patient on 1L NC, and heparin. Cr normalized, will consider ordering CTA chest tomorrow if kidney function remains stable. Acute renal failure  Patient takes lisinopril and lasix at home. Cr 3.1 on admit. Resolved with IVF. Close monitor. Bipolar disorder  Stable. Continue home duloxetine and seroquel    Severe DJD  Patient c/o Knee pain and Lidocaine patch to knees ordered. Tramadol prn. GERD  HTN  Continue home statin, PPI.    :   Feels better . Now off Heparin drip  I ordered CTA as her renal function is normal . Echo showed   · LV: Estimated LVEF is 65 - 70%. Visually measured ejection fraction. Mild concentric hypertrophy. Hyperdynamic systolic function. · PA: Mild pulmonary hypertension. Pulmonary arterial systolic pressure is 47 mmHg. · Echo study was technically difficulty. · Image quality for this study was suboptimal       :  CTA positive for PE was started on Lovenox transitioned to NOAC. She gets easily short of breath may need home Oxygen. ABG/ walk test pending. :  Patient did not pass walk test became hypoxic she will need Home Oxygen. Discussed with her daughter. ABG showed some C02 retention probably due to Pickwickian syndrome . Renal function is back to normal.     40 or above Morbid obesity / Body mass index is 74.23 kg/m². Code status: Full  Prophylaxis: heparin drip  Recommended Disposition: Home w/Family     Subjective:     Chief Complaint / Reason for Physician Visit  \"Patient reports sob is improved. No nausea. No vomiting. \". Discussed with RN events overnight. Has knee pain Ortho consulted for cortisone shot    Review of Systems:  Except as documented all systems reviewed and negative. Objective:     VITALS:   Last 24hrs VS reviewed since prior progress note. Most recent are:  Patient Vitals for the past 24 hrs:   Temp Pulse Resp BP SpO2   09/13/20 0732 98.1 °F (36.7 °C) 89 22 (!) 152/80 95 %   09/13/20 0504 97.9 °F (36.6 °C) 84 18 (!) 143/81 95 %   09/13/20 0007 98.1 °F (36.7 °C) 78 18 (!) 144/94 98 %   09/12/20 2004 98.3 °F (36.8 °C) 81 18 (!) 145/68 98 %   09/12/20 1604 98.6 °F (37 °C) 76 18 113/87 98 %   09/12/20 1211 98.9 °F (37.2 °C) 78 17 104/80 99 %   09/12/20 1107 98.2 °F (36.8 °C) 78 17 (!) 150/87 96 %       Intake/Output Summary (Last 24 hours) at 9/13/2020 1024  Last data filed at 9/13/2020 1006  Gross per 24 hour   Intake 120 ml   Output 1370 ml   Net -1250 ml        PHYSICAL EXAM:  General: Very obese. Alert. cooperative, no acute distress    EENT:  Anicteric sclerae. Resp:  CTA bilaterally, no wheezing or rales. No accessory muscle use  CV:  Regular rate. S1S2 present,  distant heart sounds. No edema  GI:  Soft, Non distended, Non tender.  +Bowel sounds  Neurologic:  Alert and oriented X 3, normal speech,   Psych:   not agitated  Skin:  No rashes. No jaundice    Reviewed most current lab test results and cultures  YES  Reviewed most current radiology test results   YES  Review and summation of old records today    NO  Reviewed patient's current orders and MAR    YES  PMH/SH reviewed - no change compared to H&P  ________________________________________________________________________________  Quinton Richmond MD     Procedures: see electronic medical records for all procedures/Xrays and details which were not copied into this note but were reviewed prior to creation of Plan. LABS:  I reviewed today's most current labs and imaging studies.   Pertinent labs include:  Recent Labs 09/13/20  0339 09/12/20 0316 09/11/20  0508   WBC 7.7 8.0 7.2   HGB 11.5 12.5 10.8*   HCT 36.0 38.3 33.1*    211 180     Recent Labs     09/13/20 0339 09/12/20 0316 09/11/20  0508    135* 134*   K 3.7 4.4 4.5    103 109*   CO2 32 29 23   * 125* 107*   BUN 12 10 10   CREA 0.97 1.08* 0.96   CA 9.1 9.7 9.2       Signed: Kari Boles MD

## 2020-09-14 LAB
ANION GAP SERPL CALC-SCNC: 6 MMOL/L (ref 5–15)
BUN SERPL-MCNC: 12 MG/DL (ref 6–20)
BUN/CREAT SERPL: 12 (ref 12–20)
CALCIUM SERPL-MCNC: 9.2 MG/DL (ref 8.5–10.1)
CHLORIDE SERPL-SCNC: 101 MMOL/L (ref 97–108)
CO2 SERPL-SCNC: 29 MMOL/L (ref 21–32)
CREAT SERPL-MCNC: 0.98 MG/DL (ref 0.55–1.02)
ERYTHROCYTE [DISTWIDTH] IN BLOOD BY AUTOMATED COUNT: 14.3 % (ref 11.5–14.5)
GLUCOSE SERPL-MCNC: 104 MG/DL (ref 65–100)
HCT VFR BLD AUTO: 38.6 % (ref 35–47)
HGB BLD-MCNC: 12.3 G/DL (ref 11.5–16)
MCH RBC QN AUTO: 28.9 PG (ref 26–34)
MCHC RBC AUTO-ENTMCNC: 31.9 G/DL (ref 30–36.5)
MCV RBC AUTO: 90.6 FL (ref 80–99)
NRBC # BLD: 0 K/UL (ref 0–0.01)
NRBC BLD-RTO: 0 PER 100 WBC
PLATELET # BLD AUTO: 202 K/UL (ref 150–400)
PMV BLD AUTO: 11.5 FL (ref 8.9–12.9)
POTASSIUM SERPL-SCNC: 3.5 MMOL/L (ref 3.5–5.1)
RBC # BLD AUTO: 4.26 M/UL (ref 3.8–5.2)
SODIUM SERPL-SCNC: 136 MMOL/L (ref 136–145)
WBC # BLD AUTO: 6.4 K/UL (ref 3.6–11)

## 2020-09-14 PROCEDURE — 97535 SELF CARE MNGMENT TRAINING: CPT | Performed by: OCCUPATIONAL THERAPIST

## 2020-09-14 PROCEDURE — 77010033678 HC OXYGEN DAILY

## 2020-09-14 PROCEDURE — 85027 COMPLETE CBC AUTOMATED: CPT

## 2020-09-14 PROCEDURE — 74011250637 HC RX REV CODE- 250/637: Performed by: HOSPITALIST

## 2020-09-14 PROCEDURE — 94760 N-INVAS EAR/PLS OXIMETRY 1: CPT

## 2020-09-14 PROCEDURE — 74011250637 HC RX REV CODE- 250/637: Performed by: INTERNAL MEDICINE

## 2020-09-14 PROCEDURE — 74011000250 HC RX REV CODE- 250: Performed by: INTERNAL MEDICINE

## 2020-09-14 PROCEDURE — 97116 GAIT TRAINING THERAPY: CPT

## 2020-09-14 PROCEDURE — 80048 BASIC METABOLIC PNL TOTAL CA: CPT

## 2020-09-14 PROCEDURE — 36415 COLL VENOUS BLD VENIPUNCTURE: CPT

## 2020-09-14 PROCEDURE — 2709999900 HC NON-CHARGEABLE SUPPLY

## 2020-09-14 PROCEDURE — 65660000000 HC RM CCU STEPDOWN

## 2020-09-14 RX ORDER — NYSTATIN 100000 [USP'U]/G
POWDER TOPICAL 3 TIMES DAILY
Status: DISCONTINUED | OUTPATIENT
Start: 2020-09-14 | End: 2020-09-15 | Stop reason: HOSPADM

## 2020-09-14 RX ADMIN — Medication 10 ML: at 05:31

## 2020-09-14 RX ADMIN — LORAZEPAM 0.5 MG: 0.5 TABLET ORAL at 11:26

## 2020-09-14 RX ADMIN — Medication 10 ML: at 13:47

## 2020-09-14 RX ADMIN — DIPHENHYDRAMINE HYDROCHLORIDE 25 MG: 25 CAPSULE ORAL at 13:45

## 2020-09-14 RX ADMIN — POLYMYXIN B SULFATE, BACITRACIN ZINC, NEOMYCIN SULFATE: 5000; 3.5; 4 OINTMENT TOPICAL at 21:28

## 2020-09-14 RX ADMIN — ATORVASTATIN CALCIUM 10 MG: 10 TABLET, FILM COATED ORAL at 21:27

## 2020-09-14 RX ADMIN — HYDROCORTISONE: 1 CREAM TOPICAL at 11:28

## 2020-09-14 RX ADMIN — NYSTATIN: 100000 POWDER TOPICAL at 21:28

## 2020-09-14 RX ADMIN — PANTOPRAZOLE SODIUM 40 MG: 40 TABLET, DELAYED RELEASE ORAL at 09:43

## 2020-09-14 RX ADMIN — POLYMYXIN B SULFATE, BACITRACIN ZINC, NEOMYCIN SULFATE: 5000; 3.5; 4 OINTMENT TOPICAL at 11:28

## 2020-09-14 RX ADMIN — TRAMADOL HYDROCHLORIDE 25 MG: 50 TABLET, FILM COATED ORAL at 21:27

## 2020-09-14 RX ADMIN — Medication 10 ML: at 21:27

## 2020-09-14 RX ADMIN — Medication: at 11:27

## 2020-09-14 RX ADMIN — DULOXETINE HYDROCHLORIDE 60 MG: 30 CAPSULE, DELAYED RELEASE ORAL at 09:43

## 2020-09-14 RX ADMIN — DIPHENHYDRAMINE HYDROCHLORIDE 25 MG: 25 CAPSULE ORAL at 03:49

## 2020-09-14 RX ADMIN — DULOXETINE HYDROCHLORIDE 30 MG: 30 CAPSULE, DELAYED RELEASE ORAL at 09:42

## 2020-09-14 RX ADMIN — DIPHENHYDRAMINE HYDROCHLORIDE 25 MG: 25 CAPSULE ORAL at 23:04

## 2020-09-14 RX ADMIN — QUETIAPINE FUMARATE 200 MG: 100 TABLET ORAL at 21:27

## 2020-09-14 RX ADMIN — NYSTATIN: 100000 POWDER TOPICAL at 11:27

## 2020-09-14 RX ADMIN — NYSTATIN: 100000 POWDER TOPICAL at 17:41

## 2020-09-14 RX ADMIN — LORAZEPAM 0.5 MG: 0.5 TABLET ORAL at 21:27

## 2020-09-14 RX ADMIN — Medication: at 21:29

## 2020-09-14 RX ADMIN — HYDROCORTISONE: 1 CREAM TOPICAL at 21:31

## 2020-09-14 RX ADMIN — APIXABAN 10 MG: 5 TABLET, FILM COATED ORAL at 17:42

## 2020-09-14 RX ADMIN — APIXABAN 10 MG: 5 TABLET, FILM COATED ORAL at 09:42

## 2020-09-14 NOTE — PROGRESS NOTES
Problem: Falls - Risk of  Goal: *Absence of Falls  Description: Document Bindu Madison Fall Risk and appropriate interventions in the flowsheet. Outcome: Progressing Towards Goal  Note: Fall Risk Interventions:  Mobility Interventions: Communicate number of staff needed for ambulation/transfer         Medication Interventions: Evaluate medications/consider consulting pharmacy    Elimination Interventions: Call light in reach              Problem: Patient Education: Go to Patient Education Activity  Goal: Patient/Family Education  Outcome: Progressing Towards Goal     Problem: Patient Education: Go to Patient Education Activity  Goal: Patient/Family Education  Outcome: Progressing Towards Goal     Problem: Patient Education: Go to Patient Education Activity  Goal: Patient/Family Education  Outcome: Progressing Towards Goal     Problem: Pressure Injury - Risk of  Goal: *Prevention of pressure injury  Description: Document Inder Scale and appropriate interventions in the flowsheet.   Outcome: Progressing Towards Goal  Note: Pressure Injury Interventions:  Sensory Interventions: Assess changes in LOC    Moisture Interventions: Absorbent underpads, Apply protective barrier, creams and emollients    Activity Interventions: Pressure redistribution bed/mattress(bed type)    Mobility Interventions: PT/OT evaluation, Pressure redistribution bed/mattress (bed type), Float heels    Nutrition Interventions: Document food/fluid/supplement intake    Friction and Shear Interventions: HOB 30 degrees or less                Problem: Patient Education: Go to Patient Education Activity  Goal: Patient/Family Education  Outcome: Progressing Towards Goal

## 2020-09-14 NOTE — PROGRESS NOTES
Hospitalist Progress Note    NAME: Sharad Urena   :  1955   MRN:  [id number]       Assessment / Plan:        Acute hypoxemic respiratory with hypoxemia due to acute PE  Acute PE  MURIEL/OHS  Patient presented with sob and hypoxia  CT chest/a/p with no acute pathology. Doppler of legs negative for DVT  CTA chest  with acute right lower lobe PE. Echo with LVEF 65%, mild pulmonary hypertension. Pulmonology team is following. Patient on 2L NC, and unable to wean off oxygen. On Eliquis. Patient will need life long anticoagulation given history of prior RLE DVT a few years ago. CM to help with Eliquis precription. Medial thigh wounds  Wound care consulted: note   These are secondary to her thighs rub together due to morbid obesity and worsened by the increased moisture from the incontinence  Plan:   - Hydrocolloid dressing applied to each side of her thigh. This will adhere to the skin and keep it from rubbing together. This can be applied after a few days of the thick antifungal cream.  - Secura anti-fungal cream ordered because this has a larger % of zinc oxide and it thick - will remain on the skin better  - keep murray catheter for 1 week and then remove it  - HH to continue dressing and topical care    Acute renal failure  Patient takes lisinopril and lasix at home. Cr 3.1 on admit. Resolved with IVF. Bipolar disorder  Stable. Continue home duloxetine and seroquel    Severe DJD  Bilateral knee OA  Ortho consulted. S/p intraarticular steroid injection. GERD  HTN  Stable. Continue home statin, PPI. Mucocutaneous candidiasis  Nystatin powder ordered. 40 or above Morbid obesity / Body mass index is 74.23 kg/m². Code status: Full  Prophylaxis: heparin drip  Recommended Disposition: Home w/Family     Subjective:     Chief Complaint / Reason for Physician Visit  \"Patient reports sob is improved. No nausea. No vomiting. No chest pain.  She reports vaginal discharge and itching all over her body. \". Discussed with RN events overnight. Review of Systems:  Except as documented all systems reviewed and negative. Objective:     VITALS:   Last 24hrs VS reviewed since prior progress note. Most recent are:  Patient Vitals for the past 24 hrs:   Temp Pulse Resp BP SpO2   09/14/20 1215 97.9 °F (36.6 °C) 98 18 121/69 95 %   09/14/20 0756 98.6 °F (37 °C) 93 18 119/75 94 %   09/14/20 0329 98 °F (36.7 °C) 78 18 119/62 97 %   09/13/20 2331 98.3 °F (36.8 °C) 78 18 135/73 97 %   09/13/20 2003 99.3 °F (37.4 °C) 74 18 127/77 97 %   09/13/20 1547 97.9 °F (36.6 °C) 86 20 128/68 97 %       Intake/Output Summary (Last 24 hours) at 9/14/2020 1502  Last data filed at 9/14/2020 0351  Gross per 24 hour   Intake 500 ml   Output 1550 ml   Net -1050 ml        PHYSICAL EXAM:  General: Very obese. Alert. cooperative, no acute distress    EENT:  Anicteric sclerae. Resp:  CTA bilaterally, no wheezing or rales. No accessory muscle use  CV:  Regular rate. S1S2 present,  distant heart sounds. No edema  GI:  Soft, Non distended, Non tender.  +Bowel sounds  Neurologic:  Alert and oriented X 3, normal speech,   Psych:   not agitated  Skin:  No rashes. No jaundice    Reviewed most current lab test results and cultures  YES  Reviewed most current radiology test results   YES  Review and summation of old records today    NO  Reviewed patient's current orders and MAR    YES  PMH/SH reviewed - no change compared to H&P  ________________________________________________________________________________  Lizbeth Reyes MD     Procedures: see electronic medical records for all procedures/Xrays and details which were not copied into this note but were reviewed prior to creation of Plan. LABS:  I reviewed today's most current labs and imaging studies.   Pertinent labs include:  Recent Labs     09/14/20  0357 09/13/20  0339 09/12/20  0316   WBC 6.4 7.7 8.0   HGB 12.3 11.5 12.5   HCT 38.6 36.0 38.3   PLT 202 194 211     Recent Labs     09/14/20  0357 09/13/20  0339 09/12/20  0316    137 135*   K 3.5 3.7 4.4    102 103   CO2 29 32 29   * 110* 125*   BUN 12 12 10   CREA 0.98 0.97 1.08*   CA 9.2 9.1 9.7       Signed: Darrius Valiente MD

## 2020-09-14 NOTE — PROGRESS NOTES
Problem: Mobility Impaired (Adult and Pediatric)  Goal: *Acute Goals and Plan of Care (Insert Text)  Description: FUNCTIONAL STATUS PRIOR TO ADMISSION: Patient was modified independent using a rolling walker for functional mobility. States she was ambulatory with RW but was having some intermittent knee pain that has just recently gotten worse. Was performing own ADL's and iADLs. HOME SUPPORT PRIOR TO ADMISSION: The patient lived with grandson (30 year old) but did not require assistance. Physical Therapy Goals  Initiated 9/9/2020  1. Patient will move from supine to sit and sit to supine  in bed with minimal assistance within 7 day(s). 2.  Patient will transfer from bed to chair and chair to bed with minimal assistance using the least restrictive device within 7 day(s). 3.  Patient will perform sit to stand with moderate assistance  within 7 day(s). 4.  Patient will ambulate with minimal assistance for 20 feet with the least restrictive device within 7 day(s). Outcome: Progressing Towards Goal   PHYSICAL THERAPY TREATMENT  Patient: Glory Dunbar (40 y.o. female)  Date: 9/14/2020  Diagnosis: Hypoxia [R09.02]  Acute renal failure (ARF) ([facility] [address] [N17.9]   <principal problem not specified>       Precautions: Fall  Chart, physical therapy assessment, plan of care and goals were reviewed. ASSESSMENT  Patient continues with skilled PT services and is progressing towards goals. Gait tolerated for 60 feet total with sitting rest needed after 30 feet. Needed a RW and sba with cues to maintain correct walker distance and to slow down with turns. Mostly limited by B knee pain and sob. O2 sats on RA was 93% during gait and 97% at rest sitting. Current Level of Function Impacting Discharge (mobility/balance): sba to supervision. Other factors to consider for discharge: will need assistance once home.          PLAN :  Patient continues to benefit from skilled intervention to address the above impairments. Continue treatment per established plan of care. to address goals. Recommendation for discharge: (in order for the patient to meet his/her long term goals)  Physical therapy at least 2 days/week in the home AND ensure assist and/or supervision for safety with ADLs and mobility. This discharge recommendation:  Has been made in collaboration with the attending provider and/or case management    IF patient discharges home will need the following DME: patient owns DME required for discharge       SUBJECTIVE:   Patient stated Gato Cho will have plenty of help at home. Mee Cohen    OBJECTIVE DATA SUMMARY:   Critical Behavior:  Neurologic State: Alert  Orientation Level: Oriented X4  Cognition: Appropriate decision making, Appropriate for age attention/concentration, Appropriate safety awareness, Impulsive, Decreased command following  Safety/Judgement: Awareness of environment, Decreased insight into deficits, Fall prevention  Functional Mobility Training:  Bed Mobility:  Rolling: (sitting on sofa when PT arrived.)                 Transfers:  Sit to Stand: Supervision(needs reminders to not pull up on rw.)  Stand to Sit: Supervision        Bed to Chair: Stand-by assistance; Adaptive equipment(rw)                    Balance:  Sitting: Intact  Standing: Impaired  Standing - Static: Good  Standing - Dynamic : Fair;Constant support  Ambulation/Gait Training:  Distance (ft): 45 Feet (ft)  Assistive Device: Gait belt;Walker, rolling  Ambulation - Level of Assistance: Stand-by assistance        Gait Abnormalities: Decreased step clearance;Trunk sway increased(excessive forward lean)        Base of Support: Widened     Speed/Leda: Pace decreased (<100 feet/min); Fluctuations  Step Length: Right shortened;Left shortened                    Pain Ratin/10 L knee when walking; 4/10 sitting.     Activity Tolerance:   Fair, SpO2 stable on RA, requires frequent rest breaks, and observed SOB with activity  Please refer to the flowsheet for vital signs taken during this treatment. After treatment patient left in no apparent distress:   Sitting in chair and Call bell within reach    COMMUNICATION/COLLABORATION:   The patients plan of care was discussed with: Occupational therapist and Registered nurse.      Lacie Osorio, PT   Time Calculation: 18 mins

## 2020-09-14 NOTE — PROGRESS NOTES
Problem: Self Care Deficits Care Plan (Adult)  Goal: *Acute Goals and Plan of Care (Insert Text)  Description:   FUNCTIONAL STATUS PRIOR TO ADMISSION: Pt reports residing in 2 story home with 1 ADEEL, grandson lives with patient, with pt reporting Mod [place] with ADLs/IADLs at [facility]. Tub-shower combo, no seated surface, grab bars present. HOME SUPPORT: The patient lived with grandson but did not require assist.    Occupational Therapy Goals  Initiated 9/9/2020  1. Patient will perform RW grooming with modified independence within 7 day(s). 2.  Patient will perform EOB/RW lower body dressing with minimal assistance within 7 day(s). 3.  Patient will perform EOB bathing with moderate assistance within 7 day(s). 4.  Patient will perform toilet transfers at [facility] with moderate assistance within 7 day(s). 5.  Patient will perform all aspects of RW toileting with moderate assistance  within 7 day(s). 6.  Patient will utilize energy conservation techniques during functional activities with Min verbal cues within 7 day(s). Outcome: Progressing Towards Goal    OCCUPATIONAL THERAPY TREATMENT  Patient: Xu Verdugo (15 y.o. female)  Date: 9/14/2020  Diagnosis: Hypoxia [R09.02]  Acute renal failure (ARF) ([facility] [address] [N17.9]   <principal problem not specified>       Precautions: Fall  Chart, occupational therapy assessment, plan of care, and goals were reviewed. ASSESSMENT  Patient intermittently irritable and impulsive t/o this session. She is SOB despite VS being stable on RA during activity. Patient is unwilling to decrease her pace during ambulation and standing ADLs 2/2 c/o B knee pain limiting her standing tolerance. This session was limited due to patient's decreased attention span and the patient having limited interest in participating in suggested functional activies. Overall she was supervision for ADLs performed and she was supervision to SBA for functional mobility.  At this time she continues to benefit from acute OT and will need HHOT and PT with supervision after discharge. PLAN :  Patient continues to benefit from skilled intervention to address the above impairments. Continue treatment per established plan of care. to address goals. Recommendation for discharge: (in order for the patient to meet his/her long term goals)  Occupational therapy at least 2 days/week in the home AND ensure assist and/or supervision for safety with ADLs and functional mobility. Equipment recommendations for successful discharge (if) home:none, patient has needed DME         OBJECTIVE DATA SUMMARY:   Cognitive/Behavioral Status:  Neurologic State: Alert;Irritable  Orientation Level: Oriented X4  Cognition: Decreased attention/concentration; Follows commands; Impaired decision making; Impulsive  Perception: Appears intact  Perseveration: No perseveration noted  Safety/Judgement: Decreased awareness of need for safety;Decreased awareness of need for assistance;Decreased insight into deficits    Functional Mobility and Transfers for ADLs:  Bed Mobility:  Rolling: (sitting on sofa when PT arrived.)    Transfers:  Sit to Stand: Supervision(needs reminders to not pull up on rw.)  Functional Transfers  Bathroom Mobility: Stand-by assistance(ambulating with a RW)  Toilet Transfer : Supervision  Cues: Verbal cues provided  Bed to Chair: Stand-by assistance; Adaptive equipment(RW)    Balance:  Sitting: Intact  Standing: Impaired  Standing - Static: Good  Standing - Dynamic : Fair;Constant support    ADL Intervention:  Lower Body Dressing Assistance  Socks: Supervision(to pull up socks at ankle. )  Leg Crossed Method Used: No  Position Performed: Bending forward method(seated on toilet)    Toileting  Toileting Assistance: Supervision  Clothing Management: Supervision  Cues: Verbal cues provided    Cognitive Retraining  Safety/Judgement: Decreased awareness of need for safety;Decreased awareness of need for assistance;Decreased insight into deficits    Pain:  C/o B knee pain during ambulation    Activity Tolerance:   Poor  Pulse oximetry assessment   97% at rest on room air (if 88% or less, skip next steps)  93-94% while ambulating on room air     Mainly limited by B knee pain and poor use pacing techniques. Please refer to the flowsheet for vital signs taken during this treatment.     After treatment patient left in no apparent distress:   Sitting in chair and Call bell within reach    COMMUNICATION/COLLABORATION:   The patients plan of care was discussed with: Physical Therapist and Registered Nurse    Martin Menard OTR/L  Time Calculation: 15 mins

## 2020-09-14 NOTE — PROGRESS NOTES
Pulse oximetry assessment   97% at rest on room air (if 88% or less, skip next steps)  93-94% while ambulating on room air    Tim Tvaarez, PT

## 2020-09-14 NOTE — PROGRESS NOTES
ARIANA Plan:                 *Home w/[facility]-PT & SN              *daughter to transport at d/c      *IM letter signed & placed on chart 9/14    3:32pm  Patient passed oxygen challenge and will not need home oxygen. Patient is expected to d/c home tomorrow with [facility]. CM informed [facility] of d/c tomorrow and wound care/murray order. CM has acknowledged consult for home oxygen. Unfortunately, pt does not have a qualifying diagnosis at this time. Patient will need a chronic diagnosis to qualify for home oxygen. CM informed MD and will continue to follow.     Denise Donato  Ext [phone]

## 2020-09-15 VITALS
DIASTOLIC BLOOD PRESSURE: 84 MMHG | WEIGHT: 293 LBS | HEART RATE: 96 BPM | HEIGHT: 60 IN | SYSTOLIC BLOOD PRESSURE: 137 MMHG | RESPIRATION RATE: 18 BRPM | BODY MASS INDEX: 57.52 KG/M2 | TEMPERATURE: 97.9 F | OXYGEN SATURATION: 94 %

## 2020-09-15 PROCEDURE — 94760 N-INVAS EAR/PLS OXIMETRY 1: CPT

## 2020-09-15 PROCEDURE — 77010033678 HC OXYGEN DAILY

## 2020-09-15 PROCEDURE — 74011250637 HC RX REV CODE- 250/637: Performed by: INTERNAL MEDICINE

## 2020-09-15 PROCEDURE — 94762 N-INVAS EAR/PLS OXIMTRY CONT: CPT

## 2020-09-15 PROCEDURE — 74011250637 HC RX REV CODE- 250/637: Performed by: HOSPITALIST

## 2020-09-15 RX ADMIN — HYDROCORTISONE: 1 CREAM TOPICAL at 08:53

## 2020-09-15 RX ADMIN — Medication: at 08:52

## 2020-09-15 RX ADMIN — Medication 10 ML: at 06:33

## 2020-09-15 RX ADMIN — DULOXETINE HYDROCHLORIDE 30 MG: 30 CAPSULE, DELAYED RELEASE ORAL at 08:50

## 2020-09-15 RX ADMIN — NYSTATIN: 100000 POWDER TOPICAL at 08:54

## 2020-09-15 RX ADMIN — Medication 10 ML: at 14:01

## 2020-09-15 RX ADMIN — APIXABAN 10 MG: 5 TABLET, FILM COATED ORAL at 08:49

## 2020-09-15 RX ADMIN — TRAMADOL HYDROCHLORIDE 25 MG: 50 TABLET, FILM COATED ORAL at 15:13

## 2020-09-15 RX ADMIN — PANTOPRAZOLE SODIUM 40 MG: 40 TABLET, DELAYED RELEASE ORAL at 06:33

## 2020-09-15 RX ADMIN — DULOXETINE HYDROCHLORIDE 60 MG: 30 CAPSULE, DELAYED RELEASE ORAL at 08:50

## 2020-09-15 RX ADMIN — POLYMYXIN B SULFATE, BACITRACIN ZINC, NEOMYCIN SULFATE: 5000; 3.5; 4 OINTMENT TOPICAL at 08:51

## 2020-09-15 NOTE — DISCHARGE SUMMARY
Hospitalist Discharge Summary     Patient ID:  Lynn Varela  [id number]  18 y.o.  1955  9/8/2020    PCP on record: Todd Tolbert MD    Admit date: 9/8/2020  Discharge date and time: 9/15/2020    DISCHARGE DIAGNOSIS:  See below    CONSULTATIONS:  IP CONSULT TO PULMONOLOGY  IP CONSULT TO NEPHROLOGY  IP CONSULT TO ORTHOPEDIC SURGERY    Excerpted HPI from H&P of Sylvie Mckeon MD:  Lynn Varela is a 76 y.o. female with past medical history of hypertension bipolar disorder, depression, history of DVT presents with several days history of dizziness and lightheadedness, no vertigo. As per the patient she has been dizzy for the last several days mostly on exertion and climbing up stairs. Recently the symptoms have got worse. Along with the current symptoms the patient also noted increasing shortness of breath worse on exertion. ______________________________________________________________________  DISCHARGE SUMMARY/HOSPITAL COURSE:  for full details see H&P, daily progress notes, labs, consult notes. Acute hypoxemic respiratory with hypoxemia due to acute PE - resolved  Acute PE  MURIEL/OHS  Patient presented with sob and hypoxia  CT chest/a/p with no acute pathology. Doppler of legs negative for DVT  CTA chest 09/11 with acute right lower lobe PE. Echo with LVEF 65%, mild pulmonary hypertension. Pulmonology team is following. Patient on 2L NC, and unable to wean off oxygen. On Eliquis. Patient will need life long anticoagulation given history of prior RLE DVT a few years ago. CM to help with Eliquis precription.       9/15:  Pt stable for discharge home today. Pt did not qualify for home O2. Pt to follow up with Pulm. Pt noncompliant with CPAP at home - reinforced compliance. Pt to be discharged on Eliquis as anticoagulation. Risks and benefits discussed with the pt and she agreed with continuation of Eliquis.  Advised that she should seek immediate medical care if she notices any abnormal bleeding or bruising. Pt verbalized her understanding and agrees with the plan. Medial thigh wounds  Wound care consulted: note 09/09  These are secondary to her thighs rub together due to morbid obesity and worsened by the increased moisture from the incontinence  Plan:   - Hydrocolloid dressing applied to each side of her thigh. This will adhere to the skin and keep it from rubbing together. This can be applied after a few days of the thick antifungal cream.  - Secura anti-fungal cream ordered because this has a larger % of zinc oxide and it thick - will remain on the skin better  - keep murray catheter for 1 week and then remove it  - HH to continue dressing and topical care    9/15; Pt states that she can use the bathroom without incident and that she will keep the area dry. She states that the murray is bothering her a great deal. Will therefore discontinue prior to discharge.     Acute renal failure - resolved     Bipolar disorder  Stable. Continue home duloxetine and seroquel     Severe DJD  Bilateral knee OA  Ortho consulted. S/p intraarticular steroid injection.     GERD  HTN  Stable. Continue home statin, PPI.     Mucocutaneous candidiasis  Nystatin powder ordered. _______________________________________________________________________  Patient seen and examined by me on discharge day. Pertinent Findings:  Gen:    Not in distress  Chest: Clear lungs  CVS:   Regular rhythm. No edema  Abd:  Soft, not distended, not tender  Neuro:  Alert, oriented x3, no focal deficits  _______________________________________________________________________  DISCHARGE MEDICATIONS:   Current Discharge Medication List      START taking these medications    Details   apixaban (ELIQUIS) 5 mg tablet Take 1 Tab by mouth two (2) times a day for 30 days.   Qty: 60 Tab, Refills: 2    Comments: Take 10 mg po bid until 09/189 at 9 am and then 5 mg po bid starting on 09/19 pm. Lifelong anticoagulation CONTINUE these medications which have NOT CHANGED    Details   naloxone (NARCAN) 4 mg/actuation nasal spray Use 1 spray intranasally, then discard. Repeat with new spray every 2 min as needed for opioid overdose symptoms, alternating nostrils. Qty: 1 Each, Refills: 1      !! miconazole (MICOTIN) 2 % topical cream Apply  to affected area two (2) times a day. Qty: 42 g, Refills: 1      simvastatin (ZOCOR) 20 mg tablet TAKE 1 TABLET BY MOUTH EVERY NIGHT  Qty: 90 Tab, Refills: 1    Associated Diagnoses: Hyperlipidemia, unspecified hyperlipidemia type      !! miconazole (MICOTIN) 2 % topical cream Apply  to affected area two (2) times a day. Qty: 15 g, Refills: 0      nystatin (MYCOSTATIN) topical cream Apply  to affected area two (2) times a day. 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 Qty: 1 Each, Refills: 0      LORazepam (Ativan) 0.5 mg tablet Take 0.5 mg by mouth two (2) times daily as needed for Anxiety. lisinopriL (PRINIVIL, ZESTRIL) 20 mg tablet Take 2 tablets daily. Qty: 180 Tab, Refills: 1    Associated Diagnoses: Essential hypertension with goal blood pressure less than 140/90      hydrocortisone (CORTAID) 0.5 % topical cream Apply  to affected area two (2) times a day. use thin layer  Qty: 30 g, Refills: 0    Associated Diagnoses: Abrasion      bacitracin-polymyxin b (Polysporin) 500-10,000 unit/gram ointment Apply  to affected area two (2) times a day. Qty: 15 g, Refills: 0    Associated Diagnoses: Abrasion      omeprazole (PRILOSEC) 40 mg capsule TAKE 1 CAPSULE BY MOUTH DAILY  Qty: 90 Cap, Refills: 0      furosemide (LASIX) 20 mg tablet Take 2 Tabs by mouth two (2) times a day.  TAKE 1 TABLET BY MOUTH DAILY  Qty: 30 Tab, Refills: 0    Associated Diagnoses: Essential hypertension with goal blood pressure less than 140/90      QUEtiapine (SEROQUEL) 200 mg tablet TK 1 T PO HS PRF INSOMNIA  Refills: 0      !! DULoxetine (CYMBALTA) 60 mg capsule Take 1 Cap by mouth daily. Qty: 30 Cap, Refills: 1    Associated Diagnoses: GABRIEL (generalized anxiety disorder); MDD (major depressive disorder), recurrent episode, mild ([address]      ! ! DULoxetine (CYMBALTA) 30 mg capsule Take 1 Cap by mouth daily. Qty: 30 Cap, Refills: 1      potassium chloride SR (KLOR-CON 10) 10 mEq tablet TAKE 2 TABLETS BY MOUTH DAILY  Qty: 180 Tab, Refills: 1    Associated Diagnoses: Essential hypertension with goal blood pressure less than 140/90       ! ! - Potential duplicate medications found. Please discuss with provider. STOP taking these medications       traMADoL (ULTRAM) 50 mg tablet Comments:   Reason for Stopping:         trimethoprim-sulfamethoxazole (BACTRIM DS, SEPTRA DS) 160-800 mg per tablet Comments:   Reason for Stopping:         nystatin (MYCOSTATIN) powder Comments:   Reason for Stopping:                 Patient Follow Up Instructions: Activity: Activity as tolerated  Diet: Resume previous diet  Wound Care: As directed    Follow-up Information     Follow up With Specialties Details Why Contact Info    Za Rodriguez MD Family Medicine On 9/16/2020 For previously scheduled Virtual follow up appointment at 2:00PM  [address] [place] [address]  [phone]      [facility] 191  This is your home health care provider.   If you dont hear from them within 48hrs of discharge, please give them a call [place] [phone]    Munir Moralez, DO Pulmonary Disease, Internal Medicine Schedule an appointment as soon as possible for a visit call office for sleep clinic appointmentmnt and Sleep study ASAP [address] [facility]  [address] [place] [address],[address]  [facility] [phone] ________________________________________________________________    Risk of deterioration: Low    Condition at Discharge:  Stable  __________________________________________________________________    Disposition  Home with family and home health services    ____________________________________________________________________    Code Status: Full Code  ___________________________________________________________________      Total time in minutes spent coordinating this discharge (includes going over instructions, follow-up, prescriptions, and preparing report for sign off to her PCP) :  35 minutes    Signed:  MD Martha Shipman

## 2020-09-15 NOTE — PROGRESS NOTES
General Surgery End of Shift Nursing Note    Bedside shift change report given to Gregorio Reyes RN (oncoming nurse) by Mary Carmen Goetz (offgoing nurse). Report included the following information SBAR, Kardex, Intake/Output, MAR and Recent Results. Shift worked:   7p-7a   Summary of shift:    Pt successfully moved from couch to bed via jessica lift/lift team. Wound care done per orders. Pt c/o pain in the legs medicated x1, see MAR. Pt O2 study done, placed on up to 4L NC as O2 dropped as low as 59 due to sleep apnea. Pt O2 sat shoots right back up when woken and breathing returns to normal. Appears to be pt baseline. Pt murray stabilized with leg anchor, and checked for placement per pt request. Pt on tele running NSR all night. Issues for physician to address:   O2 desat when sleeping     Number times ambulated in hallway past shift: 0    Number of times OOB to chair past shift: 1    Pain Management:  Current medication: see MAR  Patient states pain is manageable on current pain medication: YES    GI:    Current diet:  DIET CARDIAC Regular    Tolerating current diet: YES  Passing flatus: YES  Last Bowel Movement: yesterday      Patient Safety:    Falls Score: 3  Bed Alarm On? No  Sitter?  No    [place] See

## 2020-09-15 NOTE — PROGRESS NOTES
Problem: Falls - Risk of  Goal: *Absence of Falls  Description: Document Mesfin Malone Fall Risk and appropriate interventions in the flowsheet. 9/15/2020 1600 by Navya Watkins  Outcome: Resolved/Met  Note: Fall Risk Interventions:  Mobility Interventions: Communicate number of staff needed for ambulation/transfer         Medication Interventions: Patient to call before getting OOB    Elimination Interventions: Call light in reach, Patient to call for help with toileting needs           9/15/2020 1004 by Navya Watkins  Outcome: Progressing Towards Goal  Note: Fall Risk Interventions:  Mobility Interventions: Communicate number of staff needed for ambulation/transfer         Medication Interventions: Patient to call before getting OOB    Elimination Interventions: Call light in reach, Patient to call for help with toileting needs              Problem: Patient Education: Go to Patient Education Activity  Goal: Patient/Family Education  9/15/2020 1600 by Navya Watkins  Outcome: Resolved/Met  9/15/2020 1004 by Navya Watkins  Outcome: Progressing Towards Goal     Problem: Patient Education: Go to Patient Education Activity  Goal: Patient/Family Education  9/15/2020 1600 by Navya Watkins  Outcome: Resolved/Met  9/15/2020 1004 by Navya Watkins  Outcome: Progressing Towards Goal     Problem: Patient Education: Go to Patient Education Activity  Goal: Patient/Family Education  9/15/2020 1600 by Navya Watkins  Outcome: Resolved/Met  9/15/2020 1004 by Navya Watkins  Outcome: Progressing Towards Goal     Problem: Pressure Injury - Risk of  Goal: *Prevention of pressure injury  Description: Document Inder Scale and appropriate interventions in the flowsheet.   9/15/2020 1600 by Navya Watkins  Outcome: Resolved/Met  Note: Pressure Injury Interventions:  Sensory Interventions: Float heels, Keep linens dry and wrinkle-free, Maintain/enhance activity level, Minimize linen layers, Monitor skin under medical devices, Pad between skin to skin    Moisture Interventions: Absorbent underpads, Apply protective barrier, creams and emollients, Internal/External urinary devices, Limit adult briefs, Maintain skin hydration (lotion/cream), Minimize layers, Moisture barrier    Activity Interventions: Increase time out of bed    Mobility Interventions: HOB 30 degrees or less    Nutrition Interventions: Document food/fluid/supplement intake    Friction and Shear Interventions: HOB 30 degrees or less             9/15/2020 1004 by Shavon Perdomo  Outcome: Progressing Towards Goal  Note: Pressure Injury Interventions:  Sensory Interventions: Float heels, Keep linens dry and wrinkle-free, Maintain/enhance activity level, Minimize linen layers, Monitor skin under medical devices, Pad between skin to skin    Moisture Interventions: Absorbent underpads, Apply protective barrier, creams and emollients, Internal/External urinary devices, Limit adult briefs, Maintain skin hydration (lotion/cream), Minimize layers, Moisture barrier    Activity Interventions: Increase time out of bed    Mobility Interventions: HOB 30 degrees or less    Nutrition Interventions: Document food/fluid/supplement intake    Friction and Shear Interventions: HOB 30 degrees or less                Problem: Patient Education: Go to Patient Education Activity  Goal: Patient/Family Education  9/15/2020 1600 by Shavon Perdomo  Outcome: Resolved/Met  9/15/2020 1004 by Shavon Perdomo  Outcome: Progressing Towards Goal

## 2020-09-15 NOTE — PROGRESS NOTES
General Surgery End of Shift Nursing Note    Bedside shift change report given to [facility] (oncoming nurse) by Fausto Limon (offgoing nurse). Report included the following information SBAR, Kardex and Recent Results. Shift worked:   7a-7p   Summary of shift:    Pt had an uneventful shift. Pt was up to the chair for most of the day. She ambulated to the bathroom and had a bowel movement today. Pt had an O2 challenge today and passed it. The plan is to discharge tomorrow with home health. Issues for physician to address:   none     Number times ambulated in hallway past shift: 1    Number of times OOB to chair past shift: 2    Pain Management:  Current medication: See MAR  Patient states pain is manageable on current pain medication: YES    GI:    Current diet:  DIET CARDIAC Regular    Tolerating current diet: YES  Passing flatus: YES  Last Bowel Movement: today   Appearance: formed and small    Respiratory:    Incentive Spirometer at bedside: YES  Patient instructed on use: YES    Patient Safety:    Falls Score: 3  Bed Alarm On? No  Sitter?  No    Maggie Flowers

## 2020-09-15 NOTE — PROGRESS NOTES
Problem: Falls - Risk of  Goal: *Absence of Falls  Description: Document Loren Lyles Fall Risk and appropriate interventions in the flowsheet. Outcome: Progressing Towards Goal  Note: Fall Risk Interventions:  Mobility Interventions: Communicate number of staff needed for ambulation/transfer, PT Consult for mobility concerns, Patient to call before getting OOB, Utilize walker, cane, or other assistive device         Medication Interventions: Patient to call before getting OOB, Teach patient to arise slowly    Elimination Interventions: Patient to call for help with toileting needs, Call light in reach, Toileting schedule/hourly rounds              Problem: Patient Education: Go to Patient Education Activity  Goal: Patient/Family Education  Outcome: Progressing Towards Goal     Problem: Patient Education: Go to Patient Education Activity  Goal: Patient/Family Education  Outcome: Progressing Towards Goal     Problem: Patient Education: Go to Patient Education Activity  Goal: Patient/Family Education  Outcome: Progressing Towards Goal     Problem: Pressure Injury - Risk of  Goal: *Prevention of pressure injury  Description: Document Inder Scale and appropriate interventions in the flowsheet.   Outcome: Progressing Towards Goal  Note: Pressure Injury Interventions:  Sensory Interventions: Assess changes in LOC, Float heels, Minimize linen layers    Moisture Interventions: Absorbent underpads, Internal/External urinary devices    Activity Interventions: Increase time out of bed, PT/OT evaluation    Mobility Interventions: PT/OT evaluation, Pressure redistribution bed/mattress (bed type), Float heels    Nutrition Interventions: Document food/fluid/supplement intake    Friction and Shear Interventions: HOB 30 degrees or less                Problem: Patient Education: Go to Patient Education Activity  Goal: Patient/Family Education  Outcome: Progressing Towards Goal

## 2020-09-15 NOTE — PROGRESS NOTES
Problem: Falls - Risk of  Goal: *Absence of Falls  Description: Document Cole Scott Fall Risk and appropriate interventions in the flowsheet. Outcome: Progressing Towards Goal  Note: Fall Risk Interventions:  Mobility Interventions: Communicate number of staff needed for ambulation/transfer         Medication Interventions: Patient to call before getting OOB    Elimination Interventions: Call light in reach, Patient to call for help with toileting needs              Problem: Patient Education: Go to Patient Education Activity  Goal: Patient/Family Education  Outcome: Progressing Towards Goal     Problem: Patient Education: Go to Patient Education Activity  Goal: Patient/Family Education  Outcome: Progressing Towards Goal     Problem: Patient Education: Go to Patient Education Activity  Goal: Patient/Family Education  Outcome: Progressing Towards Goal     Problem: Pressure Injury - Risk of  Goal: *Prevention of pressure injury  Description: Document Inder Scale and appropriate interventions in the flowsheet.   Outcome: Progressing Towards Goal  Note: Pressure Injury Interventions:  Sensory Interventions: Float heels, Keep linens dry and wrinkle-free, Maintain/enhance activity level, Minimize linen layers, Monitor skin under medical devices, Pad between skin to skin    Moisture Interventions: Absorbent underpads, Apply protective barrier, creams and emollients, Internal/External urinary devices, Limit adult briefs, Maintain skin hydration (lotion/cream), Minimize layers, Moisture barrier    Activity Interventions: Increase time out of bed    Mobility Interventions: HOB 30 degrees or less    Nutrition Interventions: Document food/fluid/supplement intake    Friction and Shear Interventions: HOB 30 degrees or less                Problem: Patient Education: Go to Patient Education Activity  Goal: Patient/Family Education  Outcome: Progressing Towards Goal

## 2020-09-15 NOTE — PROGRESS NOTES
General Surgery End of Shift Nursing Note    Shift worked:   1900 - 0700   Summary of shift:    Mainly uneventful. Respiratory did Pulse OX overnight study. O2 dropped and called Alana RT back to look. Left her on oxygen to maintain levels. Tolerated medication and nursing related tasks. Slept majority of the night.  PRN Tramadol given x1   Issues for physician to address:   None at this time     Number times ambulated in hallway past shift: 0    Number of times OOB to chair past shift: 1    Pain Management:  Current medication: Tramadol  Patient states pain is manageable on current pain medication: YES    GI:    Current diet:  DIET CARDIAC Regular    Tolerating current diet: YES  Passing flatus: YES  Last Bowel Movement: yesterday    Respiratory:    Incentive Spirometer at bedside: YES  Patient instructed on use: Milad Nicholson LPN

## 2020-09-15 NOTE — PROGRESS NOTES
ARIANA Plan:                 *Home w/[facility]-PT & SN              *daughter or son to transport at d/c                 *IM letter signed & placed on chart 9/14    Patient is discharging home today with [facility]. [facility] was notified yesterday of d/c today and wound care/murray order. Follow-up appointment on AVS.  Patient does not have any other needs or concerns at this time. Care Management Interventions  PCP Verified by CM: Dinora Shearer MD)  Mode of Transport at Discharge: Other (see comment)(daughter)  Transition of Care Consult (CM Consult): Discharge Planning, [address]: Yes  Discharge Durable Medical Equipment: No(walker)  Physical Therapy Consult: Yes  Occupational Therapy Consult: Yes  Speech Therapy Consult: No  Current Support Network: Other(Lives in a two story townhouse with 32 yr old grandson.  1 ADEEL home and 9 steps to bedroom)  Confirm Follow Up Transport: Family(daughter or son)  The Patient and/or Patient Representative was Provided with a Choice of Provider and Agrees with the Discharge Plan?: Yes  Name of the Patient Representative Who was Provided with a Choice of Provider and Agrees with the Discharge Plan: patient  Freedom of Choice List was Provided with Basic Dialogue that Supports the Patient's Individualized Plan of Care/Goals, Treatment Preferences and Shares the Quality Data Associated with the Providers?: Yes  Discharge Location  Discharge Placement: Home with home health([facility])      Radha Michelle  Ext [phone]

## 2020-09-17 ENCOUNTER — HOME CARE VISIT (OUTPATIENT)
Dept: HOME HEALTH SERVICES | Facility: HOME HEALTH | Age: 65
End: 2020-09-17

## 2020-09-23 ENCOUNTER — TELEPHONE (OUTPATIENT)
Dept: INTERNAL MEDICINE CLINIC | Age: 65
End: 2020-09-23

## 2020-09-23 ENCOUNTER — HOME CARE VISIT (OUTPATIENT)
Dept: HOME HEALTH SERVICES | Facility: HOME HEALTH | Age: 65
End: 2020-09-23

## 2020-09-23 NOTE — TELEPHONE ENCOUNTER
I called and spoke to Derrek Alarcon to give verbal order of delaying start of care until 9/28/20. MD Jt Malone LPN 1 hour ago (7:84 PM)       Please give the verbal order.     Message text

## 2020-09-23 NOTE — TELEPHONE ENCOUNTER
#[phone] Marquise [facility] states that pt has pushed them off yet again today about coming out to do the eval.  Pt states she is having a family meeting this wknd and asking Marquise Bruno to call her back Monday, 9-28-20 to see if that is what she wants to do. Marquise Bruno is asking for a verbal to delay start of care until 9-28-20.     Please call to give verbal.

## 2020-09-28 DIAGNOSIS — R52 PAIN: ICD-10-CM

## 2020-10-02 ENCOUNTER — HOME CARE VISIT (OUTPATIENT)
Dept: HOME HEALTH SERVICES | Facility: HOME HEALTH | Age: 65
End: 2020-10-02

## 2020-10-05 ENCOUNTER — TELEPHONE (OUTPATIENT)
Dept: INTERNAL MEDICINE CLINIC | Age: 65
End: 2020-10-05

## 2020-10-05 ENCOUNTER — HOME CARE VISIT (OUTPATIENT)
Dept: HOME HEALTH SERVICES | Facility: HOME HEALTH | Age: 65
End: 2020-10-05

## 2020-10-05 DIAGNOSIS — N17.9 ACUTE RENAL FAILURE, UNSPECIFIED ACUTE RENAL FAILURE TYPE (HCC): ICD-10-CM

## 2020-10-05 DIAGNOSIS — L97.112 NON-PRESSURE CHRONIC ULCER OF RIGHT THIGH WITH FAT LAYER EXPOSED (HCC): Primary | ICD-10-CM

## 2020-10-05 NOTE — TELEPHONE ENCOUNTER
----- Message from Ganga Rodriguez sent at 10/5/2020 10:56 AM EDT -----  Regarding: Dr. Rebecca Blair (if not patient): pt  Relationship of caller (if not patient): n/a  Best contact number(s): [phone]  Name of medication and dosage if known: \"Tromodol\" 50 mg  Is patient out of this medication (yes/no): yes  Pharmacy name: [address] [place] [address] listed in chart? (yes/no): yes  Pharmacy phone number: n/a  Date of last visit: 9/18/20  Details to clarify the request  Pharmacy have called several times to get medication refilled.

## 2020-10-07 RX ORDER — TRAMADOL HYDROCHLORIDE 50 MG/1
TABLET ORAL
Qty: 21 TAB | Refills: 0 | Status: SHIPPED | OUTPATIENT
Start: 2020-10-07 | End: 2020-10-14

## 2020-10-07 NOTE — TELEPHONE ENCOUNTER
Identified patient 2 identifiers verified. Spoke with patient   She is needing home health for wound care and per Dr. Naida Price order originated.

## 2020-10-08 ENCOUNTER — HOME HEALTH ADMISSION (OUTPATIENT)
Dept: HOME HEALTH SERVICES | Facility: HOME HEALTH | Age: 65
End: 2020-10-08
Payer: MEDICARE

## 2020-10-10 ENCOUNTER — HOME CARE VISIT (OUTPATIENT)
Dept: SCHEDULING | Facility: HOME HEALTH | Age: 65
End: 2020-10-10
Payer: MEDICARE

## 2020-10-10 VITALS
BODY MASS INDEX: 55.32 KG/M2 | RESPIRATION RATE: 20 BRPM | HEART RATE: 88 BPM | SYSTOLIC BLOOD PRESSURE: 124 MMHG | WEIGHT: 293 LBS | DIASTOLIC BLOOD PRESSURE: 70 MMHG | TEMPERATURE: 97.5 F | HEIGHT: 61 IN | OXYGEN SATURATION: 97 %

## 2020-10-10 PROCEDURE — 3331090001 HH PPS REVENUE CREDIT

## 2020-10-10 PROCEDURE — 3331090002 HH PPS REVENUE DEBIT

## 2020-10-10 PROCEDURE — G0299 HHS/HOSPICE OF RN EA 15 MIN: HCPCS

## 2020-10-10 PROCEDURE — 400013 HH SOC

## 2020-10-11 PROCEDURE — 3331090002 HH PPS REVENUE DEBIT

## 2020-10-11 PROCEDURE — 3331090001 HH PPS REVENUE CREDIT

## 2020-10-12 ENCOUNTER — HOME CARE VISIT (OUTPATIENT)
Dept: HOME HEALTH SERVICES | Facility: HOME HEALTH | Age: 65
End: 2020-10-12
Payer: MEDICARE

## 2020-10-12 PROCEDURE — 3331090002 HH PPS REVENUE DEBIT

## 2020-10-12 PROCEDURE — 3331090001 HH PPS REVENUE CREDIT

## 2020-10-13 ENCOUNTER — HOME CARE VISIT (OUTPATIENT)
Dept: SCHEDULING | Facility: HOME HEALTH | Age: 65
End: 2020-10-13
Payer: MEDICARE

## 2020-10-13 PROCEDURE — G0299 HHS/HOSPICE OF RN EA 15 MIN: HCPCS

## 2020-10-13 PROCEDURE — 3331090002 HH PPS REVENUE DEBIT

## 2020-10-13 PROCEDURE — 3331090001 HH PPS REVENUE CREDIT

## 2020-10-14 ENCOUNTER — HOME CARE VISIT (OUTPATIENT)
Dept: SCHEDULING | Facility: HOME HEALTH | Age: 65
End: 2020-10-14
Payer: MEDICARE

## 2020-10-14 PROCEDURE — 3331090002 HH PPS REVENUE DEBIT

## 2020-10-14 PROCEDURE — 3331090001 HH PPS REVENUE CREDIT

## 2020-10-15 ENCOUNTER — TELEPHONE (OUTPATIENT)
Dept: INTERNAL MEDICINE CLINIC | Age: 65
End: 2020-10-15

## 2020-10-15 PROCEDURE — 3331090002 HH PPS REVENUE DEBIT

## 2020-10-15 PROCEDURE — 3331090001 HH PPS REVENUE CREDIT

## 2020-10-15 NOTE — TELEPHONE ENCOUNTER
Identified patient 2 identifiers verified. Patient was encouraged to go to ED for evaluation, since experiencing diarrhea, nausea and vomitting for past 3 weeks. Patient also reports a fall that she ended up on the floor for  Long period of time 911 was called to assist patient off the floor. Patient agreed to going to ED for evaluation.

## 2020-10-15 NOTE — TELEPHONE ENCOUNTER
Patient states she needs a call back in reference to Diarrhea patient reports she has had for 3 weeks now & can't eat without getting sick. Patient states she needs a call back to be advised what to do or get an appt. Please call.  Thank you

## 2020-10-16 ENCOUNTER — HOME CARE VISIT (OUTPATIENT)
Dept: SCHEDULING | Facility: HOME HEALTH | Age: 65
End: 2020-10-16
Payer: MEDICARE

## 2020-10-16 PROCEDURE — 3331090001 HH PPS REVENUE CREDIT

## 2020-10-16 PROCEDURE — 3331090002 HH PPS REVENUE DEBIT

## 2020-10-17 VITALS
TEMPERATURE: 98.2 F | RESPIRATION RATE: 18 BRPM | DIASTOLIC BLOOD PRESSURE: 78 MMHG | HEART RATE: 70 BPM | OXYGEN SATURATION: 98 % | SYSTOLIC BLOOD PRESSURE: 116 MMHG

## 2020-10-17 PROCEDURE — 3331090001 HH PPS REVENUE CREDIT

## 2020-10-17 PROCEDURE — 3331090002 HH PPS REVENUE DEBIT

## 2020-10-18 PROCEDURE — 3331090001 HH PPS REVENUE CREDIT

## 2020-10-18 PROCEDURE — 3331090002 HH PPS REVENUE DEBIT

## 2020-10-19 ENCOUNTER — HOME CARE VISIT (OUTPATIENT)
Dept: SCHEDULING | Facility: HOME HEALTH | Age: 65
End: 2020-10-19
Payer: MEDICARE

## 2020-10-19 VITALS
TEMPERATURE: 97.1 F | DIASTOLIC BLOOD PRESSURE: 90 MMHG | SYSTOLIC BLOOD PRESSURE: 130 MMHG | OXYGEN SATURATION: 96 % | RESPIRATION RATE: 18 BRPM | HEART RATE: 83 BPM

## 2020-10-19 PROCEDURE — 3331090001 HH PPS REVENUE CREDIT

## 2020-10-19 PROCEDURE — A6212 FOAM DRG <=16 SQ IN W/BORDER: HCPCS

## 2020-10-19 PROCEDURE — G0300 HHS/HOSPICE OF LPN EA 15 MIN: HCPCS

## 2020-10-19 PROCEDURE — A6250 SKIN SEAL PROTECT MOISTURIZR: HCPCS

## 2020-10-19 PROCEDURE — A5120 SKIN BARRIER, WIPE OR SWAB: HCPCS

## 2020-10-19 PROCEDURE — 3331090002 HH PPS REVENUE DEBIT

## 2020-10-20 PROCEDURE — 3331090002 HH PPS REVENUE DEBIT

## 2020-10-20 PROCEDURE — 3331090001 HH PPS REVENUE CREDIT

## 2020-10-21 PROCEDURE — 3331090001 HH PPS REVENUE CREDIT

## 2020-10-21 PROCEDURE — 3331090002 HH PPS REVENUE DEBIT

## 2020-10-22 ENCOUNTER — HOME CARE VISIT (OUTPATIENT)
Dept: SCHEDULING | Facility: HOME HEALTH | Age: 65
End: 2020-10-22
Payer: MEDICARE

## 2020-10-22 PROCEDURE — 3331090002 HH PPS REVENUE DEBIT

## 2020-10-22 PROCEDURE — 3331090001 HH PPS REVENUE CREDIT

## 2020-10-22 PROCEDURE — G0300 HHS/HOSPICE OF LPN EA 15 MIN: HCPCS

## 2020-10-23 ENCOUNTER — VIRTUAL VISIT (OUTPATIENT)
Dept: INTERNAL MEDICINE CLINIC | Age: 65
End: 2020-10-23
Payer: MEDICARE

## 2020-10-23 DIAGNOSIS — S71.109D OPEN WOUND OF THIGH, UNSPECIFIED LATERALITY, SUBSEQUENT ENCOUNTER: ICD-10-CM

## 2020-10-23 DIAGNOSIS — R52 PAIN: ICD-10-CM

## 2020-10-23 DIAGNOSIS — M25.50 ARTHRALGIA, UNSPECIFIED JOINT: Primary | ICD-10-CM

## 2020-10-23 PROCEDURE — 99213 OFFICE O/P EST LOW 20 MIN: CPT | Performed by: FAMILY MEDICINE

## 2020-10-23 PROCEDURE — 3331090001 HH PPS REVENUE CREDIT

## 2020-10-23 PROCEDURE — 3331090002 HH PPS REVENUE DEBIT

## 2020-10-23 PROCEDURE — G0463 HOSPITAL OUTPT CLINIC VISIT: HCPCS | Performed by: FAMILY MEDICINE

## 2020-10-23 RX ORDER — TRAMADOL HYDROCHLORIDE 50 MG/1
50 TABLET ORAL
Qty: 21 TAB | Refills: 0 | Status: SHIPPED | OUTPATIENT
Start: 2020-10-23 | End: 2020-10-30

## 2020-10-23 NOTE — Clinical Note
Please give this patient the contact info for pain management.  See the referral to Dr. Ayleen Collins.

## 2020-10-23 NOTE — PROGRESS NOTES
Timothy Schultz is a 76 y.o. female who was seen by synchronous (real-time) audio-video technology on 10/23/2020 for Pain (Chronic) (wants to talk about pain management )    Reports ongoing joint pains in her knees, shoulders, and back. She is requesting a refill of tramadol and referral to pain management. Reports that the wounds on her inner thighs are still irritated but are healing. Home health nurses coming out to treat the wounds. Assessment & Plan:   Diagnoses and all orders for this visit:    1. Arthralgia, unspecified joint  -     REFERRAL TO ORTHOPEDICS  -     traMADoL (ULTRAM) 50 mg tablet; Take 1 Tab by mouth every eight (8) hours as needed for Pain for up to 7 days. Max Daily Amount: 150 mg.    2. Pain  -     REFERRAL TO ORTHOPEDICS  -     traMADoL (ULTRAM) 50 mg tablet; Take 1 Tab by mouth every eight (8) hours as needed for Pain for up to 7 days. Max Daily Amount: 150 mg.    3. Open wound of thigh, unspecified laterality, subsequent encounter  -     traMADoL (ULTRAM) 50 mg tablet; Take 1 Tab by mouth every eight (8) hours as needed for Pain for up to 7 days. Max Daily Amount: 150 mg. Ms. Lisa Petty is given a refill of tramadol to help with her pain. She was also given a referral to pain management. Subjective:       Prior to Admission medications    Medication Sig Start Date End Date Taking? Authorizing Provider   traMADoL (ULTRAM) 50 mg tablet Take 1 Tab by mouth every eight (8) hours as needed for Pain for up to 7 days. Max Daily Amount: 150 mg. 10/23/20 10/30/20 Yes Paulino Reeves MD   furosemide (LASIX) 40 mg tablet Take 40 mg by mouth daily. Yes Provider, Historical   traZODone (DESYREL) 50 mg tablet Take 50 mg by mouth daily as needed for Sleep. at bedtime   Yes Provider, Historical   LORazepam (ATIVAN) 1 mg tablet Take 1 mg by mouth two (2) times daily as needed for Anxiety.    Yes Provider, Historical   miconazole (MICOTIN) 2 % topical cream Apply  to affected area two (2) times a day. 9/18/20  Yes Noreen Mark MD   omeprazole (PRILOSEC) 40 mg capsule TAKE 1 CAPSULE BY MOUTH DAILY 9/18/20  Yes Noreen Mark MD   dicyclomine (BENTYL) 10 mg capsule Take 1 Cap by mouth three (3) times daily. 9/18/20  Yes Noreen Mark MD   naloxone [facility]) 4 mg/actuation nasal spray Use 1 spray intranasally, then discard. Repeat with new spray every 2 min as needed for opioid overdose symptoms, alternating nostrils. 9/4/20  Yes Noreen Mark MD   miconazole (MICOTIN) 2 % topical cream Apply  to affected area two (2) times a day. 9/3/20  Yes Tomer Yu MD   simvastatin (ZOCOR) 20 mg tablet TAKE 1 TABLET BY MOUTH EVERY NIGHT 8/25/20  Yes Georgie Newell MD   nystatin (MYCOSTATIN) topical cream Apply  to affected area two (2) times a day. 8/21/20  Yes Noreen Mark MD   Nystatin, Bulk, 1 billion unit powd 1 Dose by Does Not Apply route two (2) times a day. 8/19/20  Yes Yu Mendes PA   lisinopriL (PRINIVIL, ZESTRIL) 20 mg tablet Take 2 tablets daily. Patient taking differently: Take 40 mg by mouth daily. Take 2 tablets daily. 7/17/20  Yes Noreen Mark MD   hydrocortisone (CORTAID) 0.5 % topical cream Apply  to affected area two (2) times a day. use thin layer 7/17/20  Yes Noreen Mark MD   bacitracin-polymyxin b (Polysporin) 500-10,000 unit/gram ointment Apply  to affected area two (2) times a day. 7/17/20  Yes Noreen Mark MD   QUEtiapine (SEROQUEL) 200 mg tablet TK 1 T PO HS PRF INSOMNIA 10/9/19  Yes Yue Rodriguez MD   DULoxetine (CYMBALTA) 60 mg capsule Take 1 Cap by mouth daily. 3/6/19  Yes Jackie Handy, NP   DULoxetine (CYMBALTA) 30 mg capsule Take 1 Cap by mouth daily. 3/6/19  Yes Jackie Handy, NP   potassium chloride SR (KLOR-CON 10) 10 mEq tablet TAKE 2 TABLETS BY MOUTH DAILY 1/16/19  Yes Noreen Mark MD   furosemide (LASIX) 20 mg tablet Take 2 Tabs by mouth two (2) times a day.  TAKE 1 TABLET BY MOUTH DAILY  Patient not taking: Reported on 10/10/2020 2/19/20   Silva Luna MD         Review of Systems   Constitutional: Negative. HENT: Negative. Eyes: Negative. Respiratory: Negative. Cardiovascular: Negative. Gastrointestinal: Negative. Genitourinary: Negative. Musculoskeletal: Positive for back pain and joint pain. Skin:        Open wounds both inner thighs   Neurological: Negative. Psychiatric/Behavioral: Negative. Objective:   No flowsheet data found. [INSTRUCTIONS:  \"[x]\" Indicates a positive item  \"[]\" Indicates a negative item  -- DELETE ALL ITEMS NOT EXAMINED]    Constitutional: [x] Appears well-developed and well-nourished [x] No apparent distress      [] Abnormal -     Mental status: [x] Alert and awake  [x] Oriented to person/place/time [x] Able to follow commands    [] Abnormal -     Eyes:   EOM    [x]  Normal    [] Abnormal -   Sclera  [x]  Normal    [] Abnormal -          Discharge [x]  None visible   [] Abnormal -     HENT: [x] Normocephalic, atraumatic  [] Abnormal -   [x] Mouth/Throat: Mucous membranes are moist    External Ears [x] Normal  [] Abnormal -    Neck: [x] No visualized mass [] Abnormal -     Pulmonary/Chest: [x] Respiratory effort normal   [x] No visualized signs of difficulty breathing or respiratory distress        [] Abnormal -      Musculoskeletal:   [x] Normal gait with no signs of ataxia         [x] Normal range of motion of neck        [] Abnormal -     Neurological:        [x] No Facial Asymmetry (Cranial nerve 7 motor function) (limited exam due to video visit)          [x] No gaze palsy        [] Abnormal -          Skin:        [x] No significant exanthematous lesions or discoloration noted on facial skin         [] Abnormal -            Psychiatric:       [x] Normal Affect [] Abnormal -        [x] No Hallucinations    Other pertinent observable physical exam findings:-        We discussed the expected course, resolution and complications of the diagnosis(es) in detail. Medication risks, benefits, costs, interactions, and alternatives were discussed as indicated. I advised her to contact the office if her condition worsens, changes or fails to improve as anticipated. She expressed understanding with the diagnosis(es) and plan. Suha Jay, who was evaluated through a patient-initiated, synchronous (real-time) audio-video encounter, and/or her healthcare decision maker, is aware that it is a billable service, with coverage as determined by her insurance carrier. She provided verbal consent to proceed: Yes, and patient identification was verified. It was conducted pursuant to the emergency declaration under the [address] [place] [address] authority and the [employer] Act. A caregiver was present when appropriate. Ability to conduct physical exam was limited. I was at home. The patient was at home.       Kemal Riojas MD

## 2020-10-24 ENCOUNTER — HOME CARE VISIT (OUTPATIENT)
Dept: HOME HEALTH SERVICES | Facility: HOME HEALTH | Age: 65
End: 2020-10-24
Payer: MEDICARE

## 2020-10-24 ENCOUNTER — HOME CARE VISIT (OUTPATIENT)
Dept: SCHEDULING | Facility: HOME HEALTH | Age: 65
End: 2020-10-24
Payer: MEDICARE

## 2020-10-24 VITALS
DIASTOLIC BLOOD PRESSURE: 78 MMHG | TEMPERATURE: 98.3 F | RESPIRATION RATE: 20 BRPM | SYSTOLIC BLOOD PRESSURE: 130 MMHG | HEART RATE: 80 BPM | OXYGEN SATURATION: 96 %

## 2020-10-24 VITALS
RESPIRATION RATE: 18 BRPM | TEMPERATURE: 98.3 F | HEART RATE: 78 BPM | SYSTOLIC BLOOD PRESSURE: 130 MMHG | DIASTOLIC BLOOD PRESSURE: 78 MMHG | OXYGEN SATURATION: 98 %

## 2020-10-24 PROCEDURE — 3331090001 HH PPS REVENUE CREDIT

## 2020-10-24 PROCEDURE — 3331090002 HH PPS REVENUE DEBIT

## 2020-10-24 PROCEDURE — G0300 HHS/HOSPICE OF LPN EA 15 MIN: HCPCS

## 2020-10-25 PROCEDURE — 3331090001 HH PPS REVENUE CREDIT

## 2020-10-25 PROCEDURE — 3331090002 HH PPS REVENUE DEBIT

## 2020-10-26 DIAGNOSIS — L08.9 SKIN INFECTION: ICD-10-CM

## 2020-10-26 PROCEDURE — 3331090001 HH PPS REVENUE CREDIT

## 2020-10-26 PROCEDURE — 3331090002 HH PPS REVENUE DEBIT

## 2020-10-26 NOTE — TELEPHONE ENCOUNTER
#[phone] pt states she needs to discuss getting a refill on a cream (no name) and to discuss another medication she wants. Please call.

## 2020-10-27 ENCOUNTER — HOME CARE VISIT (OUTPATIENT)
Dept: SCHEDULING | Facility: HOME HEALTH | Age: 65
End: 2020-10-27
Payer: MEDICARE

## 2020-10-27 PROCEDURE — G0300 HHS/HOSPICE OF LPN EA 15 MIN: HCPCS

## 2020-10-27 PROCEDURE — 3331090001 HH PPS REVENUE CREDIT

## 2020-10-27 PROCEDURE — 3331090002 HH PPS REVENUE DEBIT

## 2020-10-28 ENCOUNTER — TELEPHONE (OUTPATIENT)
Dept: INTERNAL MEDICINE CLINIC | Age: 65
End: 2020-10-28

## 2020-10-28 VITALS
OXYGEN SATURATION: 97 % | TEMPERATURE: 98.4 F | HEART RATE: 84 BPM | SYSTOLIC BLOOD PRESSURE: 150 MMHG | DIASTOLIC BLOOD PRESSURE: 68 MMHG | RESPIRATION RATE: 18 BRPM

## 2020-10-28 PROCEDURE — 3331090001 HH PPS REVENUE CREDIT

## 2020-10-28 PROCEDURE — 3331090002 HH PPS REVENUE DEBIT

## 2020-10-28 NOTE — TELEPHONE ENCOUNTER
----- Message from [place] sent at 10/28/2020  9:34 AM EDT -----  Regarding: Dr. Awilda Anderson Message/Vendor Calls    Caller's first and last name: Niru Garcia      Reason for call: Inner thigh wounds.       Callback required yes/no and why: yes      Best contact number(s):[phone]    Message from Celeste Alston

## 2020-10-29 PROCEDURE — 3331090001 HH PPS REVENUE CREDIT

## 2020-10-29 PROCEDURE — 3331090002 HH PPS REVENUE DEBIT

## 2020-10-29 RX ORDER — DOXYLAMINE SUCCINATE 25 MG
TABLET ORAL 2 TIMES DAILY
Qty: 15 G | Refills: 0 | Status: SHIPPED | OUTPATIENT
Start: 2020-10-29 | End: 2020-11-06 | Stop reason: ALTCHOICE

## 2020-10-30 ENCOUNTER — HOME CARE VISIT (OUTPATIENT)
Dept: SCHEDULING | Facility: HOME HEALTH | Age: 65
End: 2020-10-30
Payer: MEDICARE

## 2020-10-30 VITALS
TEMPERATURE: 97.8 F | OXYGEN SATURATION: 96 % | HEART RATE: 79 BPM | DIASTOLIC BLOOD PRESSURE: 90 MMHG | SYSTOLIC BLOOD PRESSURE: 144 MMHG | RESPIRATION RATE: 18 BRPM

## 2020-10-30 PROCEDURE — 3331090001 HH PPS REVENUE CREDIT

## 2020-10-30 PROCEDURE — G0299 HHS/HOSPICE OF RN EA 15 MIN: HCPCS

## 2020-10-30 PROCEDURE — 3331090002 HH PPS REVENUE DEBIT

## 2020-10-31 PROCEDURE — 3331090002 HH PPS REVENUE DEBIT

## 2020-10-31 PROCEDURE — 3331090001 HH PPS REVENUE CREDIT

## 2020-11-01 ENCOUNTER — HOSPITAL ENCOUNTER (EMERGENCY)
Age: 65
Discharge: HOME | End: 2020-11-01
Attending: STUDENT IN AN ORGANIZED HEALTH CARE EDUCATION/TRAINING PROGRAM | Admitting: STUDENT IN AN ORGANIZED HEALTH CARE EDUCATION/TRAINING PROGRAM
Payer: MEDICARE

## 2020-11-01 ENCOUNTER — APPOINTMENT (OUTPATIENT)
Dept: GENERAL RADIOLOGY | Age: 65
End: 2020-11-01
Attending: STUDENT IN AN ORGANIZED HEALTH CARE EDUCATION/TRAINING PROGRAM
Payer: MEDICARE

## 2020-11-01 VITALS
HEART RATE: 83 BPM | BODY MASS INDEX: 55.32 KG/M2 | OXYGEN SATURATION: 92 % | DIASTOLIC BLOOD PRESSURE: 105 MMHG | TEMPERATURE: 98.7 F | HEIGHT: 61 IN | RESPIRATION RATE: 17 BRPM | SYSTOLIC BLOOD PRESSURE: 177 MMHG | WEIGHT: 293 LBS

## 2020-11-01 DIAGNOSIS — R03.0 ELEVATED BLOOD PRESSURE READING: Primary | ICD-10-CM

## 2020-11-01 DIAGNOSIS — F41.1 ANXIETY STATE: ICD-10-CM

## 2020-11-01 LAB
ALBUMIN SERPL-MCNC: 3.4 G/DL (ref 3.5–5)
ALBUMIN/GLOB SERPL: 0.9 {RATIO} (ref 1.1–2.2)
ALP SERPL-CCNC: 70 U/L (ref 45–117)
ALT SERPL-CCNC: 20 U/L (ref 12–78)
ANION GAP SERPL CALC-SCNC: 6 MMOL/L (ref 5–15)
AST SERPL-CCNC: 13 U/L (ref 15–37)
BASOPHILS # BLD: 0 K/UL (ref 0–0.1)
BASOPHILS NFR BLD: 1 % (ref 0–1)
BILIRUB SERPL-MCNC: 0.7 MG/DL (ref 0.2–1)
BUN SERPL-MCNC: 8 MG/DL (ref 6–20)
BUN/CREAT SERPL: 10 (ref 12–20)
CALCIUM SERPL-MCNC: 9.5 MG/DL (ref 8.5–10.1)
CHLORIDE SERPL-SCNC: 105 MMOL/L (ref 97–108)
CO2 SERPL-SCNC: 28 MMOL/L (ref 21–32)
COMMENT, HOLDF: NORMAL
CREAT SERPL-MCNC: 0.77 MG/DL (ref 0.55–1.02)
DIFFERENTIAL METHOD BLD: NORMAL
EOSINOPHIL # BLD: 0.1 K/UL (ref 0–0.4)
EOSINOPHIL NFR BLD: 2 % (ref 0–7)
ERYTHROCYTE [DISTWIDTH] IN BLOOD BY AUTOMATED COUNT: 13.7 % (ref 11.5–14.5)
GLOBULIN SER CALC-MCNC: 3.7 G/DL (ref 2–4)
GLUCOSE SERPL-MCNC: 106 MG/DL (ref 65–100)
HCT VFR BLD AUTO: 39.7 % (ref 35–47)
HGB BLD-MCNC: 12.9 G/DL (ref 11.5–16)
IMM GRANULOCYTES # BLD AUTO: 0 K/UL (ref 0–0.04)
IMM GRANULOCYTES NFR BLD AUTO: 0 % (ref 0–0.5)
LYMPHOCYTES # BLD: 2.2 K/UL (ref 0.8–3.5)
LYMPHOCYTES NFR BLD: 36 % (ref 12–49)
MCH RBC QN AUTO: 28 PG (ref 26–34)
MCHC RBC AUTO-ENTMCNC: 32.5 G/DL (ref 30–36.5)
MCV RBC AUTO: 86.1 FL (ref 80–99)
MONOCYTES # BLD: 0.6 K/UL (ref 0–1)
MONOCYTES NFR BLD: 10 % (ref 5–13)
NEUTS SEG # BLD: 3.1 K/UL (ref 1.8–8)
NEUTS SEG NFR BLD: 51 % (ref 32–75)
NRBC # BLD: 0 K/UL (ref 0–0.01)
NRBC BLD-RTO: 0 PER 100 WBC
PLATELET # BLD AUTO: 181 K/UL (ref 150–400)
PMV BLD AUTO: 12.4 FL (ref 8.9–12.9)
POTASSIUM SERPL-SCNC: 3.6 MMOL/L (ref 3.5–5.1)
PROT SERPL-MCNC: 7.1 G/DL (ref 6.4–8.2)
RBC # BLD AUTO: 4.61 M/UL (ref 3.8–5.2)
SAMPLES BEING HELD,HOLD: NORMAL
SODIUM SERPL-SCNC: 139 MMOL/L (ref 136–145)
TROPONIN I SERPL-MCNC: <0.05 NG/ML
WBC # BLD AUTO: 6.1 K/UL (ref 3.6–11)

## 2020-11-01 PROCEDURE — 71045 X-RAY EXAM CHEST 1 VIEW: CPT

## 2020-11-01 PROCEDURE — 74011250636 HC RX REV CODE- 250/636: Performed by: STUDENT IN AN ORGANIZED HEALTH CARE EDUCATION/TRAINING PROGRAM

## 2020-11-01 PROCEDURE — 36415 COLL VENOUS BLD VENIPUNCTURE: CPT

## 2020-11-01 PROCEDURE — 84484 ASSAY OF TROPONIN QUANT: CPT

## 2020-11-01 PROCEDURE — 3331090001 HH PPS REVENUE CREDIT

## 2020-11-01 PROCEDURE — 96375 TX/PRO/DX INJ NEW DRUG ADDON: CPT

## 2020-11-01 PROCEDURE — 74011250637 HC RX REV CODE- 250/637: Performed by: STUDENT IN AN ORGANIZED HEALTH CARE EDUCATION/TRAINING PROGRAM

## 2020-11-01 PROCEDURE — 85025 COMPLETE CBC W/AUTO DIFF WBC: CPT

## 2020-11-01 PROCEDURE — 93005 ELECTROCARDIOGRAM TRACING: CPT

## 2020-11-01 PROCEDURE — 80053 COMPREHEN METABOLIC PANEL: CPT

## 2020-11-01 PROCEDURE — 96374 THER/PROPH/DIAG INJ IV PUSH: CPT

## 2020-11-01 PROCEDURE — 3331090002 HH PPS REVENUE DEBIT

## 2020-11-01 PROCEDURE — 99285 EMERGENCY DEPT VISIT HI MDM: CPT

## 2020-11-01 RX ORDER — KETOROLAC TROMETHAMINE 30 MG/ML
15 INJECTION, SOLUTION INTRAMUSCULAR; INTRAVENOUS
Status: COMPLETED | OUTPATIENT
Start: 2020-11-01 | End: 2020-11-01

## 2020-11-01 RX ORDER — ACETAMINOPHEN 500 MG
1000 TABLET ORAL
Status: COMPLETED | OUTPATIENT
Start: 2020-11-01 | End: 2020-11-01

## 2020-11-01 RX ORDER — ONDANSETRON 2 MG/ML
4 INJECTION INTRAMUSCULAR; INTRAVENOUS
Status: COMPLETED | OUTPATIENT
Start: 2020-11-01 | End: 2020-11-01

## 2020-11-01 RX ADMIN — ONDANSETRON 4 MG: 2 INJECTION INTRAMUSCULAR; INTRAVENOUS at 12:23

## 2020-11-01 RX ADMIN — KETOROLAC TROMETHAMINE 15 MG: 30 INJECTION, SOLUTION INTRAMUSCULAR at 12:45

## 2020-11-01 RX ADMIN — ACETAMINOPHEN 1000 MG: 500 TABLET ORAL at 13:47

## 2020-11-01 NOTE — ED NOTES
Dr. Jennifer Kim at bedside to provide discharge paperwork. Vital signs stable. Pt in no apparent distress at this time. Mental status at baseline. Wheeled out to car, discharge paperwork in hand. Patient and or family acknowledged and signed discharge instructions that were created/provided by the Physician. Signed discharge form with patient label placed in scan box. Accompanied by family to drive pt home.

## 2020-11-01 NOTE — ED PROVIDER NOTES
EMERGENCY DEPARTMENT HISTORY AND PHYSICAL EXAM      Date: 11/1/2020  Patient Name: Yeison Odom    History of Presenting Illness     Chief Complaint   Patient presents with    Hypertension    Headache    Dizziness       History Provided By: Patient    HPI: Yeison Odom, 76 y.o. female with pmhx of GABRIEL, MURIEL on CPAP, PE on eliquis, HTN, HLD, bipolar disorder, GERD, presents to the ED with cc of elevated blood pressure earlier at home associated with headache and lightheadedness. Patient states that she took her morning medications including her BP meds, and approximately 45 minutes after checked her blood pressure with a wrist cuff, and noticed that it was extremely elevated with a systolic blood pressure above 200. She thought she would try to repeat the blood pressure on her other wrist, and her second reading was even higher with a systolic blood pressure of 240. Patient states that she was extremely \"freaked out\" about this. She called her nurse, who told her to come to the ER for evaluation. Patient's blood pressure is currently much improved in the ED without any intervention, she states that her lightheadedness has now resolved, and that she only has a mild headache. Patient also reports some \"queasiness\" which she attributes to not having eaten anything after waking up. She states that she normally eats something within 30 minutes of waking up. Patient does think that her symptoms could be related to and worsened by anxiety, and states that she has been told by her children that she needs to \"calm down. \"  She denies any chest pain or shortness of breath. No diaphoresis. There are no other complaints, changes, or physical findings at this time. PCP: Majo Xie MD    No current facility-administered medications on file prior to encounter. Current Outpatient Medications on File Prior to Encounter   Medication Sig Dispense Refill    apixaban (ELIQUIS PO) Take  by mouth daily.  lisinopriL (PRINIVIL, ZESTRIL) 20 mg tablet Take 2 tablets daily. (Patient taking differently: Take 40 mg by mouth daily. Take 2 tablets daily. ) 180 Tab 1    miconazole (MICOTIN) 2 % topical cream Apply  to affected area two (2) times a day. 15 g 0    furosemide (LASIX) 40 mg tablet Take 40 mg by mouth daily.  traZODone (DESYREL) 50 mg tablet Take 50 mg by mouth daily as needed for Sleep. at bedtime      LORazepam (ATIVAN) 1 mg tablet Take 1 mg by mouth two (2) times daily as needed for Anxiety.  omeprazole (PRILOSEC) 40 mg capsule TAKE 1 CAPSULE BY MOUTH DAILY 90 Cap 0    dicyclomine (BENTYL) 10 mg capsule Take 1 Cap by mouth three (3) times daily. 90 Cap 1    naloxone (NARCAN) 4 mg/actuation nasal spray Use 1 spray intranasally, then discard. Repeat with new spray every 2 min as needed for opioid overdose symptoms, alternating nostrils. 1 Each 1    miconazole (MICOTIN) 2 % topical cream Apply  to affected area two (2) times a day. 42 g 1    simvastatin (ZOCOR) 20 mg tablet TAKE 1 TABLET BY MOUTH EVERY NIGHT 90 Tab 1    nystatin (MYCOSTATIN) topical cream Apply  to affected area two (2) times a day. 15 g 0    Nystatin, Bulk, 1 billion unit powd 1 Dose by Does Not Apply route two (2) times a day. 1 Each 0    hydrocortisone (CORTAID) 0.5 % topical cream Apply  to affected area two (2) times a day. use thin layer 30 g 0    bacitracin-polymyxin b (Polysporin) 500-10,000 unit/gram ointment Apply  to affected area two (2) times a day. 15 g 0    furosemide (LASIX) 20 mg tablet Take 2 Tabs by mouth two (2) times a day. TAKE 1 TABLET BY MOUTH DAILY (Patient not taking: Reported on 10/10/2020) 30 Tab 0    QUEtiapine (SEROQUEL) 200 mg tablet TK 1 T PO HS PRF INSOMNIA  0    DULoxetine (CYMBALTA) 60 mg capsule Take 1 Cap by mouth daily. 30 Cap 1    DULoxetine (CYMBALTA) 30 mg capsule Take 1 Cap by mouth daily.  30 Cap 1    potassium chloride SR (KLOR-CON 10) 10 mEq tablet TAKE 2 TABLETS BY MOUTH DAILY 180 Tab 1       Past History     Past Medical History:  Past Medical History:   Diagnosis Date    Arthritis     chronic pain related arthritis    Bipolar 1 disorder (HCC)     GERD (gastroesophageal reflux disease)     Hypertension     Other ill-defined conditions(799.89)     hyperlipidemia. Stomach abcess    Psychiatric disorder     panic attacks    Sleep apnea     Thromboembolus ([address]     last year L leg    Unspecified sleep apnea        Past Surgical History:  Past Surgical History:   Procedure Laterality Date    HX CHOLECYSTECTOMY      HX GI      gallbladder removed 2/2010    HX HYSTERECTOMY         Family History:  Family History   Problem Relation Age of Onset    Heart Disease Mother     Diabetes Mother     Arthritis-osteo Mother     High Cholesterol Mother     Heart Attack Sister     Diabetes Sister     Heart Disease Sister     Diabetes Sister     Arthritis-osteo Sister     High Cholesterol Sister     Schizophrenia Paternal Grandmother     Drug Abuse Sister        Social History:  Social History     Tobacco Use    Smoking status: Never Smoker    Smokeless tobacco: Never Used   Substance Use Topics    Alcohol use: No    Drug use: No       Allergies:  No Known Allergies      Review of Systems   Review of Systems   Constitutional: Negative for chills and fever. HENT: Negative for congestion and rhinorrhea. Eyes: Negative for visual disturbance. Respiratory: Negative for chest tightness and shortness of breath. Cardiovascular: Negative for chest pain, palpitations and leg swelling. Gastrointestinal: Positive for nausea. Negative for abdominal pain, diarrhea and vomiting. Genitourinary: Negative for dysuria, flank pain and hematuria. Musculoskeletal: Negative for back pain and neck pain. Skin: Negative for rash. Allergic/Immunologic: Negative for immunocompromised state. Neurological: Positive for headaches.  Negative for dizziness, speech difficulty, weakness and light-headedness. Hematological: Negative for adenopathy. Psychiatric/Behavioral: Negative for dysphoric mood and suicidal ideas. The patient is nervous/anxious. Physical Exam   Physical Exam  Vitals signs and nursing note reviewed. Constitutional:       General: She is not in acute distress. Appearance: She is not ill-appearing or toxic-appearing. HENT:      Head: Normocephalic and atraumatic. Nose: Nose normal.      Mouth/Throat:      Mouth: Mucous membranes are moist.   Eyes:      Extraocular Movements: Extraocular movements intact. Pupils: Pupils are equal, round, and reactive to light. Neck:      Musculoskeletal: Normal range of motion and neck supple. Cardiovascular:      Rate and Rhythm: Normal rate and regular rhythm. Pulses: Normal pulses. Pulmonary:      Effort: Pulmonary effort is normal.      Breath sounds: No stridor. No wheezing or rhonchi. Abdominal:      General: Abdomen is flat. There is no distension. Tenderness: There is no abdominal tenderness. Musculoskeletal: Normal range of motion. Skin:     General: Skin is warm and dry. Neurological:      General: No focal deficit present. Mental Status: She is alert and oriented to person, place, and time. Cranial Nerves: No cranial nerve deficit. Sensory: No sensory deficit. Motor: No weakness. Coordination: Coordination normal.      Gait: Gait normal.   Psychiatric:         Mood and Affect: Mood is anxious. Affect is tearful.          Judgment: Judgment normal.         Diagnostic Study Results     Labs -     Recent Results (from the past 12 hour(s))   EKG, 12 LEAD, INITIAL    Collection Time: 11/01/20 10:42 AM   Result Value Ref Range    Ventricular Rate 84 BPM    Atrial Rate 84 BPM    P-R Interval 156 ms    QRS Duration 64 ms    Q-T Interval 398 ms    QTC Calculation (Bezet) 470 ms    Calculated P Axis 41 degrees    Calculated R Axis 28 degrees    Calculated T Axis 25 degrees    Diagnosis       Normal sinus rhythm  Possible Left atrial enlargement  T wave abnormality, consider anterior ischemia  When compared with ECG of 08-SEP-2020 16:56,  Inverted T waves have replaced nonspecific T wave abnormality in Anterior   leads     CBC WITH AUTOMATED DIFF    Collection Time: 11/01/20 12:06 PM   Result Value Ref Range    WBC 6.1 3.6 - 11.0 K/uL    RBC 4.61 3.80 - 5.20 M/uL    HGB 12.9 11.5 - 16.0 g/dL    HCT 39.7 35.0 - 47.0 %    MCV 86.1 80.0 - 99.0 FL    MCH 28.0 26.0 - 34.0 PG    MCHC 32.5 30.0 - 36.5 g/dL    RDW 13.7 11.5 - 14.5 %    PLATELET 858 150 - 201 K/uL    MPV 12.4 8.9 - 12.9 FL    NRBC 0.0 0  WBC    ABSOLUTE NRBC 0.00 0.00 - 0.01 K/uL    NEUTROPHILS 51 32 - 75 %    LYMPHOCYTES 36 12 - 49 %    MONOCYTES 10 5 - 13 %    EOSINOPHILS 2 0 - 7 %    BASOPHILS 1 0 - 1 %    IMMATURE GRANULOCYTES 0 0.0 - 0.5 %    ABS. NEUTROPHILS 3.1 1.8 - 8.0 K/UL    ABS. LYMPHOCYTES 2.2 0.8 - 3.5 K/UL    ABS. MONOCYTES 0.6 0.0 - 1.0 K/UL    ABS. EOSINOPHILS 0.1 0.0 - 0.4 K/UL    ABS. BASOPHILS 0.0 0.0 - 0.1 K/UL    ABS. IMM. GRANS. 0.0 0.00 - 0.04 K/UL    DF AUTOMATED     SAMPLES BEING HELD    Collection Time: 11/01/20 12:06 PM   Result Value Ref Range    SAMPLES BEING HELD BL,RD     COMMENT        Add-on orders for these samples will be processed based on acceptable specimen integrity and analyte stability, which may vary by analyte.    METABOLIC PANEL, COMPREHENSIVE    Collection Time: 11/01/20  1:33 PM   Result Value Ref Range    Sodium 139 136 - 145 mmol/L    Potassium 3.6 3.5 - 5.1 mmol/L    Chloride 105 97 - 108 mmol/L    CO2 28 21 - 32 mmol/L    Anion gap 6 5 - 15 mmol/L    Glucose 106 (H) 65 - 100 mg/dL    BUN 8 6 - 20 MG/DL    Creatinine 0.77 0.55 - 1.02 MG/DL    BUN/Creatinine ratio 10 (L) 12 - 20      GFR est AA >60 >60 ml/min/1.73m2    GFR est non-AA >60 >60 ml/min/1.73m2    Calcium 9.5 8.5 - 10.1 MG/DL    Bilirubin, total 0.7 0.2 - 1.0 MG/DL    ALT (SGPT) 20 12 - 78 U/L    AST (SGOT) 13 (L) 15 - 37 U/L    Alk. phosphatase 70 45 - 117 U/L    Protein, total 7.1 6.4 - 8.2 g/dL    Albumin 3.4 (L) 3.5 - 5.0 g/dL    Globulin 3.7 2.0 - 4.0 g/dL    A-G Ratio 0.9 (L) 1.1 - 2.2     TROPONIN I    Collection Time: 11/01/20  1:33 PM   Result Value Ref Range    Troponin-I, Qt. <0.05 <0.05 ng/mL       Radiologic Studies -   XR CHEST PORT   Final Result   IMPRESSION: No acute cardiopulmonary process        CT Results  (Last 48 hours)    None        CXR Results  (Last 48 hours)               11/01/20 1241  XR CHEST PORT Final result    Impression:  IMPRESSION: No acute cardiopulmonary process       Narrative:  EXAM: XR CHEST PORT       INDICATION: lightheadedness       COMPARISON: 9/8/2020       FINDINGS: A portable AP radiograph of the chest was obtained at 1236 hours. The   patient is on a cardiac monitor. The lungs are clear. Heart size is mildly   enlarged. .  The bones and soft tissues are grossly within normal limits. Medical Decision Making   I am the first provider for this patient. I reviewed the vital signs, available nursing notes, past medical history, past surgical history, family history and social history. Vital Signs-Reviewed the patient's vital signs. Patient Vitals for the past 12 hrs:   Temp Pulse Resp BP SpO2   11/01/20 1132 98.7 °F (37.1 °C) 89 21 (!) 161/90 96 %       EKG interpretation: (Preliminary)  NSR, rate of 84, t wave inversions in anterior lateral precordial leads. Compared to EKG from 9/2020, there are now more prominent t wave inversions, and t wave flattening in some leads have been replaced by t wave inversions. No ADEEL or STD. Qtc of 470.  Interpretation by Santos Rush MD    Records Reviewed: Nursing Notes, Old Medical Records, Previous electrocardiograms, Ambulance Run Sheet, Previous Radiology Studies and Previous Laboratory Studies    Provider Notes (Medical Decision Making):   65-year-old female presenting for evaluation of elevated blood pressure reading at home. Patient also describes a very mild headache, initial lightheadedness that has now resolved, nausea, and increased anxiety. Blood pressure here is elevated, but significantly improved compared to her readings at home. On exam, she is extremely anxious, tearful when explaining that she is concerned about her symptoms as result of recent diagnosis of PE. Her EKG here shows some T wave inversions in precordial leads. On review of her previous EKG, some of these T wave inversions were already present, but today's appear more prominent. No ADEEL or STD. I doubt acute ischemia as patient is without chest pain. Troponin is also negative. Her renal function is within normal limits. Chest x-ray without acute findings such as pulmonary edema. Patient's headache resolved with Tylenol. Her nausea was improved with Zofran. On repeat evaluation, patient appears extremely anxious and scared. I informed her that there is no evidence of end organ damage from her episode of extreme hypertension at home. I also cautioned her regarding accuracy of wrist blood pressure machines. Patient was extremely pleased with her negative work-up results today. On recheck of her blood pressure after receiving reassuring news, patient's heart rate and blood pressure both dropped from the while in the room. I suspect that some of patient's hypotension is contributed to by her history of anxiety. She will be discharged at this time in stable condition to follow-up with her primary care physician. Return precautions discussed. ED Course:   Initial assessment performed. The patients presenting problems have been discussed, and they are in agreement with the care plan formulated and outlined with them. I have encouraged them to ask questions as they arise throughout their visit. Donya Toledo MD      Disposition:  Discharge      DISCHARGE PLAN:  1.    Discharge Medication List as of 11/1/2020  3:04 PM        2.   Follow-up Information     Follow up With Specialties Details Why Contact Info    Anthony Vizcaino MD Family Medicine In 2 days  [address] [facility]  [id number]          3. Return to ED if worse     Diagnosis     Clinical Impression:   1. Elevated blood pressure reading    2. Anxiety state        Attestations:    Nati Mora MD    Please note that this dictation was completed with [employer], the computer voice recognition software. Quite often unanticipated grammatical, syntax, homophones, and other interpretive errors are inadvertently transcribed by the computer software. Please disregard these errors. Please excuse any errors that have escaped final proofreading. Thank you.

## 2020-11-02 ENCOUNTER — TELEPHONE (OUTPATIENT)
Dept: INTERNAL MEDICINE CLINIC | Age: 65
End: 2020-11-02

## 2020-11-02 DIAGNOSIS — I10 ESSENTIAL HYPERTENSION WITH GOAL BLOOD PRESSURE LESS THAN 140/90: Primary | ICD-10-CM

## 2020-11-02 LAB
ATRIAL RATE: 84 BPM
CALCULATED P AXIS, ECG09: 41 DEGREES
CALCULATED R AXIS, ECG10: 28 DEGREES
CALCULATED T AXIS, ECG11: 25 DEGREES
DIAGNOSIS, 93000: NORMAL
P-R INTERVAL, ECG05: 156 MS
Q-T INTERVAL, ECG07: 398 MS
QRS DURATION, ECG06: 64 MS
QTC CALCULATION (BEZET), ECG08: 470 MS
VENTRICULAR RATE, ECG03: 84 BPM

## 2020-11-02 PROCEDURE — 3331090002 HH PPS REVENUE DEBIT

## 2020-11-02 PROCEDURE — 3331090001 HH PPS REVENUE CREDIT

## 2020-11-02 NOTE — TELEPHONE ENCOUNTER
----- Message from Cecille Sensor sent at 11/2/2020 12:03 PM EST -----  Regarding: Dr. Shoaib Le Message/Vendor Calls    Caller's first and last name: Pt      Reason for call: Lisinopril      Callback required yes/no and why: Yes, discuss dosage      Best contact number(s): [phone]      Details to clarify the request: Pt was sent to ER on 11/01 due to high BP readings. She was not admitted, but was instructed to contact her primary's office to discuss if her Lisinopril dosage needs to be increased or if she is to take more than her current amount.       Message copied/pasted from [facility]

## 2020-11-03 ENCOUNTER — HOME CARE VISIT (OUTPATIENT)
Dept: SCHEDULING | Facility: HOME HEALTH | Age: 65
End: 2020-11-03
Payer: MEDICARE

## 2020-11-03 PROCEDURE — 3331090001 HH PPS REVENUE CREDIT

## 2020-11-03 PROCEDURE — 3331090002 HH PPS REVENUE DEBIT

## 2020-11-03 PROCEDURE — G0299 HHS/HOSPICE OF RN EA 15 MIN: HCPCS

## 2020-11-03 NOTE — TELEPHONE ENCOUNTER
----- Message from [place] sent at 11/3/2020  4:18 PM EST -----  Regarding: /Telephone  Contact: [phone]  General Message/Vendor Calls    Caller's first and last name:Suze from [facility]       Reason for call:nausea and vomiting       Callback required yes/no and why:yes to confirm if any rx can be sent       Best contact number(s):[phone]      Details to clarify the Raheem Taj says that pt has been unable to keep any food down, upset stomach, nausea, diarrhea since hospital DC 10/9. Robin Friedman also wants to know if she can use \"duoderm\" on pt thigh.       Message from Aldo

## 2020-11-04 ENCOUNTER — HOME CARE VISIT (OUTPATIENT)
Dept: HOME HEALTH SERVICES | Facility: HOME HEALTH | Age: 65
End: 2020-11-04
Payer: MEDICARE

## 2020-11-04 PROCEDURE — 3331090002 HH PPS REVENUE DEBIT

## 2020-11-04 PROCEDURE — 3331090001 HH PPS REVENUE CREDIT

## 2020-11-04 RX ORDER — LISINOPRIL 20 MG/1
TABLET ORAL
Qty: 180 TAB | Refills: 1 | Status: SHIPPED | OUTPATIENT
Start: 2020-11-04 | End: 2021-06-08

## 2020-11-05 ENCOUNTER — HOME CARE VISIT (OUTPATIENT)
Dept: HOME HEALTH SERVICES | Facility: HOME HEALTH | Age: 65
End: 2020-11-05
Payer: MEDICARE

## 2020-11-05 PROCEDURE — 3331090002 HH PPS REVENUE DEBIT

## 2020-11-05 PROCEDURE — 3331090001 HH PPS REVENUE CREDIT

## 2020-11-06 ENCOUNTER — TELEPHONE (OUTPATIENT)
Dept: INTERNAL MEDICINE CLINIC | Age: 65
End: 2020-11-06

## 2020-11-06 ENCOUNTER — VIRTUAL VISIT (OUTPATIENT)
Dept: INTERNAL MEDICINE CLINIC | Age: 65
End: 2020-11-06
Payer: MEDICARE

## 2020-11-06 DIAGNOSIS — L97.122 NON-PRESSURE CHRONIC ULCER OF LEFT THIGH WITH FAT LAYER EXPOSED (HCC): ICD-10-CM

## 2020-11-06 DIAGNOSIS — T14.8XXA ABRASION: ICD-10-CM

## 2020-11-06 DIAGNOSIS — L97.112 NON-PRESSURE CHRONIC ULCER OF RIGHT THIGH WITH FAT LAYER EXPOSED (HCC): Primary | ICD-10-CM

## 2020-11-06 DIAGNOSIS — L97.112 NON-PRESSURE CHRONIC ULCER OF RIGHT THIGH WITH FAT LAYER EXPOSED (HCC): ICD-10-CM

## 2020-11-06 PROCEDURE — 99212 OFFICE O/P EST SF 10 MIN: CPT | Performed by: FAMILY MEDICINE

## 2020-11-06 PROCEDURE — 3331090001 HH PPS REVENUE CREDIT

## 2020-11-06 PROCEDURE — G0463 HOSPITAL OUTPT CLINIC VISIT: HCPCS | Performed by: FAMILY MEDICINE

## 2020-11-06 PROCEDURE — 3331090002 HH PPS REVENUE DEBIT

## 2020-11-06 RX ORDER — ZOLPIDEM TARTRATE 10 MG/1
TABLET ORAL
COMMUNITY
Start: 2020-10-31

## 2020-11-06 NOTE — PROGRESS NOTES
Identified pt with two pt identifiers(name and ). Reviewed record in preparation for visit and have obtained necessary documentation. Chief Complaint   Patient presents with    Medication Evaluation        There were no vitals taken for this visit. Health Maintenance Due   Topic    DTaP/Tdap/Td series (1 - Tdap)    Shingrix Vaccine Age 49> (1 of 2)    Breast Cancer Screen Mammogram     Colorectal Cancer Screening Combo     GLAUCOMA SCREENING Q2Y     Bone Densitometry (Dexa) Screening     Lipid Screen     Pneumococcal 65+ years (2 of 2 - PPSV23)    Flu Vaccine (1)       Med Reconciliation: Completed    Coordination of Care Questionnaire:  :   1) Have you been to an emergency room, urgent care, or hospitalized since your last visit? If yes, where when, and reason for visit? No       2. Have seen or consulted any other health care provider since your last visit? If yes, where when, and reason for visit? No       3) Do you have an Advanced Directive/ Living Will in place? No  If yes, do we have a copy on file   If no, would you like information       This is an established visit conducted via telemedicine. The patient has been instructed that this meets HIPAA criteria and acknowledges and agrees to this method of visitation.     Mona Malik  20  11:53 AM

## 2020-11-07 PROCEDURE — 3331090002 HH PPS REVENUE DEBIT

## 2020-11-07 PROCEDURE — 3331090001 HH PPS REVENUE CREDIT

## 2020-11-08 PROCEDURE — 3331090002 HH PPS REVENUE DEBIT

## 2020-11-08 PROCEDURE — 3331090001 HH PPS REVENUE CREDIT

## 2020-11-09 RX ORDER — NYSTATIN 100000 U/G
CREAM TOPICAL 2 TIMES DAILY
Qty: 15 G | Refills: 0 | Status: SHIPPED | OUTPATIENT
Start: 2020-11-09

## 2020-11-09 RX ORDER — NYSTATIN 10B UNIT
1 POWDER (EA) MISCELLANEOUS 2 TIMES DAILY
Qty: 1 EACH | Refills: 0 | Status: SHIPPED | OUTPATIENT
Start: 2020-11-09 | End: 2021-02-01 | Stop reason: SDUPTHER

## 2020-11-09 NOTE — PROGRESS NOTES
Jeffery Gates is a 76 y.o. female who was seen by synchronous (real-time) audio-video technology on 11/6/2020 for Medication Evaluation  Mr. Familia Adame calls in to request additional medications for her leg wounds. She reports they have been responding to the current therapy and are getting smaller. Assessment & Plan:   Diagnoses and all orders for this visit:    1. Abrasion  -     bacitracin-polymyxin b (Polysporin) 500-10,000 unit/gram ointment; Apply  to affected area two (2) times a day. 2. 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 -     Nystatin, Bulk, 1 billion unit powd; 1 Dose by Does Not Apply route two (2) times a day. 3. 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 -     Nystatin, Bulk, 1 billion unit powd; 1 Dose by Does Not Apply route two (2) times a day. Subjective:       Prior to Admission medications    Medication Sig Start Date End Date Taking? Authorizing Provider   bacitracin-polymyxin b (Polysporin) 500-10,000 unit/gram ointment Apply  to affected area two (2) times a day. 11/9/20  Yes Farrah Orlando MD   nystatin (MYCOSTATIN) topical cream Apply  to affected area two (2) times a day. 11/9/20  Yes Farrah Orlando MD   Nystatin, Bulk, 1 billion unit powd 1 Dose by Does Not Apply route two (2) times a day. 11/9/20  Yes Farrah Orlando MD   zolpidem (AMBIEN) 10 mg tablet TK 1 T PO HS PRF INSOMNIA 10/31/20  Yes Provider, Historical   lisinopriL (PRINIVIL, ZESTRIL) 20 mg tablet Take 2 tablets daily. 11/4/20  Yes Farrah Orlando MD   apixaban (ELIQUIS PO) Take  by mouth daily. Yes Other, MD Yue   furosemide (LASIX) 40 mg tablet Take 40 mg by mouth daily.    Yes Provider, Historical   LORazepam (ATIVAN) 1 mg tablet Take 1 mg by mouth two (2) times daily as needed for Anxiety. Yes Provider, Historical   omeprazole (PRILOSEC) 40 mg capsule TAKE 1 CAPSULE BY MOUTH DAILY 9/18/20  Yes Maribeth Coles MD   dicyclomine (BENTYL) 10 mg capsule Take 1 Cap by mouth three (3) times daily. 9/18/20  Yes Maribeth Coles MD   naloxone [facility]) 4 mg/actuation nasal spray Use 1 spray intranasally, then discard. Repeat with new spray every 2 min as needed for opioid overdose symptoms, alternating nostrils. 9/4/20  Yes Maribeth Coles MD   simvastatin (ZOCOR) 20 mg tablet TAKE 1 TABLET BY MOUTH EVERY NIGHT 8/25/20  Yes Deion Veliz MD   DULoxetine (CYMBALTA) 60 mg capsule Take 1 Cap by mouth daily. 3/6/19  Yes Aurora Handy NP   potassium chloride SR (KLOR-CON 10) 10 mEq tablet TAKE 2 TABLETS BY MOUTH DAILY 1/16/19  Yes Maribeth Coles MD   traZODone (DESYREL) 50 mg tablet Take 50 mg by mouth daily as needed for Sleep. at bedtime    Provider, Historical   hydrocortisone (CORTAID) 0.5 % topical cream Apply  to affected area two (2) times a day. use thin layer  Patient not taking: Reported on 11/6/2020 7/17/20   Maribeth Coles MD   QUEtiapine (SEROQUEL) 200 mg tablet TK 1 T PO HS PRF INSOMNIA 10/9/19   Other, MD Yue         ROS    Objective:   No flowsheet data found.      [INSTRUCTIONS:  \"[x]\" Indicates a positive item  \"[]\" Indicates a negative item  -- DELETE ALL ITEMS NOT EXAMINED]    Constitutional: [x] Appears well-developed and well-nourished [x] No apparent distress      [] Abnormal -     Mental status: [x] Alert and awake  [x] Oriented to person/place/time [x] Able to follow commands    [] Abnormal -     Eyes:   EOM    [x]  Normal    [] Abnormal -   Sclera  [x]  Normal    [] Abnormal -          Discharge [x]  None visible   [] Abnormal -     HENT: [x] Normocephalic, atraumatic  [] Abnormal -   [x] Mouth/Throat: Mucous membranes are moist    External Ears [x] Normal  [] Abnormal -    Neck: [x] No visualized mass [] Abnormal -     Pulmonary/Chest: [x] Respiratory effort normal   [x] No visualized signs of difficulty breathing or respiratory distress        [] Abnormal -      Musculoskeletal:   [x] Normal gait with no signs of ataxia         [x] Normal range of motion of neck        [] Abnormal -     Neurological:        [x] No Facial Asymmetry (Cranial nerve 7 motor function) (limited exam due to video visit)          [x] No gaze palsy        [] Abnormal -          Skin:        [x] No significant exanthematous lesions or discoloration noted on facial skin         [] Abnormal -            Psychiatric:       [x] Normal Affect [] Abnormal -        [x] No Hallucinations    Other pertinent observable physical exam findings:-        We discussed the expected course, resolution and complications of the diagnosis(es) in detail. Medication risks, benefits, costs, interactions, and alternatives were discussed as indicated. I advised her to contact the office if her condition worsens, changes or fails to improve as anticipated. She expressed understanding with the diagnosis(es) and plan. Areli Ingram, who was evaluated through a patient-initiated, synchronous (real-time) audio-video encounter, and/or her healthcare decision maker, is aware that it is a billable service, with coverage as determined by her insurance carrier. She provided verbal consent to proceed: Yes, and patient identification was verified. It was conducted pursuant to the emergency declaration under the [address] [place] [address] authority and the [employer] Act. A caregiver was present when appropriate. Ability to conduct physical exam was limited. I was at home. The patient was at home.       Alfred Grady MD

## 2020-11-12 ENCOUNTER — TELEPHONE (OUTPATIENT)
Dept: INTERNAL MEDICINE CLINIC | Age: 65
End: 2020-11-12

## 2020-11-12 NOTE — TELEPHONE ENCOUNTER
Identified patient 2 identifiers verified. Diarrhea 8 times  Today, and constant,  Nausea. No cramping, usual body aches. Patient ate broth,  Drinking water and Ginger Ale but gags. Per Dr. Deann Marc  Patient was instructed to go to ER for evaluation of possible dehydration. Patient agreed.

## 2020-11-13 RX ORDER — FOAM BANDAGE 6.625X6.75
BANDAGE TOPICAL
Qty: 2 BOX | Refills: 2 | Status: SHIPPED | OUTPATIENT
Start: 2020-11-13

## 2020-11-13 RX ORDER — SILVER SULFADIAZINE 10 G/1000G
CREAM TOPICAL DAILY
Qty: 50 G | Refills: 2 | Status: SHIPPED | OUTPATIENT
Start: 2020-11-13

## 2020-11-16 DIAGNOSIS — L97.122 NON-PRESSURE CHRONIC ULCER OF LEFT THIGH WITH FAT LAYER EXPOSED (HCC): Primary | ICD-10-CM

## 2020-11-16 RX ORDER — TRAMADOL HYDROCHLORIDE 50 MG/1
50 TABLET ORAL
Qty: 21 TAB | Refills: 0 | Status: SHIPPED | OUTPATIENT
Start: 2020-11-16 | End: 2020-11-23

## 2020-11-16 NOTE — TELEPHONE ENCOUNTER
----- Message from Corry Guy sent at 11/14/2020 12:14 PM EST -----  Regarding: Dr. Flowers  / Brad Tyson (if not patient): self  Relationship of caller (if not patient): n/a  Best contact number(s): [phone]  Name of medication and dosage if known: Tramadol 50mg  Is patient out of this medication (yes/no): yes  [facility]arm[facility]: [facility] at [address] and Get [place] listed in chart? (yes/no): yes  Pharmacy phone number: [phone]  Date of last visit: 11/6/20  Details to clarify the request: Pt's pharmacy has been sending in an authorization requests all week and has not heard back. She is in pain, having difficulties walking, and her right leg is swollen from osteoarthritis. Please fill medication and call patient to confirm.     Copy/paste [place]

## 2020-11-16 NOTE — TELEPHONE ENCOUNTER
Patient states she needs a call back today in reference to Tramadol refill patient states she has been trying to get for 2 weeks now & patient states she is having difficulty walking & needs to get approval today Please call to advise. Patient has upcoming Virtual visit appt on this Friday, 11/20/20.

## 2020-11-18 ENCOUNTER — DOCUMENTATION ONLY (OUTPATIENT)
Dept: SLEEP MEDICINE | Age: 65
End: 2020-11-18

## 2020-11-18 ENCOUNTER — VIRTUAL VISIT (OUTPATIENT)
Dept: SLEEP MEDICINE | Age: 65
End: 2020-11-18
Payer: MEDICARE

## 2020-11-18 DIAGNOSIS — G47.33 OSA (OBSTRUCTIVE SLEEP APNEA): ICD-10-CM

## 2020-11-18 DIAGNOSIS — G47.33 OSA (OBSTRUCTIVE SLEEP APNEA): Primary | ICD-10-CM

## 2020-11-18 DIAGNOSIS — F32.A ANXIETY AND DEPRESSION: ICD-10-CM

## 2020-11-18 DIAGNOSIS — I10 ESSENTIAL HYPERTENSION: ICD-10-CM

## 2020-11-18 DIAGNOSIS — F41.9 ANXIETY AND DEPRESSION: ICD-10-CM

## 2020-11-18 PROCEDURE — G8400 PT W/DXA NO RESULTS DOC: HCPCS | Performed by: INTERNAL MEDICINE

## 2020-11-18 PROCEDURE — G9899 SCRN MAM PERF RSLTS DOC: HCPCS | Performed by: INTERNAL MEDICINE

## 2020-11-18 PROCEDURE — 3017F COLORECTAL CA SCREEN DOC REV: CPT | Performed by: INTERNAL MEDICINE

## 2020-11-18 PROCEDURE — G9717 DOC PT DX DEP/BP F/U NT REQ: HCPCS | Performed by: INTERNAL MEDICINE

## 2020-11-18 PROCEDURE — G8427 DOCREV CUR MEDS BY ELIG CLIN: HCPCS | Performed by: INTERNAL MEDICINE

## 2020-11-18 PROCEDURE — 99204 OFFICE O/P NEW MOD 45 MIN: CPT | Performed by: INTERNAL MEDICINE

## 2020-11-18 PROCEDURE — G8536 NO DOC ELDER MAL SCRN: HCPCS | Performed by: INTERNAL MEDICINE

## 2020-11-18 PROCEDURE — G8417 CALC BMI ABV UP PARAM F/U: HCPCS | Performed by: INTERNAL MEDICINE

## 2020-11-18 PROCEDURE — G8756 NO BP MEASURE DOC: HCPCS | Performed by: INTERNAL MEDICINE

## 2020-11-18 PROCEDURE — 1090F PRES/ABSN URINE INCON ASSESS: CPT | Performed by: INTERNAL MEDICINE

## 2020-11-18 PROCEDURE — 1101F PT FALLS ASSESS-DOCD LE1/YR: CPT | Performed by: INTERNAL MEDICINE

## 2020-11-18 NOTE — PATIENT INSTRUCTIONS
[address] [facility] [place]. [address] [facility], [address] Tel.  [phone] Fax. [address] [facility] [address] [place], [address] [facility] Tel.  [phone] Fax. [phone] [address] [employer] [address] Tel.  [phone] Fax. [phone] Sleep Apnea: After Your Visit Your Care Instructions Sleep apnea occurs when you frequently stop breathing for 10 seconds or longer during sleep. It can be mild to severe, based on the number of times per hour that you stop breathing or have slowed breathing. Blocked or narrowed airways in your nose, mouth, or throat can cause sleep apnea. Your airway can become blocked when your throat muscles and tongue relax during sleep. Sleep apnea is common, occurring in 1 out of 20 individuals. Individuals having any of the following characteristics should be evaluated and treated right away due to high risk and detrimental consequences from untreated sleep apnea: 
1. Obesity 2. Congestive Heart failure 3. Atrial Fibrillation 4. Uncontrolled Hypertension 5. Type II Diabetes 6. Night-time Arrhythmias 7. Stroke 8. Pulmonary Hypertension 9. High-risk Driving Populations (pilots, truck drivers, etc.) 10. Patients Considering Weight-loss Surgery How do you know you have sleep apnea? You probably have sleep apnea if you answer 'yes' to 3 or more of the following questions: S - Have you been told that you Snore? T - Are you often Tired during the day? O - Has anyone Observed you stop breathing while sleeping? P- Do you have (or are being treated for) high blood Pressure? B - Are you obese (Body Mass Index > 35)? A - Is your Age 48years old or older? N - Is your Neck size greater than 16 inches? G - Are you male Gender? A sleep physician can prescribe a breathing device that prevents tissues in the throat from blocking your airway.  Or your doctor may recommend using a dental device (oral breathing device) to help keep your airway open. In some cases, surgery may be needed to remove enlarged tissues in the throat. Follow-up care is a key part of your treatment and safety. Be sure to make and go to all appointments, and call your doctor if you are having problems. It's also a good idea to know your test results and keep a list of the medicines you take. How can you care for yourself at home? · Lose weight, if needed. It may reduce the number of times you stop breathing or have slowed breathing. · Go to bed at the same time every night. · Sleep on your side. It may stop mild apnea. If you tend to roll onto your back, sew a pocket in the back of your pajama top. Put a tennis ball into the pocket, and stitch the pocket shut. This will help keep you from sleeping on your back. · Avoid alcohol and medicines such as sleeping pills and sedatives before bed. · Do not smoke. Smoking can make sleep apnea worse. If you need help quitting, talk to your doctor about stop-smoking programs and medicines. These can increase your chances of quitting for good. · Prop up the head of your bed 4 to 6 inches by putting bricks under the legs of the bed. · Treat breathing problems, such as a stuffy nose, caused by a cold or allergies. · Use a continuous positive airway pressure (CPAP) breathing machine if lifestyle changes do not help your apnea and your doctor recommends it. The machine keeps your airway from closing when you sleep. · If CPAP does not help you, ask your doctor whether you should try other breathing machines. A bilevel positive airway pressure machine has two types of air pressureâone for breathing in and one for breathing out. Another device raises or lowers air pressure as needed while you breathe. · If your nose feels dry or bleeds when using one of these machines, talk with your doctor about increasing moisture in the air. A humidifier may help.  
· If your nose is runny or stuffy from using a breathing machine, talk with your doctor about using decongestants or a corticosteroid nasal spray. When should you call for help? Watch closely for changes in your health, and be sure to contact your doctor if: 
· You still have sleep apnea even though you have made lifestyle changes. · You are thinking of trying a device such as CPAP. · You are having problems using a CPAP or similar machine. Where can you learn more? Go to [employer]ogl[employer]. Enter R407 in the search box to learn more about \"Sleep Apnea: After Your Visit. \"  
© 8419-5918 Healthwise, [employer]orporated. Care instructions adapted under license by [facility] (which disclaims liability or warranty for this information). This care instruction is for use with your licensed healthcare professional. If you have questions about a medical condition or this instruction, always ask your healthcare professional. Jessica Lainey any warranty or liability for your use of this information. PROPER SLEEP HYGIENE What to avoid · Do not have drinks with caffeine, such as coffee or black tea, for 8 hours before bed. · Do not smoke or use other types of tobacco near bedtime. Nicotine is a stimulant and can keep you awake. · Avoid drinking alcohol late in the evening, because it can cause you to wake in the middle of the night. · Do not eat a big meal close to bedtime. If you are hungry, eat a light snack. · Do not drink a lot of water close to bedtime, because the need to urinate may wake you up during the night. · Do not read or watch TV in bed. Use the bed only for sleeping and sexual activity. What to try · Go to bed at the same time every night, and wake up at the same time every morning. Do not take naps during the day. · Keep your bedroom quiet, dark, and cool. · Get regular exercise, but not within 3 to 4 hours of your bedtime. Mindy Alberto · Sleep on a comfortable pillow and mattress. · If watching the clock makes you anxious, turn it facing away from you so you cannot see the time. · If you worry when you lie down, start a worry book. Well before bedtime, write down your worries, and then set the book and your concerns aside. · Try meditation or other relaxation techniques before you go to bed. · If you cannot fall asleep, get up and go to another room until you feel sleepy. Do something relaxing. Repeat your bedtime routine before you go to bed again. · Make your house quiet and calm about an hour before bedtime. Turn down the lights, turn off the TV, log off the computer, and turn down the volume on music. This can help you relax after a busy day. Drowsy Driving The [place] [address] cites drowsiness as a causing factor in more than 132,670 police reported crashes annually, resulting in 76,000 injuries and 1,500 deaths. Other surveys suggest 55% of people polled have driven while drowsy in the past year, 23% had fallen asleep but not crashed, 3% crashed, and 2% had and accident due to drowsy driving. Who is at risk? Young Drivers: One study of drowsy driving accidents states that 55% of the drivers were under 25 years. Of those, 75% were male. Shift Workers and Travelers: People who work overnight or travel across time zones frequently are at higher risk of experiencing Circadian Rhythm Disorders. They are trying to work and function when their body is programed to sleep. Sleep Deprived: Lack of sleep has a serious impact on your ability to pay attention or focus on a task. Consistently getting less than the average of 8 hours your body needs creates partial or cumulative sleep deprivation. Untreated Sleep Disorders: Sleep Apnea, Narcolepsy, R.L.S., and other sleep disorders (untreated) prevent a person from getting enough restful sleep.  This leads to excessive daytime sleepiness and increases the risk for drowsy driving accidents by up to 7 times. Medications / Alcohol: Even over the counter medications can cause drowsiness. Medications that impair a drivers attention should have a warning label. Alcohol naturally makes you sleepy and on its own can cause accidents. Combined with excessive drowsiness its effects are amplified. Signs of Drowsy Driving: * You don't remember driving the last few miles * You may drift out of your kyler * You are unable to focus and your thoughts wander * You may yawn more often than normal 
 * You have difficulty keeping your eyes open / nodding off * Missing traffic signs, speeding, or tailgating Prevention-  
Good sleep hygiene, lifestyle and behavioral choices have the most impact on drowsy driving. There is no substitute for sleep and the average person requires 8 hours nightly. If you find yourself driving drowsy, stop and sleep. Consider the sleep hygiene tips provided during your visit as well. Medication Refill Policy: Refills for all medications require 1 week advance notice. Please have your pharmacy fax a refill request. We are unable to fax, or call in \"controled substance\" medications and you will need to pick these prescriptions up from our office. [employer] Activation Thank you for requesting access to [employer]. Please follow the instructions below to securely access and download your online medical record. [employer] allows you to send messages to your doctor, view your test results, renew your prescriptions, schedule appointments, and more. How Do I Sign Up? 1. In your internet browser, go to [email or web address]. 2. Click on the First Time User? Click Here link in the Sign In box. You will see the New Member Sign Up page. 3. Enter your [employer] Access Code exactly as it appears below. You will not need to use this code after youve completed the sign-up process.  If you do not sign up before the expiration date, you must request a new code. Cell[employer] Access Code: [email or web address] Expires: 2021  8:43 AM (This is the date your Cell[employer] access code will ) 4. Enter the last four digits of your Social Security Number (xxxx) and Date of Birth (mm/dd/yyyy) as indicated and click Submit. You will be taken to the next sign-up page. 5. Create a Cell[employer]on ID. This will be your Cell[employer] login ID and cannot be changed, so think of one that is secure and easy to remember. 6. Create a Cell[employer] password. You can change your password at any time. 7. Enter your Password Reset Question and Answer. This can be used at a later time if you forget your password. 8. Enter your e-mail address. You will receive e-mail notification when new information is available in [address] 9. Click Sign Up. You can now view and download portions of your medical record. 10. Click the Download Summary menu link to download a portable copy of your medical information. Additional Information If you have questions, please call [phone]. Remember, [employer] is NOT to be used for urgent needs. For medical emergencies, dial 911.

## 2020-11-18 NOTE — PROGRESS NOTES
[address] [facility] [place]. [place], [address]  Tel.  [phone]  Fax. 100 [facility] 60  [place], [address] [facility]  Tel.  [phone]  Fax. [phone] [address] Jesus  Tel.  [phone]  Fax. [phone]         Subjective:    Naty Burns is a 76 y.o. female who was seen by synchronous (real-time) audio-video technology on 11/18/2020. Consent:  She is aware that this patient-initiated Telehealth encounter is a billable service, with coverage as determined by her insurance carrier. She is aware that she may receive a bill and has provided verbal consent to proceed: Yes    I was at home while conducting this encounter. Patient verified with [place] ID Card. She complains of snoring associated with snorting, periods of not breathing, kicking of legs along with twitching of leg and feet. Symptoms began several years ago, she was diagnosed with MURIEL and prescribed a CPAP device which she was unable to tolerated and therapy was discontinued about 5 years previously  since that time. She usually gets in bed by 10:30 pm takes Seroquel 100 mg but is unable to fall asleep for about an hour. Family or house members note snoring, periods of not breathing. She reports of a past history of feeling completely or partially paralyzed while waking up. Naty Burns does wake up frequently at night. She is bothered by waking up too early and left unable to get back to sleep. She actually sleeps about 3 hours at night and wakes up about 4-6 times during the night. She does not work shifts. Daniel Lipscomb indicates she does get too little sleep at night. Her bedtime is 10:00 or 11:00 pm. She awakens at 2:00 am. She does not take naps. She has the following observed behaviors: Loud snoring, Twitching of legs or feet, Pauses in breathing, Sleep talking, Kicking with legs; Other (use comments).   Other remarks: hallucinations (denies during history intake), vivid dreams, waking with a gasp or snort. She reports of having been hospitalized at [facility] in September 2020 and was found to have a blood clot on right lung. [place] Sleepiness Score: 0   and Modified F.O.S.Q. Score Total / 2: 20       No Known Allergies      Current Outpatient Medications:     bacitracin-polymyxin b (Polysporin) 500-10,000 unit/gram ointment, Apply  to affected area two (2) times a day., Disp: 15 g, Rfl: 0    lisinopriL (PRINIVIL, ZESTRIL) 20 mg tablet, Take 2 tablets daily. , Disp: 180 Tab, Rfl: 1    apixaban (ELIQUIS PO), Take  by mouth daily. , Disp: , Rfl:     furosemide (LASIX) 40 mg tablet, Take 40 mg by mouth daily. , Disp: , Rfl:     LORazepam (ATIVAN) 1 mg tablet, Take 1 mg by mouth two (2) times daily as needed for Anxiety. , Disp: , Rfl:     omeprazole (PRILOSEC) 40 mg capsule, TAKE 1 CAPSULE BY MOUTH DAILY, Disp: 90 Cap, Rfl: 0    simvastatin (ZOCOR) 20 mg tablet, TAKE 1 TABLET BY MOUTH EVERY NIGHT, Disp: 90 Tab, Rfl: 1    QUEtiapine (SEROQUEL) 200 mg tablet, TK 1 T PO HS PRF INSOMNIA, Disp: , Rfl: 0    DULoxetine (CYMBALTA) 60 mg capsule, Take 1 Cap by mouth daily. , Disp: 30 Cap, Rfl: 1    potassium chloride SR (KLOR-CON 10) 10 mEq tablet, TAKE 2 TABLETS BY MOUTH DAILY, Disp: 180 Tab, Rfl: 1    traMADoL (ULTRAM) 50 mg tablet, Take 1 Tab by mouth every eight (8) hours as needed for Pain for up to 7 days. Max Daily Amount: 150 mg., Disp: 21 Tab, Rfl: 0    silver sulfADIAZINE (SILVADENE) 1 % topical cream, Apply  to affected area daily. , Disp: 50 g, Rfl: 2    foam bandage (Allevyn Gentle Border Multisit) 6 3/4 X 7 1/16 \" bndg, Apply to affected area daily. , Disp: 2 Box, Rfl: 2    nystatin (MYCOSTATIN) topical cream, Apply  to affected area two (2) times a day., Disp: 15 g, Rfl: 0    Nystatin, Bulk, 1 billion unit powd, 1 Dose by Does Not Apply route two (2) times a day., Disp: 1 Each, Rfl: 0    zolpidem (AMBIEN) 10 mg tablet, TK 1 T PO HS PRF INSOMNIA, Disp: , Rfl:     traZODone (DESYREL) 50 mg tablet, Take 50 mg by mouth daily as needed for Sleep. at bedtime, Disp: , Rfl:     dicyclomine (BENTYL) 10 mg capsule, Take 1 Cap by mouth three (3) times daily. , Disp: 90 Cap, Rfl: 1    naloxone (NARCAN) 4 mg/actuation nasal spray, Use 1 spray intranasally, then discard. Repeat with new spray every 2 min as needed for opioid overdose symptoms, alternating nostrils. , Disp: 1 Each, Rfl: 1    hydrocortisone (CORTAID) 0.5 % topical cream, Apply  to affected area two (2) times a day. use thin layer (Patient not taking: Reported on 11/6/2020), Disp: 30 g, Rfl: 0     She  has a past medical history of Arthritis, Bipolar 1 disorder ([facility] [address] GERD (gastroesophageal reflux disease), Hypertension, Other ill-defined conditions(799.89), Psychiatric disorder, Sleep apnea, Thromboembolus ([facility] [address] and Unspecified sleep apnea. She  has a past surgical history that includes hx gi; hx cholecystectomy; and hx hysterectomy. She family history includes Arthritis-osteo in her mother and sister; Diabetes in her mother, sister, and sister; Drug Abuse in her sister; Heart Attack in her sister; Heart Disease in her mother and sister; High Cholesterol in her mother and sister; Schizophrenia in her paternal grandmother. She  reports that she has never smoked. She has never used smokeless tobacco. She reports that she does not drink alcohol or use drugs.      Review of Systems:  Constitutional:  No significant weight loss or weight gain  Eyes:  No blurred vision  CVS:  No significant chest pain  Pulm:  No significant shortness of breath  GI:  No significant nausea or vomiting  :  No significant nocturia  Musculoskeletal:  No significant joint pain at night  Skin:  No significant rashes  Neuro:  No significant dizziness   Psych:  No active mood issues    Sleep Review of Systems: notable for difficulty falling asleep; frequent awakenings at night;  regular dreaming noted;  nightmares ; early morning headaches; no memory problems; no concentration issues; no history of any automobile or occupational accidents due to daytime drowsiness. Objective:     Patient-Reported Vitals 11/18/2020   Patient-Reported Weight 390   Patient-Reported Systolic  205   Patient-Reported Diastolic 328       Physical Exam completed by visual and auditory observation of patient with verbal input from patient. General:   Alert, oriented, not in acute distress   Eyes:  Anicteric Sclerae; no obvious strabismus   Nose:  No obvious nasal septum deviation    Neck:   Midline trachea, no visible mass   Chest/Lungs:  Respiratory effort normal, no visualized signs of difficulty breathing or respiratory distress   CVS:  No JVD   Extremities:  No obvious rashes noted on face, neck, or hands   Neuro:  No facial asymmetry, no focal deficits; no obvious tremor    Psych:  Normal affect,  normal countenance       Assessment:       ICD-10-CM ICD-9-CM    1. MURIEL (obstructive sleep apnea)  G47.33 327.23 POLYSOMNOGRAPHY 1 NIGHT   2. Essential hypertension  I10 401.9    3. Anxiety and depression  F41.9 300.00     F32.9 311    4. BMI 70 and over, adult [facility])  Z68.45 V85.45          Plan:        Sleep testing was ordered for initial evaluation.  She was provided information on sleep apnea including coresponding risk factors and the importance of proper treatment.  Treatment options if indicated were reviewed today. Patient agrees to a trial of PAP therapy if indicated.  Counseling was provided regarding proper sleep hygiene, appropriate sleep schedule, need for sleep environment safety and safe driving (patient denies of driving at present - due to osteo arthritis).  Recommended a dedicated weight loss program through appropriate diet and exercise regimen as significant weight reduction has been shown to reduce severity of obstructive sleep apnea. Patient's phone number [phone] (cell)  was reviewed and confirmed for accuracy.   She gives permission for messages regarding results and appointments to be left at that number. Pursuant to the emergency declaration under the [facility] waiver authority and the [employer] Act, this Virtual Visit was conducted, with patient's consent, to reduce the patient's risk of exposure to COVID-19 and provide continuity of care for an established patient. Services were provided through a video synchronous discussion virtually to substitute for in-person clinic visit. Aaron Dye MD, [facility]  Electronically signed.  11/18/20

## 2020-11-20 NOTE — TELEPHONE ENCOUNTER
Identified patient 2 identifiers verified. Spoke with patient, she just wanted to let Dr. Sejal Simon know that she has appointments for sleep study,  And pain management with Dr. Emmy Jane  In December. Patient requesting an order for shower chair, and a walker, and a reacher. Leroy requested for IBS Last refill on 9/8/20. Last visit 10/23/20. Patient wants to know should she get an Ultrasound of abdomen to find out reason for diarrhea.

## 2020-11-23 RX ORDER — DICYCLOMINE HYDROCHLORIDE 10 MG/1
10 CAPSULE ORAL 3 TIMES DAILY
Qty: 90 CAP | Refills: 2 | Status: SHIPPED | OUTPATIENT
Start: 2020-11-23 | End: 2021-05-28 | Stop reason: CLARIF

## 2020-12-01 DIAGNOSIS — K21.9 GASTROESOPHAGEAL REFLUX DISEASE WITHOUT ESOPHAGITIS: ICD-10-CM

## 2020-12-01 RX ORDER — OMEPRAZOLE 40 MG/1
CAPSULE, DELAYED RELEASE ORAL
Qty: 90 CAP | Refills: 0 | Status: SHIPPED | OUTPATIENT
Start: 2020-12-01 | End: 2021-01-14 | Stop reason: SDUPTHER

## 2020-12-01 NOTE — TELEPHONE ENCOUNTER
PCP: Ledy Jacobs MD    Last appt: 11/6/2020  Future Appointments   Date Time Provider Noah Valero   12/18/2020  8:30 PM [facility] 1 [address] [facility],  [address] SLEEP LAB [place]       Requested Prescriptions     Pending Prescriptions Disp Refills    omeprazole (PRILOSEC) 40 mg capsule 90 Cap 0     Sig: TAKE 1 CAPSULE BY MOUTH DAILY

## 2020-12-10 NOTE — TELEPHONE ENCOUNTER
----- Message from Caryn Vo sent at 12/10/2020 10:09 AM EST -----  Regarding: Dr Latonya Naik (if not patient): n/a      Relationship of caller (if not patient): n/a      Best contact number(s):[phone]      Name of medication and dosage if known: Elliquis 5 mg, quantity 60      Is patient out of this medication (yes/no): no, 1 day left      Pharmacy name: [facility]    Pharmacy listed in chart? (yes/no): yes  Pharmacy phone number: [phone]      Details to clarify the request: Pt filled last time in the hospital for a blood clot. She needs to fill with her pcp.       Message from [facility]

## 2020-12-11 NOTE — TELEPHONE ENCOUNTER
Patient was discharged from the hospital on Eliquis 9/15/20. Spoke with patient Identified patient 2 identifiers verified. Patient  reports taking Eliquis 5mg I tab at 9am and I tab at 1pm.  Patient has an appointment with Sleep study next week. Pulmonary will not see patient until Sleep study done. Patient has an appointment with Dr. Howard Bang in January.

## 2020-12-31 DIAGNOSIS — S81.801D LEG WOUND, RIGHT, SUBSEQUENT ENCOUNTER: Primary | ICD-10-CM

## 2020-12-31 RX ORDER — TRAMADOL HYDROCHLORIDE 50 MG/1
50 TABLET ORAL
Qty: 21 TAB | Refills: 0 | OUTPATIENT
Start: 2020-12-31 | End: 2021-01-05

## 2020-12-31 NOTE — TELEPHONE ENCOUNTER
Sounds like she needs to go to urgent care and get the wound looked at. Ultram is not going to help that.

## 2020-12-31 NOTE — TELEPHONE ENCOUNTER
Received triage call from pt. Pt very upset and crying stating her wound has \"opened up again\". Pt no longer receiving wound care d/t MD referring pt dermatology. Pt requesting a refill on tramadol and a different wound care cream.   Notified pt Dr. Carl Bautista is out of the office but a message will be sent to on call MD.   Pt verbalized understanding of information discussed w/ no further questions at this time.

## 2021-01-01 ENCOUNTER — HOSPITAL ENCOUNTER (EMERGENCY)
Age: 66
Discharge: HOME | End: 2021-01-01
Attending: EMERGENCY MEDICINE
Payer: MEDICARE

## 2021-01-01 VITALS
OXYGEN SATURATION: 96 % | BODY MASS INDEX: 55.32 KG/M2 | SYSTOLIC BLOOD PRESSURE: 150 MMHG | DIASTOLIC BLOOD PRESSURE: 73 MMHG | WEIGHT: 293 LBS | TEMPERATURE: 98.1 F | RESPIRATION RATE: 16 BRPM | HEIGHT: 61 IN | HEART RATE: 90 BPM

## 2021-01-01 DIAGNOSIS — L98.9 SKIN LESION: Primary | ICD-10-CM

## 2021-01-01 PROCEDURE — 99283 EMERGENCY DEPT VISIT LOW MDM: CPT

## 2021-01-01 PROCEDURE — 74011250637 HC RX REV CODE- 250/637: Performed by: EMERGENCY MEDICINE

## 2021-01-01 RX ORDER — SULFAMETHOXAZOLE AND TRIMETHOPRIM 800; 160 MG/1; MG/1
1 TABLET ORAL 2 TIMES DAILY
Qty: 20 TAB | Refills: 0 | Status: SHIPPED | OUTPATIENT
Start: 2021-01-01 | End: 2021-01-11

## 2021-01-01 RX ORDER — HYDROCODONE BITARTRATE AND ACETAMINOPHEN 5; 325 MG/1; MG/1
1 TABLET ORAL
Status: COMPLETED | OUTPATIENT
Start: 2021-01-01 | End: 2021-01-01

## 2021-01-01 RX ORDER — HYDROCODONE BITARTRATE AND ACETAMINOPHEN 5; 325 MG/1; MG/1
1 TABLET ORAL
Qty: 10 TAB | Refills: 0 | Status: SHIPPED | OUTPATIENT
Start: 2021-01-01 | End: 2021-01-04

## 2021-01-01 RX ADMIN — HYDROCODONE BITARTRATE AND ACETAMINOPHEN 1 TABLET: 5; 325 TABLET ORAL at 16:54

## 2021-01-01 NOTE — DISCHARGE INSTRUCTIONS
Follow up with your wound care specialist for further evaluation in 1-2 days. Follow up with your physician for further evaluation in 1-2 days.     Return to ED with worsening of condition or new concerning symptoms

## 2021-01-04 ENCOUNTER — TELEPHONE (OUTPATIENT)
Dept: INTERNAL MEDICINE CLINIC | Age: 66
End: 2021-01-04

## 2021-01-04 NOTE — TELEPHONE ENCOUNTER
Patient states she needs a call back in reference to her Inner Thigh has Broken done & has Wound issues again & needs to possibly discuss a Dermatologist for her to see. Patient states she also needs to discuss her Breast Pain that breast is Red & Inflamed & needs to know if a Picture can be sent to be advised what can be done. Please call to discuss.  Thank you

## 2021-01-05 ENCOUNTER — TELEPHONE (OUTPATIENT)
Dept: INTERNAL MEDICINE CLINIC | Age: 66
End: 2021-01-05

## 2021-01-05 NOTE — TELEPHONE ENCOUNTER
----- Message from Jaime Angeles sent at 1/5/2021 12:49 PM EST -----  Regarding: /telephone  Contact: [phone]  General Message/Vendor Calls    Caller's first and last name: Thanh Barragan son      Reason for call: He is concerned with his mother's depressive state. He believes that she is taking medications that she should not be taking. Callback required yes/no and why: Yes to discuss medications.       Best contact number(s):[phone]      Details to clarify the request:N/A      Message from [facility]

## 2021-01-06 NOTE — TELEPHONE ENCOUNTER
Of note, writer spoke with patient regarding her recent evaluation with dermatology. She is awaiting culture results for her derm to suggest the appropriate course of treatment for her chronic wounds.  Awaiting call back from son, to discuss concerns with mental health & a follow up has been suggested for patient to schedule with Dr. Poncho Paulino.

## 2021-01-06 NOTE — TELEPHONE ENCOUNTER
Patient stats she needs a call back today Please as she is very upset & crying due to her persistent Health issues with her Wound between her thighs & is also having a Breast problem now. Please call to discuss a [id number]  Thank you

## 2021-01-06 NOTE — TELEPHONE ENCOUNTER
Discussed with patient. She was evaluated by dermatology yesterday & cultures taken from both wound sites. Derm is treating this condition. Patient has no additional concerns or questions.

## 2021-01-09 RX ORDER — APIXABAN 5 MG/1
TABLET, FILM COATED ORAL
Qty: 60 TAB | Refills: 0 | Status: SHIPPED | OUTPATIENT
Start: 2021-01-09 | End: 2021-02-01 | Stop reason: SDUPTHER

## 2021-01-11 NOTE — TELEPHONE ENCOUNTER
Message was left to patient to contact this office to scheduled an appointment with Dr. Xena William..

## 2021-01-14 DIAGNOSIS — K21.9 GASTROESOPHAGEAL REFLUX DISEASE WITHOUT ESOPHAGITIS: ICD-10-CM

## 2021-01-14 DIAGNOSIS — S81.802D WOUND OF LEFT LOWER EXTREMITY, SUBSEQUENT ENCOUNTER: Primary | ICD-10-CM

## 2021-01-14 RX ORDER — TRAMADOL HYDROCHLORIDE 50 MG/1
50 TABLET ORAL
COMMUNITY
End: 2021-01-14 | Stop reason: SDUPTHER

## 2021-01-14 NOTE — TELEPHONE ENCOUNTER
----- Message from Leroy Peraza sent at 1/14/2021 11:18 AM EST -----  Regarding: Dr. Roger Negro (if not patient): NA      Relationship of caller (if not patient): N/A      Best contact number(s):[phone]      Name of medication and dosage if known: \"Tramodol\" and \"Omerprazole\"      Is patient out of this medication (yes/no):Yes      Pharmacy name: [address] [place] [address] listed in chart? (yes/no): Yes    Pharmacy phone number:Phone:  [phone]  Fax:  [phone]       Details to clarify the request: She was not able to see the pain management doctor.       Message from [facility]

## 2021-01-15 DIAGNOSIS — E78.5 HYPERLIPIDEMIA, UNSPECIFIED HYPERLIPIDEMIA TYPE: ICD-10-CM

## 2021-01-19 RX ORDER — OMEPRAZOLE 40 MG/1
CAPSULE, DELAYED RELEASE ORAL
Qty: 90 CAP | Refills: 0 | Status: SHIPPED | OUTPATIENT
Start: 2021-01-19 | End: 2021-07-28

## 2021-01-19 RX ORDER — TRAMADOL HYDROCHLORIDE 50 MG/1
TABLET ORAL
Qty: 21 TAB | OUTPATIENT
Start: 2021-01-19

## 2021-01-19 RX ORDER — TRAMADOL HYDROCHLORIDE 50 MG/1
50 TABLET ORAL
Qty: 21 TAB | Refills: 0 | Status: SHIPPED | OUTPATIENT
Start: 2021-01-19 | End: 2021-01-26

## 2021-01-23 ENCOUNTER — APPOINTMENT (OUTPATIENT)
Dept: CT IMAGING | Age: 66
End: 2021-01-23
Attending: PHYSICIAN ASSISTANT
Payer: MEDICARE

## 2021-01-23 ENCOUNTER — HOSPITAL ENCOUNTER (EMERGENCY)
Age: 66
Discharge: HOME | End: 2021-01-24
Attending: EMERGENCY MEDICINE
Payer: MEDICARE

## 2021-01-23 VITALS
BODY MASS INDEX: 53.92 KG/M2 | HEART RATE: 100 BPM | DIASTOLIC BLOOD PRESSURE: 69 MMHG | WEIGHT: 293 LBS | HEIGHT: 62 IN | RESPIRATION RATE: 18 BRPM | SYSTOLIC BLOOD PRESSURE: 103 MMHG | TEMPERATURE: 97.8 F | OXYGEN SATURATION: 94 %
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
## 2021-01-23 DIAGNOSIS — M17.0 OSTEOARTHRITIS OF BOTH KNEES, UNSPECIFIED OSTEOARTHRITIS TYPE: Primary | ICD-10-CM

## 2021-01-23 LAB
ALBUMIN SERPL-MCNC: 3.7 G/DL (ref 3.5–5)
ALBUMIN/GLOB SERPL: 0.9 {RATIO} (ref 1.1–2.2)
ALP SERPL-CCNC: 95 U/L (ref 45–117)
ALT SERPL-CCNC: 20 U/L (ref 12–78)
ANION GAP SERPL CALC-SCNC: 6 MMOL/L (ref 5–15)
AST SERPL-CCNC: 16 U/L (ref 15–37)
BASOPHILS # BLD: 0 K/UL (ref 0–0.1)
BASOPHILS NFR BLD: 1 % (ref 0–1)
BILIRUB SERPL-MCNC: 0.8 MG/DL (ref 0.2–1)
BUN SERPL-MCNC: 25 MG/DL (ref 6–20)
BUN/CREAT SERPL: 19 (ref 12–20)
CALCIUM SERPL-MCNC: 9 MG/DL (ref 8.5–10.1)
CHLORIDE SERPL-SCNC: 101 MMOL/L (ref 97–108)
CO2 SERPL-SCNC: 25 MMOL/L (ref 21–32)
CREAT SERPL-MCNC: 1.31 MG/DL (ref 0.55–1.02)
DIFFERENTIAL METHOD BLD: ABNORMAL
EOSINOPHIL # BLD: 0.3 K/UL (ref 0–0.4)
EOSINOPHIL NFR BLD: 5 % (ref 0–7)
ERYTHROCYTE [DISTWIDTH] IN BLOOD BY AUTOMATED COUNT: 15.9 % (ref 11.5–14.5)
GLOBULIN SER CALC-MCNC: 4 G/DL (ref 2–4)
GLUCOSE SERPL-MCNC: 90 MG/DL (ref 65–100)
HCT VFR BLD AUTO: 33.4 % (ref 35–47)
HGB BLD-MCNC: 10.6 G/DL (ref 11.5–16)
IMM GRANULOCYTES # BLD AUTO: 0 K/UL (ref 0–0.04)
IMM GRANULOCYTES NFR BLD AUTO: 0 % (ref 0–0.5)
LYMPHOCYTES # BLD: 2.2 K/UL (ref 0.8–3.5)
LYMPHOCYTES NFR BLD: 31 % (ref 12–49)
MCH RBC QN AUTO: 28.4 PG (ref 26–34)
MCHC RBC AUTO-ENTMCNC: 31.7 G/DL (ref 30–36.5)
MCV RBC AUTO: 89.5 FL (ref 80–99)
MONOCYTES # BLD: 0.5 K/UL (ref 0–1)
MONOCYTES NFR BLD: 7 % (ref 5–13)
NEUTS SEG # BLD: 3.9 K/UL (ref 1.8–8)
NEUTS SEG NFR BLD: 56 % (ref 32–75)
NRBC # BLD: 0 K/UL (ref 0–0.01)
NRBC BLD-RTO: 0 PER 100 WBC
PLATELET # BLD AUTO: 226 K/UL (ref 150–400)
PMV BLD AUTO: 10.6 FL (ref 8.9–12.9)
POTASSIUM SERPL-SCNC: 4.7 MMOL/L (ref 3.5–5.1)
PROT SERPL-MCNC: 7.7 G/DL (ref 6.4–8.2)
RBC # BLD AUTO: 3.73 M/UL (ref 3.8–5.2)
SODIUM SERPL-SCNC: 132 MMOL/L (ref 136–145)
TROPONIN I SERPL-MCNC: <0.05 NG/ML
WBC # BLD AUTO: 7.1 K/UL (ref 3.6–11)

## 2021-01-23 PROCEDURE — 36415 COLL VENOUS BLD VENIPUNCTURE: CPT

## 2021-01-23 PROCEDURE — 80053 COMPREHEN METABOLIC PANEL: CPT

## 2021-01-23 PROCEDURE — 85025 COMPLETE CBC W/AUTO DIFF WBC: CPT

## 2021-01-23 PROCEDURE — 99284 EMERGENCY DEPT VISIT MOD MDM: CPT

## 2021-01-23 PROCEDURE — 70450 CT HEAD/BRAIN W/O DYE: CPT

## 2021-01-23 PROCEDURE — 84484 ASSAY OF TROPONIN QUANT: CPT

## 2021-01-23 PROCEDURE — 93005 ELECTROCARDIOGRAM TRACING: CPT

## 2021-01-24 ENCOUNTER — APPOINTMENT (OUTPATIENT)
Dept: CT IMAGING | Age: 66
End: 2021-01-24
Attending: EMERGENCY MEDICINE
Payer: MEDICARE

## 2021-01-24 LAB
APPEARANCE UR: ABNORMAL
ATRIAL RATE: 93 BPM
BACTERIA URNS QL MICRO: ABNORMAL /HPF
BILIRUB UR QL: NEGATIVE
CALCULATED P AXIS, ECG09: 45 DEGREES
CALCULATED R AXIS, ECG10: 28 DEGREES
CALCULATED T AXIS, ECG11: 44 DEGREES
COLOR UR: ABNORMAL
DIAGNOSIS, 93000: NORMAL
EPITH CASTS URNS QL MICRO: ABNORMAL /LPF
GLUCOSE UR STRIP.AUTO-MCNC: NEGATIVE MG/DL
HGB UR QL STRIP: NEGATIVE
KETONES UR QL STRIP.AUTO: NEGATIVE MG/DL
LEUKOCYTE ESTERASE UR QL STRIP.AUTO: ABNORMAL
NITRITE UR QL STRIP.AUTO: NEGATIVE
P-R INTERVAL, ECG05: 172 MS
PH UR STRIP: 5 [PH] (ref 5–8)
PROT UR STRIP-MCNC: NEGATIVE MG/DL
Q-T INTERVAL, ECG07: 366 MS
QRS DURATION, ECG06: 74 MS
QTC CALCULATION (BEZET), ECG08: 455 MS
RBC #/AREA URNS HPF: ABNORMAL /HPF (ref 0–5)
SP GR UR REFRACTOMETRY: 1.02 (ref 1–1.03)
UA: UC IF INDICATED,UAUC: ABNORMAL
UROBILINOGEN UR QL STRIP.AUTO: 0.2 EU/DL (ref 0.2–1)
VENTRICULAR RATE, ECG03: 93 BPM
WBC URNS QL MICRO: ABNORMAL /HPF (ref 0–4)

## 2021-01-24 PROCEDURE — 74011250636 HC RX REV CODE- 250/636: Performed by: EMERGENCY MEDICINE

## 2021-01-24 PROCEDURE — 70496 CT ANGIOGRAPHY HEAD: CPT

## 2021-01-24 PROCEDURE — 81001 URINALYSIS AUTO W/SCOPE: CPT

## 2021-01-24 PROCEDURE — 74011250637 HC RX REV CODE- 250/637: Performed by: EMERGENCY MEDICINE

## 2021-01-24 PROCEDURE — 74011000636 HC RX REV CODE- 636: Performed by: EMERGENCY MEDICINE

## 2021-01-24 RX ORDER — LIDOCAINE 4 G/100G
2 PATCH TOPICAL EVERY 12 HOURS
Qty: 20 PATCH | Refills: 0 | Status: SHIPPED | OUTPATIENT
Start: 2021-01-24

## 2021-01-24 RX ORDER — OXYCODONE AND ACETAMINOPHEN 5; 325 MG/1; MG/1
2 TABLET ORAL
Status: COMPLETED | OUTPATIENT
Start: 2021-01-24 | End: 2021-01-24

## 2021-01-24 RX ORDER — DICLOFENAC SODIUM 30 MG/G
GEL TOPICAL 2 TIMES DAILY
Qty: 100 G | Refills: 0 | Status: SHIPPED | OUTPATIENT
Start: 2021-01-24

## 2021-01-24 RX ORDER — OXYCODONE AND ACETAMINOPHEN 5; 325 MG/1; MG/1
2 TABLET ORAL
Status: DISCONTINUED | OUTPATIENT
Start: 2021-01-24 | End: 2021-01-24

## 2021-01-24 RX ADMIN — SODIUM CHLORIDE 500 ML: 9 INJECTION, SOLUTION INTRAVENOUS at 04:25

## 2021-01-24 RX ADMIN — OXYCODONE AND ACETAMINOPHEN 2 TABLET: 5; 325 TABLET ORAL at 01:00

## 2021-01-24 RX ADMIN — IOPAMIDOL 100 ML: 755 INJECTION, SOLUTION INTRAVENOUS at 00:57

## 2021-01-24 NOTE — ED PROVIDER NOTES
EMERGENCY DEPARTMENT HISTORY AND PHYSICAL EXAM    Please note that this dictation was completed with [employer], the computer voice recognition software. Quite often unanticipated grammatical, syntax, homophones, and other interpretive errors are inadvertently transcribed by the computer software. Please disregard these errors. Please excuse any errors that have escaped final proofreading. Date: 1/23/2021  Patient Name: Siva Wellington  Patient Age and Sex: 76 y.o. female    History of Presenting Illness     Chief Complaint   Patient presents with    Gait Problem     pt states, \"my children didn't like the way I was looking. I was bobbing and weaving. I took a sleeping pill by accident. \"     Head Injury     reports she struck her head and is on eliquis. no LOC. History Provided By: Patient    HPI: Siva Wellington, is a 76 y.o. female whose medical history is noted below and includes morbid obesity, severe degenerative arthritis, presents to the ED with worsening bilateral knee pain, constant, aching, 8/10. She has chronic knee pain from the arthritis and has seen a orthopedic specialist for this. Plan is to discuss knee replacements after she looses 45 lbs, which she is currently doing her best to achieve. Rox Browner yesterday due to pain in knees, unsure if she had LOC but if so it was brief. No scalp lacerations or hematomas. Since fall, knees have been hurting her more. Pain is generalized in both knees, worse with standing or knee bending. Better with rest. She walks with a walker. Taking otc meds for pain without sufficient relief. Pt denies any other alleviating or exacerbating factors. No other associated signs or symptoms. There are no other complaints, changes or physical findings at this time.      PCP: Varghese Flores MD    Past History   All documented elements of the [address] reviewed and verified by me. -Adri Hui MD    Past Medical History:  Past Medical History:   Diagnosis Date    Arthritis chronic pain related arthritis    Bipolar 1 disorder (HCC)     GERD (gastroesophageal reflux disease)     Hypertension     Other ill-defined conditions(799.89)     hyperlipidemia. Stomach abcess    Psychiatric disorder     panic attacks    Sleep apnea     Thromboembolus ([address]     last year L leg    Unspecified sleep apnea        Past Surgical History:  Past Surgical History:   Procedure Laterality Date    HX CHOLECYSTECTOMY      HX GI      gallbladder removed 2/2010    HX HYSTERECTOMY         Family History:  Family History   Problem Relation Age of Onset    Heart Disease Mother     Diabetes Mother     Arthritis-osteo Mother     High Cholesterol Mother     Heart Attack Sister     Diabetes Sister     Heart Disease Sister     Diabetes Sister     Arthritis-osteo Sister     High Cholesterol Sister     Schizophrenia Paternal Grandmother     Drug Abuse Sister        Social History:  Social History     Tobacco Use    Smoking status: Never Smoker    Smokeless tobacco: Never Used   Substance Use Topics    Alcohol use: No    Drug use: No       Allergies:  No Known Allergies    Review of Systems   All other systems reviewed and negative    Review of Systems   Constitutional: Negative for appetite change and fever. HENT: Negative. Eyes: Negative. Respiratory: Negative for cough and shortness of breath. Cardiovascular: Negative for chest pain and palpitations. Gastrointestinal: Negative for abdominal pain, diarrhea, nausea and vomiting. Endocrine: Negative. Genitourinary: Negative for dysuria and flank pain. Musculoskeletal: Positive for gait problem. Negative for joint swelling. Skin: Positive for wound (right sacral wound, c/w pressure ulcer. no tunneling. does not appear infected or necrotic. according to paitient, improving gradually). Neurological: Negative for weakness, numbness and headaches. Hematological: Negative.     All other systems reviewed and are negative. Physical Exam   Reviewed patients vital signs and nursing note    Physical Exam  Vitals signs and nursing note reviewed. HENT:      Head: Atraumatic. Mouth/Throat:      Mouth: Mucous membranes are moist.   Eyes:      General: No scleral icterus. Extraocular Movements: Extraocular movements intact. Conjunctiva/sclera: Conjunctivae normal.      Pupils: Pupils are equal, round, and reactive to light. Neck:      Musculoskeletal: Normal range of motion and neck supple. Cardiovascular:      Rate and Rhythm: Normal rate and regular rhythm. Pulses: Normal pulses. Heart sounds: Normal heart sounds. Pulmonary:      Effort: Pulmonary effort is normal.      Breath sounds: Normal breath sounds. Abdominal:      Palpations: Abdomen is soft. Tenderness: There is no abdominal tenderness. Musculoskeletal: Normal range of motion. General: No swelling or deformity. Comments: Tender to palpation of both knees, no erythema or swelling however. No joint laxity on stress testing of joint. Passive rom full, active rom limited by pain. Able to ambulate at baseline but has discomfort in both knees after 3-4 steps.  +crepitus bilaterally   Skin:     General: Skin is warm and dry. Capillary Refill: Capillary refill takes less than 2 seconds. Neurological:      General: No focal deficit present. Mental Status: She is alert. Psychiatric:         Mood and Affect: Mood normal.         Behavior: Behavior normal.         Diagnostic Study Results     Labs - I have personally reviewed and interpreted all laboratory results.  Molina Aguilar MD, MSc  Recent Results (from the past 24 hour(s))   EKG, 12 LEAD, INITIAL    Collection Time: 01/23/21  7:34 PM   Result Value Ref Range    Ventricular Rate 93 BPM    Atrial Rate 93 BPM    P-R Interval 172 ms    QRS Duration 74 ms    Q-T Interval 366 ms    QTC Calculation (Bezet) 455 ms    Calculated P Axis 45 degrees    Calculated R Axis 28 degrees    Calculated T Axis 44 degrees    Diagnosis       Normal sinus rhythm  Nonspecific T wave abnormality  When compared with ECG of 01-NOV-2020 10:42,  No significant change was found     CBC WITH AUTOMATED DIFF    Collection Time: 01/23/21  8:25 PM   Result Value Ref Range    WBC 7.1 3.6 - 11.0 K/uL    RBC 3.73 (L) 3.80 - 5.20 M/uL    HGB 10.6 (L) 11.5 - 16.0 g/dL    HCT 33.4 (L) 35.0 - 47.0 %    MCV 89.5 80.0 - 99.0 FL    MCH 28.4 26.0 - 34.0 PG    MCHC 31.7 30.0 - 36.5 g/dL    RDW 15.9 (H) 11.5 - 14.5 %    PLATELET 019 419 - 782 K/uL    MPV 10.6 8.9 - 12.9 FL    NRBC 0.0 0  WBC    ABSOLUTE NRBC 0.00 0.00 - 0.01 K/uL    NEUTROPHILS 56 32 - 75 %    LYMPHOCYTES 31 12 - 49 %    MONOCYTES 7 5 - 13 %    EOSINOPHILS 5 0 - 7 %    BASOPHILS 1 0 - 1 %    IMMATURE GRANULOCYTES 0 0.0 - 0.5 %    ABS. NEUTROPHILS 3.9 1.8 - 8.0 K/UL    ABS. LYMPHOCYTES 2.2 0.8 - 3.5 K/UL    ABS. MONOCYTES 0.5 0.0 - 1.0 K/UL    ABS. EOSINOPHILS 0.3 0.0 - 0.4 K/UL    ABS. BASOPHILS 0.0 0.0 - 0.1 K/UL    ABS. IMM. GRANS. 0.0 0.00 - 0.04 K/UL    DF AUTOMATED     METABOLIC PANEL, COMPREHENSIVE    Collection Time: 01/23/21  8:25 PM   Result Value Ref Range    Sodium 132 (L) 136 - 145 mmol/L    Potassium 4.7 3.5 - 5.1 mmol/L    Chloride 101 97 - 108 mmol/L    CO2 25 21 - 32 mmol/L    Anion gap 6 5 - 15 mmol/L    Glucose 90 65 - 100 mg/dL    BUN 25 (H) 6 - 20 MG/DL    Creatinine 1.31 (H) 0.55 - 1.02 MG/DL    BUN/Creatinine ratio 19 12 - 20      GFR est AA 49 (L) >60 ml/min/1.73m2    GFR est non-AA 41 (L) >60 ml/min/1.73m2    Calcium 9.0 8.5 - 10.1 MG/DL    Bilirubin, total 0.8 0.2 - 1.0 MG/DL    ALT (SGPT) 20 12 - 78 U/L    AST (SGOT) 16 15 - 37 U/L    Alk.  phosphatase 95 45 - 117 U/L    Protein, total 7.7 6.4 - 8.2 g/dL    Albumin 3.7 3.5 - 5.0 g/dL    Globulin 4.0 2.0 - 4.0 g/dL    A-G Ratio 0.9 (L) 1.1 - 2.2     TROPONIN I    Collection Time: 01/23/21  8:25 PM   Result Value Ref Range    Troponin-I, Qt. <0.05 <0.05 ng/mL Radiologic Studies - I have personally reviewed and interpreted all imaging studies and agree with radiology interpretation and report. Quincy Gregg MD, MSc  CTA HEAD NECK W CONT   Final Result   Impression:      No evidence for acute large vessel arterial occlusion. Equivocal ostial stenosis   of the right vertebral artery which is diminutive in caliber                            CT HEAD WO CONT   Final Result   Unremarkable CT of the head. Left maxillary periapical abscess. Medical Decision Making   I am the first provider for this patient. Records Reviewed: I reviewed our electronic medical record system for any past medical records that were available that may contribute to the patient's current condition, including their PMH, surgical history, social and family history. Reviewed the nursing notes and vital signs from today's visit. Nursing notes will be reviewed as they become available in realtime while the pt has been in the ED. In addition, I read most recent discharge summaries, if available and reviewed prior ECGs or imaging studies for comparison purposes. Mariluz Burns MD Msc    Vital Signs-Reviewed the patient's vital signs. Patient Vitals for the past 24 hrs:   Temp Pulse Resp BP SpO2   01/23/21 1924 97.8 °F (36.6 °C) 100 18 103/69 94 %       ECG interpretation: Normal sinus rhythm with rate 93, normal intervals, no ST elevations, depressions or other changes concerning for acute ischemia. This ECG has been viewed and interpreted by me personally. Mariluz Burns MD, Msc    Provider Notes (Medical Decision Making):   Patient presents to the ED with several complaints. Most pertinent to her is worsening knee pain from degenerative arthritis. She also had a non-synocpal fall yesterday. No obvious head injury but she is not sure if she may have had LOC or hit her head. In terms of knees, she is neurovascularly intact distally.  ddx includes possible ligamentous injury however no gross instability. No tibial plateau tenderness. crepitus is present and she is able to bear weight. Low suspicion for vascular injury with dislocation-relocation. No ankle or hip pain. No back pain with low supicion for significant axial load. No systemic symptoms and nontoxic; given exam and history, low suspicion for septic arthritis, pyomyositis or necrotizing fascitis. Pain is likely worsening arthritis. Will recommend topical nsaid cream for pain control and follow up with ortho. No injuries to knees noted, do not think she needs xrays. Due to fall, will get ct head to ensure there is no bleed or vascular injury to brain. ED Course:   Initial assessment performed. The patients presenting problems have been discussed, and they are in agreement with the care plan formulated and outlined with them. I have encouraged them to ask questions as they arise throughout their visit. Progress note:  Patient has been reassessed and reports feeling considerably better, has normal vital signs and feels comfortable going home. I think this is reasonable as no findings today suggest a life-threatening condition. DISPOSITION: DISCHARGE  The patient's results have been reviewed with patient and available family and/or caregiver. They verbally convey their understanding and agreement of the patient's signs, symptoms, diagnosis, treatment and prognosis and additionally agree to follow up as recommended in the discharge instructions or to return to the Emergency Department should the patient's condition change prior to their follow-up appointment. The patient and available family and/or caregiver verbally agree with the care plan and all of their questions have been answered.  The discharge instructions have also been provided to the them with educational information regarding the patient's diagnosis as well a list of reasons why the patient would want to return to the ER prior to their follow-up appointment should any concerns arise, the patient's condition change or symptoms worsen. Shorty Jose MD, Msc    PLAN:  Discharge Medication List as of 1/24/2021  5:25 AM      1.   2.     Follow-up Information     Follow up With Specialties Details Why Contact Info    Lisa Gonzales MD Family Medicine Call in 1 day update your doctor on today's visit and how you are feeling [address]  [phone]      [facility] EMERGENCY DEPT Emergency Medicine  As needed, If symptoms worsen [address] [place] [address] Drive  [address] [facility]  [phone]        3. Return to ED if worse       I, Millie Gregory MD, am the attending of record for this patient encounter. Diagnosis     Clinical Impression:   1. Osteoarthritis of both knees, unspecified osteoarthritis type    2. Fall, initial encounter        Attestation:  I personally performed the services described in this documentation on this date 1/23/2021 for patient Shantal Brown.   Shorty Jose MD

## 2021-02-01 ENCOUNTER — VIRTUAL VISIT (OUTPATIENT)
Dept: INTERNAL MEDICINE CLINIC | Age: 66
End: 2021-02-01
Payer: MEDICARE

## 2021-02-01 DIAGNOSIS — L97.112 NON-PRESSURE CHRONIC ULCER OF RIGHT THIGH WITH FAT LAYER EXPOSED (HCC): ICD-10-CM

## 2021-02-01 DIAGNOSIS — L97.122 NON-PRESSURE CHRONIC ULCER OF LEFT THIGH WITH FAT LAYER EXPOSED (HCC): ICD-10-CM

## 2021-02-01 DIAGNOSIS — Z86.711 HISTORY OF PULMONARY EMBOLUS (PE): Primary | ICD-10-CM

## 2021-02-01 PROCEDURE — 99213 OFFICE O/P EST LOW 20 MIN: CPT | Performed by: FAMILY MEDICINE

## 2021-02-01 PROCEDURE — G0463 HOSPITAL OUTPT CLINIC VISIT: HCPCS | Performed by: FAMILY MEDICINE

## 2021-02-01 RX ORDER — TRAMADOL HYDROCHLORIDE 50 MG/1
50 TABLET ORAL
Qty: 15 TAB | Refills: 0 | Status: SHIPPED | OUTPATIENT
Start: 2021-02-01 | End: 2021-02-17

## 2021-02-01 RX ORDER — NYSTATIN 10B UNIT
1 POWDER (EA) MISCELLANEOUS 2 TIMES DAILY
Qty: 1 EACH | Refills: 0 | Status: SHIPPED | OUTPATIENT
Start: 2021-02-01

## 2021-02-01 RX ORDER — SULFAMETHOXAZOLE AND TRIMETHOPRIM 800; 160 MG/1; MG/1
1 TABLET ORAL 2 TIMES DAILY
Qty: 10 TAB | Refills: 0 | Status: SHIPPED | OUTPATIENT
Start: 2021-02-01 | End: 2021-02-06

## 2021-02-01 NOTE — PROGRESS NOTES
Olimpia Casillas is a 76 y.o. female who was seen by synchronous (real-time) audio-video technology on 2/1/2021 for Medication Refill and Medication Evaluation    This patient calls today to follow-up and get refills of her medication. She reports that her wound is healing although there still is a small opening. She agrees that she should follow-up in the wound  clinic. She is requesting pain medication. Assessment & Plan:   Diagnoses and all orders for this visit:    1. History of pulmonary embolus (PE)  -     apixaban (Eliquis) 5 mg tablet; TAKE 1 TABLET BY MOUTH TWICE DAILY    2. 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 3. Non-pressure chronic ulcer of left thigh with fat layer exposed ([address]  -     Nystatin, Bulk, 1 billion unit powd; 1 Dose by Does Not Apply route two (2) times a day. -     trimethoprim-sulfamethoxazole (BACTRIM DS, SEPTRA DS) 160-800 mg per tablet; Take 1 Tab by mouth two (2) times a day for 5 days. -     traMADoL (ULTRAM) 50 mg tablet; Take 1 Tab by mouth every eight (8) hours as needed for Pain for up to 5 days. Max Daily Amount: 150 mg. This is trauma is encouraged to continue with diligent wound care and to follow-up with the wound clinic as soon as possible. She was given refills of Bactrim to take for 5 days. She was also given refills of the nystatin powder and tramadol for pain. Subjective:       Prior to Admission medications    Medication Sig Start Date End Date Taking? Authorizing Provider   apixaban (Eliquis) 5 mg tablet TAKE 1 TABLET BY MOUTH TWICE DAILY 2/1/21  Yes Serenity Kuo MD   Nystatin, Bulk, 1 billion unit powd 1 Dose by Does Not Apply route two (2) times a day. 2/1/21  Yes Serenity Kuo MD   trimethoprim-sulfamethoxazole (BACTRIM DS, SEPTRA DS) 160-800 mg per tablet Take 1 Tab by mouth two (2) times a day for 5 days. 2/1/21 2/6/21 Yes Stella Walter MD   traMADoL Georginaigor Garland) 50 mg tablet Take 1 Tab by mouth every eight (8) hours as needed for Pain for up to 5 days. Max Daily Amount: 150 mg. 2/1/21 2/6/21 Yes Stella Walter MD   diclofenac [facility]) 3 % topical gel Apply  to affected area two (2) times a day. 1/24/21  Yes Lencho Klein MD   omeprazole (PRILOSEC) 40 mg capsule TAKE 1 CAPSULE BY MOUTH DAILY 1/19/21  Yes Stella Walter MD   Shower Chair XX nishant Patient will use the shower chair to reduce her risk of falling. Patient has poor mobility due to leg pain. 11/23/20  Yes MD Siria Patel [facility] Patient will use the shower chair to reduce her risk of falling. Patient has poor mobility due to leg pain. 11/23/20  Yes Stella Walter MD   foam bandage (Allevyn Gentle Border Multisit) 6 3/4 X 7 1/16 \" bndg Apply to affected area daily. 11/13/20  Yes Stella Walter MD   lisinopriL (PRINIVIL, ZESTRIL) 20 mg tablet Take 2 tablets daily. 11/4/20  Yes Stella Walter MD   furosemide (LASIX) 40 mg tablet Take 40 mg by mouth daily. Yes Provider, Historical   simvastatin (ZOCOR) 20 mg tablet TAKE 1 TABLET BY MOUTH EVERY NIGHT 8/25/20  Yes Rosa Caldwell MD   QUEtiapine (SEROQUEL) 200 mg tablet TK 1 T PO HS PRF INSOMNIA 10/9/19  Yes Yue Rodriguez MD   DULoxetine (CYMBALTA) 60 mg capsule Take 1 Cap by mouth daily. 3/6/19  Yes AllocTara hills NP   lidocaine 4 % patch 2 Patches by TransDERmal route every twelve (12) hours every twelve (12) hours. 1/24/21   Lencho Klein MD   dicyclomine (BENTYL) 10 mg capsule Take 1 Cap by mouth three (3) times daily. 11/23/20   Stella Walter MD   silver sulfADIAZINE (SILVADENE) 1 % topical cream Apply  to affected area daily. 11/13/20   Stella Walter MD   bacitracin-polymyxin b (Polysporin) 500-10,000 unit/gram ointment Apply  to affected area two (2) times a day. 11/9/20   Stella Walter MD   nystatin (MYCOSTATIN) topical cream Apply  to affected area two (2) times a day.  11/9/20 Case Bullock MD   zolpidem (AMBIEN) 10 mg tablet TK 1 T PO HS PRF INSOMNIA 10/31/20   Provider, Historical   traZODone (DESYREL) 50 mg tablet Take 50 mg by mouth daily as needed for Sleep. at bedtime    Provider, Historical   LORazepam (ATIVAN) 1 mg tablet Take 1 mg by mouth two (2) times daily as needed for Anxiety. Provider, Historical   dicyclomine (BENTYL) 10 mg capsule Take 1 Cap by mouth three (3) times daily. 9/18/20   Case Bullock MD   naloxone [facility]) 4 mg/actuation nasal spray Use 1 spray intranasally, then discard. Repeat with new spray every 2 min as needed for opioid overdose symptoms, alternating nostrils. 9/4/20   Case Bullock MD   hydrocortisone (CORTAID) 0.5 % topical cream Apply  to affected area two (2) times a day. use thin layer 7/17/20   Case Bullock MD   potassium chloride SR (KLOR-CON 10) 10 mEq tablet TAKE 2 TABLETS BY MOUTH DAILY 1/16/19   Case Bullock MD         Review of Systems   Constitutional: Negative. HENT: Negative. Eyes: Negative. Respiratory: Negative. Cardiovascular: Negative. Gastrointestinal: Negative. Genitourinary: Negative. Musculoskeletal: Negative. Skin:        Wound on inner thighs   Neurological: Negative.         Objective:     Patient-Reported Vitals 11/18/2020   Patient-Reported Weight 390   Patient-Reported Systolic  181   Patient-Reported Diastolic 752        [INSTRUCTIONS:  \"[x]\" Indicates a positive item  \"[]\" Indicates a negative item  -- DELETE ALL ITEMS NOT EXAMINED]    Constitutional: [x] Appears well-developed and well-nourished [x] No apparent distress      [] Abnormal -     Mental status: [x] Alert and awake  [x] Oriented to person/place/time [x] Able to follow commands    [] Abnormal -     Eyes:   EOM    [x]  Normal    [] Abnormal -   Sclera  [x]  Normal    [] Abnormal -          Discharge [x]  None visible   [] Abnormal -     HENT: [x] Normocephalic, atraumatic  [] Abnormal -   [x] Mouth/Throat: Mucous membranes are moist    External Ears [x] Normal  [] Abnormal -    Neck: [x] No visualized mass [] Abnormal -     Pulmonary/Chest: [x] Respiratory effort normal   [x] No visualized signs of difficulty breathing or respiratory distress        [] Abnormal -      Musculoskeletal:   [] Normal gait with no signs of ataxia         [x] Normal range of motion of neck        [] Abnormal -     Neurological:        [x] No Facial Asymmetry (Cranial nerve 7 motor function) (limited exam due to video visit)          [x] No gaze palsy        [] Abnormal -          Skin:        [x] No significant exanthematous lesions or discoloration noted on facial skin         [] Abnormal -            Psychiatric:       [x] Normal Affect [] Abnormal -        [x] No Hallucinations    Other pertinent observable physical exam findings:-        We discussed the expected course, resolution and complications of the diagnosis(es) in detail. Medication risks, benefits, costs, interactions, and alternatives were discussed as indicated. I advised her to contact the office if her condition worsens, changes or fails to improve as anticipated. She expressed understanding with the diagnosis(es) and plan. Vy Josse, who was evaluated through a patient-initiated, synchronous (real-time) audio-video encounter, and/or her healthcare decision maker, is aware that it is a billable service, with coverage as determined by her insurance carrier. She provided verbal consent to proceed: Yes, and patient identification was verified. It was conducted pursuant to the emergency declaration under the [facility], [id number] waiver authority and the [employer] General Act. A caregiver was present when appropriate. Ability to conduct physical exam was limited. I was at home. The patient was at home.       Denae Vegas MD

## 2021-02-03 NOTE — TELEPHONE ENCOUNTER
Patient states she needs to get refill done thru [address]. Patient states she forgot to request at her recent visit. Please call if any questions.  Thank you

## 2021-02-05 DIAGNOSIS — E78.5 HYPERLIPIDEMIA, UNSPECIFIED HYPERLIPIDEMIA TYPE: ICD-10-CM

## 2021-02-05 RX ORDER — SIMVASTATIN 20 MG/1
TABLET, FILM COATED ORAL
Qty: 90 TAB | Refills: 1 | Status: SHIPPED | OUTPATIENT
Start: 2021-02-05 | End: 2021-08-03

## 2021-02-05 RX ORDER — SIMVASTATIN 20 MG/1
TABLET, FILM COATED ORAL
Qty: 90 TAB | Refills: 1 | Status: SHIPPED | OUTPATIENT
Start: 2021-02-05 | End: 2021-02-05

## 2021-02-15 DIAGNOSIS — L97.122 NON-PRESSURE CHRONIC ULCER OF LEFT THIGH WITH FAT LAYER EXPOSED (HCC): ICD-10-CM

## 2021-02-17 RX ORDER — TRAMADOL HYDROCHLORIDE 50 MG/1
TABLET ORAL
Qty: 10 TAB | Refills: 0 | Status: SHIPPED | OUTPATIENT
Start: 2021-02-17 | End: 2021-03-14

## 2021-03-05 DIAGNOSIS — L97.122 NON-PRESSURE CHRONIC ULCER OF LEFT THIGH WITH FAT LAYER EXPOSED (HCC): ICD-10-CM

## 2021-03-09 NOTE — TELEPHONE ENCOUNTER
----- Message from Simeon Rice sent at 3/9/2021  3:23 PM EST -----  Regarding: Dr. Katelyn Vela Refill  Medication Refill    Caller (if not patient): N/A      Relationship of caller (if not patient): N/A      Best contact number(s): [phone]      Name of medication and dosage if known: \"Tramadol\" HCl 50 mg       Is patient out of this medication (yes/no): yes      Pharmacy name: [facility] listed in chart? (yes/no): Yes    Pharmacy phone number: Phone: [phone]  Fax: [phone]       Details to clarify the request: Patient has already sent a request for this medication on 3/5/21.       Simeon Rice

## 2021-03-11 RX ORDER — TRAMADOL HYDROCHLORIDE 50 MG/1
50 TABLET ORAL
OUTPATIENT
Start: 2021-03-11 | End: 2021-03-14

## 2021-03-11 NOTE — TELEPHONE ENCOUNTER
If her wound is not healing she needs to go to the wound clinic. She also needs to establish with pain management if she needs long term narcotics.

## 2021-03-11 NOTE — TELEPHONE ENCOUNTER
Identified patient 2 identifiers verified. Spoke with patient wound is healed. Patient reports that she found a pain management Specialist on [facility] , she will call today to get the information to send records to.

## 2021-03-14 RX ORDER — TRAMADOL HYDROCHLORIDE 50 MG/1
TABLET ORAL
Qty: 10 TAB | Refills: 0 | Status: SHIPPED | OUTPATIENT
Start: 2021-03-14 | End: 2021-03-17

## 2021-04-20 ENCOUNTER — APPOINTMENT (OUTPATIENT)
Dept: ULTRASOUND IMAGING | Age: 66
End: 2021-04-20
Attending: EMERGENCY MEDICINE
Payer: MEDICARE

## 2021-04-20 ENCOUNTER — TELEPHONE (OUTPATIENT)
Dept: INTERNAL MEDICINE CLINIC | Age: 66
End: 2021-04-20

## 2021-04-20 ENCOUNTER — APPOINTMENT (OUTPATIENT)
Dept: GENERAL RADIOLOGY | Age: 66
End: 2021-04-20
Attending: EMERGENCY MEDICINE
Payer: MEDICARE

## 2021-04-20 ENCOUNTER — HOSPITAL ENCOUNTER (EMERGENCY)
Age: 66
Discharge: HOME | End: 2021-04-20
Attending: EMERGENCY MEDICINE
Payer: MEDICARE

## 2021-04-20 VITALS
SYSTOLIC BLOOD PRESSURE: 142 MMHG | TEMPERATURE: 97.5 F | DIASTOLIC BLOOD PRESSURE: 75 MMHG | HEART RATE: 77 BPM | RESPIRATION RATE: 15 BRPM | OXYGEN SATURATION: 98 %

## 2021-04-20 DIAGNOSIS — M25.561 ARTHRALGIA OF RIGHT LOWER LEG: Primary | ICD-10-CM

## 2021-04-20 LAB
ALBUMIN SERPL-MCNC: 3.6 G/DL (ref 3.5–5)
ALBUMIN/GLOB SERPL: 0.9 {RATIO} (ref 1.1–2.2)
ALP SERPL-CCNC: 97 U/L (ref 45–117)
ALT SERPL-CCNC: 17 U/L (ref 12–78)
ANION GAP SERPL CALC-SCNC: 6 MMOL/L (ref 5–15)
AST SERPL-CCNC: 13 U/L (ref 15–37)
BASOPHILS # BLD: 0 K/UL (ref 0–0.1)
BASOPHILS NFR BLD: 1 % (ref 0–1)
BILIRUB SERPL-MCNC: 0.5 MG/DL (ref 0.2–1)
BUN SERPL-MCNC: 18 MG/DL (ref 6–20)
BUN/CREAT SERPL: 19 (ref 12–20)
CALCIUM SERPL-MCNC: 9.5 MG/DL (ref 8.5–10.1)
CHLORIDE SERPL-SCNC: 103 MMOL/L (ref 97–108)
CK SERPL-CCNC: 126 U/L (ref 26–192)
CO2 SERPL-SCNC: 26 MMOL/L (ref 21–32)
CREAT SERPL-MCNC: 0.97 MG/DL (ref 0.55–1.02)
DIFFERENTIAL METHOD BLD: ABNORMAL
EOSINOPHIL # BLD: 0.2 K/UL (ref 0–0.4)
EOSINOPHIL NFR BLD: 2 % (ref 0–7)
ERYTHROCYTE [DISTWIDTH] IN BLOOD BY AUTOMATED COUNT: 14.8 % (ref 11.5–14.5)
GLOBULIN SER CALC-MCNC: 4.2 G/DL (ref 2–4)
GLUCOSE SERPL-MCNC: 93 MG/DL (ref 65–100)
HCT VFR BLD AUTO: 35.6 % (ref 35–47)
HGB BLD-MCNC: 11.4 G/DL (ref 11.5–16)
IMM GRANULOCYTES # BLD AUTO: 0 K/UL (ref 0–0.04)
IMM GRANULOCYTES NFR BLD AUTO: 0 % (ref 0–0.5)
LYMPHOCYTES # BLD: 2.5 K/UL (ref 0.8–3.5)
LYMPHOCYTES NFR BLD: 38 % (ref 12–49)
MAGNESIUM SERPL-MCNC: 1.9 MG/DL (ref 1.6–2.4)
MCH RBC QN AUTO: 28.4 PG (ref 26–34)
MCHC RBC AUTO-ENTMCNC: 32 G/DL (ref 30–36.5)
MCV RBC AUTO: 88.6 FL (ref 80–99)
MONOCYTES # BLD: 0.5 K/UL (ref 0–1)
MONOCYTES NFR BLD: 8 % (ref 5–13)
NEUTS SEG # BLD: 3.4 K/UL (ref 1.8–8)
NEUTS SEG NFR BLD: 51 % (ref 32–75)
NRBC # BLD: 0 K/UL (ref 0–0.01)
NRBC BLD-RTO: 0 PER 100 WBC
PLATELET # BLD AUTO: 242 K/UL (ref 150–400)
PMV BLD AUTO: 11.3 FL (ref 8.9–12.9)
POTASSIUM SERPL-SCNC: 4.5 MMOL/L (ref 3.5–5.1)
PROT SERPL-MCNC: 7.8 G/DL (ref 6.4–8.2)
RBC # BLD AUTO: 4.02 M/UL (ref 3.8–5.2)
SODIUM SERPL-SCNC: 135 MMOL/L (ref 136–145)
TROPONIN I SERPL-MCNC: <0.05 NG/ML
WBC # BLD AUTO: 6.6 K/UL (ref 3.6–11)

## 2021-04-20 PROCEDURE — 82550 ASSAY OF CK (CPK): CPT

## 2021-04-20 PROCEDURE — 36415 COLL VENOUS BLD VENIPUNCTURE: CPT

## 2021-04-20 PROCEDURE — 93005 ELECTROCARDIOGRAM TRACING: CPT

## 2021-04-20 PROCEDURE — 74011250637 HC RX REV CODE- 250/637: Performed by: EMERGENCY MEDICINE

## 2021-04-20 PROCEDURE — 83735 ASSAY OF MAGNESIUM: CPT

## 2021-04-20 PROCEDURE — 93971 EXTREMITY STUDY: CPT

## 2021-04-20 PROCEDURE — 84484 ASSAY OF TROPONIN QUANT: CPT

## 2021-04-20 PROCEDURE — 99284 EMERGENCY DEPT VISIT MOD MDM: CPT

## 2021-04-20 PROCEDURE — 85025 COMPLETE CBC W/AUTO DIFF WBC: CPT

## 2021-04-20 PROCEDURE — 80053 COMPREHEN METABOLIC PANEL: CPT

## 2021-04-20 PROCEDURE — 71045 X-RAY EXAM CHEST 1 VIEW: CPT

## 2021-04-20 RX ORDER — TRAMADOL HYDROCHLORIDE 50 MG/1
50 TABLET ORAL
Qty: 12 TAB | Refills: 0 | Status: SHIPPED | OUTPATIENT
Start: 2021-04-20 | End: 2021-04-23

## 2021-04-20 RX ORDER — HYDROCODONE BITARTRATE AND ACETAMINOPHEN 5; 325 MG/1; MG/1
1 TABLET ORAL
Status: COMPLETED | OUTPATIENT
Start: 2021-04-20 | End: 2021-04-20

## 2021-04-20 RX ADMIN — HYDROCODONE BITARTRATE AND ACETAMINOPHEN 1 TABLET: 5; 325 TABLET ORAL at 13:28

## 2021-04-20 NOTE — TELEPHONE ENCOUNTER
Identified patient 2 identifiers verified. Patient thinks sh may have a blood clot in her leg. Patient instructed to go to ED for evaluation. Patient agreed.

## 2021-04-20 NOTE — ED PROVIDER NOTES
EMERGENCY DEPARTMENT HISTORY AND PHYSICAL EXAM          Date: 4/20/2021  Patient Name: Mason Mckinney    History of Presenting Illness     Chief Complaint   Patient presents with    Chest Pain     pt reports shes been getting palpiltations x2 weeks, PE a year ago    Leg Pain     pain in her R leg specifically behind her knee that she is concerned may be a blood clot       History Provided By: Patient    HPI: Mason Mckinney is a 78 y.o. female, pmhx hypertension, PE, GERD, bipolar disorder, chronic pain, who presents ambulatory to the ED c/o leg pain    Patient explains for the past month she has been having some right-sided leg pain. She notes the pains comes from her calf up to the lateral thigh, behind her knee into her hamstring. She states she cannot walk and she cannot sit due to the pain. She took a Tylenol this morning with no relief in her symptoms. She states she is tried multiple medications without any relief of her symptoms. She called her primary care doctor who is reluctant to give her pain medication so she attempted to get a pain management specialist but apparently its not covered by her insurance. She also notes that in the ER she is been feeling short of breath and fatigued and was concerned that maybe she has another blood clot so she decided to come to the ER today. In triage she told him she is been having palpitations but she states she really has not been having the symptoms frequently and denies any symptoms now. Patient specifically denies any recent fevers, chills, nausea, vomiting, diarrhea, abd pain, CP, SOB, urinary sxs, changes in BM, or headache. PCP: Lee Goodrich MD    Allergies: None known  Social Hx: -tobacco, -vaping, -EtOH, -Illicit Drugs; There are no other complaints, changes, or physical findings at this time.      Current Outpatient Medications   Medication Sig Dispense Refill    traMADoL (Ultram) 50 mg tablet Take 1 Tab by mouth every four (4) hours as needed for Pain for up to 3 days. Max Daily Amount: 300 mg. 12 Tab 0    simvastatin (ZOCOR) 20 mg tablet TAKE 1 TABLET BY MOUTH EVERY NIGHT 90 Tab 1    apixaban (Eliquis) 5 mg tablet TAKE 1 TABLET BY MOUTH TWICE DAILY 60 Tab 5    Nystatin, Bulk, 1 billion unit powd 1 Dose by Does Not Apply route two (2) times a day. 1 Each 0    lidocaine 4 % patch 2 Patches by TransDERmal route every twelve (12) hours every twelve (12) hours. 20 Patch 0    diclofenac (SOLARAZE) 3 % topical gel Apply  to affected area two (2) times a day. 100 g 0    omeprazole (PRILOSEC) 40 mg capsule TAKE 1 CAPSULE BY MOUTH DAILY 90 Cap 0    Shower Chair XX nishant Patient will use the shower chair to reduce her risk of falling. Patient has poor mobility due to leg pain. 1 Each 0    Walker misc Patient will use the shower chair to reduce her risk of falling. Patient has poor mobility due to leg pain. 1 Each 0    dicyclomine (BENTYL) 10 mg capsule Take 1 Cap by mouth three (3) times daily. 90 Cap 2    silver sulfADIAZINE (SILVADENE) 1 % topical cream Apply  to affected area daily. 50 g 2    foam bandage (Allevyn Gentle Border Multisit) 6 3/4 X 7 1/16 \" bndg Apply to affected area daily. 2 Box 2    bacitracin-polymyxin b (Polysporin) 500-10,000 unit/gram ointment Apply  to affected area two (2) times a day. 15 g 0    nystatin (MYCOSTATIN) topical cream Apply  to affected area two (2) times a day. 15 g 0    zolpidem (AMBIEN) 10 mg tablet TK 1 T PO HS PRF INSOMNIA      lisinopriL (PRINIVIL, ZESTRIL) 20 mg tablet Take 2 tablets daily. 180 Tab 1    furosemide (LASIX) 40 mg tablet Take 40 mg by mouth daily.  traZODone (DESYREL) 50 mg tablet Take 50 mg by mouth daily as needed for Sleep. at bedtime      LORazepam (ATIVAN) 1 mg tablet Take 1 mg by mouth two (2) times daily as needed for Anxiety.  dicyclomine (BENTYL) 10 mg capsule Take 1 Cap by mouth three (3) times daily.  90 Cap 1    naloxone (NARCAN) 4 mg/actuation nasal spray Use 1 spray intranasally, then discard. Repeat with new spray every 2 min as needed for opioid overdose symptoms, alternating nostrils. 1 Each 1    hydrocortisone (CORTAID) 0.5 % topical cream Apply  to affected area two (2) times a day. use thin layer 30 g 0    QUEtiapine (SEROQUEL) 200 mg tablet TK 1 T PO HS PRF INSOMNIA  0    DULoxetine (CYMBALTA) 60 mg capsule Take 1 Cap by mouth daily. 30 Cap 1    potassium chloride SR (KLOR-CON 10) 10 mEq tablet TAKE 2 TABLETS BY MOUTH DAILY 180 Tab 1       Past History     Past Medical History:  Past Medical History:   Diagnosis Date    Arthritis     chronic pain related arthritis    Bipolar 1 disorder (HCC)     GERD (gastroesophageal reflux disease)     Hypertension     Other ill-defined conditions(799.89)     hyperlipidemia. Stomach abcess    Psychiatric disorder     panic attacks    Sleep apnea     Thromboembolus ([address]     last year L leg    Unspecified sleep apnea        Past Surgical History:  Past Surgical History:   Procedure Laterality Date    HX CHOLECYSTECTOMY      HX GI      gallbladder removed 2/2010    HX HYSTERECTOMY         Family History:  Family History   Problem Relation Age of Onset    Heart Disease Mother     Diabetes Mother     Arthritis-osteo Mother     High Cholesterol Mother     Heart Attack Sister     Diabetes Sister     Heart Disease Sister     Diabetes Sister     Arthritis-osteo Sister     High Cholesterol Sister     Schizophrenia Paternal Grandmother     Drug Abuse Sister        Social History:  Social History     Tobacco Use    Smoking status: Never Smoker    Smokeless tobacco: Never Used   Substance Use Topics    Alcohol use: No    Drug use: No       Allergies:  No Known Allergies      Review of Systems   Review of Systems   Constitutional: Positive for fatigue. Negative for activity change, appetite change, chills, fever and unexpected weight change. HENT: Negative for congestion.     Eyes: Negative for pain and visual disturbance. Respiratory: Positive for shortness of breath. Negative for cough. Cardiovascular: Negative for chest pain. Gastrointestinal: Negative for abdominal pain, diarrhea, nausea and vomiting. Genitourinary: Negative for dysuria. Musculoskeletal: Positive for arthralgias and gait problem. Negative for back pain. Skin: Negative for rash. Neurological: Negative for headaches. Physical Exam   Physical Exam  Vitals signs and nursing note reviewed. Constitutional:       Appearance: She is well-developed. She is not diaphoretic. Comments: This is a morbidly obese middle-aged female who appears uncomfortable with slightly elevated blood pressure currently in mild to moderate distress due to pain   HENT:      Head: Normocephalic and atraumatic. Eyes:      General:         Right eye: No discharge. Left eye: No discharge. Conjunctiva/sclera: Conjunctivae normal.      Pupils: Pupils are equal, round, and reactive to light. Neck:      Musculoskeletal: Normal range of motion and neck supple. Cardiovascular:      Rate and Rhythm: Normal rate and regular rhythm. Heart sounds: Normal heart sounds. No murmur. No systolic murmur. Pulmonary:      Effort: Pulmonary effort is normal. No accessory muscle usage or respiratory distress. Breath sounds: Normal breath sounds. No wheezing or rales. Abdominal:      General: Bowel sounds are normal. There is no distension. Palpations: Abdomen is soft. Tenderness: There is no abdominal tenderness. Musculoskeletal: Normal range of motion. Right lower leg: No edema. Left lower leg: No edema. Skin:     General: Skin is warm and dry. Coloration: Skin is not pale. Findings: No erythema or rash. Neurological:      Mental Status: She is alert and oriented to person, place, and time. Cranial Nerves: No cranial nerve deficit. Motor: No abnormal muscle tone.        Diagnostic Study Results Labs -     Recent Results (from the past 12 hour(s))   EKG, 12 LEAD, INITIAL    Collection Time: 04/20/21 12:30 PM   Result Value Ref Range    Ventricular Rate 79 BPM    Atrial Rate 79 BPM    P-R Interval 168 ms    QRS Duration 66 ms    Q-T Interval 402 ms    QTC Calculation (Bezet) 460 ms    Calculated P Axis 37 degrees    Calculated R Axis 26 degrees    Calculated T Axis 42 degrees    Diagnosis       Normal sinus rhythm  Cannot rule out Anterior infarct , age undetermined  When compared with ECG of 23-JAN-2021 19:34,  No significant change was found     CBC WITH AUTOMATED DIFF    Collection Time: 04/20/21 12:45 PM   Result Value Ref Range    WBC 6.6 3.6 - 11.0 K/uL    RBC 4.02 3.80 - 5.20 M/uL    HGB 11.4 (L) 11.5 - 16.0 g/dL    HCT 35.6 35.0 - 47.0 %    MCV 88.6 80.0 - 99.0 FL    MCH 28.4 26.0 - 34.0 PG    MCHC 32.0 30.0 - 36.5 g/dL    RDW 14.8 (H) 11.5 - 14.5 %    PLATELET 308 632 - 956 K/uL    MPV 11.3 8.9 - 12.9 FL    NRBC 0.0 0  WBC    ABSOLUTE NRBC 0.00 0.00 - 0.01 K/uL    NEUTROPHILS 51 32 - 75 %    LYMPHOCYTES 38 12 - 49 %    MONOCYTES 8 5 - 13 %    EOSINOPHILS 2 0 - 7 %    BASOPHILS 1 0 - 1 %    IMMATURE GRANULOCYTES 0 0.0 - 0.5 %    ABS. NEUTROPHILS 3.4 1.8 - 8.0 K/UL    ABS. LYMPHOCYTES 2.5 0.8 - 3.5 K/UL    ABS. MONOCYTES 0.5 0.0 - 1.0 K/UL    ABS. EOSINOPHILS 0.2 0.0 - 0.4 K/UL    ABS. BASOPHILS 0.0 0.0 - 0.1 K/UL    ABS. IMM.  GRANS. 0.0 0.00 - 0.04 K/UL    DF AUTOMATED     METABOLIC PANEL, COMPREHENSIVE    Collection Time: 04/20/21 12:45 PM   Result Value Ref Range    Sodium 135 (L) 136 - 145 mmol/L    Potassium 4.5 3.5 - 5.1 mmol/L    Chloride 103 97 - 108 mmol/L    CO2 26 21 - 32 mmol/L    Anion gap 6 5 - 15 mmol/L    Glucose 93 65 - 100 mg/dL    BUN 18 6 - 20 MG/DL    Creatinine 0.97 0.55 - 1.02 MG/DL    BUN/Creatinine ratio 19 12 - 20      GFR est AA >60 >60 ml/min/1.73m2    GFR est non-AA 57 (L) >60 ml/min/1.73m2    Calcium 9.5 8.5 - 10.1 MG/DL    Bilirubin, total 0.5 0.2 - 1.0 MG/DL ALT (SGPT) 17 12 - 78 U/L    AST (SGOT) 13 (L) 15 - 37 U/L    Alk. phosphatase 97 45 - 117 U/L    Protein, total 7.8 6.4 - 8.2 g/dL    Albumin 3.6 3.5 - 5.0 g/dL    Globulin 4.2 (H) 2.0 - 4.0 g/dL    A-G Ratio 0.9 (L) 1.1 - 2.2     TROPONIN I    Collection Time: 04/20/21 12:45 PM   Result Value Ref Range    Troponin-I, Qt. <0.05 <0.05 ng/mL   CK W/ REFLX CKMB    Collection Time: 04/20/21 12:45 PM   Result Value Ref Range     26 - 192 U/L   MAGNESIUM    Collection Time: 04/20/21 12:45 PM   Result Value Ref Range    Magnesium 1.9 1.6 - 2.4 mg/dL       Radiologic Studies -   XR CHEST PORT   Final Result   1. No radiographic evidence of acute cardiopulmonary disease. CT Results  (Last 48 hours)    None        CXR Results  (Last 48 hours)               04/20/21 1350  XR CHEST PORT Final result    Impression:  1. No radiographic evidence of acute cardiopulmonary disease. Narrative:  INDICATION: . chest pain   Additional history:   COMPARISON: Previous chest xray, 11/1/2020. LIMITATIONS: Portable technique. Patient body habitus. Viola Rodriguez FINDINGS: Single frontal view of the chest.    .   Lines/tubes/surgical: None. Heart/mediastinum: Unremarkable. Lungs/pleura:  No focal consolidation or mass. No visualized pleural effusion or   pneumothorax. Additional Comments: None. .               Medical Decision Making   I am the first provider for this patient. I reviewed the vital signs, available nursing notes, past medical history, past surgical history, family history and social history. Vital Signs-Reviewed the patient's vital signs.   Patient Vitals for the past 12 hrs:   Temp Pulse Resp BP SpO2   04/20/21 1438    (!) 142/75 98 %   04/20/21 1423    (!) 165/95 94 %   04/20/21 1353    (!) 159/79 98 %   04/20/21 1323    (!) 141/79    04/20/21 1223 97.5 °F (36.4 °C) 77 15 (!) 142/98 100 %       Pulse Oximetry Analysis - 98% on RA    Records Reviewed: Nursing Notes, Old Medical Records, Previous Radiology Studies and Previous Laboratory Studies    Provider Notes (Medical Decision Making):   MDM: Middle-aged female presents to the ER with concern for acute blood clot. Ultrasound to be obtained to rule out any new change from previous studies. Patient is hemodynamically stable without concern for acute saddle PE at this time. Will initiate cardiac monitoring with lab and ultrasound check while patient is receiving symptom management for pain control. ED Course:   Initial assessment performed. The patients presenting problems have been discussed, and they are in agreement with the care plan formulated and outlined with them. I have encouraged them to ask questions as they arise throughout their visit. EKG interpretation: (Preliminary)  Rhythm: Sinus rhythm at a rate of 79 bpm; normal SC; normal QRS; normal QTC and normal axis. T wave flattening noted in the inferior leads with T wave inversions in V1 through V3. Appears similar to January 23, 2021. This EKG was interpreted by ED Provider Rossy Bedolla MD    PROGRESS NOTE:  2 PM  Patient appears much more comfortable and denies any pain currently. Discussed home management and medication sent to pharmacy. Discharge note:  Pt re-evaluated and noted to be feeling better, ready for discharge. Updated pt on all final lab and ultrasound findings. Will follow up as instructed. All questions have been answered, pt voiced understanding and agreement with plan. Narcotics were prescribed, pt was advised not to drive or operate heavy machinery. Specific return precautions provided as well as instructions to return to the ED should sx worsen at any time. Vital signs stable for discharge. Critical Care Time:   0      Diagnosis     Clinical Impression:   1. Arthralgia of right lower leg        PLAN:  1.    Discharge Medication List as of 4/20/2021  2:37 PM      START taking these medications    Details traMADoL (Ultram) 50 mg tablet Take 1 Tab by mouth every four (4) hours as needed for Pain for up to 3 days. Max Daily Amount: 300 mg., Normal, Disp-12 Tab, R-0         CONTINUE these medications which have NOT CHANGED    Details   simvastatin (ZOCOR) 20 mg tablet TAKE 1 TABLET BY MOUTH EVERY NIGHT, Normal, Disp-90 Tab, R-1      apixaban (Eliquis) 5 mg tablet TAKE 1 TABLET BY MOUTH TWICE DAILY, Normal, Disp-60 Tab, R-5      Nystatin, Bulk, 1 billion unit powd 1 Dose by Does Not Apply route two (2) times a day., Normal, Disp-1 Each, R-0      lidocaine 4 % patch 2 Patches by TransDERmal route every twelve (12) hours every twelve (12) hours. , Normal, Disp-20 Patch, R-0      diclofenac (SOLARAZE) 3 % topical gel Apply  to affected area two (2) times a day., Normal, Disp-100 g, R-0      omeprazole (PRILOSEC) 40 mg capsule TAKE 1 CAPSULE BY MOUTH DAILY, Normal, Disp-90 Cap, R-0      Shower Chair XX nishant Patient will use the shower chair to reduce her risk of falling. Patient has poor mobility due to leg pain. , Print, Disp-1 Each,R-0      Walker misc Patient will use the shower chair to reduce her risk of falling. Patient has poor mobility due to leg pain., Normal, Disp-1 Each,R-0      !! dicyclomine (BENTYL) 10 mg capsule Take 1 Cap by mouth three (3) times daily. , Normal, Disp-90 Cap,R-2      silver sulfADIAZINE (SILVADENE) 1 % topical cream Apply  to affected area daily. , Normal, Disp-50 g,R-2      foam bandage (Allevyn Gentle Border Multisit) 6 3/4 X 7 1/16 \" bndg Apply to affected area daily. , Normal, Disp-2 Box,R-2      bacitracin-polymyxin b (Polysporin) 500-10,000 unit/gram ointment Apply  to affected area two (2) times a day., Normal, Disp-15 g,R-0      nystatin (MYCOSTATIN) topical cream Apply  to affected area two (2) times a day., Normal, Disp-15 g,R-0      zolpidem (AMBIEN) 10 mg tablet TK 1 T PO HS PRF INSOMNIA, Historical Med      lisinopriL (PRINIVIL, ZESTRIL) 20 mg tablet Take 2 tablets daily. , Normal, Disp-180 Tab,R-1      furosemide (LASIX) 40 mg tablet Take 40 mg by mouth daily. , Historical Med      traZODone (DESYREL) 50 mg tablet Take 50 mg by mouth daily as needed for Sleep. at bedtime, Historical Med      LORazepam (ATIVAN) 1 mg tablet Take 1 mg by mouth two (2) times daily as needed for Anxiety. , Historical Med      !! dicyclomine (BENTYL) 10 mg capsule Take 1 Cap by mouth three (3) times daily. , Normal, Disp-90 Cap,R-1      naloxone (NARCAN) 4 mg/actuation nasal spray Use 1 spray intranasally, then discard. Repeat with new spray every 2 min as needed for opioid overdose symptoms, alternating nostrils. , Normal, Disp-1 Each,R-1      hydrocortisone (CORTAID) 0.5 % topical cream Apply  to affected area two (2) times a day. use thin layer, Normal, Disp-30 g,R-0      QUEtiapine (SEROQUEL) 200 mg tablet TK 1 T PO HS PRF INSOMNIA, Historical Med, R-0      DULoxetine (CYMBALTA) 60 mg capsule Take 1 Cap by mouth daily. , Normal, Disp-30 Cap, R-1      potassium chloride SR (KLOR-CON 10) 10 mEq tablet TAKE 2 TABLETS BY MOUTH DAILY, Normal, Disp-180 Tab, R-1       !! - Potential duplicate medications found. Please discuss with provider. 2.   Follow-up Information     Follow up With Specialties Details Why Contact Info    Armaan Russo MD [facility] Medicine Schedule an appointment as soon as possible for a visit   [address] [place] [address]  [phone]      [facility] EMERGENCY DEPT Emergency Medicine  If symptoms worsen [address] [place] [address]  [address] [facility]  [phone]        Return to ED if worse     Disposition:  Home       Please note, this dictation was completed with [employer] the [employer] voice recognition software. Quite often unanticipated grammatical, syntax, homophones, and other interpretive errors are inadvertently transcribed by the computer software. Please disregard these errors. Please excuse any errors that have escaped final proof reading.

## 2021-04-20 NOTE — ED NOTES
Ultra sound at bedside     1430- Assisted pt to [facility]    1446- MD Termeer at bedside going over dc papers. 1450- Pt discharged by Mona Zamora. Pt provided with discharge instructions Rx and instructions on follow up care. Pt out of ED via wheelchiar accompanied by ed tech.

## 2021-04-21 LAB
ATRIAL RATE: 79 BPM
CALCULATED P AXIS, ECG09: 37 DEGREES
CALCULATED R AXIS, ECG10: 26 DEGREES
CALCULATED T AXIS, ECG11: 42 DEGREES
DIAGNOSIS, 93000: NORMAL
P-R INTERVAL, ECG05: 168 MS
Q-T INTERVAL, ECG07: 402 MS
QRS DURATION, ECG06: 66 MS
QTC CALCULATION (BEZET), ECG08: 460 MS
VENTRICULAR RATE, ECG03: 79 BPM

## 2021-05-28 ENCOUNTER — HOSPITAL ENCOUNTER (EMERGENCY)
Age: 66
Discharge: HOME | End: 2021-05-28
Attending: EMERGENCY MEDICINE
Payer: MEDICARE

## 2021-05-28 ENCOUNTER — APPOINTMENT (OUTPATIENT)
Dept: CT IMAGING | Age: 66
End: 2021-05-28
Attending: EMERGENCY MEDICINE
Payer: MEDICARE

## 2021-05-28 ENCOUNTER — APPOINTMENT (OUTPATIENT)
Dept: GENERAL RADIOLOGY | Age: 66
End: 2021-05-28
Attending: EMERGENCY MEDICINE
Payer: MEDICARE

## 2021-05-28 VITALS
BODY MASS INDEX: 57.52 KG/M2 | HEART RATE: 94 BPM | TEMPERATURE: 98.9 F | RESPIRATION RATE: 20 BRPM | WEIGHT: 293 LBS | OXYGEN SATURATION: 96 % | SYSTOLIC BLOOD PRESSURE: 154 MMHG | DIASTOLIC BLOOD PRESSURE: 81 MMHG | HEIGHT: 60 IN

## 2021-05-28 DIAGNOSIS — K52.9 GASTROENTERITIS: ICD-10-CM

## 2021-05-28 DIAGNOSIS — R07.9 ACUTE CHEST PAIN: Primary | ICD-10-CM

## 2021-05-28 DIAGNOSIS — E83.42 HYPOMAGNESEMIA: ICD-10-CM

## 2021-05-28 DIAGNOSIS — E87.6 HYPOKALEMIA: ICD-10-CM

## 2021-05-28 LAB
ALBUMIN SERPL-MCNC: 3.7 G/DL (ref 3.5–5)
ALBUMIN/GLOB SERPL: 0.9 {RATIO} (ref 1.1–2.2)
ALP SERPL-CCNC: 95 U/L (ref 45–117)
ALT SERPL-CCNC: 18 U/L (ref 12–78)
ANION GAP SERPL CALC-SCNC: 6 MMOL/L (ref 5–15)
APPEARANCE UR: CLEAR
AST SERPL-CCNC: 15 U/L (ref 15–37)
BACTERIA URNS QL MICRO: NEGATIVE /HPF
BASOPHILS # BLD: 0 K/UL (ref 0–0.1)
BASOPHILS NFR BLD: 0 % (ref 0–1)
BILIRUB SERPL-MCNC: 1.1 MG/DL (ref 0.2–1)
BILIRUB UR QL: NEGATIVE
BUN SERPL-MCNC: 12 MG/DL (ref 6–20)
BUN/CREAT SERPL: 12 (ref 12–20)
CALCIUM SERPL-MCNC: 8.9 MG/DL (ref 8.5–10.1)
CHLORIDE SERPL-SCNC: 105 MMOL/L (ref 97–108)
CO2 SERPL-SCNC: 25 MMOL/L (ref 21–32)
COLOR UR: ABNORMAL
CREAT SERPL-MCNC: 0.99 MG/DL (ref 0.55–1.02)
DIFFERENTIAL METHOD BLD: ABNORMAL
EOSINOPHIL # BLD: 0 K/UL (ref 0–0.4)
EOSINOPHIL NFR BLD: 1 % (ref 0–7)
EPITH CASTS URNS QL MICRO: ABNORMAL /LPF
ERYTHROCYTE [DISTWIDTH] IN BLOOD BY AUTOMATED COUNT: 14.9 % (ref 11.5–14.5)
GLOBULIN SER CALC-MCNC: 4.1 G/DL (ref 2–4)
GLUCOSE SERPL-MCNC: 102 MG/DL (ref 65–100)
GLUCOSE UR STRIP.AUTO-MCNC: NEGATIVE MG/DL
HCT VFR BLD AUTO: 36.1 % (ref 35–47)
HGB BLD-MCNC: 11.8 G/DL (ref 11.5–16)
HGB UR QL STRIP: NEGATIVE
HYALINE CASTS URNS QL MICRO: ABNORMAL /LPF (ref 0–5)
IMM GRANULOCYTES # BLD AUTO: 0 K/UL (ref 0–0.04)
IMM GRANULOCYTES NFR BLD AUTO: 1 % (ref 0–0.5)
KETONES UR QL STRIP.AUTO: NEGATIVE MG/DL
LEUKOCYTE ESTERASE UR QL STRIP.AUTO: ABNORMAL
LYMPHOCYTES # BLD: 1.2 K/UL (ref 0.8–3.5)
LYMPHOCYTES NFR BLD: 19 % (ref 12–49)
MAGNESIUM SERPL-MCNC: 1.5 MG/DL (ref 1.6–2.4)
MCH RBC QN AUTO: 28.3 PG (ref 26–34)
MCHC RBC AUTO-ENTMCNC: 32.7 G/DL (ref 30–36.5)
MCV RBC AUTO: 86.6 FL (ref 80–99)
MONOCYTES # BLD: 0.5 K/UL (ref 0–1)
MONOCYTES NFR BLD: 7 % (ref 5–13)
NEUTS SEG # BLD: 4.6 K/UL (ref 1.8–8)
NEUTS SEG NFR BLD: 72 % (ref 32–75)
NITRITE UR QL STRIP.AUTO: NEGATIVE
NRBC # BLD: 0 K/UL (ref 0–0.01)
NRBC BLD-RTO: 0 PER 100 WBC
PH UR STRIP: 6 [PH] (ref 5–8)
PLATELET # BLD AUTO: 192 K/UL (ref 150–400)
PMV BLD AUTO: 10.9 FL (ref 8.9–12.9)
POTASSIUM SERPL-SCNC: 3.3 MMOL/L (ref 3.5–5.1)
PROT SERPL-MCNC: 7.8 G/DL (ref 6.4–8.2)
PROT UR STRIP-MCNC: NEGATIVE MG/DL
RBC # BLD AUTO: 4.17 M/UL (ref 3.8–5.2)
RBC #/AREA URNS HPF: ABNORMAL /HPF (ref 0–5)
SODIUM SERPL-SCNC: 136 MMOL/L (ref 136–145)
SP GR UR REFRACTOMETRY: 1.01 (ref 1–1.03)
TROPONIN I SERPL-MCNC: <0.05 NG/ML
UA: UC IF INDICATED,UAUC: ABNORMAL
UROBILINOGEN UR QL STRIP.AUTO: 0.2 EU/DL (ref 0.2–1)
WBC # BLD AUTO: 6.3 K/UL (ref 3.6–11)
WBC URNS QL MICRO: ABNORMAL /HPF (ref 0–4)

## 2021-05-28 PROCEDURE — 81001 URINALYSIS AUTO W/SCOPE: CPT

## 2021-05-28 PROCEDURE — 96374 THER/PROPH/DIAG INJ IV PUSH: CPT

## 2021-05-28 PROCEDURE — 74177 CT ABD & PELVIS W/CONTRAST: CPT

## 2021-05-28 PROCEDURE — 99284 EMERGENCY DEPT VISIT MOD MDM: CPT

## 2021-05-28 PROCEDURE — 71045 X-RAY EXAM CHEST 1 VIEW: CPT

## 2021-05-28 PROCEDURE — 80053 COMPREHEN METABOLIC PANEL: CPT

## 2021-05-28 PROCEDURE — 85025 COMPLETE CBC W/AUTO DIFF WBC: CPT

## 2021-05-28 PROCEDURE — 84484 ASSAY OF TROPONIN QUANT: CPT

## 2021-05-28 PROCEDURE — 83735 ASSAY OF MAGNESIUM: CPT

## 2021-05-28 PROCEDURE — 74011000636 HC RX REV CODE- 636: Performed by: EMERGENCY MEDICINE

## 2021-05-28 PROCEDURE — 74011250636 HC RX REV CODE- 250/636: Performed by: EMERGENCY MEDICINE

## 2021-05-28 PROCEDURE — 96375 TX/PRO/DX INJ NEW DRUG ADDON: CPT

## 2021-05-28 PROCEDURE — 36415 COLL VENOUS BLD VENIPUNCTURE: CPT

## 2021-05-28 RX ORDER — ONDANSETRON 2 MG/ML
4 INJECTION INTRAMUSCULAR; INTRAVENOUS
Status: COMPLETED | OUTPATIENT
Start: 2021-05-28 | End: 2021-05-28

## 2021-05-28 RX ORDER — POTASSIUM CHLORIDE 1.5 G/1.77G
20 POWDER, FOR SOLUTION ORAL DAILY
Qty: 3 PACKET | Refills: 0 | Status: SHIPPED | OUTPATIENT
Start: 2021-05-28

## 2021-05-28 RX ORDER — FENTANYL CITRATE 50 UG/ML
50 INJECTION, SOLUTION INTRAMUSCULAR; INTRAVENOUS
Status: COMPLETED | OUTPATIENT
Start: 2021-05-28 | End: 2021-05-28

## 2021-05-28 RX ORDER — MAGNESIUM CHLORIDE 71.5 G/G
143 TABLET ORAL DAILY
Qty: 4 TABLET | Refills: 0 | Status: SHIPPED | OUTPATIENT
Start: 2021-05-28 | End: 2021-08-10 | Stop reason: CLARIF

## 2021-05-28 RX ORDER — METHOCARBAMOL 750 MG/1
750 TABLET, FILM COATED ORAL
Qty: 20 TABLET | Refills: 0 | Status: SHIPPED | OUTPATIENT
Start: 2021-05-28

## 2021-05-28 RX ADMIN — SODIUM CHLORIDE 1000 ML: 9 INJECTION, SOLUTION INTRAVENOUS at 16:15

## 2021-05-28 RX ADMIN — ONDANSETRON 4 MG: 2 INJECTION INTRAMUSCULAR; INTRAVENOUS at 16:15

## 2021-05-28 RX ADMIN — FENTANYL CITRATE 50 MCG: 50 INJECTION, SOLUTION INTRAMUSCULAR; INTRAVENOUS at 16:15

## 2021-05-28 RX ADMIN — IOPAMIDOL 100 ML: 755 INJECTION, SOLUTION INTRAVENOUS at 16:58

## 2021-05-28 NOTE — ED PROVIDER NOTES
EMERGENCY DEPARTMENT HISTORY AND PHYSICAL EXAM      Date: 5/28/2021  Patient Name: Siva Chowdhury    History of Presenting Illness     Chief Complaint   Patient presents with    Chest Pain     pt with known blood clot is having chest pain when she coughs.  Diarrhea     3 days of diarrhrea. 8 episodes today. History Provided By: Patient    HPI: Siva Chowdhury, 78 y.o. female presents to the ED with cc of diarrhea and chest pain. Patient symptoms started 3 days ago. She has chest pain when coughing. Cough is productive of clear sputum but occasionally she has seen blood in her sputum. She has shortness of breath associated with this. The pain last for seconds to minutes at a time and is a 7 out of 10 when it occurs. Is located in the left upper chest.  She denies any radiation of pain to the back, neck, jaw or arms. Patient denies any recent antibiotic use or foreign travel. She had at least 8 episodes of diarrhea today. She has left lower quadrant pain which is a 7 out of 10 in severity. She denies dysuria, fever or chills. She denies diaphoresis or new leg pain. There are no other complaints, changes, or physical findings at this time. PCP: Baldomero Desai MD    No current facility-administered medications on file prior to encounter. Current Outpatient Medications on File Prior to Encounter   Medication Sig Dispense Refill    simvastatin (ZOCOR) 20 mg tablet TAKE 1 TABLET BY MOUTH EVERY NIGHT 90 Tab 1    apixaban (Eliquis) 5 mg tablet TAKE 1 TABLET BY MOUTH TWICE DAILY 60 Tab 5    Nystatin, Bulk, 1 billion unit powd 1 Dose by Does Not Apply route two (2) times a day. 1 Each 0    lidocaine 4 % patch 2 Patches by TransDERmal route every twelve (12) hours every twelve (12) hours. 20 Patch 0    diclofenac (SOLARAZE) 3 % topical gel Apply  to affected area two (2) times a day.  100 g 0    omeprazole (PRILOSEC) 40 mg capsule TAKE 1 CAPSULE BY MOUTH DAILY 90 Cap 0    Shower Chair XX nishant Patient will use the shower chair to reduce her risk of falling. Patient has poor mobility due to leg pain. 1 Each 0    Walker misc Patient will use the shower chair to reduce her risk of falling. Patient has poor mobility due to leg pain. 1 Each 0    dicyclomine (BENTYL) 10 mg capsule Take 1 Cap by mouth three (3) times daily. 90 Cap 2    silver sulfADIAZINE (SILVADENE) 1 % topical cream Apply  to affected area daily. 50 g 2    foam bandage (Allevyn Gentle Border Multisit) 6 3/4 X 7 1/16 \" bndg Apply to affected area daily. 2 Box 2    bacitracin-polymyxin b (Polysporin) 500-10,000 unit/gram ointment Apply  to affected area two (2) times a day. 15 g 0    nystatin (MYCOSTATIN) topical cream Apply  to affected area two (2) times a day. 15 g 0    zolpidem (AMBIEN) 10 mg tablet TK 1 T PO HS PRF INSOMNIA      lisinopriL (PRINIVIL, ZESTRIL) 20 mg tablet Take 2 tablets daily. 180 Tab 1    furosemide (LASIX) 40 mg tablet Take 40 mg by mouth daily.  traZODone (DESYREL) 50 mg tablet Take 50 mg by mouth daily as needed for Sleep. at bedtime      LORazepam (ATIVAN) 1 mg tablet Take 1 mg by mouth two (2) times daily as needed for Anxiety.  dicyclomine (BENTYL) 10 mg capsule Take 1 Cap by mouth three (3) times daily. 90 Cap 1    naloxone (NARCAN) 4 mg/actuation nasal spray Use 1 spray intranasally, then discard. Repeat with new spray every 2 min as needed for opioid overdose symptoms, alternating nostrils. 1 Each 1    hydrocortisone (CORTAID) 0.5 % topical cream Apply  to affected area two (2) times a day. use thin layer 30 g 0    QUEtiapine (SEROQUEL) 200 mg tablet TK 1 T PO HS PRF INSOMNIA  0    DULoxetine (CYMBALTA) 60 mg capsule Take 1 Cap by mouth daily.  30 Cap 1    potassium chloride SR (KLOR-CON 10) 10 mEq tablet TAKE 2 TABLETS BY MOUTH DAILY 180 Tab 1       Past History     Past Medical History:  Past Medical History:   Diagnosis Date    Arthritis     chronic pain related arthritis  Bipolar 1 disorder (HCC)     GERD (gastroesophageal reflux disease)     Hypertension     Other ill-defined conditions(799.89)     hyperlipidemia. Stomach abcess    Psychiatric disorder     panic attacks    Sleep apnea     Thromboembolus ([address]     last year L leg    Unspecified sleep apnea        Past Surgical History:  Past Surgical History:   Procedure Laterality Date    HX CHOLECYSTECTOMY      HX GI      gallbladder removed 2/2010    HX HYSTERECTOMY         Family History:  Family History   Problem Relation Age of Onset    Heart Disease Mother     Diabetes Mother     Arthritis-osteo Mother     High Cholesterol Mother     Heart Attack Sister     Diabetes Sister     Heart Disease Sister     Diabetes Sister     Arthritis-osteo Sister     High Cholesterol Sister     Schizophrenia Paternal Grandmother     Drug Abuse Sister        Social History:  Social History     Tobacco Use    Smoking status: Never Smoker    Smokeless tobacco: Never Used   Substance Use Topics    Alcohol use: No    Drug use: No       Allergies:  No Known Allergies      Review of Systems   Review of Systems   Constitutional: Positive for fever. HENT: Negative for congestion. Eyes: Negative. Respiratory: Positive for cough and shortness of breath. Cardiovascular: Positive for chest pain. Gastrointestinal: Positive for abdominal pain, diarrhea and nausea. Endocrine: Negative for heat intolerance. Genitourinary: Negative. Musculoskeletal: Negative for back pain. Skin: Negative for rash. Allergic/Immunologic: Negative for immunocompromised state. Neurological: Negative for dizziness. Hematological: Does not bruise/bleed easily. Psychiatric/Behavioral: Negative. All other systems reviewed and are negative. Physical Exam   Physical Exam  Vitals and nursing note reviewed. Constitutional:       General: She is not in acute distress. Appearance: She is well-developed.    HENT:      Head: Normocephalic. Cardiovascular:      Rate and Rhythm: Normal rate and regular rhythm. Heart sounds: Normal heart sounds. Pulmonary:      Effort: Pulmonary effort is normal.      Breath sounds: Normal breath sounds. Chest:      Chest wall: Tenderness present. Abdominal:      General: Bowel sounds are normal.      Palpations: Abdomen is soft. Tenderness: There is abdominal tenderness. Musculoskeletal:         General: Normal range of motion. Cervical back: Normal range of motion and neck supple. Skin:     General: Skin is warm and dry. Neurological:      General: No focal deficit present. Mental Status: She is alert and oriented to person, place, and time. Psychiatric:         Mood and Affect: Mood normal.         Behavior: Behavior normal.         Diagnostic Study Results     Labs -     Recent Results (from the past 12 hour(s))   CBC WITH AUTOMATED DIFF    Collection Time: 05/28/21  2:35 PM   Result Value Ref Range    WBC 6.3 3.6 - 11.0 K/uL    RBC 4.17 3.80 - 5.20 M/uL    HGB 11.8 11.5 - 16.0 g/dL    HCT 36.1 35.0 - 47.0 %    MCV 86.6 80.0 - 99.0 FL    MCH 28.3 26.0 - 34.0 PG    MCHC 32.7 30.0 - 36.5 g/dL    RDW 14.9 (H) 11.5 - 14.5 %    PLATELET 043 067 - 438 K/uL    MPV 10.9 8.9 - 12.9 FL    NRBC 0.0 0  WBC    ABSOLUTE NRBC 0.00 0.00 - 0.01 K/uL    NEUTROPHILS 72 32 - 75 %    LYMPHOCYTES 19 12 - 49 %    MONOCYTES 7 5 - 13 %    EOSINOPHILS 1 0 - 7 %    BASOPHILS 0 0 - 1 %    IMMATURE GRANULOCYTES 1 (H) 0.0 - 0.5 %    ABS. NEUTROPHILS 4.6 1.8 - 8.0 K/UL    ABS. LYMPHOCYTES 1.2 0.8 - 3.5 K/UL    ABS. MONOCYTES 0.5 0.0 - 1.0 K/UL    ABS. EOSINOPHILS 0.0 0.0 - 0.4 K/UL    ABS. BASOPHILS 0.0 0.0 - 0.1 K/UL    ABS. IMM.  GRANS. 0.0 0.00 - 0.04 K/UL    DF AUTOMATED     METABOLIC PANEL, COMPREHENSIVE    Collection Time: 05/28/21  2:35 PM   Result Value Ref Range    Sodium 136 136 - 145 mmol/L    Potassium 3.3 (L) 3.5 - 5.1 mmol/L    Chloride 105 97 - 108 mmol/L    CO2 25 21 - 32 mmol/L    Anion gap 6 5 - 15 mmol/L    Glucose 102 (H) 65 - 100 mg/dL    BUN 12 6 - 20 MG/DL    Creatinine 0.99 0.55 - 1.02 MG/DL    BUN/Creatinine ratio 12 12 - 20      GFR est AA >60 >60 ml/min/1.73m2    GFR est non-AA 56 (L) >60 ml/min/1.73m2    Calcium 8.9 8.5 - 10.1 MG/DL    Bilirubin, total 1.1 (H) 0.2 - 1.0 MG/DL    ALT (SGPT) 18 12 - 78 U/L    AST (SGOT) 15 15 - 37 U/L    Alk. phosphatase 95 45 - 117 U/L    Protein, total 7.8 6.4 - 8.2 g/dL    Albumin 3.7 3.5 - 5.0 g/dL    Globulin 4.1 (H) 2.0 - 4.0 g/dL    A-G Ratio 0.9 (L) 1.1 - 2.2     TROPONIN I    Collection Time: 05/28/21  2:35 PM   Result Value Ref Range    Troponin-I, Qt. <0.05 <0.05 ng/mL   MAGNESIUM    Collection Time: 05/28/21  2:35 PM   Result Value Ref Range    Magnesium 1.5 (L) 1.6 - 2.4 mg/dL   URINALYSIS W/ REFLEX CULTURE    Collection Time: 05/28/21  4:18 PM    Specimen: Urine   Result Value Ref Range    Color YELLOW/STRAW      Appearance CLEAR CLEAR      Specific gravity 1.014 1.003 - 1.030      pH (UA) 6.0 5.0 - 8.0      Protein Negative NEG mg/dL    Glucose Negative NEG mg/dL    Ketone Negative NEG mg/dL    Bilirubin Negative NEG      Blood Negative NEG      Urobilinogen 0.2 0.2 - 1.0 EU/dL    Nitrites Negative NEG      Leukocyte Esterase SMALL (A) NEG      WBC 5-10 0 - 4 /hpf    RBC 0-5 0 - 5 /hpf    Epithelial cells FEW FEW /lpf    Bacteria Negative NEG /hpf    UA:UC IF INDICATED CULTURE NOT INDICATED BY UA RESULT CNI      Hyaline cast 0-2 0 - 5 /lpf       Radiologic Studies -   CT ABD PELV W CONT   Final Result   No bowel obstruction, ileus or perforation. No intra-abdominal   abscess. XR CHEST PORT   Final Result        CT Results  (Last 48 hours)               05/28/21 1658  CT ABD PELV W CONT Final result    Impression:  No bowel obstruction, ileus or perforation. No intra-abdominal   abscess. Narrative:  INDICATION: Diarrhea, left-sided abdominal pain.        CT of the abdomen and pelvis is performed with 5 mm collimation. Study is   performed with 100 cc of nonionic Isovue 370. Sagittal and coronal reformatted   images were also performed. CT dose reduction was achieved with the use of the standardized protocol   tailored for this examination and automatic exposure control for dose   modulation. Direct comparison is made to prior CT dated September 2020. Findings:       Lung bases: There is minimal left basilar scarring. There is a moderate-sized   hiatal hernia. Liver: The liver is normal.       Adrenals: Adrenal glands are normal.       Pancreas: The pancreas is normal.       Gallbladder: The gallbladder is surgically absent. Kidneys: The kidneys are normal.       Spleen: The spleen is normal.       Lymph nodes. There is no sharon hepatitis, mesenteric, retroperitoneal or pelvic   lymphadenopathy. Bowel: No thickened or dilated loop of large or small bowel is visualized. Scattered colonic diverticulosis is noted. There is no diverticulitis. Appendix: The appendix is normal.       Urinary bladder: Urinary bladder is partially filled and grossly normal.       Miscellaneous: There is no free intraperitoneal fluid or gas. There is no focal   fluid collection to suggest abscess. The uterus is absent. CXR Results  (Last 48 hours)               05/28/21 1455  XR CHEST PORT Final result    Impression:   impression: No acute changes. Narrative:  Clinical indication: Cough. Portable AP semiupright view of the chest obtained, comparison 4/20/2021. The    heart size is normal. There is no acute infiltrate. Medical Decision Making   I am the first provider for this patient. I reviewed the vital signs, available nursing notes, past medical history, past surgical history, family history and social history. Vital Signs-Reviewed the patient's vital signs.   Patient Vitals for the past 12 hrs:   Temp Pulse Resp BP SpO2   05/28/21 1621  94 20 (!) 154/81 96 %   05/28/21 1542 98.9 °F (37.2 °C) (!) 103 26 136/76 94 %   05/28/21 1420 98.6 °F (37 °C) (!) 106 16 (!) 150/95 95 %         Records Reviewed: Nursing Notes, Old Medical Records, Previous Radiology Studies and Previous Laboratory Studies    Provider Notes (Medical Decision Making):   Diverticulitis, colitis, gastroenteritis, dehydration, electrolyte abnormality, costochondritis, ACS, CHF, bronchitis, pneumonia    ED Course:   Initial assessment performed. The patients presenting problems have been discussed, and they are in agreement with the care plan formulated and outlined with them. I have encouraged them to ask questions as they arise throughout their visit. progress note:      Patient is feeling better. Results were reviewed. She is advised to follow-up and return to ER if worse             Critical Care Time:   0    Disposition:  home    DISCHARGE PLAN:  1. Discharge Medication List as of 5/28/2021  6:01 PM      START taking these medications    Details   methocarbamoL (Robaxin-750) 750 mg tablet Take 1 Tablet by mouth four (4) times daily as needed for Muscle Spasm(s). , Normal, Disp-20 Tablet, R-0      potassium chloride (Klor-Con) 20 mEq pack Take 1 Packet by mouth daily. , Normal, Disp-3 Packet, R-0      magnesium chloride (Slow-Mag) 71.5 mg tablet Take 2 Tablets by mouth daily. , Normal, Disp-4 Tablet, R-0         CONTINUE these medications which have NOT CHANGED    Details   simvastatin (ZOCOR) 20 mg tablet TAKE 1 TABLET BY MOUTH EVERY NIGHT, Normal, Disp-90 Tab, R-1      apixaban (Eliquis) 5 mg tablet TAKE 1 TABLET BY MOUTH TWICE DAILY, Normal, Disp-60 Tab, R-5      Nystatin, Bulk, 1 billion unit powd 1 Dose by Does Not Apply route two (2) times a day., Normal, Disp-1 Each, R-0      lidocaine 4 % patch 2 Patches by TransDERmal route every twelve (12) hours every twelve (12) hours. , Normal, Disp-20 Patch, R-0      diclofenac (SOLARAZE) 3 % topical gel Apply  to affected area two (2) times a day., Normal, Disp-100 g, R-0      omeprazole (PRILOSEC) 40 mg capsule TAKE 1 CAPSULE BY MOUTH DAILY, Normal, Disp-90 Cap, R-0      Shower Chair XX nishant Patient will use the shower chair to reduce her risk of falling. Patient has poor mobility due to leg pain. , Print, Disp-1 Each,R-0      Walker misc Patient will use the shower chair to reduce her risk of falling. Patient has poor mobility due to leg pain., Normal, Disp-1 Each,R-0      silver sulfADIAZINE (SILVADENE) 1 % topical cream Apply  to affected area daily. , Normal, Disp-50 g,R-2      foam bandage (Allevyn Gentle Border Multisit) 6 3/4 X 7 1/16 \" bndg Apply to affected area daily. , Normal, Disp-2 Box,R-2      bacitracin-polymyxin b (Polysporin) 500-10,000 unit/gram ointment Apply  to affected area two (2) times a day., Normal, Disp-15 g,R-0      nystatin (MYCOSTATIN) topical cream Apply  to affected area two (2) times a day., Normal, Disp-15 g,R-0      zolpidem (AMBIEN) 10 mg tablet TK 1 T PO HS PRF INSOMNIA, Historical Med      lisinopriL (PRINIVIL, ZESTRIL) 20 mg tablet Take 2 tablets daily. , Normal, Disp-180 Tab,R-1      furosemide (LASIX) 40 mg tablet Take 40 mg by mouth daily. , Historical Med      traZODone (DESYREL) 50 mg tablet Take 50 mg by mouth daily as needed for Sleep. at bedtime, Historical Med      LORazepam (ATIVAN) 1 mg tablet Take 1 mg by mouth two (2) times daily as needed for Anxiety. , Historical Med      naloxone (NARCAN) 4 mg/actuation nasal spray Use 1 spray intranasally, then discard. Repeat with new spray every 2 min as needed for opioid overdose symptoms, alternating nostrils. , Normal, Disp-1 Each,R-1      hydrocortisone (CORTAID) 0.5 % topical cream Apply  to affected area two (2) times a day. use thin layer, Normal, Disp-30 g,R-0      QUEtiapine (SEROQUEL) 200 mg tablet TK 1 T PO HS PRF INSOMNIA, Historical Med, R-0      DULoxetine (CYMBALTA) 60 mg capsule Take 1 Cap by mouth daily. , Normal, Disp-30 Cap, R-1      potassium chloride SR (KLOR-CON 10) 10 mEq tablet TAKE 2 TABLETS BY MOUTH DAILY, Normal, Disp-180 Tab, R-1           2. Follow-up Information     Follow up With Specialties Details Why Contact Info    Juan David Fletcher MD Family Medicine  As needed [address] [facility]  [address]  [phone]      [facility] EMERGENCY DEPT Emergency Medicine  If symptoms worsen [address]  [address] [facility]  [phone]        3. Return to ED if worse     Diagnosis     Clinical Impression:   1. Acute chest pain    2. Gastroenteritis    3. Hypokalemia    4. Hypomagnesemia        Attestations:    Anne Marie Jackson MD    Please note that this dictation was completed with KLab, the computer voice recognition software. Quite often unanticipated grammatical, syntax, homophones, and other interpretive errors are inadvertently transcribed by the computer software. Please disregard these errors. Please excuse any errors that have escaped final proofreading. Thank you.

## 2021-06-08 DIAGNOSIS — I10 ESSENTIAL HYPERTENSION WITH GOAL BLOOD PRESSURE LESS THAN 140/90: ICD-10-CM

## 2021-06-08 RX ORDER — LISINOPRIL 20 MG/1
TABLET ORAL
Qty: 180 TABLET | Refills: 1 | Status: SHIPPED | OUTPATIENT
Start: 2021-06-08

## 2021-06-23 ENCOUNTER — VIRTUAL VISIT (OUTPATIENT)
Dept: INTERNAL MEDICINE CLINIC | Age: 66
End: 2021-06-23
Payer: MEDICARE

## 2021-06-23 DIAGNOSIS — E66.01 MORBID OBESITY WITH BMI OF 70 AND OVER, ADULT (HCC): ICD-10-CM

## 2021-06-23 DIAGNOSIS — E78.5 HYPERLIPIDEMIA, UNSPECIFIED HYPERLIPIDEMIA TYPE: ICD-10-CM

## 2021-06-23 DIAGNOSIS — N32.81 OVERACTIVE BLADDER: ICD-10-CM

## 2021-06-23 DIAGNOSIS — R73.09 ELEVATED HEMOGLOBIN A1C: Primary | ICD-10-CM

## 2021-06-23 DIAGNOSIS — I10 ESSENTIAL HYPERTENSION WITH GOAL BLOOD PRESSURE LESS THAN 140/90: ICD-10-CM

## 2021-06-23 DIAGNOSIS — F33.0 MDD (MAJOR DEPRESSIVE DISORDER), RECURRENT EPISODE, MILD (HCC): ICD-10-CM

## 2021-06-23 PROCEDURE — G9717 DOC PT DX DEP/BP F/U NT REQ: HCPCS | Performed by: FAMILY MEDICINE

## 2021-06-23 PROCEDURE — 3017F COLORECTAL CA SCREEN DOC REV: CPT | Performed by: FAMILY MEDICINE

## 2021-06-23 PROCEDURE — G8400 PT W/DXA NO RESULTS DOC: HCPCS | Performed by: FAMILY MEDICINE

## 2021-06-23 PROCEDURE — 1101F PT FALLS ASSESS-DOCD LE1/YR: CPT | Performed by: FAMILY MEDICINE

## 2021-06-23 PROCEDURE — G8756 NO BP MEASURE DOC: HCPCS | Performed by: FAMILY MEDICINE

## 2021-06-23 PROCEDURE — G9899 SCRN MAM PERF RSLTS DOC: HCPCS | Performed by: FAMILY MEDICINE

## 2021-06-23 PROCEDURE — G0463 HOSPITAL OUTPT CLINIC VISIT: HCPCS | Performed by: FAMILY MEDICINE

## 2021-06-23 PROCEDURE — 99214 OFFICE O/P EST MOD 30 MIN: CPT | Performed by: FAMILY MEDICINE

## 2021-06-23 PROCEDURE — G8427 DOCREV CUR MEDS BY ELIG CLIN: HCPCS | Performed by: FAMILY MEDICINE

## 2021-06-23 PROCEDURE — 1090F PRES/ABSN URINE INCON ASSESS: CPT | Performed by: FAMILY MEDICINE

## 2021-06-23 RX ORDER — INSULIN PUMP SYRINGE, 3 ML
EACH MISCELLANEOUS
Qty: 1 KIT | Refills: 0 | Status: SHIPPED | OUTPATIENT
Start: 2021-06-23

## 2021-06-23 RX ORDER — LANCETS
EACH MISCELLANEOUS
Qty: 1 EACH | Refills: 11 | Status: SHIPPED | OUTPATIENT
Start: 2021-06-23

## 2021-06-23 RX ORDER — OXYBUTYNIN CHLORIDE 10 MG/1
10 TABLET, EXTENDED RELEASE ORAL DAILY
Qty: 30 TABLET | Refills: 2 | Status: SHIPPED | OUTPATIENT
Start: 2021-06-23

## 2021-06-23 NOTE — PROGRESS NOTES
This is an established visit conducted via telemedicine. The patient has been instructed that this meets HIPAA criteria and acknowledges and agrees to this method of visitation. Identified pt with two pt identifiers(name and ). Chief Complaint   Patient presents with    Follow-up     6 month follow up        Health Maintenance Due   Topic Date Due    COVID-19 Vaccine (1) Never done    DTaP/Tdap/Td  (1 - Tdap) Never done    Shingles Vaccine (1 of 2) Never done    Mammogram  Never done    Colorectal Screening  2017    Bone Mineral Density   Never done    Cholesterol Test   2020    Pneumococcal Vaccine (2 of 2 - PPSV23) 2020    Annual Well Visit  2021       Ms. Yg Thurston has a reminder for a \"due or due soon\" health maintenance. I have asked that she discuss this further with her primary care provider for follow-up on this health maintenance.

## 2021-06-23 NOTE — PROGRESS NOTES
Jaquelyn Goldberg is a 78 y.o. female who was seen by synchronous (real-time) audio-video technology on 6/23/2021 for Follow-up (6 month follow up )    Assessment & Plan:   Diagnoses and all orders for this visit:    1. Elevated hemoglobin F7W  -     METABOLIC PANEL, COMPREHENSIVE; Future  -     HEMOGLOBIN A1C WITH EAG; Future  -     Blood-Glucose Meter monitoring kit; Check blood glucose daily.  -     glucose blood VI test strips (ASCENSIA AUTODISC VI, ONE TOUCH ULTRA TEST VI) strip; Check blood glucose daily. -     lancets misc; Check blood glucose daily. 2. Essential hypertension with goal blood pressure less than 600/35  -     METABOLIC PANEL, COMPREHENSIVE; Future  -     CBC WITH AUTOMATED DIFF; Future    3. Hyperlipidemia, unspecified hyperlipidemia type    4. MDD (major depressive disorder), recurrent episode, mild ([facility] [address]    5. Overactive bladder  -     oxybutynin chloride XL (DITROPAN XL) 10 mg CR tablet; Take 1 Tablet by mouth daily. 6. Morbid obesity with BMI of 70 and over, adult ([facility] [address]    1. Elevated Hemoglobin A1c  Her A1c was in the prediabetic range at 6.3% the last time it was checked in 01/2019. Recheck CMP and hemoglobin A1c with EAG. Ordered a blood glucose monitoring kit for routine checks. 2. Essential Hypertension  I recommended continuing current dose of lisinopril 20 mg tablet BID, eating a low sodium diet, and increasing exercise. Recheck CMP and CBC. 3. Hyperlipidemia   I recommended continuing current dose of simvastatin 20 mg tablet daily, eating a low fat diet, and increasing exercise. Recheck lipid panel. 4. MDD  Pt seems to be stable, no acute exacerbation at this time. 5. Overactive Bladder  I would like to check on her glucose levels to see if the urinary urgency is related to her FHx of DM. I will order her a blood glucose monitor to check on her sugars. I will prescribe a medication for sxs management but need to research for interaction effects.  Prescribed Ditropan XL 10 mg CR tablet daily. 6. Morbid Obesity with BMI of 70 and over  I advised her to try and increase exercise if possible. She will need to reduce the simple carbohydrates in her diet. I told her that we really needs to try and reduce her weight. Subjective:     Patient presents virtually today for a routine follow-up. Elevated HbA1c:  Pt confirms that diabetes runs in the family. Her last HbA1c in 2019 was 6.3%. Overactive Bladder: She reports an overactive bladder and needs to urinate at least 5 times during the night. Pt notes she will be unable to hold it while running to the bathroom and it will start to dribble down her legs. She does not feel more thirsty than usual. She denies burning, stinging, or pain during urination. Pt denies taking any steroids recently. Prior to Admission medications    Medication Sig Start Date End Date Taking? Authorizing Provider   Blood-Glucose Meter monitoring kit Check blood glucose daily. 6/23/21  Yes Sheila Lazar MD   glucose blood VI test strips (ASCENSIA AUTODISC VI, ONE TOUCH ULTRA TEST VI) strip Check blood glucose daily. 6/23/21  Yes Sheila Lazar MD   [facility] Check blood glucose daily. 6/23/21  Yes Sheila Lazar MD   oxybutynin chloride XL (DITROPAN XL) 10 mg CR tablet Take 1 Tablet by mouth daily. 6/23/21  Yes Sheila Lazar MD   lisinopriL (PRINIVIL, ZESTRIL) 20 mg tablet TAKE 2 TABLETS BY MOUTH DAILY 6/8/21  Yes Sheila Lazar MD   potassium chloride (Klor-Con) 20 mEq pack Take 1 Packet by mouth daily. 5/28/21  Yes Serenity Douglass MD   simvastatin (ZOCOR) 20 mg tablet TAKE 1 TABLET BY MOUTH EVERY NIGHT 2/5/21  Yes Kimberley Delatorre MD   apixaban (Eliquis) 5 mg tablet TAKE 1 TABLET BY MOUTH TWICE DAILY 2/1/21  Yes Sheila Lazar MD   omeprazole (PRILOSEC) 40 mg capsule TAKE 1 CAPSULE BY MOUTH DAILY 1/19/21  Yes Sheila Lazar MD   [facility] Patient will use the shower chair to reduce her risk of falling.  Patient has poor mobility due to leg pain. 11/23/20  Yes Madelin Harmon MD   zolpidem (AMBIEN) 10 mg tablet TK 1 T PO HS PRF INSOMNIA 10/31/20  Yes Provider, Historical   furosemide (LASIX) 40 mg tablet Take 40 mg by mouth daily. Yes Provider, Historical   LORazepam (ATIVAN) 1 mg tablet Take 1 mg by mouth two (2) times daily as needed for Anxiety. Yes Provider, Historical   QUEtiapine (SEROQUEL) 200 mg tablet TK 1 T PO HS PRF INSOMNIA 10/9/19  Yes Other, MD Yue   methocarbamoL (Robaxin-750) 750 mg tablet Take 1 Tablet by mouth four (4) times daily as needed for Muscle Spasm(s). Patient not taking: Reported on 6/23/2021 5/28/21   Dennie Andrews, MD   magnesium chloride (Slow-Mag) 71.5 mg tablet Take 2 Tablets by mouth daily. Patient not taking: Reported on 6/23/2021 5/28/21   Dennie Andrews, MD   Nystatin, Bulk, 1 billion unit powd 1 Dose by Does Not Apply route two (2) times a day. Patient not taking: Reported on 6/23/2021 2/1/21   Madelin Harmon MD   lidocaine 4 % patch 2 Patches by TransDERmal route every twelve (12) hours every twelve (12) hours. Patient not taking: Reported on 6/23/2021 1/24/21   Good Goodrich MD   diclofenac (SOLARAZE) 3 % topical gel Apply  to affected area two (2) times a day. Patient not taking: Reported on 6/23/2021 1/24/21   Good Goodrich MD   Shower Chair XX nishant Patient will use the shower chair to reduce her risk of falling. Patient has poor mobility due to leg pain. Patient not taking: Reported on 6/23/2021 11/23/20   Madelin Harmon MD   silver sulfADIAZINE (SILVADENE) 1 % topical cream Apply  to affected area daily. Patient not taking: Reported on 6/23/2021 11/13/20   Madelin Harmon MD   foam bandage (Allevyn Gentle Border Multisit) 6 3/4 X 7 1/16 \" bndg Apply to affected area daily. Patient not taking: Reported on 6/23/2021 11/13/20   Madelin Harmon MD   bacitracin-polymyxin b (Polysporin) 500-10,000 unit/gram ointment Apply  to affected area two (2) times a day.   Patient not taking: Reported on 6/23/2021 11/9/20   Xena Alvarez MD   nystatin (MYCOSTATIN) topical cream Apply  to affected area two (2) times a day. Patient not taking: Reported on 6/23/2021 11/9/20   Xena Alvarez MD   traZODone (DESYREL) 50 mg tablet Take 50 mg by mouth daily as needed for Sleep. at bedtime  Patient not taking: Reported on 6/23/2021    Provider, [facility]) 4 mg/actuation nasal spray Use 1 spray intranasally, then discard. Repeat with new spray every 2 min as needed for opioid overdose symptoms, alternating nostrils. Patient not taking: Reported on 6/23/2021 9/4/20   Xena Alvarez MD   hydrocortisone (CORTAID) 0.5 % topical cream Apply  to affected area two (2) times a day. use thin layer  Patient not taking: Reported on 6/23/2021 7/17/20   Xena Alvarez MD   DULoxetine (CYMBALTA) 60 mg capsule Take 1 Cap by mouth daily. Patient not taking: Reported on 6/23/2021 3/6/19   Ryann Ramos NP   potassium chloride SR (KLOR-CON 10) 10 mEq tablet TAKE 2 TABLETS BY MOUTH DAILY  Patient not taking: Reported on 6/23/2021 1/16/19   Xena Alvaerz MD         Review of Systems   Respiratory: Negative for shortness of breath. Cardiovascular: Negative for chest pain. Genitourinary: Positive for frequency (urinary) and urgency (urinary).        Objective:     Patient-Reported Vitals 11/18/2020   Patient-Reported Weight 390   Patient-Reported Systolic  623   Patient-Reported Diastolic 625        [INSTRUCTIONS:  \"[x]\" Indicates a positive item  \"[]\" Indicates a negative item  -- DELETE ALL ITEMS NOT EXAMINED]    Constitutional: [x] Appears well-developed and well-nourished [x] No apparent distress      [] Abnormal -     Mental status: [x] Alert and awake  [x] Oriented to person/place/time [x] Able to follow commands    [] Abnormal -     Eyes:   EOM    [x]  Normal    [] Abnormal -   Sclera  [x]  Normal    [] Abnormal -          Discharge [x]  None visible   [] Abnormal -     HENT: [x] Normocephalic, atraumatic  [] Abnormal -   [x] Mouth/Throat: Mucous membranes are moist    External Ears [x] Normal  [] Abnormal -    Neck: [x] No visualized mass [] Abnormal -     Pulmonary/Chest: [x] Respiratory effort normal   [x] No visualized signs of difficulty breathing or respiratory distress        [] Abnormal -      Musculoskeletal:   [x] Normal gait with no signs of ataxia         [x] Normal range of motion of neck        [] Abnormal -     Neurological:        [x] No Facial Asymmetry (Cranial nerve 7 motor function) (limited exam due to video visit)          [x] No gaze palsy        [] Abnormal -          Skin:        [x] No significant exanthematous lesions or discoloration noted on facial skin         [] Abnormal -            Psychiatric:       [x] Normal Affect [] Abnormal -        [x] No Hallucinations    Other pertinent observable physical exam findings:-        We discussed the expected course, resolution and complications of the diagnosis(es) in detail. Medication risks, benefits, costs, interactions, and alternatives were discussed as indicated. I advised her to contact the office if her condition worsens, changes or fails to improve as anticipated. She expressed understanding with the diagnosis(es) and plan. Marvin Whitley, was evaluated through a synchronous (real-time) audio-video encounter. The patient (or guardian if applicable) is aware that this is a billable service. Verbal consent to proceed has been obtained within the past 12 months. The visit was conducted pursuant to the emergency declaration under the [address] [place] [address] authority and the [employer] General Act. Patient identification was verified, and a caregiver was present when appropriate. The patient was located in a state where the provider was credentialed to provide care.       Tiffanie Bautista, as dictated by Griselda Hoff, MD.

## 2021-07-03 ENCOUNTER — HOSPITAL ENCOUNTER (EMERGENCY)
Age: 66
Discharge: HOME | End: 2021-07-03
Attending: EMERGENCY MEDICINE
Payer: MEDICARE

## 2021-07-03 ENCOUNTER — APPOINTMENT (OUTPATIENT)
Dept: GENERAL RADIOLOGY | Age: 66
End: 2021-07-03
Attending: EMERGENCY MEDICINE
Payer: MEDICARE

## 2021-07-03 VITALS
BODY MASS INDEX: 55.32 KG/M2 | SYSTOLIC BLOOD PRESSURE: 120 MMHG | RESPIRATION RATE: 16 BRPM | TEMPERATURE: 98.7 F | WEIGHT: 293 LBS | HEART RATE: 80 BPM | OXYGEN SATURATION: 98 % | HEIGHT: 61 IN | DIASTOLIC BLOOD PRESSURE: 75 MMHG

## 2021-07-03 DIAGNOSIS — E87.70 HYPERVOLEMIA, UNSPECIFIED HYPERVOLEMIA TYPE: Primary | ICD-10-CM

## 2021-07-03 LAB
ALBUMIN SERPL-MCNC: 3.6 G/DL (ref 3.5–5)
ALBUMIN/GLOB SERPL: 0.9 {RATIO} (ref 1.1–2.2)
ALP SERPL-CCNC: 85 U/L (ref 45–117)
ALT SERPL-CCNC: 17 U/L (ref 12–78)
ANION GAP SERPL CALC-SCNC: 5 MMOL/L (ref 5–15)
APPEARANCE UR: CLEAR
AST SERPL-CCNC: 16 U/L (ref 15–37)
BACTERIA URNS QL MICRO: NEGATIVE /HPF
BASOPHILS # BLD: 0 K/UL (ref 0–0.1)
BASOPHILS NFR BLD: 1 % (ref 0–1)
BILIRUB SERPL-MCNC: 0.5 MG/DL (ref 0.2–1)
BILIRUB UR QL: NEGATIVE
BUN SERPL-MCNC: 18 MG/DL (ref 6–20)
BUN/CREAT SERPL: 19 (ref 12–20)
CALCIUM SERPL-MCNC: 9.2 MG/DL (ref 8.5–10.1)
CHLORIDE SERPL-SCNC: 104 MMOL/L (ref 97–108)
CO2 SERPL-SCNC: 29 MMOL/L (ref 21–32)
COLOR UR: NORMAL
CREAT SERPL-MCNC: 0.97 MG/DL (ref 0.55–1.02)
DIFFERENTIAL METHOD BLD: ABNORMAL
EOSINOPHIL # BLD: 0.1 K/UL (ref 0–0.4)
EOSINOPHIL NFR BLD: 2 % (ref 0–7)
EPITH CASTS URNS QL MICRO: NORMAL /LPF
ERYTHROCYTE [DISTWIDTH] IN BLOOD BY AUTOMATED COUNT: 14.6 % (ref 11.5–14.5)
EST. AVERAGE GLUCOSE BLD GHB EST-MCNC: 126 MG/DL
GLOBULIN SER CALC-MCNC: 4.1 G/DL (ref 2–4)
GLUCOSE BLD STRIP.AUTO-MCNC: 97 MG/DL (ref 65–117)
GLUCOSE SERPL-MCNC: 92 MG/DL (ref 65–100)
GLUCOSE UR STRIP.AUTO-MCNC: NEGATIVE MG/DL
HBA1C MFR BLD: 6 % (ref 4–5.6)
HCT VFR BLD AUTO: 35.2 % (ref 35–47)
HGB BLD-MCNC: 11.1 G/DL (ref 11.5–16)
HGB UR QL STRIP: NEGATIVE
HYALINE CASTS URNS QL MICRO: NORMAL /LPF (ref 0–5)
IMM GRANULOCYTES # BLD AUTO: 0 K/UL (ref 0–0.04)
IMM GRANULOCYTES NFR BLD AUTO: 0 % (ref 0–0.5)
KETONES UR QL STRIP.AUTO: NEGATIVE MG/DL
LEUKOCYTE ESTERASE UR QL STRIP.AUTO: NEGATIVE
LYMPHOCYTES # BLD: 2.3 K/UL (ref 0.8–3.5)
LYMPHOCYTES NFR BLD: 42 % (ref 12–49)
MCH RBC QN AUTO: 28.2 PG (ref 26–34)
MCHC RBC AUTO-ENTMCNC: 31.5 G/DL (ref 30–36.5)
MCV RBC AUTO: 89.3 FL (ref 80–99)
MONOCYTES # BLD: 0.5 K/UL (ref 0–1)
MONOCYTES NFR BLD: 9 % (ref 5–13)
NEUTS SEG # BLD: 2.5 K/UL (ref 1.8–8)
NEUTS SEG NFR BLD: 46 % (ref 32–75)
NITRITE UR QL STRIP.AUTO: NEGATIVE
NRBC # BLD: 0 K/UL (ref 0–0.01)
NRBC BLD-RTO: 0 PER 100 WBC
PH UR STRIP: 5.5 [PH] (ref 5–8)
PLATELET # BLD AUTO: 192 K/UL (ref 150–400)
PMV BLD AUTO: 11.3 FL (ref 8.9–12.9)
POTASSIUM SERPL-SCNC: 4.4 MMOL/L (ref 3.5–5.1)
PROT SERPL-MCNC: 7.7 G/DL (ref 6.4–8.2)
PROT UR STRIP-MCNC: NEGATIVE MG/DL
RBC # BLD AUTO: 3.94 M/UL (ref 3.8–5.2)
RBC #/AREA URNS HPF: NORMAL /HPF (ref 0–5)
SERVICE CMNT-IMP: NORMAL
SODIUM SERPL-SCNC: 138 MMOL/L (ref 136–145)
SP GR UR REFRACTOMETRY: 1.01 (ref 1–1.03)
TROPONIN I SERPL-MCNC: <0.05 NG/ML
UA: UC IF INDICATED,UAUC: NORMAL
UROBILINOGEN UR QL STRIP.AUTO: 0.2 EU/DL (ref 0.2–1)
WBC # BLD AUTO: 5.4 K/UL (ref 3.6–11)
WBC URNS QL MICRO: NORMAL /HPF (ref 0–4)

## 2021-07-03 PROCEDURE — 81001 URINALYSIS AUTO W/SCOPE: CPT

## 2021-07-03 PROCEDURE — 36415 COLL VENOUS BLD VENIPUNCTURE: CPT

## 2021-07-03 PROCEDURE — 83036 HEMOGLOBIN GLYCOSYLATED A1C: CPT

## 2021-07-03 PROCEDURE — 85025 COMPLETE CBC W/AUTO DIFF WBC: CPT

## 2021-07-03 PROCEDURE — 74011250637 HC RX REV CODE- 250/637: Performed by: EMERGENCY MEDICINE

## 2021-07-03 PROCEDURE — 80053 COMPREHEN METABOLIC PANEL: CPT

## 2021-07-03 PROCEDURE — 84484 ASSAY OF TROPONIN QUANT: CPT

## 2021-07-03 PROCEDURE — 93005 ELECTROCARDIOGRAM TRACING: CPT

## 2021-07-03 PROCEDURE — 99285 EMERGENCY DEPT VISIT HI MDM: CPT

## 2021-07-03 PROCEDURE — 71045 X-RAY EXAM CHEST 1 VIEW: CPT

## 2021-07-03 PROCEDURE — 82962 GLUCOSE BLOOD TEST: CPT

## 2021-07-03 RX ORDER — FUROSEMIDE 10 MG/ML
60 INJECTION INTRAMUSCULAR; INTRAVENOUS
Status: DISCONTINUED | OUTPATIENT
Start: 2021-07-03 | End: 2021-07-03

## 2021-07-03 RX ORDER — ACETAMINOPHEN 325 MG/1
650 TABLET ORAL
Status: COMPLETED | OUTPATIENT
Start: 2021-07-03 | End: 2021-07-03

## 2021-07-03 RX ADMIN — ACETAMINOPHEN 650 MG: 325 TABLET ORAL at 14:39

## 2021-07-03 NOTE — ED TRIAGE NOTES
Pt reports increased urinary frequency since May, denied burning, hematuria. Chills, shortness of breath with movement, intermittent nausea, denied abdominal pain and vomiting; pt also reports increased thirst since May; denied h/o DM; states she has gained over 50 pounds since March 2021; pt c/o increased bilateral leg pain from knees down and reports pain in the back of the legs due to her arthritis she currently takes tylenol arthritis for pain with minimal relief pain rated 10/10; denied  CP.  Bilateral edema noted pt has not taking diuretics today; pt reports good appetite; LBM 7/2 and normal

## 2021-07-03 NOTE — ED PROVIDER NOTES
EMERGENCY DEPARTMENT HISTORY AND PHYSICAL EXAM      Date: 7/3/2021  Patient Name: Jevon Cummins  Patient Age and Sex: 78 y.o. female     History of Presenting Illness     Chief Complaint   Patient presents with    Urinary Frequency     urinary frequency and excessive thirst x 2 months. pt has a family hx of DM but not a personal hx. She also complains of bilateral lower extremity pain from the knees down x 1 month. pt has hx of blood clots and is on eliquis    Leg Pain       History Provided By: Patient    HPI: Jevon Cummins is a 58-year-old female with a history of hypertension and prediabetes presenting for urinary frequency as well as bilateral leg pain and swelling. Patient states that over the past 2 months has been having urinary frequency as well as polydipsia yet. She has gained about 50 pounds in the last 2 months as well as leg swelling of the bilateral legs. Having pain in the leg secondary to the swelling. Has a history of PE and is on Eliquis currently. Patient states that because of the pain and swelling, Dr. Jagjit Marsh had put her on furosemide. But because she was peeing a lot she stopped it 2 weeks ago. Is now having a harder time getting around secondary to the pain and swelling. She denies any chest pain, but having shortness of breath on exertion. There are no other complaints, changes, or physical findings at this time. PCP: Rhonda Lindo MD    No current facility-administered medications on file prior to encounter. Current Outpatient Medications on File Prior to Encounter   Medication Sig Dispense Refill    Blood-Glucose Meter monitoring kit Check blood glucose daily. 1 Kit 0    glucose blood VI test strips (ASCENSIA AUTODISC VI, ONE TOUCH ULTRA TEST VI) strip Check blood glucose daily. 100 Strip 1    lancets misc Check blood glucose daily. 1 Each 11    oxybutynin chloride XL (DITROPAN XL) 10 mg CR tablet Take 1 Tablet by mouth daily.  30 Tablet 2    lisinopriL (PRINIVIL, ZESTRIL) 20 mg tablet TAKE 2 TABLETS BY MOUTH DAILY 180 Tablet 1    methocarbamoL (Robaxin-750) 750 mg tablet Take 1 Tablet by mouth four (4) times daily as needed for Muscle Spasm(s). (Patient not taking: Reported on 6/23/2021) 20 Tablet 0    potassium chloride (Klor-Con) 20 mEq pack Take 1 Packet by mouth daily. 3 Packet 0    magnesium chloride (Slow-Mag) 71.5 mg tablet Take 2 Tablets by mouth daily. (Patient not taking: Reported on 6/23/2021) 4 Tablet 0    simvastatin (ZOCOR) 20 mg tablet TAKE 1 TABLET BY MOUTH EVERY NIGHT 90 Tab 1    apixaban (Eliquis) 5 mg tablet TAKE 1 TABLET BY MOUTH TWICE DAILY 60 Tab 5    Nystatin, Bulk, 1 billion unit powd 1 Dose by Does Not Apply route two (2) times a day. (Patient not taking: Reported on 6/23/2021) 1 Each 0    lidocaine 4 % patch 2 Patches by TransDERmal route every twelve (12) hours every twelve (12) hours. (Patient not taking: Reported on 6/23/2021) 20 Patch 0    diclofenac (SOLARAZE) 3 % topical gel Apply  to affected area two (2) times a day. (Patient not taking: Reported on 6/23/2021) 100 g 0    omeprazole (PRILOSEC) 40 mg capsule TAKE 1 CAPSULE BY MOUTH DAILY 80 Cap 0    Shower Chair XX nishant Patient will use the shower chair to reduce her risk of falling. Patient has poor mobility due to leg pain. (Patient not taking: Reported on 6/23/2021) 1 Each 0    Walker misc Patient will use the shower chair to reduce her risk of falling. Patient has poor mobility due to leg pain. 1 Each 0    silver sulfADIAZINE (SILVADENE) 1 % topical cream Apply  to affected area daily. (Patient not taking: Reported on 6/23/2021) 50 g 2    foam bandage (Allevyn Gentle Border Multisit) 6 3/4 X 7 1/16 \" bndg Apply to affected area daily. (Patient not taking: Reported on 6/23/2021) 2 Box 2    bacitracin-polymyxin b (Polysporin) 500-10,000 unit/gram ointment Apply  to affected area two (2) times a day.  (Patient not taking: Reported on 6/23/2021) 15 g 0    nystatin (MYCOSTATIN) topical cream Apply  to affected area two (2) times a day. (Patient not taking: Reported on 6/23/2021) 15 g 0    zolpidem (AMBIEN) 10 mg tablet TK 1 T PO HS PRF INSOMNIA      furosemide (LASIX) 40 mg tablet Take 40 mg by mouth daily.  traZODone (DESYREL) 50 mg tablet Take 50 mg by mouth daily as needed for Sleep. at bedtime (Patient not taking: Reported on 6/23/2021)      LORazepam (ATIVAN) 1 mg tablet Take 1 mg by mouth two (2) times daily as needed for Anxiety.  naloxone (NARCAN) 4 mg/actuation nasal spray Use 1 spray intranasally, then discard. Repeat with new spray every 2 min as needed for opioid overdose symptoms, alternating nostrils. (Patient not taking: Reported on 6/23/2021) 1 Each 1    hydrocortisone (CORTAID) 0.5 % topical cream Apply  to affected area two (2) times a day. use thin layer (Patient not taking: Reported on 6/23/2021) 30 g 0    QUEtiapine (SEROQUEL) 200 mg tablet TK 1 T PO HS PRF INSOMNIA  0    DULoxetine (CYMBALTA) 60 mg capsule Take 1 Cap by mouth daily. (Patient not taking: Reported on 6/23/2021) 30 Cap 1    potassium chloride SR (KLOR-CON 10) 10 mEq tablet TAKE 2 TABLETS BY MOUTH DAILY (Patient not taking: Reported on 6/23/2021) 180 Tab 1       Past History     Past Medical History:  Past Medical History:   Diagnosis Date    Arthritis     chronic pain related arthritis    Bipolar 1 disorder (HCC)     GERD (gastroesophageal reflux disease)     Hypertension     Other ill-defined conditions(799.89)     hyperlipidemia.  Stomach abcess    Psychiatric disorder     panic attacks    Sleep apnea     Thromboembolus ([facility] [address]     last year L leg    Unspecified sleep apnea        Past Surgical History:  Past Surgical History:   Procedure Laterality Date    HX CHOLECYSTECTOMY      HX GI      gallbladder removed 2/2010    HX HYSTERECTOMY         Family History:  Family History   Problem Relation Age of Onset    Heart Disease Mother     Diabetes Mother    [address] [facility] Arthritis-osteo Mother     High Cholesterol Mother     Heart Attack Sister     Diabetes Sister     Heart Disease Sister     Diabetes Sister     Arthritis-osteo Sister     High Cholesterol Sister     Schizophrenia Paternal Grandmother     Drug Abuse Sister        Social History:  Social History     Tobacco Use    Smoking status: Never Smoker    Smokeless tobacco: Never Used   Substance Use Topics    Alcohol use: No    Drug use: No       Allergies:  No Known Allergies      Review of Systems   Review of Systems   Constitutional: Positive for unexpected weight change. Negative for chills and fever. Respiratory: Positive for shortness of breath. Negative for cough. Cardiovascular: Positive for leg swelling. Negative for chest pain. Gastrointestinal: Negative for abdominal pain, constipation, diarrhea, nausea and vomiting. Endocrine: Positive for polydipsia and polyuria. Genitourinary: Positive for frequency. Negative for dysuria and hematuria. Neurological: Negative for weakness and numbness. All other systems reviewed and are negative. Physical Exam   Physical Exam  Vitals and nursing note reviewed. Constitutional:       Appearance: She is well-developed. She is obese. HENT:      Head: Normocephalic and atraumatic. Nose: Nose normal.      Mouth/Throat:      Mouth: Mucous membranes are moist.   Eyes:      Extraocular Movements: Extraocular movements intact. Conjunctiva/sclera: Conjunctivae normal.   Cardiovascular:      Rate and Rhythm: Normal rate and regular rhythm. Pulmonary:      Effort: Pulmonary effort is normal. No respiratory distress. Breath sounds: Normal breath sounds. No stridor. No wheezing or rhonchi. Abdominal:      General: There is no distension. Palpations: Abdomen is soft. Tenderness: There is no abdominal tenderness. Musculoskeletal:         General: Normal range of motion. Cervical back: Normal range of motion and neck supple. Comments: Bilateral legs are large however no pitting edema noted. No erythema or discoloration of the legs. Skin:     General: Skin is warm and dry. Neurological:      General: No focal deficit present. Mental Status: She is alert and oriented to person, place, and time. Mental status is at baseline. Psychiatric:         Mood and Affect: Mood normal.          Diagnostic Study Results     Labs -     Recent Results (from the past 12 hour(s))   GLUCOSE, POC    Collection Time: 07/03/21 11:36 AM   Result Value Ref Range    Glucose (POC) 97 65 - 117 mg/dL    Performed by Isaiah Servin    CBC WITH AUTOMATED DIFF    Collection Time: 07/03/21  1:12 PM   Result Value Ref Range    WBC 5.4 3.6 - 11.0 K/uL    RBC 3.94 3.80 - 5.20 M/uL    HGB 11.1 (L) 11.5 - 16.0 g/dL    HCT 35.2 35.0 - 47.0 %    MCV 89.3 80.0 - 99.0 FL    MCH 28.2 26.0 - 34.0 PG    MCHC 31.5 30.0 - 36.5 g/dL    RDW 14.6 (H) 11.5 - 14.5 %    PLATELET 632 642 - 202 K/uL    MPV 11.3 8.9 - 12.9 FL    NRBC 0.0 0  WBC    ABSOLUTE NRBC 0.00 0.00 - 0.01 K/uL    NEUTROPHILS 46 32 - 75 %    LYMPHOCYTES 42 12 - 49 %    MONOCYTES 9 5 - 13 %    EOSINOPHILS 2 0 - 7 %    BASOPHILS 1 0 - 1 %    IMMATURE GRANULOCYTES 0 0.0 - 0.5 %    ABS. NEUTROPHILS 2.5 1.8 - 8.0 K/UL    ABS. LYMPHOCYTES 2.3 0.8 - 3.5 K/UL    ABS. MONOCYTES 0.5 0.0 - 1.0 K/UL    ABS. EOSINOPHILS 0.1 0.0 - 0.4 K/UL    ABS. BASOPHILS 0.0 0.0 - 0.1 K/UL    ABS. IMM.  GRANS. 0.0 0.00 - 0.04 K/UL    DF AUTOMATED     METABOLIC PANEL, COMPREHENSIVE    Collection Time: 07/03/21  1:12 PM   Result Value Ref Range    Sodium 138 136 - 145 mmol/L    Potassium 4.4 3.5 - 5.1 mmol/L    Chloride 104 97 - 108 mmol/L    CO2 29 21 - 32 mmol/L    Anion gap 5 5 - 15 mmol/L    Glucose 92 65 - 100 mg/dL    BUN 18 6 - 20 MG/DL    Creatinine 0.97 0.55 - 1.02 MG/DL    BUN/Creatinine ratio 19 12 - 20      GFR est AA >60 >60 ml/min/1.73m2    GFR est non-AA 57 (L) >60 ml/min/1.73m2    Calcium 9.2 8.5 - 10.1 MG/DL Bilirubin, total 0.5 0.2 - 1.0 MG/DL    ALT (SGPT) 17 12 - 78 U/L    AST (SGOT) 16 15 - 37 U/L    Alk. phosphatase 85 45 - 117 U/L    Protein, total 7.7 6.4 - 8.2 g/dL    Albumin 3.6 3.5 - 5.0 g/dL    Globulin 4.1 (H) 2.0 - 4.0 g/dL    A-G Ratio 0.9 (L) 1.1 - 2.2     URINALYSIS W/ REFLEX CULTURE    Collection Time: 07/03/21  1:12 PM    Specimen: Urine   Result Value Ref Range    Color YELLOW/STRAW      Appearance CLEAR CLEAR      Specific gravity 1.012 1.003 - 1.030      pH (UA) 5.5 5.0 - 8.0      Protein Negative NEG mg/dL    Glucose Negative NEG mg/dL    Ketone Negative NEG mg/dL    Bilirubin Negative NEG      Blood Negative NEG      Urobilinogen 0.2 0.2 - 1.0 EU/dL    Nitrites Negative NEG      Leukocyte Esterase Negative NEG      WBC 0-4 0 - 4 /hpf    RBC 0-5 0 - 5 /hpf    Epithelial cells FEW FEW /lpf    Bacteria Negative NEG /hpf    UA:UC IF INDICATED CULTURE NOT INDICATED BY UA RESULT CNI      Hyaline cast 0-2 0 - 5 /lpf   TROPONIN I    Collection Time: 07/03/21  1:12 PM   Result Value Ref Range    Troponin-I, Qt. <0.05 <0.05 ng/mL       Radiologic Studies -   XR CHEST PORT   Final Result   No acute cardiopulmonary process        CT Results  (Last 48 hours)    None        CXR Results  (Last 48 hours)               07/03/21 1320  XR CHEST PORT Final result    Impression:  No acute cardiopulmonary process       Narrative:  EXAM: XR CHEST PORT       INDICATION: sob leg swelling       COMPARISON: 5/28/2021       FINDINGS: A portable AP radiograph of the chest was obtained at 1307 hours. The   patient is on a cardiac monitor. The lungs are clear. Heart size is borderline. .    The bones and soft tissues are grossly within normal limits. Medical Decision Making   I am the first provider for this patient. I reviewed the vital signs, available nursing notes, past medical history, past surgical history, family history and social history. Vital Signs-Reviewed the patient's vital signs.   Patient Vitals for the past 12 hrs:   Temp Pulse Resp BP SpO2   07/03/21 1157     96 %   07/03/21 1137 97.8 °F (36.6 °C) 82 16 112/67 96 %       Records Reviewed: Nursing Notes and Old Medical Records    Provider Notes (Medical Decision Making):   Patient presenting with polyuria, polydipsia, leg swelling and pain. Pain and swelling likely due to some of the edema versus arthritis. Likely having urinary frequency either due to UTI, hyperglycemia, or due to the furosemide. She did stop the furosemide secondary to the urinary frequency which probably is causing the fluid buildup. Will make sure no signs of renal, congestive heart failure or liver failure. ED Course:   Initial assessment performed. The patients presenting problems have been discussed, and they are in agreement with the care plan formulated and outlined with them. I have encouraged them to ask questions as they arise throughout their visit. ED Course as of Jul 03 1432   Sat Jul 03, 2021   1336 Patient's glucose is well as UA negative for hyperglycemia or UTI. Her urinary frequency is probably related to the diuretic. [JS]      ED Course User Index  [JS] Natalie Tamayo MD     Critical Care Time:   0    Disposition:  Discharge Note:  The patient has been re-evaluated and is ready for discharge. Reviewed available results with patient. Counseled patient on diagnosis and care plan. Patient has expressed understanding, and all questions have been answered. Patient agrees with plan and agrees to follow up as recommended, or to return to the ED if their symptoms worsen. Discharge instructions have been provided and explained to the patient, along with reasons to return to the ED. PLAN:  Current Discharge Medication List        2.    Follow-up Information     Follow up With Specialties Details Why Contact Info    Annette Ferguson MD Family Medicine Schedule an appointment as soon as possible for a visit  As needed 2 [address] [address] [place] [facility]  [phone]          3. Return to ED if worse     Diagnosis     Clinical Impression:   1. Hypervolemia, unspecified hypervolemia type        Attestations:    Jemma Shoemaker M.D. Please note that this dictation was completed with [employer], the computer voice recognition software. Quite often unanticipated grammatical, syntax, homophones, and other interpretive errors are inadvertently transcribed by the computer software. Please disregard these errors. Please excuse any errors that have escaped final proofreading. Thank you.

## 2021-07-03 NOTE — DISCHARGE INSTRUCTIONS
Please make sure to take the furosemide 40 mg Lasix daily. By urinating out the fluid, it is the only way to control the fluid overload you are experiencing and the weight gain. If you continue to have worsening swelling despite the 40 mg daily, increase it to 40 mg twice a day, once in the morning and once in the afternoon.

## 2021-07-04 LAB
ATRIAL RATE: 75 BPM
CALCULATED P AXIS, ECG09: 23 DEGREES
CALCULATED R AXIS, ECG10: 50 DEGREES
CALCULATED T AXIS, ECG11: 79 DEGREES
DIAGNOSIS, 93000: NORMAL
P-R INTERVAL, ECG05: 146 MS
Q-T INTERVAL, ECG07: 398 MS
QRS DURATION, ECG06: 62 MS
QTC CALCULATION (BEZET), ECG08: 444 MS
VENTRICULAR RATE, ECG03: 75 BPM

## 2021-07-06 ENCOUNTER — TELEPHONE (OUTPATIENT)
Dept: INTERNAL MEDICINE CLINIC | Age: 66
End: 2021-07-06

## 2021-07-06 NOTE — TELEPHONE ENCOUNTER
Spoke w/ pt. Pt was scheduled for a VV on 7/14 at 1:30 pm w/ Dr. Lio Long. Pt wanted to inform Dr. Lio Long that the glucose labs were done at the hospital. Results are currently in the chart.

## 2021-07-13 ENCOUNTER — TELEPHONE (OUTPATIENT)
Dept: INTERNAL MEDICINE CLINIC | Age: 66
End: 2021-07-13

## 2021-07-13 NOTE — TELEPHONE ENCOUNTER
Message was left for patient to change virtual visit from 1:30 pm to 3pm and to contact office to confirm.

## 2021-07-21 DIAGNOSIS — K21.9 GASTROESOPHAGEAL REFLUX DISEASE WITHOUT ESOPHAGITIS: ICD-10-CM

## 2021-07-28 RX ORDER — OMEPRAZOLE 40 MG/1
CAPSULE, DELAYED RELEASE ORAL
Qty: 90 CAPSULE | Refills: 0 | Status: SHIPPED | OUTPATIENT
Start: 2021-07-28

## 2021-07-29 DIAGNOSIS — Z86.711 HISTORY OF PULMONARY EMBOLUS (PE): ICD-10-CM

## 2021-08-03 DIAGNOSIS — E78.5 HYPERLIPIDEMIA, UNSPECIFIED HYPERLIPIDEMIA TYPE: ICD-10-CM

## 2021-08-03 RX ORDER — SIMVASTATIN 20 MG/1
TABLET, FILM COATED ORAL
Qty: 90 TABLET | Refills: 1 | Status: SHIPPED | OUTPATIENT
Start: 2021-08-03

## 2021-08-05 ENCOUNTER — APPOINTMENT (OUTPATIENT)
Dept: ULTRASOUND IMAGING | Age: 66
End: 2021-08-05
Attending: PHYSICIAN ASSISTANT
Payer: MEDICARE

## 2021-08-05 ENCOUNTER — HOSPITAL ENCOUNTER (EMERGENCY)
Age: 66
Discharge: HOME | End: 2021-08-05
Attending: EMERGENCY MEDICINE
Payer: MEDICARE

## 2021-08-05 VITALS
OXYGEN SATURATION: 93 % | DIASTOLIC BLOOD PRESSURE: 68 MMHG | HEIGHT: 62 IN | SYSTOLIC BLOOD PRESSURE: 113 MMHG | WEIGHT: 293 LBS | TEMPERATURE: 98 F | HEART RATE: 87 BPM | RESPIRATION RATE: 13 BRPM | BODY MASS INDEX: 53.92 KG/M2

## 2021-08-05 DIAGNOSIS — M71.21 BAKER'S CYST OF KNEE, RIGHT: Primary | ICD-10-CM

## 2021-08-05 PROCEDURE — 99283 EMERGENCY DEPT VISIT LOW MDM: CPT

## 2021-08-05 PROCEDURE — 93971 EXTREMITY STUDY: CPT

## 2021-08-05 PROCEDURE — 74011250637 HC RX REV CODE- 250/637: Performed by: EMERGENCY MEDICINE

## 2021-08-05 RX ORDER — HYDROCODONE BITARTRATE AND ACETAMINOPHEN 5; 325 MG/1; MG/1
1 TABLET ORAL
Qty: 10 TABLET | Refills: 0 | Status: SHIPPED | OUTPATIENT
Start: 2021-08-05 | End: 2021-08-12 | Stop reason: SDUPTHER

## 2021-08-05 RX ORDER — HYDROCODONE BITARTRATE AND ACETAMINOPHEN 5; 325 MG/1; MG/1
1 TABLET ORAL
Status: COMPLETED | OUTPATIENT
Start: 2021-08-05 | End: 2021-08-05

## 2021-08-05 RX ORDER — ONDANSETRON 4 MG/1
4 TABLET, ORALLY DISINTEGRATING ORAL
Status: COMPLETED | OUTPATIENT
Start: 2021-08-05 | End: 2021-08-05

## 2021-08-05 RX ADMIN — HYDROCODONE BITARTRATE AND ACETAMINOPHEN 1 TABLET: 5; 325 TABLET ORAL at 10:01

## 2021-08-05 RX ADMIN — ONDANSETRON 4 MG: 4 TABLET, ORALLY DISINTEGRATING ORAL at 10:01

## 2021-08-05 NOTE — ED PROVIDER NOTES
EMERGENCY DEPARTMENT HISTORY AND PHYSICAL EXAM      Date: 8/5/2021  Patient Name: Kamar De Leon    History of Presenting Illness     Chief Complaint   Patient presents with    Leg Pain     Into triage via wheelchair with c/o pain that starts in her Rt hip and radiates down her RLE - denies injury. Sx x 1 week. Currently taking Eliquis for a PE. History Provided By: Patient    HPI: Kamar De Leon, 78 y.o. female presents to the ED with cc of right leg pain. Patient symptoms started 1 week ago. She denies trauma, chest pain or shortness of breath. She is currently on Eliquis for pulmonary embolism. She states she has been using over-the-counter medications, and heating pads, with no relief of symptoms. Pain radiates from the hip down to the calf. Is currently 10 out of 10 in severity. She denies any numbness or tingling. She also denies back pain, dysuria. She states she had a fever 3 days ago but she does not know what her temperature was. She says she has a history of sciatica and arthritis, but this feels different. Feels like there is something behind her right knee. There are no other complaints, changes, or physical findings at this time. PCP: Loida Graham MD    No current facility-administered medications on file prior to encounter. Current Outpatient Medications on File Prior to Encounter   Medication Sig Dispense Refill    simvastatin (ZOCOR) 20 mg tablet TAKE 1 TABLET BY MOUTH EVERY NIGHT 90 Tablet 1    apixaban (Eliquis) 5 mg tablet TAKE 1 TABLET BY MOUTH TWICE DAILY 60 Tablet 5    omeprazole (PRILOSEC) 40 mg capsule TAKE 1 CAPSULE BY MOUTH DAILY 90 Capsule 0    Blood-Glucose Meter monitoring kit Check blood glucose daily. 1 Kit 0    glucose blood VI test strips (ASCENSIA AUTODISC VI, ONE TOUCH ULTRA TEST VI) strip Check blood glucose daily. 100 Strip 1    lancets misc Check blood glucose daily.  1 Each 11    oxybutynin chloride XL (DITROPAN XL) 10 mg CR tablet Take 1 Tablet by mouth daily. 30 Tablet 2    lisinopriL (PRINIVIL, ZESTRIL) 20 mg tablet TAKE 2 TABLETS BY MOUTH DAILY 180 Tablet 1    methocarbamoL (Robaxin-750) 750 mg tablet Take 1 Tablet by mouth four (4) times daily as needed for Muscle Spasm(s). (Patient not taking: Reported on 6/23/2021) 20 Tablet 0    potassium chloride (Klor-Con) 20 mEq pack Take 1 Packet by mouth daily. 3 Packet 0    magnesium chloride (Slow-Mag) 71.5 mg tablet Take 2 Tablets by mouth daily. (Patient not taking: Reported on 6/23/2021) 4 Tablet 0    Nystatin, Bulk, 1 billion unit powd 1 Dose by Does Not Apply route two (2) times a day. (Patient not taking: Reported on 6/23/2021) 1 Each 0    lidocaine 4 % patch 2 Patches by TransDERmal route every twelve (12) hours every twelve (12) hours. (Patient not taking: Reported on 6/23/2021) 20 Patch 0    diclofenac (SOLARAZE) 3 % topical gel Apply  to affected area two (2) times a day. (Patient not taking: Reported on 6/23/2021) 100 g 0    Shower Chair XX nishant Patient will use the shower chair to reduce her risk of falling. Patient has poor mobility due to leg pain. (Patient not taking: Reported on 6/23/2021) 1 Each 0    Walker misc Patient will use the shower chair to reduce her risk of falling. Patient has poor mobility due to leg pain. 1 Each 0    silver sulfADIAZINE (SILVADENE) 1 % topical cream Apply  to affected area daily. (Patient not taking: Reported on 6/23/2021) 50 g 2    foam bandage (Allevyn Gentle Border Multisit) 6 3/4 X 7 1/16 \" bndg Apply to affected area daily. (Patient not taking: Reported on 6/23/2021) 2 Box 2    bacitracin-polymyxin b (Polysporin) 500-10,000 unit/gram ointment Apply  to affected area two (2) times a day. (Patient not taking: Reported on 6/23/2021) 15 g 0    nystatin (MYCOSTATIN) topical cream Apply  to affected area two (2) times a day.  (Patient not taking: Reported on 6/23/2021) 15 g 0    zolpidem (AMBIEN) 10 mg tablet TK 1 T PO HS PRF INSOMNIA      furosemide (LASIX) 40 mg tablet Take 40 mg by mouth daily.  traZODone (DESYREL) 50 mg tablet Take 50 mg by mouth daily as needed for Sleep. at bedtime (Patient not taking: Reported on 6/23/2021)      LORazepam (ATIVAN) 1 mg tablet Take 1 mg by mouth two (2) times daily as needed for Anxiety.  naloxone (NARCAN) 4 mg/actuation nasal spray Use 1 spray intranasally, then discard. Repeat with new spray every 2 min as needed for opioid overdose symptoms, alternating nostrils. (Patient not taking: Reported on 6/23/2021) 1 Each 1    hydrocortisone (CORTAID) 0.5 % topical cream Apply  to affected area two (2) times a day. use thin layer (Patient not taking: Reported on 6/23/2021) 30 g 0    QUEtiapine (SEROQUEL) 200 mg tablet TK 1 T PO HS PRF INSOMNIA  0    DULoxetine (CYMBALTA) 60 mg capsule Take 1 Cap by mouth daily. (Patient not taking: Reported on 6/23/2021) 30 Cap 1    potassium chloride SR (KLOR-CON 10) 10 mEq tablet TAKE 2 TABLETS BY MOUTH DAILY (Patient not taking: Reported on 6/23/2021) 180 Tab 1       Past History     Past Medical History:  Past Medical History:   Diagnosis Date    Arthritis     chronic pain related arthritis    Bipolar 1 disorder (HCC)     GERD (gastroesophageal reflux disease)     Hypertension     Other ill-defined conditions(799.89)     hyperlipidemia.  Stomach abcess    Psychiatric disorder     panic attacks    Sleep apnea     Thromboembolus ([address]     last year L leg    Unspecified sleep apnea        Past Surgical History:  Past Surgical History:   Procedure Laterality Date    HX CHOLECYSTECTOMY      HX GI      gallbladder removed 2/2010    HX HYSTERECTOMY         Family History:  Family History   Problem Relation Age of Onset    Heart Disease Mother     Diabetes Mother     Arthritis-osteo Mother     High Cholesterol Mother     Heart Attack Sister     Diabetes Sister     Heart Disease Sister     Diabetes Sister     Arthritis-osteo Sister     High Cholesterol Sister     Schizophrenia Paternal Grandmother     Drug Abuse Sister        Social History:  Social History     Tobacco Use    Smoking status: Never Smoker    Smokeless tobacco: Never Used   Substance Use Topics    Alcohol use: No    Drug use: No       Allergies:  No Known Allergies      Review of Systems   Review of Systems   Constitutional: Positive for fever. HENT: Negative for congestion. Eyes: Negative. Respiratory: Negative for shortness of breath. Cardiovascular: Negative for chest pain. Gastrointestinal: Negative for abdominal pain. Endocrine: Negative for heat intolerance. Genitourinary: Negative. Musculoskeletal: Negative for back pain. Skin: Negative for rash. Allergic/Immunologic: Negative for immunocompromised state. Neurological: Negative for dizziness. Hematological: Does not bruise/bleed easily. Psychiatric/Behavioral: Negative. All other systems reviewed and are negative. Physical Exam   Physical Exam  Vitals and nursing note reviewed. Constitutional:       General: She is not in acute distress. Appearance: She is well-developed. HENT:      Head: Normocephalic. Cardiovascular:      Rate and Rhythm: Normal rate and regular rhythm. Pulses: Normal pulses. Heart sounds: Normal heart sounds. Pulmonary:      Effort: Pulmonary effort is normal.      Breath sounds: Normal breath sounds. Abdominal:      General: Bowel sounds are normal.      Palpations: Abdomen is soft. Tenderness: There is no abdominal tenderness. Musculoskeletal:         General: Tenderness present. Normal range of motion. Cervical back: Normal range of motion and neck supple. Comments: Right lower extremity tenderness, no erythema   Skin:     General: Skin is warm and dry. Neurological:      General: No focal deficit present. Mental Status: She is alert and oriented to person, place, and time.    Psychiatric:         Mood and Affect: Mood normal. Behavior: Behavior normal.         Diagnostic Study Results     Labs -   No results found for this or any previous visit (from the past 12 hour(s)). Radiologic Studies -   DUPLEX LOWER EXT VENOUS RIGHT   Final Result        CT Results  (Last 48 hours)    None        CXR Results  (Last 48 hours)    None          Medical Decision Making   I am the first provider for this patient. I reviewed the vital signs, available nursing notes, past medical history, past surgical history, family history and social history. Vital Signs-Reviewed the patient's vital signs. Patient Vitals for the past 12 hrs:   Temp Pulse Resp BP SpO2   08/05/21 0838 98 °F (36.7 °C) 87 13 113/68 93 %         Records Reviewed: Nursing Notes, Old Medical Records, Previous Radiology Studies and Previous Laboratory Studies    Provider Notes (Medical Decision Making):   DVT, Baker's cyst, sciatica    ED Course:   Initial assessment performed. The patients presenting problems have been discussed, and they are in agreement with the care plan formulated and outlined with them. I have encouraged them to ask questions as they arise throughout their visit. Progress note: The patient is feeling better. Her results were reviewed. She is advised to follow-up and return to ER if worse           Critical Care Time:   0    Disposition:  home    DISCHARGE PLAN:  1. Discharge Medication List as of 8/5/2021 10:17 AM      START taking these medications    Details   HYDROcodone-acetaminophen (Norco) 5-325 mg per tablet Take 1 Tablet by mouth every six (6) hours as needed for Pain for up to 3 days.  Max Daily Amount: 4 Tablets., Normal, Disp-10 Tablet, R-0         CONTINUE these medications which have NOT CHANGED    Details   simvastatin (ZOCOR) 20 mg tablet TAKE 1 TABLET BY MOUTH EVERY NIGHT, Normal, Disp-90 Tablet, R-1      apixaban (Eliquis) 5 mg tablet TAKE 1 TABLET BY MOUTH TWICE DAILY, Normal, Disp-60 Tablet, R-5      omeprazole (PRILOSEC) 40 mg capsule TAKE 1 CAPSULE BY MOUTH DAILY, Normal, Disp-90 Capsule, R-0      Blood-Glucose Meter monitoring kit Check blood glucose daily. , Normal, Disp-1 Kit, R-0      glucose blood VI test strips (ASCENSIA AUTODISC VI, ONE TOUCH ULTRA TEST VI) strip Check blood glucose daily. , Normal, Disp-100 Strip, R-1      lancets misc Check blood glucose daily. , Normal, Disp-1 Each, R-11      oxybutynin chloride XL (DITROPAN XL) 10 mg CR tablet Take 1 Tablet by mouth daily. , Normal, Disp-30 Tablet, R-2      lisinopriL (PRINIVIL, ZESTRIL) 20 mg tablet TAKE 2 TABLETS BY MOUTH DAILY, Normal, Disp-180 Tablet, R-1      methocarbamoL (Robaxin-750) 750 mg tablet Take 1 Tablet by mouth four (4) times daily as needed for Muscle Spasm(s). , Normal, Disp-20 Tablet, R-0      potassium chloride (Klor-Con) 20 mEq pack Take 1 Packet by mouth daily. , Normal, Disp-3 Packet, R-0      magnesium chloride (Slow-Mag) 71.5 mg tablet Take 2 Tablets by mouth daily. , Normal, Disp-4 Tablet, R-0      Nystatin, Bulk, 1 billion unit powd 1 Dose by Does Not Apply route two (2) times a day., Normal, Disp-1 Each, R-0      lidocaine 4 % patch 2 Patches by TransDERmal route every twelve (12) hours every twelve (12) hours. , Normal, Disp-20 Patch, R-0      diclofenac (SOLARAZE) 3 % topical gel Apply  to affected area two (2) times a day., Normal, Disp-100 g, R-0      Shower Chair XX nishant Patient will use the shower chair to reduce her risk of falling. Patient has poor mobility due to leg pain. , Print, Disp-1 Each,R-0      Walker misc Patient will use the shower chair to reduce her risk of falling. Patient has poor mobility due to leg pain., Normal, Disp-1 Each,R-0      silver sulfADIAZINE (SILVADENE) 1 % topical cream Apply  to affected area daily. , Normal, Disp-50 g,R-2      foam bandage (Allevyn Gentle Border Multisit) 6 3/4 X 7 1/16 \" bndg Apply to affected area daily. , Normal, Disp-2 Box,R-2      bacitracin-polymyxin b (Polysporin) 500-10,000 unit/gram ointment Apply  to affected area two (2) times a day., Normal, Disp-15 g,R-0      nystatin (MYCOSTATIN) topical cream Apply  to affected area two (2) times a day., Normal, Disp-15 g,R-0      zolpidem (AMBIEN) 10 mg tablet TK 1 T PO HS PRF INSOMNIA, Historical Med      furosemide (LASIX) 40 mg tablet Take 40 mg by mouth daily. , Historical Med      traZODone (DESYREL) 50 mg tablet Take 50 mg by mouth daily as needed for Sleep. at bedtime, Historical Med      LORazepam (ATIVAN) 1 mg tablet Take 1 mg by mouth two (2) times daily as needed for Anxiety. , Historical Med      naloxone (NARCAN) 4 mg/actuation nasal spray Use 1 spray intranasally, then discard. Repeat with new spray every 2 min as needed for opioid overdose symptoms, alternating nostrils. , Normal, Disp-1 Each,R-1      hydrocortisone (CORTAID) 0.5 % topical cream Apply  to affected area two (2) times a day. use thin layer, Normal, Disp-30 g,R-0      QUEtiapine (SEROQUEL) 200 mg tablet TK 1 T PO HS PRF INSOMNIA, Historical Med, R-0      DULoxetine (CYMBALTA) 60 mg capsule Take 1 Cap by mouth daily. , Normal, Disp-30 Cap, R-1      potassium chloride SR (KLOR-CON 10) 10 mEq tablet TAKE 2 TABLETS BY MOUTH DAILY, Normal, Disp-180 Tab, R-1           2. Follow-up Information     Follow up With Specialties Details Why Contact Info    Naty Burr MD Family Medicine  As needed [facility]  [phone]      hospitals EMERGENCY DEPT Emergency Medicine  If symptoms worsen [address] [facility]  [address] [facility]  [phone]        3. Return to ED if worse     Diagnosis     Clinical Impression:   1. Baker's cyst of knee, right        Attestations:    Erlin Muniz MD    Please note that this dictation was completed with [employer], the [employer] voice recognition software. Quite often unanticipated grammatical, syntax, homophones, and other interpretive errors are inadvertently transcribed by the computer software. Please disregard these errors. Please excuse any errors that have escaped final proofreading. Thank you.

## 2021-08-06 ENCOUNTER — TELEPHONE (OUTPATIENT)
Dept: INTERNAL MEDICINE CLINIC | Age: 66
End: 2021-08-06

## 2021-08-06 ENCOUNTER — APPOINTMENT (OUTPATIENT)
Dept: CT IMAGING | Age: 66
End: 2021-08-06
Attending: EMERGENCY MEDICINE
Payer: MEDICARE

## 2021-08-06 ENCOUNTER — HOSPITAL ENCOUNTER (EMERGENCY)
Age: 66
Discharge: HOME | End: 2021-08-07
Attending: EMERGENCY MEDICINE | Admitting: EMERGENCY MEDICINE
Payer: MEDICARE

## 2021-08-06 DIAGNOSIS — M79.2 NEUROPATHIC PAIN: ICD-10-CM

## 2021-08-06 DIAGNOSIS — M71.21 SYNOVIAL CYST OF RIGHT POPLITEAL SPACE: Primary | ICD-10-CM

## 2021-08-06 DIAGNOSIS — M79.604 ACUTE LEG PAIN, RIGHT: ICD-10-CM

## 2021-08-06 PROCEDURE — 96374 THER/PROPH/DIAG INJ IV PUSH: CPT

## 2021-08-06 PROCEDURE — 96375 TX/PRO/DX INJ NEW DRUG ADDON: CPT

## 2021-08-06 PROCEDURE — 70450 CT HEAD/BRAIN W/O DYE: CPT

## 2021-08-06 PROCEDURE — 99285 EMERGENCY DEPT VISIT HI MDM: CPT

## 2021-08-06 RX ORDER — MORPHINE SULFATE 2 MG/ML
4 INJECTION, SOLUTION INTRAMUSCULAR; INTRAVENOUS
Status: COMPLETED | OUTPATIENT
Start: 2021-08-06 | End: 2021-08-07

## 2021-08-06 RX ORDER — DIAZEPAM 5 MG/1
5 TABLET ORAL
Status: COMPLETED | OUTPATIENT
Start: 2021-08-06 | End: 2021-08-07

## 2021-08-06 NOTE — TELEPHONE ENCOUNTER
----- Message from Carolina Castro sent at 8/6/2021 10:22 AM EDT -----  Regarding: Dr. Stephani Adam: [phone]  General Message/Vendor Calls    Caller's first and last name: Pt.       Reason for call: Was seen in ER 2 weeks ago, had all tests done pcp wanted, needs to make sure pcp got all records from that visit. Was also at ER yesterday, was diagnosed right leg with bakers cyst. Has been bothering pt for 3 weeks, pt was having trouble walking. Callback required yes/no and why: Yes, call back from pcp. Best contact number(s): [phone]      Details to clarify the request: N/a.       Message from Gruvi

## 2021-08-07 ENCOUNTER — APPOINTMENT (OUTPATIENT)
Dept: CT IMAGING | Age: 66
End: 2021-08-07
Attending: EMERGENCY MEDICINE
Payer: MEDICARE

## 2021-08-07 VITALS
OXYGEN SATURATION: 94 % | DIASTOLIC BLOOD PRESSURE: 66 MMHG | HEIGHT: 62 IN | WEIGHT: 293 LBS | TEMPERATURE: 97.9 F | BODY MASS INDEX: 53.92 KG/M2 | HEART RATE: 62 BPM | RESPIRATION RATE: 18 BRPM | SYSTOLIC BLOOD PRESSURE: 105 MMHG

## 2021-08-07 LAB
ALBUMIN SERPL-MCNC: 3.5 G/DL (ref 3.5–5)
ALBUMIN/GLOB SERPL: 0.9 {RATIO} (ref 1.1–2.2)
ALP SERPL-CCNC: 89 U/L (ref 45–117)
ALT SERPL-CCNC: 17 U/L (ref 12–78)
ANION GAP SERPL CALC-SCNC: 4 MMOL/L (ref 5–15)
APPEARANCE UR: CLEAR
AST SERPL-CCNC: 10 U/L (ref 15–37)
BACTERIA URNS QL MICRO: NEGATIVE /HPF
BASOPHILS # BLD: 0 K/UL (ref 0–0.1)
BASOPHILS NFR BLD: 0 % (ref 0–1)
BILIRUB SERPL-MCNC: 0.5 MG/DL (ref 0.2–1)
BILIRUB UR QL: NEGATIVE
BUN SERPL-MCNC: 19 MG/DL (ref 6–20)
BUN/CREAT SERPL: 18 (ref 12–20)
CALCIUM SERPL-MCNC: 8.5 MG/DL (ref 8.5–10.1)
CHLORIDE SERPL-SCNC: 107 MMOL/L (ref 97–108)
CO2 SERPL-SCNC: 27 MMOL/L (ref 21–32)
COLOR UR: NORMAL
COMMENT, HOLDF: NORMAL
CREAT SERPL-MCNC: 1.06 MG/DL (ref 0.55–1.02)
DIFFERENTIAL METHOD BLD: ABNORMAL
EOSINOPHIL # BLD: 0.2 K/UL (ref 0–0.4)
EOSINOPHIL NFR BLD: 3 % (ref 0–7)
EPITH CASTS URNS QL MICRO: NORMAL /LPF
ERYTHROCYTE [DISTWIDTH] IN BLOOD BY AUTOMATED COUNT: 14.3 % (ref 11.5–14.5)
GLOBULIN SER CALC-MCNC: 4 G/DL (ref 2–4)
GLUCOSE BLD STRIP.AUTO-MCNC: 77 MG/DL (ref 65–117)
GLUCOSE SERPL-MCNC: 90 MG/DL (ref 65–100)
GLUCOSE UR STRIP.AUTO-MCNC: NEGATIVE MG/DL
HCT VFR BLD AUTO: 34.6 % (ref 35–47)
HGB BLD-MCNC: 10.7 G/DL (ref 11.5–16)
HGB UR QL STRIP: NEGATIVE
HYALINE CASTS URNS QL MICRO: NORMAL /LPF (ref 0–5)
IMM GRANULOCYTES # BLD AUTO: 0 K/UL (ref 0–0.04)
IMM GRANULOCYTES NFR BLD AUTO: 0 % (ref 0–0.5)
KETONES UR QL STRIP.AUTO: NEGATIVE MG/DL
LEUKOCYTE ESTERASE UR QL STRIP.AUTO: NEGATIVE
LYMPHOCYTES # BLD: 2.4 K/UL (ref 0.8–3.5)
LYMPHOCYTES NFR BLD: 43 % (ref 12–49)
MCH RBC QN AUTO: 27.6 PG (ref 26–34)
MCHC RBC AUTO-ENTMCNC: 30.9 G/DL (ref 30–36.5)
MCV RBC AUTO: 89.2 FL (ref 80–99)
MONOCYTES # BLD: 0.5 K/UL (ref 0–1)
MONOCYTES NFR BLD: 10 % (ref 5–13)
NEUTS SEG # BLD: 2.4 K/UL (ref 1.8–8)
NEUTS SEG NFR BLD: 44 % (ref 32–75)
NITRITE UR QL STRIP.AUTO: NEGATIVE
NRBC # BLD: 0 K/UL (ref 0–0.01)
NRBC BLD-RTO: 0 PER 100 WBC
PH UR STRIP: 5.5 [PH] (ref 5–8)
PLATELET # BLD AUTO: 160 K/UL (ref 150–400)
PMV BLD AUTO: 11.4 FL (ref 8.9–12.9)
POTASSIUM SERPL-SCNC: 4.2 MMOL/L (ref 3.5–5.1)
PROT SERPL-MCNC: 7.5 G/DL (ref 6.4–8.2)
PROT UR STRIP-MCNC: NEGATIVE MG/DL
RBC # BLD AUTO: 3.88 M/UL (ref 3.8–5.2)
RBC #/AREA URNS HPF: NORMAL /HPF (ref 0–5)
SAMPLES BEING HELD,HOLD: NORMAL
SERVICE CMNT-IMP: NORMAL
SODIUM SERPL-SCNC: 138 MMOL/L (ref 136–145)
SP GR UR REFRACTOMETRY: 1.02 (ref 1–1.03)
UA: UC IF INDICATED,UAUC: NORMAL
UROBILINOGEN UR QL STRIP.AUTO: 0.2 EU/DL (ref 0.2–1)
WBC # BLD AUTO: 5.6 K/UL (ref 3.6–11)
WBC URNS QL MICRO: NORMAL /HPF (ref 0–4)

## 2021-08-07 PROCEDURE — 85025 COMPLETE CBC W/AUTO DIFF WBC: CPT

## 2021-08-07 PROCEDURE — 36415 COLL VENOUS BLD VENIPUNCTURE: CPT

## 2021-08-07 PROCEDURE — 96374 THER/PROPH/DIAG INJ IV PUSH: CPT

## 2021-08-07 PROCEDURE — 81001 URINALYSIS AUTO W/SCOPE: CPT

## 2021-08-07 PROCEDURE — 82962 GLUCOSE BLOOD TEST: CPT

## 2021-08-07 PROCEDURE — 96375 TX/PRO/DX INJ NEW DRUG ADDON: CPT

## 2021-08-07 PROCEDURE — 74011000636 HC RX REV CODE- 636: Performed by: EMERGENCY MEDICINE

## 2021-08-07 PROCEDURE — 75635 CT ANGIO ABDOMINAL ARTERIES: CPT

## 2021-08-07 PROCEDURE — 74011250636 HC RX REV CODE- 250/636: Performed by: EMERGENCY MEDICINE

## 2021-08-07 PROCEDURE — 80053 COMPREHEN METABOLIC PANEL: CPT

## 2021-08-07 PROCEDURE — 74011250637 HC RX REV CODE- 250/637: Performed by: EMERGENCY MEDICINE

## 2021-08-07 RX ORDER — GABAPENTIN 300 MG/1
300 CAPSULE ORAL ONCE
Status: COMPLETED | OUTPATIENT
Start: 2021-08-07 | End: 2021-08-07

## 2021-08-07 RX ORDER — DIAZEPAM 10 MG/2ML
2 INJECTION INTRAMUSCULAR
Status: DISCONTINUED | OUTPATIENT
Start: 2021-08-07 | End: 2021-08-07

## 2021-08-07 RX ORDER — FENTANYL CITRATE 50 UG/ML
50 INJECTION, SOLUTION INTRAMUSCULAR; INTRAVENOUS
Status: COMPLETED | OUTPATIENT
Start: 2021-08-07 | End: 2021-08-07

## 2021-08-07 RX ORDER — GABAPENTIN 300 MG/1
300 CAPSULE ORAL 3 TIMES DAILY
Qty: 10 CAPSULE | Refills: 0 | Status: SHIPPED | OUTPATIENT
Start: 2021-08-07 | End: 2021-08-12 | Stop reason: SDUPTHER

## 2021-08-07 RX ADMIN — GABAPENTIN 300 MG: 300 CAPSULE ORAL at 01:19

## 2021-08-07 RX ADMIN — FENTANYL CITRATE 50 MCG: 50 INJECTION, SOLUTION INTRAMUSCULAR; INTRAVENOUS at 01:19

## 2021-08-07 RX ADMIN — IOPAMIDOL 100 ML: 755 INJECTION, SOLUTION INTRAVENOUS at 01:39

## 2021-08-07 RX ADMIN — DIAZEPAM 5 MG: 5 TABLET ORAL at 00:09

## 2021-08-07 RX ADMIN — MORPHINE SULFATE 4 MG: 2 INJECTION, SOLUTION INTRAMUSCULAR; INTRAVENOUS at 00:15

## 2021-08-07 NOTE — DISCHARGE INSTRUCTIONS
It was a pleasure taking care of you in our Emergency Department today. We know that when you come to [facility], you are entrusting us with your health, comfort, and safety. Our physicians and nurses honor that trust, and truly appreciate the opportunity to care for you and your loved ones. We also value your feedback. If you receive a survey about your Emergency Department experience today, please fill it out. We care about our patients' feedback, and we listen to what you have to say. Thank you! Dr. Alex uRshing MD.      _________________________________________________________________________  I have also included a copy of all your lab results and/or radiologic studies so you can have them easily available at your follow-up visit. We hope you feel better and please do not hesitate to contact the ED if you have any questions at all. Vitals:    08/06/21 2129   BP: 97/83   Pulse: 87   Temp: 98 °F (36.7 °C)   Resp: 18   Height: 5' 2\" (1.575 m)   Weight: (!) 181 kg (399 lb 0.5 oz)   SpO2: 94%       Recent Results (from the past 12 hour(s))   CBC WITH AUTOMATED DIFF    Collection Time: 08/07/21 12:07 AM   Result Value Ref Range    WBC 5.6 3.6 - 11.0 K/uL    RBC 3.88 3.80 - 5.20 M/uL    HGB 10.7 (L) 11.5 - 16.0 g/dL    HCT 34.6 (L) 35.0 - 47.0 %    MCV 89.2 80.0 - 99.0 FL    MCH 27.6 26.0 - 34.0 PG    MCHC 30.9 30.0 - 36.5 g/dL    RDW 14.3 11.5 - 14.5 %    PLATELET 605 284 - 277 K/uL    MPV 11.4 8.9 - 12.9 FL    NRBC 0.0 0  WBC    ABSOLUTE NRBC 0.00 0.00 - 0.01 K/uL    NEUTROPHILS 44 32 - 75 %    LYMPHOCYTES 43 12 - 49 %    MONOCYTES 10 5 - 13 %    EOSINOPHILS 3 0 - 7 %    BASOPHILS 0 0 - 1 %    IMMATURE GRANULOCYTES 0 0.0 - 0.5 %    ABS. NEUTROPHILS 2.4 1.8 - 8.0 K/UL    ABS. LYMPHOCYTES 2.4 0.8 - 3.5 K/UL    ABS. MONOCYTES 0.5 0.0 - 1.0 K/UL    ABS. EOSINOPHILS 0.2 0.0 - 0.4 K/UL    ABS. BASOPHILS 0.0 0.0 - 0.1 K/UL    ABS. IMM.  GRANS. 0.0 0.00 - 0.04 K/UL DF AUTOMATED     METABOLIC PANEL, COMPREHENSIVE    Collection Time: 08/07/21 12:07 AM   Result Value Ref Range    Sodium 138 136 - 145 mmol/L    Potassium 4.2 3.5 - 5.1 mmol/L    Chloride 107 97 - 108 mmol/L    CO2 27 21 - 32 mmol/L    Anion gap 4 (L) 5 - 15 mmol/L    Glucose 90 65 - 100 mg/dL    BUN 19 6 - 20 MG/DL    Creatinine 1.06 (H) 0.55 - 1.02 MG/DL    BUN/Creatinine ratio 18 12 - 20      GFR est AA >60 >60 ml/min/1.73m2    GFR est non-AA 52 (L) >60 ml/min/1.73m2    Calcium 8.5 8.5 - 10.1 MG/DL    Bilirubin, total 0.5 0.2 - 1.0 MG/DL    ALT (SGPT) 17 12 - 78 U/L    AST (SGOT) 10 (L) 15 - 37 U/L    Alk. phosphatase 89 45 - 117 U/L    Protein, total 7.5 6.4 - 8.2 g/dL    Albumin 3.5 3.5 - 5.0 g/dL    Globulin 4.0 2.0 - 4.0 g/dL    A-G Ratio 0.9 (L) 1.1 - 2.2     SAMPLES BEING HELD    Collection Time: 08/07/21 12:07 AM   Result Value Ref Range    SAMPLES BEING HELD SST,BL     COMMENT        Add-on orders for these samples will be processed based on acceptable specimen integrity and analyte stability, which may vary by analyte.    GLUCOSE, POC    Collection Time: 08/07/21 12:12 AM   Result Value Ref Range    Glucose (POC) 77 65 - 117 mg/dL    Performed by Jennifer WINSLOW    URINALYSIS W/ REFLEX CULTURE    Collection Time: 08/07/21  1:12 AM    Specimen: Urine   Result Value Ref Range    Color YELLOW/STRAW      Appearance CLEAR CLEAR      Specific gravity 1.017 1.003 - 1.030      pH (UA) 5.5 5.0 - 8.0      Protein Negative NEG mg/dL    Glucose Negative NEG mg/dL    Ketone Negative NEG mg/dL    Bilirubin Negative NEG      Blood Negative NEG      Urobilinogen 0.2 0.2 - 1.0 EU/dL    Nitrites Negative NEG      Leukocyte Esterase Negative NEG      WBC 0-4 0 - 4 /hpf    RBC 0-5 0 - 5 /hpf    Epithelial cells FEW FEW /lpf    Bacteria Negative NEG /hpf    UA:UC IF INDICATED CULTURE NOT INDICATED BY UA RESULT CNI      Hyaline cast 0-2 0 - 5 /lpf       CTA ABD ART W RUNOFF W WO CONT         CT HEAD WO CONT   Final Result   No acute findings. CT Results  (Last 48 hours)                 08/07/21 0231  CTA ABD ART W RUNOFF W WO CONT Preliminary result    Narrative:  *PRELIMINARY REPORT*       A lateral three-vessel runoff. Atherosclerotic changes in the abdomen without   aneurysm or dissection and normal enhancement major branches. Hiatal hernia and   colonic diverticulosis are incidentally noted. Preliminary report was provided by Dr. Morena Santiago, the on-call radiologist, at   02:44       Final report to follow. *END PRELIMINARY REPORT*                               08/06/21 2321  CT HEAD WO CONT Final result    Impression:  No acute findings. Narrative:  EXAM: CT HEAD WO CONT       INDICATION: Right leg weakness. COMPARISON: None. CONTRAST: None. TECHNIQUE: Unenhanced CT of the head was performed using 5 mm images. Brain and   bone windows were generated. Coronal and sagittal reformats. CT dose reduction   was achieved through use of a standardized protocol tailored for this   examination and automatic exposure control for dose modulation. FINDINGS:   The ventricles and sulci are normal in size, shape and configuration. . There is   no significant white matter disease. There is no intracranial hemorrhage,   extra-axial collection, or mass effect. The basilar cisterns are open. No CT   evidence of acute infarct. The bone windows demonstrate no abnormalities. The visualized portions of the   paranasal sinuses and mastoid air cells are clear.

## 2021-08-07 NOTE — ED PROVIDER NOTES
EMERGENCY DEPARTMENT HISTORY AND PHYSICAL EXAM      Please note that this dictation was completed with the assistance of \"Dragon\", the computer voice recognition software. Quite often unanticipated grammatical, syntax, homophones, and other interpretive errors are inadvertently transcribed by the computer software. Please disregard these errors and any errors that have escaped final proofreading. Thank you. Patient Name: Yen Orozco  : 1955  MRN: [id number]  History of Presenting Illness     Chief Complaint   Patient presents with    Leg Pain     Pt came via EMS for c/c of right leg pain. Pt was seen in the ER today for the same. Ultra sound study was performed today. History Provided By: Patient and EMS    HPI: Yen Orozco, 78 y.o. female with past medical history as documented below presents to the ED with c/o of one week of moderate to severe right leg pain. Pt was seen earlier today and had ultrasound done that showed a Baker's cyst and discharged on narcotics. Pt states pain is still persistent and unbearable. Denies new trauma. Denies numbness or weakness. Denies saddle anesthesia, loss of bladder or bowel dysfunction. Pt denies any other exacerbating or ameliorating factors. Additionally, pt specifically denies any recent fever, chills, headache, nausea, vomiting, abdominal pain, CP, SOB, lightheadedness, dizziness, numbness, weakness, lower extremity swelling, heart palpitations, urinary sxs, diarrhea, constipation, melena, hematochezia, cough, or congestion. There are no other complaints, changes or physical findings pertinent to the HPI at this time. PCP: Inés Cabezas MD    Past History   Past Medical History:  Past Medical History:   Diagnosis Date    Arthritis     chronic pain related arthritis    Bipolar 1 disorder ([facility] [address]     GERD (gastroesophageal reflux disease)     Hypertension     Other ill-defined conditions(799.89)     hyperlipidemia.  Stomach abcess    Psychiatric disorder     panic attacks    Sleep apnea     Thromboembolus ([address]     last year L leg    Unspecified sleep apnea        Past Surgical History:  Past Surgical History:   Procedure Laterality Date    HX CHOLECYSTECTOMY      HX GI      gallbladder removed 2/2010    HX HYSTERECTOMY         Family History:  Family History   Problem Relation Age of Onset    Heart Disease Mother     Diabetes Mother     Arthritis-osteo Mother     High Cholesterol Mother     Heart Attack Sister     Diabetes Sister     Heart Disease Sister     Diabetes Sister     Arthritis-osteo Sister     High Cholesterol Sister     Schizophrenia Paternal Grandmother     Drug Abuse Sister        Social History:  Social History     Tobacco Use    Smoking status: Never Smoker    Smokeless tobacco: Never Used   Substance Use Topics    Alcohol use: No    Drug use: No       Allergies:  No Known Allergies    Current Medications:  No current facility-administered medications on file prior to encounter. Current Outpatient Medications on File Prior to Encounter   Medication Sig Dispense Refill    HYDROcodone-acetaminophen (Norco) 5-325 mg per tablet Take 1 Tablet by mouth every six (6) hours as needed for Pain for up to 3 days. Max Daily Amount: 4 Tablets. 10 Tablet 0    simvastatin (ZOCOR) 20 mg tablet TAKE 1 TABLET BY MOUTH EVERY NIGHT 90 Tablet 1    apixaban (Eliquis) 5 mg tablet TAKE 1 TABLET BY MOUTH TWICE DAILY 60 Tablet 5    omeprazole (PRILOSEC) 40 mg capsule TAKE 1 CAPSULE BY MOUTH DAILY 90 Capsule 0    Blood-Glucose Meter monitoring kit Check blood glucose daily. 1 Kit 0    glucose blood VI test strips (ASCENSIA AUTODISC VI, ONE TOUCH ULTRA TEST VI) strip Check blood glucose daily. 100 Strip 1    lancets misc Check blood glucose daily. 1 Each 11    oxybutynin chloride XL (DITROPAN XL) 10 mg CR tablet Take 1 Tablet by mouth daily.  30 Tablet 2    lisinopriL (PRINIVIL, ZESTRIL) 20 mg tablet TAKE 2 TABLETS BY MOUTH DAILY 180 Tablet 1    methocarbamoL (Robaxin-750) 750 mg tablet Take 1 Tablet by mouth four (4) times daily as needed for Muscle Spasm(s). (Patient not taking: Reported on 6/23/2021) 20 Tablet 0    potassium chloride (Klor-Con) 20 mEq pack Take 1 Packet by mouth daily. 3 Packet 0    magnesium chloride (Slow-Mag) 71.5 mg tablet Take 2 Tablets by mouth daily. (Patient not taking: Reported on 6/23/2021) 4 Tablet 0    Nystatin, Bulk, 1 billion unit powd 1 Dose by Does Not Apply route two (2) times a day. (Patient not taking: Reported on 6/23/2021) 1 Each 0    lidocaine 4 % patch 2 Patches by TransDERmal route every twelve (12) hours every twelve (12) hours. (Patient not taking: Reported on 6/23/2021) 20 Patch 0    diclofenac (SOLARAZE) 3 % topical gel Apply  to affected area two (2) times a day. (Patient not taking: Reported on 6/23/2021) 100 g 0    Shower Chair XX nishant Patient will use the shower chair to reduce her risk of falling. Patient has poor mobility due to leg pain. (Patient not taking: Reported on 6/23/2021) 1 Each 0    Walker misc Patient will use the shower chair to reduce her risk of falling. Patient has poor mobility due to leg pain. 1 Each 0    silver sulfADIAZINE (SILVADENE) 1 % topical cream Apply  to affected area daily. (Patient not taking: Reported on 6/23/2021) 50 g 2    foam bandage (Allevyn Gentle Border Multisit) 6 3/4 X 7 1/16 \" bndg Apply to affected area daily. (Patient not taking: Reported on 6/23/2021) 2 Box 2    bacitracin-polymyxin b (Polysporin) 500-10,000 unit/gram ointment Apply  to affected area two (2) times a day. (Patient not taking: Reported on 6/23/2021) 15 g 0    nystatin (MYCOSTATIN) topical cream Apply  to affected area two (2) times a day. (Patient not taking: Reported on 6/23/2021) 15 g 0    zolpidem (AMBIEN) 10 mg tablet TK 1 T PO HS PRF INSOMNIA      furosemide (LASIX) 40 mg tablet Take 40 mg by mouth daily.       traZODone (DESYREL) 50 mg tablet Take 50 mg by mouth daily as needed for Sleep. at bedtime (Patient not taking: Reported on 6/23/2021)      LORazepam (ATIVAN) 1 mg tablet Take 1 mg by mouth two (2) times daily as needed for Anxiety.  naloxone (NARCAN) 4 mg/actuation nasal spray Use 1 spray intranasally, then discard. Repeat with new spray every 2 min as needed for opioid overdose symptoms, alternating nostrils. (Patient not taking: Reported on 6/23/2021) 1 Each 1    hydrocortisone (CORTAID) 0.5 % topical cream Apply  to affected area two (2) times a day. use thin layer (Patient not taking: Reported on 6/23/2021) 30 g 0    QUEtiapine (SEROQUEL) 200 mg tablet TK 1 T PO HS PRF INSOMNIA  0    DULoxetine (CYMBALTA) 60 mg capsule Take 1 Cap by mouth daily. (Patient not taking: Reported on 6/23/2021) 30 Cap 1    potassium chloride SR (KLOR-CON 10) 10 mEq tablet TAKE 2 TABLETS BY MOUTH DAILY (Patient not taking: Reported on 6/23/2021) 180 Tab 1     Review of Systems   A complete ROS was reviewed by me today and was negative, unless otherwise specified below:  Review of Systems   Constitutional: Negative. Negative for chills and fever. HENT: Negative. Negative for congestion and sore throat. Eyes: Negative. Respiratory: Negative. Negative for cough, chest tightness, shortness of breath and wheezing. Cardiovascular: Negative. Negative for chest pain, palpitations and leg swelling. Gastrointestinal: Negative. Negative for abdominal distention, abdominal pain, blood in stool, constipation, diarrhea, nausea and vomiting. Endocrine: Negative. Genitourinary: Negative. Negative for dysuria, flank pain, frequency, hematuria and urgency. Musculoskeletal: Positive for arthralgias and myalgias. Negative for back pain. Skin: Negative. Negative for color change and rash. Neurological: Negative. Negative for dizziness, syncope, speech difficulty, weakness, light-headedness, numbness and headaches. Hematological: Negative. Psychiatric/Behavioral: Negative. Negative for confusion and self-injury. The patient is not nervous/anxious. All other systems reviewed and are negative. Physical Exam   Physical Exam  Vitals and nursing note reviewed. Constitutional:       General: She is not in acute distress. Appearance: She is well-developed. She is not diaphoretic. HENT:      Head: Normocephalic and atraumatic. Mouth/Throat:      Pharynx: No oropharyngeal exudate. Eyes:      Conjunctiva/sclera: Conjunctivae normal.   Cardiovascular:      Rate and Rhythm: Normal rate and regular rhythm. Heart sounds: Normal heart sounds. Pulmonary:      Effort: Pulmonary effort is normal. No respiratory distress. Breath sounds: Normal breath sounds. No wheezing or rales. Chest:      Chest wall: No tenderness. Abdominal:      General: Bowel sounds are normal. There is no distension. Palpations: Abdomen is soft. There is no mass. Tenderness: There is no abdominal tenderness. There is no guarding or rebound. Musculoskeletal:         General: Tenderness (TTP right popliteal fossa without bruit or pulsatile mass, no overlying erythema, foot well perfused, NVI) present. Normal range of motion. Cervical back: Normal range of motion. Skin:     General: Skin is warm. Neurological:      Mental Status: She is alert and oriented to person, place, and time. Cranial Nerves: No cranial nerve deficit. Motor: No abnormal muscle tone. Diagnostic Study Results     Labs -   I have personally reviewed and interpreted all available laboratory results.    Recent Results (from the past 24 hour(s))   CBC WITH AUTOMATED DIFF    Collection Time: 08/07/21 12:07 AM   Result Value Ref Range    WBC 5.6 3.6 - 11.0 K/uL    RBC 3.88 3.80 - 5.20 M/uL    HGB 10.7 (L) 11.5 - 16.0 g/dL    HCT 34.6 (L) 35.0 - 47.0 %    MCV 89.2 80.0 - 99.0 FL    MCH 27.6 26.0 - 34.0 PG    MCHC 30.9 30.0 - 36.5 g/dL    RDW 14.3 11.5 - 14.5 %    PLATELET 678 331 - 230 K/uL    MPV 11.4 8.9 - 12.9 FL    NRBC 0.0 0  WBC    ABSOLUTE NRBC 0.00 0.00 - 0.01 K/uL    NEUTROPHILS 44 32 - 75 %    LYMPHOCYTES 43 12 - 49 %    MONOCYTES 10 5 - 13 %    EOSINOPHILS 3 0 - 7 %    BASOPHILS 0 0 - 1 %    IMMATURE GRANULOCYTES 0 0.0 - 0.5 %    ABS. NEUTROPHILS 2.4 1.8 - 8.0 K/UL    ABS. LYMPHOCYTES 2.4 0.8 - 3.5 K/UL    ABS. MONOCYTES 0.5 0.0 - 1.0 K/UL    ABS. EOSINOPHILS 0.2 0.0 - 0.4 K/UL    ABS. BASOPHILS 0.0 0.0 - 0.1 K/UL    ABS. IMM. GRANS. 0.0 0.00 - 0.04 K/UL    DF AUTOMATED     METABOLIC PANEL, COMPREHENSIVE    Collection Time: 08/07/21 12:07 AM   Result Value Ref Range    Sodium 138 136 - 145 mmol/L    Potassium 4.2 3.5 - 5.1 mmol/L    Chloride 107 97 - 108 mmol/L    CO2 27 21 - 32 mmol/L    Anion gap 4 (L) 5 - 15 mmol/L    Glucose 90 65 - 100 mg/dL    BUN 19 6 - 20 MG/DL    Creatinine 1.06 (H) 0.55 - 1.02 MG/DL    BUN/Creatinine ratio 18 12 - 20      GFR est AA >60 >60 ml/min/1.73m2    GFR est non-AA 52 (L) >60 ml/min/1.73m2    Calcium 8.5 8.5 - 10.1 MG/DL    Bilirubin, total 0.5 0.2 - 1.0 MG/DL    ALT (SGPT) 17 12 - 78 U/L    AST (SGOT) 10 (L) 15 - 37 U/L    Alk. phosphatase 89 45 - 117 U/L    Protein, total 7.5 6.4 - 8.2 g/dL    Albumin 3.5 3.5 - 5.0 g/dL    Globulin 4.0 2.0 - 4.0 g/dL    A-G Ratio 0.9 (L) 1.1 - 2.2     SAMPLES BEING HELD    Collection Time: 08/07/21 12:07 AM   Result Value Ref Range    SAMPLES BEING HELD SST,BL     COMMENT        Add-on orders for these samples will be processed based on acceptable specimen integrity and analyte stability, which may vary by analyte.    GLUCOSE, POC    Collection Time: 08/07/21 12:12 AM   Result Value Ref Range    Glucose (POC) 77 65 - 117 mg/dL    Performed by Adis WINSLOW    URINALYSIS W/ REFLEX CULTURE    Collection Time: 08/07/21  1:12 AM    Specimen: Urine   Result Value Ref Range    Color YELLOW/STRAW      Appearance CLEAR CLEAR      Specific gravity 1.017 1.003 - 1.030      pH (UA) 5.5 5.0 - 8.0      Protein Negative NEG mg/dL    Glucose Negative NEG mg/dL    Ketone Negative NEG mg/dL    Bilirubin Negative NEG      Blood Negative NEG      Urobilinogen 0.2 0.2 - 1.0 EU/dL    Nitrites Negative NEG      Leukocyte Esterase Negative NEG      WBC 0-4 0 - 4 /hpf    RBC 0-5 0 - 5 /hpf    Epithelial cells FEW FEW /lpf    Bacteria Negative NEG /hpf    UA:UC IF INDICATED CULTURE NOT INDICATED BY UA RESULT CNI      Hyaline cast 0-2 0 - 5 /lpf       Radiologic Studies -   I have personally reviewed and interpreted all available imaging studies and agree with radiology interpretation and report. CTA ABD ART W RUNOFF W WO CONT         CT HEAD WO CONT   Final Result   No acute findings. CT Results  (Last 48 hours)               08/07/21 0231  CTA ABD ART W RUNOFF W WO CONT Preliminary result    Narrative:  *PRELIMINARY REPORT*       A lateral three-vessel runoff. Atherosclerotic changes in the abdomen without   aneurysm or dissection and normal enhancement major branches. Hiatal hernia and   colonic diverticulosis are incidentally noted. Preliminary report was provided by Dr. Carolina Rashid, the on-call radiologist, at   02:44       Final report to follow. *END PRELIMINARY REPOR*                               08/06/21 2321  CT HEAD WO CONT Final result    Impression:  No acute findings. Narrative:  EXAM: CT HEAD WO CONT       INDICATION: Right leg weakness. COMPARISON: None. CONTRAST: None. TECHNIQUE: Unenhanced CT of the head was performed using 5 mm images. Brain and   bone windows were generated. Coronal and sagittal reformats. CT dose reduction   was achieved through use of a standardized protocol tailored for this   examination and automatic exposure control for dose modulation. FINDINGS:   The ventricles and sulci are normal in size, shape and configuration. . There is   no significant white matter disease.  There is no intracranial hemorrhage, extra-axial collection, or mass effect. The basilar cisterns are open. No CT   evidence of acute infarct. The bone windows demonstrate no abnormalities. The visualized portions of the   paranasal sinuses and mastoid air cells are clear. CXR Results  (Last 48 hours)    None          Medical Decision Making   I reviewed the vital signs, available nursing notes, past medical history, past surgical history, family history and social history. Vital Signs-Reviewed the patient's vital signs. Patient Vitals for the past 24 hrs:   Temp Pulse Resp BP SpO2   08/07/21 0300 97.9 °F (36.6 °C) 62 18 105/66 94 %   08/07/21 0100 97.9 °F (36.6 °C) 80  110/68 94 %   08/06/21 2300 98 °F (36.7 °C)   128/70 95 %   08/06/21 2129 98 °F (36.7 °C) 87 18 97/83 94 %     Pulse Oximetry Analysis - 94% on RA    Cardiac Monitor:   Rate: 87 bpm  The cardiac monitor revealed the following rhythm as interpreted by me: Normal Sinus Rhythm    The cardiac monitor was ordered secondary to the patient's history of leg pain and to monitor the patient for dysrhythmia    Records Reviewed: Nursing Notes, Old Medical Records, Previous electrocardiograms, Previous Radiology Studies and Previous Laboratory Studies    Provider Notes (Medical Decision Making):   Pt presents with acute right leg pain and swelling; stable vitals; PMS intact in affected limb. DDx: lymphedema, muscle strain vs sprain. The pt is unlikely to have DVT. There is no recent travel, h/o PE/DVT, neg callie, no hormone use, non-smoker. Will rule out with duplex U/S. There is no trauma, deformity to warrant x-ray. The leg is not cold to touch, there is strong peripheral pulse, no paralysis or parasthesia, no pallor to suggest compartment syndrome. There are no rashes, excessive warmth, fever, induration of skin to suggest cellulitis/osteo. The pt is motor and sensory intact. Will provide symptomatic treatment and reassess.  Anticipate discharge home with close PCP follow-up. ED Course:   I am the first physician for this patient's ED visit today. Initial assessment performed. I discussed presenting problems, concerns and my formulated plan for today's visit with the patient and any available family members at bedside. I encouraged them to ask questions as they arise throughout the visit. Social History     Tobacco Use    Smoking status: Never Smoker    Smokeless tobacco: Never Used   Substance Use Topics    Alcohol use: No    Drug use: No       I reviewed our electronic medical record system for any past medical records that were available that may contribute to the patient's current condition, the nursing notes and vital signs from today's visit. ED Orders Placed :  Orders Placed This Encounter    CT HEAD WO CONT    CTA ABD ART W RUNOFF W WO CONT    CBC WITH AUTOMATED DIFF    METABOLIC PANEL, COMPREHENSIVE    SAMPLES BEING HELD    URINALYSIS W/ REFLEX CULTURE    GLUCOSE, POC    morphine injection 4 mg    diazePAM (VALIUM) tablet 5 mg    fentaNYL citrate (PF) injection 50 mcg    DISCONTD: diazePAM (VALIUM) injection 2 mg    gabapentin (NEURONTIN) capsule 300 mg    iopamidoL (ISOVUE-370) 76 % injection 100 mL    gabapentin (NEURONTIN) 300 mg capsule       ED Medications Administered:  Medications   morphine injection 4 mg (4 mg IntraVENous Given 8/7/21 0015)   diazePAM (VALIUM) tablet 5 mg (5 mg Oral Given 8/7/21 0009)   fentaNYL citrate (PF) injection 50 mcg (50 mcg IntraVENous Given 8/7/21 0119)   gabapentin (NEURONTIN) capsule 300 mg (300 mg Oral Given 8/7/21 0119)   iopamidoL (ISOVUE-370) 76 % injection 100 mL (100 mL IntraVENous Given 8/7/21 0139)         Progress Note:  CT's unremarkable, pain improved, ambulating at baseline, will DC home    Progress Note:  Patient has been reassessed and reports feeling better and symptoms have improved significantly after ED treatment.  Ekaterina Freire's final labs and imaging have been reviewed with her and available family and/or caregiver. They have been counseled regarding her diagnosis. She verbally conveys understanding and agreement of the signs, symptoms, diagnosis, treatment and prognosis and additionally agrees to follow up as recommended with Dr. Cathy Evans MD and/or specialist in 24 - 48 hours. She also agrees with the care-plan we created together and conveys that all of her questions have been answered. I have also put together a packet of discharge instructions for her that include: 1) educational information regarding their diagnosis, 2) how to care for their diagnosis at home, as well a 3) list of reasons why they would want to return to the ED prior to their follow-up appointment should the patient's condition change or symptoms worsen. I have answered all questions to the patient's satisfaction. Strict return precautions given. She both understood and agreed with plan as discussed. Vital signs stable for discharge. Disposition:   DISCHARGE  The pt is ready for discharge. The pt's signs, symptoms, diagnosis, and discharge instructions have been discussed and pt has conveyed their understanding. The pt is to follow up as recommended or return to ER should their symptoms worsen. Plan has been discussed and pt is in agreement. Plan:  1. Return precautions as discussed with patient and available family and/or caregiver. 2.   Discharge Medication List as of 8/7/2021  3:51 AM      START taking these medications    Details   gabapentin (NEURONTIN) 300 mg capsule Take 1 Capsule by mouth three (3) times daily. Max Daily Amount: 900 mg., Normal, Disp-10 Capsule, R-0         CONTINUE these medications which have NOT CHANGED    Details   HYDROcodone-acetaminophen (Norco) 5-325 mg per tablet Take 1 Tablet by mouth every six (6) hours as needed for Pain for up to 3 days.  Max Daily Amount: 4 Tablets., Normal, Disp-10 Tablet, R-0      simvastatin (ZOCOR) 20 mg tablet TAKE 1 TABLET BY MOUTH EVERY NIGHT, Normal, Disp-90 Tablet, R-1      apixaban (Eliquis) 5 mg tablet TAKE 1 TABLET BY MOUTH TWICE DAILY, Normal, Disp-60 Tablet, R-5      omeprazole (PRILOSEC) 40 mg capsule TAKE 1 CAPSULE BY MOUTH DAILY, Normal, Disp-90 Capsule, R-0      Blood-Glucose Meter monitoring kit Check blood glucose daily. , Normal, Disp-1 Kit, R-0      glucose blood VI test strips (ASCENSIA AUTODISC VI, ONE TOUCH ULTRA TEST VI) strip Check blood glucose daily. , Normal, Disp-100 Strip, R-1      lancets misc Check blood glucose daily. , Normal, Disp-1 Each, R-11      oxybutynin chloride XL (DITROPAN XL) 10 mg CR tablet Take 1 Tablet by mouth daily. , Normal, Disp-30 Tablet, R-2      lisinopriL (PRINIVIL, ZESTRIL) 20 mg tablet TAKE 2 TABLETS BY MOUTH DAILY, Normal, Disp-180 Tablet, R-1      methocarbamoL (Robaxin-750) 750 mg tablet Take 1 Tablet by mouth four (4) times daily as needed for Muscle Spasm(s). , Normal, Disp-20 Tablet, R-0      potassium chloride (Klor-Con) 20 mEq pack Take 1 Packet by mouth daily. , Normal, Disp-3 Packet, R-0      magnesium chloride (Slow-Mag) 71.5 mg tablet Take 2 Tablets by mouth daily. , Normal, Disp-4 Tablet, R-0      Nystatin, Bulk, 1 billion unit powd 1 Dose by Does Not Apply route two (2) times a day., Normal, Disp-1 Each, R-0      lidocaine 4 % patch 2 Patches by TransDERmal route every twelve (12) hours every twelve (12) hours. , Normal, Disp-20 Patch, R-0      diclofenac (SOLARAZE) 3 % topical gel Apply  to affected area two (2) times a day., Normal, Disp-100 g, R-0      Shower Chair XX nishant Patient will use the shower chair to reduce her risk of falling. Patient has poor mobility due to leg pain. , Print, Disp-1 Each,R-0      Walker misc Patient will use the shower chair to reduce her risk of falling. Patient has poor mobility due to leg pain., Normal, Disp-1 Each,R-0      silver sulfADIAZINE (SILVADENE) 1 % topical cream Apply  to affected area daily. , Normal, Disp-50 g,R-2      foam bandage (Allevyn Gentle Border Multisit) 6 3/4 X 7 1/16 \" bndg Apply to affected area daily. , Normal, Disp-2 Box,R-2      bacitracin-polymyxin b (Polysporin) 500-10,000 unit/gram ointment Apply  to affected area two (2) times a day., Normal, Disp-15 g,R-0      nystatin (MYCOSTATIN) topical cream Apply  to affected area two (2) times a day., Normal, Disp-15 g,R-0      zolpidem (AMBIEN) 10 mg tablet TK 1 T PO HS PRF INSOMNIA, Historical Med      furosemide (LASIX) 40 mg tablet Take 40 mg by mouth daily. , Historical Med      traZODone (DESYREL) 50 mg tablet Take 50 mg by mouth daily as needed for Sleep. at bedtime, Historical Med      LORazepam (ATIVAN) 1 mg tablet Take 1 mg by mouth two (2) times daily as needed for Anxiety. , Historical Med      naloxone (NARCAN) 4 mg/actuation nasal spray Use 1 spray intranasally, then discard. Repeat with new spray every 2 min as needed for opioid overdose symptoms, alternating nostrils. , Normal, Disp-1 Each,R-1      hydrocortisone (CORTAID) 0.5 % topical cream Apply  to affected area two (2) times a day. use thin layer, Normal, Disp-30 g,R-0      QUEtiapine (SEROQUEL) 200 mg tablet TK 1 T PO HS PRF INSOMNIA, Historical Med, R-0      DULoxetine (CYMBALTA) 60 mg capsule Take 1 Cap by mouth daily. , Normal, Disp-30 Cap, R-1      potassium chloride SR (KLOR-CON 10) 10 mEq tablet TAKE 2 TABLETS BY MOUTH DAILY, Normal, Disp-180 Tab, R-1           3.    Follow-up Information     Follow up With Specialties Details Why Contact Info    [facility] EMERGENCY DEPT Emergency Medicine  As needed, If symptoms worsen [address] MD Family Medicine  As needed, If symptoms worsen [address] [facility]  [phone]      [facility]   As needed, If symptoms worsen [address]  [phone]          Instructed to return to ED if worse  Diagnosis   Clinical Impression:  1. Synovial cyst of right popliteal space    2. Acute leg pain, right    3. Neuropathic pain        Attestation:  Sia Oliva MD, am the attending of record for this patient. I personally performed the services described in this documentation on this date, 8/6/2021 for patient, Asa Gale. I have reviewed the chart and verified that the record is accurate and complete.

## 2021-08-09 NOTE — TELEPHONE ENCOUNTER
#[phone]  Pt states she has a bigger cyst on the back of her leg. Pt is in such pain and can barely walk. Pt has been to ED twice and told to f/u with her pcp. Pt needs to know what to do as she is crying uncontrollably. I could barely understand pt. Please call as soon as possible.

## 2021-08-09 NOTE — TELEPHONE ENCOUNTER
Identified patient 2 identifiers verified. Patient called she is in pain from cyst, was told to be seen by PCP. There are no live appointments available. Per Dr. Corey Okeefe last week patient was left a message to go to [address] [place] [address] [facility]. This was explained to patient.

## 2021-08-10 ENCOUNTER — HOSPITAL ENCOUNTER (EMERGENCY)
Age: 66
Discharge: HOME | End: 2021-08-10
Attending: EMERGENCY MEDICINE
Payer: MEDICARE

## 2021-08-10 ENCOUNTER — TELEPHONE (OUTPATIENT)
Dept: INTERNAL MEDICINE CLINIC | Age: 66
End: 2021-08-10

## 2021-08-10 ENCOUNTER — APPOINTMENT (OUTPATIENT)
Dept: GENERAL RADIOLOGY | Age: 66
End: 2021-08-10
Attending: EMERGENCY MEDICINE
Payer: MEDICARE

## 2021-08-10 VITALS
OXYGEN SATURATION: 97 % | HEIGHT: 61 IN | WEIGHT: 293 LBS | TEMPERATURE: 98.4 F | DIASTOLIC BLOOD PRESSURE: 64 MMHG | HEART RATE: 84 BPM | RESPIRATION RATE: 18 BRPM | SYSTOLIC BLOOD PRESSURE: 158 MMHG | BODY MASS INDEX: 55.32 KG/M2

## 2021-08-10 DIAGNOSIS — R07.9 ACUTE CHEST PAIN: Primary | ICD-10-CM

## 2021-08-10 DIAGNOSIS — J20.9 ACUTE BRONCHITIS, UNSPECIFIED ORGANISM: ICD-10-CM

## 2021-08-10 LAB
ALBUMIN SERPL-MCNC: 3.7 G/DL (ref 3.5–5)
ALBUMIN/GLOB SERPL: 0.9 {RATIO} (ref 1.1–2.2)
ALP SERPL-CCNC: 93 U/L (ref 45–117)
ALT SERPL-CCNC: 19 U/L (ref 12–78)
ANION GAP SERPL CALC-SCNC: 3 MMOL/L (ref 5–15)
AST SERPL-CCNC: 16 U/L (ref 15–37)
BASOPHILS # BLD: 0 K/UL (ref 0–0.1)
BASOPHILS NFR BLD: 0 % (ref 0–1)
BILIRUB SERPL-MCNC: 0.6 MG/DL (ref 0.2–1)
BNP SERPL-MCNC: 27 PG/ML
BUN SERPL-MCNC: 17 MG/DL (ref 6–20)
BUN/CREAT SERPL: 16 (ref 12–20)
CALCIUM SERPL-MCNC: 9.3 MG/DL (ref 8.5–10.1)
CHLORIDE SERPL-SCNC: 105 MMOL/L (ref 97–108)
CK SERPL-CCNC: 151 U/L (ref 26–192)
CO2 SERPL-SCNC: 31 MMOL/L (ref 21–32)
CREAT SERPL-MCNC: 1.06 MG/DL (ref 0.55–1.02)
DIFFERENTIAL METHOD BLD: ABNORMAL
EOSINOPHIL # BLD: 0.1 K/UL (ref 0–0.4)
EOSINOPHIL NFR BLD: 2 % (ref 0–7)
ERYTHROCYTE [DISTWIDTH] IN BLOOD BY AUTOMATED COUNT: 14.5 % (ref 11.5–14.5)
GLOBULIN SER CALC-MCNC: 4.2 G/DL (ref 2–4)
GLUCOSE SERPL-MCNC: 90 MG/DL (ref 65–100)
HCT VFR BLD AUTO: 37.9 % (ref 35–47)
HGB BLD-MCNC: 11.6 G/DL (ref 11.5–16)
IMM GRANULOCYTES # BLD AUTO: 0 K/UL (ref 0–0.04)
IMM GRANULOCYTES NFR BLD AUTO: 1 % (ref 0–0.5)
LYMPHOCYTES # BLD: 2 K/UL (ref 0.8–3.5)
LYMPHOCYTES NFR BLD: 32 % (ref 12–49)
MCH RBC QN AUTO: 27.9 PG (ref 26–34)
MCHC RBC AUTO-ENTMCNC: 30.6 G/DL (ref 30–36.5)
MCV RBC AUTO: 91.1 FL (ref 80–99)
MONOCYTES # BLD: 0.6 K/UL (ref 0–1)
MONOCYTES NFR BLD: 10 % (ref 5–13)
NEUTS SEG # BLD: 3.5 K/UL (ref 1.8–8)
NEUTS SEG NFR BLD: 55 % (ref 32–75)
NRBC # BLD: 0 K/UL (ref 0–0.01)
NRBC BLD-RTO: 0 PER 100 WBC
PLATELET # BLD AUTO: 177 K/UL (ref 150–400)
PMV BLD AUTO: 11.9 FL (ref 8.9–12.9)
POTASSIUM SERPL-SCNC: 4.8 MMOL/L (ref 3.5–5.1)
PROT SERPL-MCNC: 7.9 G/DL (ref 6.4–8.2)
RBC # BLD AUTO: 4.16 M/UL (ref 3.8–5.2)
SODIUM SERPL-SCNC: 139 MMOL/L (ref 136–145)
TROPONIN I SERPL-MCNC: <0.05 NG/ML
WBC # BLD AUTO: 6.4 K/UL (ref 3.6–11)

## 2021-08-10 PROCEDURE — 80053 COMPREHEN METABOLIC PANEL: CPT

## 2021-08-10 PROCEDURE — 71046 X-RAY EXAM CHEST 2 VIEWS: CPT

## 2021-08-10 PROCEDURE — 74011000250 HC RX REV CODE- 250: Performed by: EMERGENCY MEDICINE

## 2021-08-10 PROCEDURE — 36415 COLL VENOUS BLD VENIPUNCTURE: CPT

## 2021-08-10 PROCEDURE — 83880 ASSAY OF NATRIURETIC PEPTIDE: CPT

## 2021-08-10 PROCEDURE — 85025 COMPLETE CBC W/AUTO DIFF WBC: CPT

## 2021-08-10 PROCEDURE — 84484 ASSAY OF TROPONIN QUANT: CPT

## 2021-08-10 PROCEDURE — 99285 EMERGENCY DEPT VISIT HI MDM: CPT

## 2021-08-10 PROCEDURE — 94640 AIRWAY INHALATION TREATMENT: CPT

## 2021-08-10 PROCEDURE — 93005 ELECTROCARDIOGRAM TRACING: CPT

## 2021-08-10 PROCEDURE — 74011250637 HC RX REV CODE- 250/637: Performed by: EMERGENCY MEDICINE

## 2021-08-10 PROCEDURE — 82550 ASSAY OF CK (CPK): CPT

## 2021-08-10 PROCEDURE — 74011250636 HC RX REV CODE- 250/636: Performed by: EMERGENCY MEDICINE

## 2021-08-10 RX ORDER — BENZONATATE 100 MG/1
100 CAPSULE ORAL
Status: COMPLETED | OUTPATIENT
Start: 2021-08-10 | End: 2021-08-10

## 2021-08-10 RX ORDER — ALBUTEROL SULFATE 0.83 MG/ML
5 SOLUTION RESPIRATORY (INHALATION)
Status: COMPLETED | OUTPATIENT
Start: 2021-08-10 | End: 2021-08-10

## 2021-08-10 RX ORDER — DOXYCYCLINE HYCLATE 100 MG
100 TABLET ORAL 2 TIMES DAILY
Qty: 14 TABLET | Refills: 0 | Status: SHIPPED | OUTPATIENT
Start: 2021-08-10 | End: 2021-08-17

## 2021-08-10 RX ORDER — LORAZEPAM 1 MG/1
1 TABLET ORAL
Status: COMPLETED | OUTPATIENT
Start: 2021-08-10 | End: 2021-08-10

## 2021-08-10 RX ORDER — BENZONATATE 100 MG/1
100 CAPSULE ORAL
Qty: 30 CAPSULE | Refills: 0 | Status: SHIPPED | OUTPATIENT
Start: 2021-08-10 | End: 2021-08-17

## 2021-08-10 RX ORDER — ALBUTEROL SULFATE 90 UG/1
2 AEROSOL, METERED RESPIRATORY (INHALATION)
Qty: 1 INHALER | Refills: 0 | Status: SHIPPED | OUTPATIENT
Start: 2021-08-10

## 2021-08-10 RX ORDER — IPRATROPIUM BROMIDE AND ALBUTEROL SULFATE 2.5; .5 MG/3ML; MG/3ML
3 SOLUTION RESPIRATORY (INHALATION)
Status: COMPLETED | OUTPATIENT
Start: 2021-08-10 | End: 2021-08-10

## 2021-08-10 RX ORDER — MECLIZINE HCL 12.5 MG 12.5 MG/1
25 TABLET ORAL
Status: COMPLETED | OUTPATIENT
Start: 2021-08-10 | End: 2021-08-10

## 2021-08-10 RX ADMIN — MECLIZINE 25 MG: 12.5 TABLET ORAL at 22:06

## 2021-08-10 RX ADMIN — ALBUTEROL SULFATE 5 MG: 2.5 SOLUTION RESPIRATORY (INHALATION) at 21:52

## 2021-08-10 RX ADMIN — ALBUTEROL SULFATE 5 MG: 2.5 SOLUTION RESPIRATORY (INHALATION) at 21:45

## 2021-08-10 RX ADMIN — LORAZEPAM 1 MG: 1 TABLET ORAL at 22:05

## 2021-08-10 RX ADMIN — ALBUTEROL SULFATE 5 MG: 2.5 SOLUTION RESPIRATORY (INHALATION) at 21:51

## 2021-08-10 RX ADMIN — IPRATROPIUM BROMIDE AND ALBUTEROL SULFATE 3 ML: .5; 3 SOLUTION RESPIRATORY (INHALATION) at 21:48

## 2021-08-10 RX ADMIN — BENZONATATE 100 MG: 100 CAPSULE ORAL at 22:10

## 2021-08-10 NOTE — TELEPHONE ENCOUNTER
----- Message from Nasreen Larios sent at 8/10/2021  1:41 PM EDT -----  Regarding: Dr. Knowles Abel: [phone]  General Message/Vendor Calls    Caller's first and last name:      Reason for call: Pt is going to ER per pcp and nurse, needs to know what tests need to be done at ER. Callback required yes/no and why: Yes, call back from [place] [address] contact number(s): [phone]      Details to clarify the request: N/a.       Message from [facility]

## 2021-08-10 NOTE — TELEPHONE ENCOUNTER
----- Message from Mark Pickard sent at 8/10/2021  1:41 PM EDT -----  Regarding: Dr. Alia Guzman: [phone]  General Message/Vendor Calls    Caller's first and last name:      Reason for call: Pt is going to ER per pcp and nurse, needs to know what tests need to be done at ER. Callback required yes/no and why: Yes, [place] back [place] [address] contact number(s): [phone]      Details to clarify the request: N/a.       Mark Pickard

## 2021-08-10 NOTE — TELEPHONE ENCOUNTER
Identified patient 2 identifiers verified. Patient accepted an appointment with Dr. Hair Suarez on 8/12/21. Phone call only. Patient sounded SOB over the phone, patient ware to go to ED if symptoms worsen.

## 2021-08-11 LAB
ATRIAL RATE: 89 BPM
CALCULATED P AXIS, ECG09: 33 DEGREES
CALCULATED R AXIS, ECG10: 29 DEGREES
CALCULATED T AXIS, ECG11: 50 DEGREES
DIAGNOSIS, 93000: NORMAL
P-R INTERVAL, ECG05: 160 MS
Q-T INTERVAL, ECG07: 374 MS
QRS DURATION, ECG06: 68 MS
QTC CALCULATION (BEZET), ECG08: 455 MS
VENTRICULAR RATE, ECG03: 89 BPM

## 2021-08-11 NOTE — TELEPHONE ENCOUNTER
----- Message from Domo Pack sent at 8/10/2021  7:21 PM EDT -----  Regarding: Dr. Verena Mo Message/Vendor Calls    Caller's first and last name:  Haider Murphy, Self      Reason for call:  Pt requesting a callback, Pt in hospital      Callback required yes/no and why:  Yes      Best contact number(s):  [phone]      Details to clarify the request:  Pt states she's at [facility] ER, after being told to go to the ER. Pt stated she's having breathing problems. Please contact Pt.     Thanks,  Domo Pack

## 2021-08-11 NOTE — ED NOTES
2120-Patient c/o of \"having a panic attack\" and is requesting a dose of her lorazepam. Dr George Way notified. Orders received.

## 2021-08-11 NOTE — ED PROVIDER NOTES
EMERGENCY DEPARTMENT HISTORY AND PHYSICAL EXAM      Date: 8/10/2021  Patient Name: Yanique Timmons    History of Presenting Illness     Chief Complaint   Patient presents with    Cough     Pt dx with blood clot on lung in Sept.  Pt reports referred by [facility] ED for increase in SOB x 2 days. Pt coughs after 2-3 words in triage.  Shortness of Breath       History Provided By: Patient    HPI: Yanique Timmons, 78 y.o. female presents to the ED with cc of cough and shortness of breath. Patient was diagnosed with pulmonary embolism in September. She is on Eliquis for that. She has been seen in ER frequently since then, for chest pain, leg pain, edema and elevated blood pressure. She was seen on August 5 and 6 for leg pain, and was diagnosed with a Baker's cyst of the right knee. Had increased shortness of breath for the last few days and a cough productive of yellow and green sputum. She did not take her temperature at home, but states she had hot and cold chills since yesterday. She had chest pain this morning which lasted for about an hour. She states that was located in the midsternal region and did not radiate to the arms, neck, jaw or back. That pain resolved spontaneously. She did say that she  drank a lot of water because she thought  her medications were getting stuck in her throat. She had nausea and diaphoresis with her chest pain. She has not been vaccinated against COVID-19. Has had occasional wheezing and dizziness. She states that the dizziness felt like a spinning sensation, and she does have a history of vertigo. She denies tinnitus    There are no other complaints, changes, or physical findings at this time. PCP: King Cynthia MD    No current facility-administered medications on file prior to encounter.      Current Outpatient Medications on File Prior to Encounter   Medication Sig Dispense Refill    gabapentin (NEURONTIN) 300 mg capsule Take 1 Capsule by mouth three (3) times daily. Max Daily Amount: 900 mg. 10 Capsule 0    simvastatin (ZOCOR) 20 mg tablet TAKE 1 TABLET BY MOUTH EVERY NIGHT 90 Tablet 1    apixaban (Eliquis) 5 mg tablet TAKE 1 TABLET BY MOUTH TWICE DAILY 60 Tablet 5    omeprazole (PRILOSEC) 40 mg capsule TAKE 1 CAPSULE BY MOUTH DAILY 90 Capsule 0    Blood-Glucose Meter monitoring kit Check blood glucose daily. 1 Kit 0    glucose blood VI test strips (ASCENSIA AUTODISC VI, ONE TOUCH ULTRA TEST VI) strip Check blood glucose daily. 100 Strip 1    lancets misc Check blood glucose daily. 1 Each 11    oxybutynin chloride XL (DITROPAN XL) 10 mg CR tablet Take 1 Tablet by mouth daily. 30 Tablet 2    lisinopriL (PRINIVIL, ZESTRIL) 20 mg tablet TAKE 2 TABLETS BY MOUTH DAILY 180 Tablet 1    methocarbamoL (Robaxin-750) 750 mg tablet Take 1 Tablet by mouth four (4) times daily as needed for Muscle Spasm(s). (Patient not taking: Reported on 6/23/2021) 20 Tablet 0    potassium chloride (Klor-Con) 20 mEq pack Take 1 Packet by mouth daily. 3 Packet 0    magnesium chloride (Slow-Mag) 71.5 mg tablet Take 2 Tablets by mouth daily. (Patient not taking: Reported on 6/23/2021) 4 Tablet 0    Nystatin, Bulk, 1 billion unit powd 1 Dose by Does Not Apply route two (2) times a day. (Patient not taking: Reported on 6/23/2021) 1 Each 0    lidocaine 4 % patch 2 Patches by TransDERmal route every twelve (12) hours every twelve (12) hours. (Patient not taking: Reported on 6/23/2021) 20 Patch 0    diclofenac (SOLARAZE) 3 % topical gel Apply  to affected area two (2) times a day. (Patient not taking: Reported on 6/23/2021) 100 g 0    Shower Chair XX nishant Patient will use the shower chair to reduce her risk of falling. Patient has poor mobility due to leg pain. (Patient not taking: Reported on 6/23/2021) 1 Each 0    Walker misc Patient will use the shower chair to reduce her risk of falling. Patient has poor mobility due to leg pain.  1 Each 0    silver sulfADIAZINE (SILVADENE) 1 % topical cream Apply  to affected area daily. (Patient not taking: Reported on 6/23/2021) 50 g 2    foam bandage (Allevyn Gentle Border Multisit) 6 3/4 X 7 1/16 \" bndg Apply to affected area daily. (Patient not taking: Reported on 6/23/2021) 2 Box 2    bacitracin-polymyxin b (Polysporin) 500-10,000 unit/gram ointment Apply  to affected area two (2) times a day. (Patient not taking: Reported on 6/23/2021) 15 g 0    nystatin (MYCOSTATIN) topical cream Apply  to affected area two (2) times a day. (Patient not taking: Reported on 6/23/2021) 15 g 0    zolpidem (AMBIEN) 10 mg tablet TK 1 T PO HS PRF INSOMNIA      furosemide (LASIX) 40 mg tablet Take 40 mg by mouth daily.  traZODone (DESYREL) 50 mg tablet Take 50 mg by mouth daily as needed for Sleep. at bedtime (Patient not taking: Reported on 6/23/2021)      LORazepam (ATIVAN) 1 mg tablet Take 1 mg by mouth two (2) times daily as needed for Anxiety.  naloxone (NARCAN) 4 mg/actuation nasal spray Use 1 spray intranasally, then discard. Repeat with new spray every 2 min as needed for opioid overdose symptoms, alternating nostrils. (Patient not taking: Reported on 6/23/2021) 1 Each 1    hydrocortisone (CORTAID) 0.5 % topical cream Apply  to affected area two (2) times a day. use thin layer (Patient not taking: Reported on 6/23/2021) 30 g 0    QUEtiapine (SEROQUEL) 200 mg tablet TK 1 T PO HS PRF INSOMNIA  0    DULoxetine (CYMBALTA) 60 mg capsule Take 1 Cap by mouth daily. (Patient not taking: Reported on 6/23/2021) 30 Cap 1    potassium chloride SR (KLOR-CON 10) 10 mEq tablet TAKE 2 TABLETS BY MOUTH DAILY (Patient not taking: Reported on 6/23/2021) 180 Tab 1       Past History     Past Medical History:  Past Medical History:   Diagnosis Date    Arthritis     chronic pain related arthritis    Bipolar 1 disorder (HCC)     GERD (gastroesophageal reflux disease)     Hypertension     Other ill-defined conditions(799.89)     hyperlipidemia.  Stomach abcess    Psychiatric disorder     panic attacks    Sleep apnea     Thromboembolus ([address]     last year L leg    Unspecified sleep apnea        Past Surgical History:  Past Surgical History:   Procedure Laterality Date    HX CHOLECYSTECTOMY      HX GI      gallbladder removed 2/2010    HX HYSTERECTOMY         Family History:  Family History   Problem Relation Age of Onset    Heart Disease Mother     Diabetes Mother     Arthritis-osteo Mother     High Cholesterol Mother     Heart Attack Sister     Diabetes Sister     Heart Disease Sister     Diabetes Sister     Arthritis-osteo Sister     High Cholesterol Sister     Schizophrenia Paternal Grandmother     Drug Abuse Sister        Social History:  Social History     Tobacco Use    Smoking status: Never Smoker    Smokeless tobacco: Never Used   Substance Use Topics    Alcohol use: No    Drug use: No       Allergies:  No Known Allergies      Review of Systems   Review of Systems   Constitutional: Positive for chills. HENT: Negative for congestion. Eyes: Negative. Respiratory: Positive for cough, shortness of breath and wheezing. Cardiovascular: Positive for chest pain. Gastrointestinal: Negative for abdominal pain. Endocrine: Negative for heat intolerance. Genitourinary: Negative for dysuria. Musculoskeletal: Negative for back pain. Skin: Negative for rash. Allergic/Immunologic: Negative for immunocompromised state. Neurological: Positive for dizziness. Hematological: Does not bruise/bleed easily. Psychiatric/Behavioral: Negative. All other systems reviewed and are negative. Physical Exam   Physical Exam  Vitals and nursing note reviewed. Constitutional:       General: She is not in acute distress. Appearance: She is well-developed. HENT:      Head: Normocephalic. Eyes:      Extraocular Movements: Extraocular movements intact. Cardiovascular:      Rate and Rhythm: Normal rate and regular rhythm.       Heart sounds: Normal heart sounds. Pulmonary:      Effort: Pulmonary effort is normal.      Breath sounds: Normal breath sounds. Chest:      Chest wall: Tenderness present. Abdominal:      General: Bowel sounds are normal.      Palpations: Abdomen is soft. Tenderness: There is no abdominal tenderness. Musculoskeletal:         General: Normal range of motion. Cervical back: Normal range of motion and neck supple. Skin:     General: Skin is warm and dry. Neurological:      General: No focal deficit present. Mental Status: She is alert and oriented to person, place, and time. Psychiatric:         Mood and Affect: Mood normal.         Behavior: Behavior normal.         Diagnostic Study Results     Labs -     Recent Results (from the past 12 hour(s))   EKG, 12 LEAD, INITIAL    Collection Time: 08/10/21  6:17 PM   Result Value Ref Range    Ventricular Rate 89 BPM    Atrial Rate 89 BPM    P-R Interval 160 ms    QRS Duration 68 ms    Q-T Interval 374 ms    QTC Calculation (Bezet) 455 ms    Calculated P Axis 33 degrees    Calculated R Axis 29 degrees    Calculated T Axis 50 degrees    Diagnosis       Normal sinus rhythm  T wave abnormality, consider anterior ischemia  When compared with ECG of 03-JUL-2021 12:44,  ST no longer elevated in Inferior leads  T wave inversion more evident in Anterior leads     CBC WITH AUTOMATED DIFF    Collection Time: 08/10/21  6:23 PM   Result Value Ref Range    WBC 6.4 3.6 - 11.0 K/uL    RBC 4.16 3.80 - 5.20 M/uL    HGB 11.6 11.5 - 16.0 g/dL    HCT 37.9 35.0 - 47.0 %    MCV 91.1 80.0 - 99.0 FL    MCH 27.9 26.0 - 34.0 PG    MCHC 30.6 30.0 - 36.5 g/dL    RDW 14.5 11.5 - 14.5 %    PLATELET 389 922 - 818 K/uL    MPV 11.9 8.9 - 12.9 FL    NRBC 0.0 0  WBC    ABSOLUTE NRBC 0.00 0.00 - 0.01 K/uL    NEUTROPHILS 55 32 - 75 %    LYMPHOCYTES 32 12 - 49 %    MONOCYTES 10 5 - 13 %    EOSINOPHILS 2 0 - 7 %    BASOPHILS 0 0 - 1 %    IMMATURE GRANULOCYTES 1 (H) 0.0 - 0.5 %    ABS. NEUTROPHILS 3.5 1.8 - 8.0 K/UL    ABS. LYMPHOCYTES 2.0 0.8 - 3.5 K/UL    ABS. MONOCYTES 0.6 0.0 - 1.0 K/UL    ABS. EOSINOPHILS 0.1 0.0 - 0.4 K/UL    ABS. BASOPHILS 0.0 0.0 - 0.1 K/UL    ABS. IMM. GRANS. 0.0 0.00 - 0.04 K/UL    DF AUTOMATED     TROPONIN I    Collection Time: 08/10/21  8:49 PM   Result Value Ref Range    Troponin-I, Qt. <0.05 <0.05 ng/mL   CK W/ REFLX CKMB    Collection Time: 08/10/21  8:49 PM   Result Value Ref Range     26 - 269 U/L   METABOLIC PANEL, COMPREHENSIVE    Collection Time: 08/10/21  8:49 PM   Result Value Ref Range    Sodium 139 136 - 145 mmol/L    Potassium 4.8 3.5 - 5.1 mmol/L    Chloride 105 97 - 108 mmol/L    CO2 31 21 - 32 mmol/L    Anion gap 3 (L) 5 - 15 mmol/L    Glucose 90 65 - 100 mg/dL    BUN 17 6 - 20 MG/DL    Creatinine 1.06 (H) 0.55 - 1.02 MG/DL    BUN/Creatinine ratio 16 12 - 20      GFR est AA >60 >60 ml/min/1.73m2    GFR est non-AA 52 (L) >60 ml/min/1.73m2    Calcium 9.3 8.5 - 10.1 MG/DL    Bilirubin, total 0.6 0.2 - 1.0 MG/DL    ALT (SGPT) 19 12 - 78 U/L    AST (SGOT) 16 15 - 37 U/L    Alk. phosphatase 93 45 - 117 U/L    Protein, total 7.9 6.4 - 8.2 g/dL    Albumin 3.7 3.5 - 5.0 g/dL    Globulin 4.2 (H) 2.0 - 4.0 g/dL    A-G Ratio 0.9 (L) 1.1 - 2.2     NT-PRO BNP    Collection Time: 08/10/21  8:49 PM   Result Value Ref Range    NT pro-BNP 27 <125 PG/ML       Radiologic Studies -   XR CHEST PA LAT   Final Result      No acute process. CT Results  (Last 48 hours)    None        CXR Results  (Last 48 hours)               08/10/21 1846  XR CHEST PA LAT Final result    Impression:      No acute process. Narrative:  EXAM:  XR CHEST PA LAT       INDICATION:  Shortness of Breath       COMPARISON:  7/3/2021        FINDINGS:       PA and lateral radiographs of the chest demonstrate clear lungs. There is   borderline cardiomegaly and an uncoiled, mild atherosclerotic aorta. There is no   vascular congestion or pleural fluid.    There are no significant bony   abnormalities. The study is suboptimal because of the patient's body habitus. Medical Decision Making   I am the first provider for this patient. I reviewed the vital signs, available nursing notes, past medical history, past surgical history, family history and social history. Vital Signs-Reviewed the patient's vital signs. Patient Vitals for the past 12 hrs:   Temp Pulse Resp BP SpO2   08/10/21 2030  82  (!) 147/68 99 %   08/10/21 1809 98.4 °F (36.9 °C) 94 18 (!) 143/74 98 %       EKG interpretation: (Preliminary)  Rhythm: normal sinus rhythm; and regular . Rate (approx.): 89; Axis: normal; MS interval: normal; QRS interval: normal ; ST/T wave: non-specific changes; Other findings: .    Records Reviewed: Nursing Notes, Old Medical Records, Previous electrocardiograms, Previous Radiology Studies and Previous Laboratory Studies    Provider Notes (Medical Decision Making):   ACS, costochondritis, bronchitis, pneumonia, upper respiratory infection    ED Course:   Initial assessment performed. The patients presenting problems have been discussed, and they are in agreement with the care plan formulated and outlined with them. I have encouraged them to ask questions as they arise throughout their visit. Progress note:    Patient's results of been reviewed. The patient is feeling better. She is advised to follow-up and return to ER if worse             Critical Care Time:   0      Disposition:  home    DISCHARGE PLAN:  1. Current Discharge Medication List      START taking these medications    Details   doxycycline (VIBRA-TABS) 100 mg tablet Take 1 Tablet by mouth two (2) times a day for 7 days. Qty: 14 Tablet, Refills: 0  Start date: 8/10/2021, End date: 8/17/2021      albuterol (PROVENTIL HFA, VENTOLIN HFA, PROAIR HFA) 90 mcg/actuation inhaler Take 2 Puffs by inhalation every four (4) hours as needed for Wheezing, Shortness of Breath or Cough.   Qty: 1 Inhaler, Refills: 0  Start date: 8/10/2021      benzonatate (Tessalon Perles) 100 mg capsule Take 1 Capsule by mouth three (3) times daily as needed for Cough for up to 7 days. Qty: 30 Capsule, Refills: 0  Start date: 8/10/2021, End date: 8/17/2021           2. Follow-up Information     Follow up With Specialties Details Why Contact Info    Nani Prabhakar MD Family Medicine  As needed [address]  [phone]      [facility] EMERGENCY DEPT Emergency Medicine  If symptoms worsen [address]  [address] [facility]  [phone]        3. Return to ED if worse     Diagnosis     Clinical Impression:   1. Acute chest pain    2. Acute bronchitis, unspecified organism        Attestations:    Roxana Lopez MD    Please note that this dictation was completed with [employer], the computer voice recognition software. Quite often unanticipated grammatical, syntax, homophones, and other interpretive errors are inadvertently transcribed by the computer software. Please disregard these errors. Please excuse any errors that have escaped final proofreading. Thank you.

## 2021-08-11 NOTE — ED NOTES
9768-Patient given printed discharge instructions reviewed by the MD. Patient understands instructions/follow up recommendations. Patient d/c out of ED via wheelchair to waiting room.

## 2021-08-12 ENCOUNTER — VIRTUAL VISIT (OUTPATIENT)
Dept: INTERNAL MEDICINE CLINIC | Age: 66
End: 2021-08-12
Payer: MEDICARE

## 2021-08-12 DIAGNOSIS — M79.604 ACUTE LEG PAIN, RIGHT: ICD-10-CM

## 2021-08-12 DIAGNOSIS — M71.21 BAKER'S CYST OF KNEE, RIGHT: Primary | ICD-10-CM

## 2021-08-12 PROCEDURE — 99442 PR PHYS/QHP TELEPHONE EVALUATION 11-20 MIN: CPT | Performed by: FAMILY MEDICINE

## 2021-08-12 RX ORDER — SERTRALINE HYDROCHLORIDE 50 MG/1
TABLET, FILM COATED ORAL
COMMUNITY
Start: 2021-07-15

## 2021-08-12 RX ORDER — DOXYCYCLINE 100 MG/1
CAPSULE ORAL
COMMUNITY
Start: 2021-08-11 | End: 2021-08-12 | Stop reason: SDUPTHER

## 2021-08-12 RX ORDER — QUETIAPINE FUMARATE 100 MG/1
100 TABLET, FILM COATED ORAL
COMMUNITY
Start: 2021-06-15

## 2021-08-12 RX ORDER — HYDROCODONE BITARTRATE AND ACETAMINOPHEN 5; 325 MG/1; MG/1
1 TABLET ORAL
Qty: 21 TABLET | Refills: 0 | Status: SHIPPED | OUTPATIENT
Start: 2021-08-12 | End: 2021-08-18 | Stop reason: ALTCHOICE

## 2021-08-12 RX ORDER — GABAPENTIN 300 MG/1
300 CAPSULE ORAL 3 TIMES DAILY
Qty: 15 CAPSULE | Refills: 0 | Status: SHIPPED | OUTPATIENT
Start: 2021-08-12

## 2021-08-12 RX ORDER — PENICILLIN V POTASSIUM 500 MG/1
TABLET, FILM COATED ORAL
COMMUNITY
Start: 2021-07-05

## 2021-08-12 NOTE — PROGRESS NOTES
Mario Vann is a 78 y.o. female who was seen by synchronous (real-time) audio-video technology on 8/12/2021 for ED Follow-up (ED Visits x 3-[facility] Pauline amarilis Alfredo Tha[address] 08/05/21, 08/06/21 Due to Leg Pain (Baker's Cyst) and 08/10/21 DX with Bronchitis)        Assessment & Plan:   Diagnoses and all orders for this visit:    1. Baker's cyst of knee, right  -     HYDROcodone-acetaminophen (Norco) 5-325 mg per tablet; Take 1 Tablet by mouth every eight (8) hours as needed for Pain for up to 7 days. Max Daily Amount: 3 Tablets. 2. Acute leg pain, right  -     gabapentin (NEURONTIN) 300 mg capsule; Take 1 Capsule by mouth three (3) times daily. Max Daily Amount: 900 mg.      1. Baker's cyst of knee, right   Patient continues to have pain and will be establishing with an orthopedic physician either today or tomorrow. She does not understand why the ER did not suggest an orthopedic evaluation due to the severity of her presentation. Refilled Norco 5-325 mg tablet. 2. Acute leg pain, right    Refilled gabapentin 300 mg capsule. I will refill her pain medication she received from the hospital.     I spent at least 16 minutes on this visit with this established patient. Subjective:     Patient presents today virtually for ED follow-up. Baker's cyst of knee: Pt reports going to the ER 3x this month on the 08/05/2021,08/06/2021, and 08/10/2021. She remarks the first 2 visits were due to leg pain (baker's cyst in right knee) and the third visit was for a diagnosis of bronchitis. She reports being in severe pain which caused her to return to the ER where she was told to visit her PCP for further treatment of the baker's cyst. Pt indicates calling an orthopedic specialist for the cyst today to receive treatment as soon as possible. She denies any redness around the area of the cyst.     Prior to Admission medications    Medication Sig Start Date End Date Taking?  Authorizing Provider   penicillin v potassium (VEETID) 500 mg tablet TAKE 1 TABLET BY MOUTH FOUR TIMES DAILY 7/5/21  Yes Provider, Historical   sertraline (ZOLOFT) 50 mg tablet TAKE 1 TABLET BY MOUTH EVERY DAY 7/15/21  Yes Provider, Historical   QUEtiapine (SEROquel) 100 mg tablet Take 100 mg by mouth nightly. 6/15/21  Yes Provider, Historical   guaifenesin/dextromethorphan (ROBITUSSIN-DM PO) Take  by mouth daily as needed for Cough. Yes Provider, Historical   HYDROcodone-acetaminophen (Norco) 5-325 mg per tablet Take 1 Tablet by mouth every eight (8) hours as needed for Pain for up to 7 days. Max Daily Amount: 3 Tablets. 8/12/21 8/19/21 Yes Hardy Herbert MD   gabapentin (NEURONTIN) 300 mg capsule Take 1 Capsule by mouth three (3) times daily. Max Daily Amount: 900 mg. 8/12/21  Yes Hardy Herbert MD   doxycycline (VIBRA-TABS) 100 mg tablet Take 1 Tablet by mouth two (2) times a day for 7 days. 8/10/21 8/17/21 Yes Miguel Angel Bautista MD   albuterol (PROVENTIL HFA, VENTOLIN HFA, PROAIR HFA) 90 mcg/actuation inhaler Take 2 Puffs by inhalation every four (4) hours as needed for Wheezing, Shortness of Breath or Cough. 8/10/21  Yes Miguel Angel Bautista MD   simvastatin (ZOCOR) 20 mg tablet TAKE 1 TABLET BY MOUTH EVERY NIGHT 8/3/21  Yes Hardy Herbert MD   apixaban (Eliquis) 5 mg tablet TAKE 1 TABLET BY MOUTH TWICE DAILY 7/30/21  Yes Hardy Herbert MD   omeprazole (PRILOSEC) 40 mg capsule TAKE 1 CAPSULE BY MOUTH DAILY 7/28/21  Yes Hardy Herbert MD   Blood-Glucose Meter monitoring kit Check blood glucose daily. 6/23/21  Yes Hardy Herbert MD   glucose blood VI test strips (ASCENSIA AUTODISC VI, ONE TOUCH ULTRA TEST VI) strip Check blood glucose daily. 6/23/21  Yes Hardy Herbert MD   lancets misc Check blood glucose daily. 6/23/21  Yes Hardy Herbert MD   oxybutynin chloride XL (DITROPAN XL) 10 mg CR tablet Take 1 Tablet by mouth daily.  6/23/21  Yes Hardy Herbert MD   lisinopriL (PRINIVIL, ZESTRIL) 20 mg tablet TAKE 2 TABLETS BY MOUTH DAILY 6/8/21  Yes Rhonda Lindo MD   diclofenac [facility]) 3 % topical gel Apply  to affected area two (2) times a day. 1/24/21  Yes MD Rocío Saraviakristie Ba [facility] Patient will use the shower chair to reduce her risk of falling. Patient has poor mobility due to leg pain. 11/23/20  Yes Rhonda Lindo MD   furosemide (LASIX) 40 mg tablet Take 40 mg by mouth daily. Yes Provider, Historical   LORazepam (ATIVAN) 1 mg tablet Take 1 mg by mouth two (2) times daily as needed for Anxiety. Yes Provider, Historical   DULoxetine (CYMBALTA) 60 mg capsule Take 1 Cap by mouth daily. 3/6/19  Yes Rajni Handy NP   potassium chloride SR (KLOR-CON 10) 10 mEq tablet TAKE 2 TABLETS BY MOUTH DAILY 1/16/19  Yes Rhonda Lindo MD   doxycycline (MONODOX) 100 mg capsule  8/11/21 8/12/21  Provider, Historical   benzonatate (Tessalon Perles) 100 mg capsule Take 1 Capsule by mouth three (3) times daily as needed for Cough for up to 7 days. Patient not taking: Reported on 8/12/2021 8/10/21 8/17/21  Valentin Parr MD   gabapentin (NEURONTIN) 300 mg capsule Take 1 Capsule by mouth three (3) times daily. Max Daily Amount: 900 mg. 8/7/21 8/12/21  Shayan Parson MD   HYDROcodone-acetaminophen (Norco) 5-325 mg per tablet Take 1 Tablet by mouth every six (6) hours as needed for Pain for up to 3 days. Max Daily Amount: 4 Tablets. 8/5/21 8/12/21  Valentin Parr MD   methocarbamoL (Robaxin-750) 750 mg tablet Take 1 Tablet by mouth four (4) times daily as needed for Muscle Spasm(s). Patient not taking: Reported on 6/23/2021 5/28/21   Valentin Parr MD   potassium chloride (Klor-Con) 20 mEq pack Take 1 Packet by mouth daily. Patient not taking: Reported on 8/12/2021 5/28/21   Valentin Parr MD   Nystatin, Bulk, 1 billion unit powd 1 Dose by Does Not Apply route two (2) times a day.   Patient not taking: Reported on 6/23/2021 2/1/21   Rhonda Lindo MD   lidocaine 4 % patch 2 Patches by TransDERmal route every twelve (12) hours every twelve (12) hours. Patient not taking: Reported on 6/23/2021 1/24/21   Brandy Cross MD   Shower Chair XX nishant Patient will use the shower chair to reduce her risk of falling. Patient has poor mobility due to leg pain. Patient not taking: Reported on 6/23/2021 11/23/20   Anay Silva MD   silver sulfADIAZINE (SILVADENE) 1 % topical cream Apply  to affected area daily. Patient not taking: Reported on 6/23/2021 11/13/20   Anay Silva MD   foam bandage (Allevyn Gentle Border Multisit) 6 3/4 X 7 1/16 \" bndg Apply to affected area daily. Patient not taking: Reported on 6/23/2021 11/13/20   Anay Silva MD   bacitracin-polymyxin b (Polysporin) 500-10,000 unit/gram ointment Apply  to affected area two (2) times a day. Patient not taking: Reported on 6/23/2021 11/9/20   Anay Silva MD   nystatin (MYCOSTATIN) topical cream Apply  to affected area two (2) times a day. Patient not taking: Reported on 6/23/2021 11/9/20   Anay Silva MD   zolpidem (AMBIEN) 10 mg tablet TK 1 T PO HS PRF INSOMNIA  Patient not taking: Reported on 8/12/2021 10/31/20   Provider, [place]   traZODone (DESYREL) 50 mg tablet Take 50 mg by mouth daily as needed for Sleep. at bedtime  Patient not taking: Reported on 6/23/2021    Provider, [place]   naloxone [facility]) 4 mg/actuation nasal spray Use 1 spray intranasally, then discard. Repeat with new spray every 2 min as needed for opioid overdose symptoms, alternating nostrils. Patient not taking: Reported on 6/23/2021 9/4/20   Anay Silva MD   hydrocortisone (CORTAID) 0.5 % topical cream Apply  to affected area two (2) times a day.  use thin layer  Patient not taking: Reported on 6/23/2021 7/17/20   Anay Silva MD   QUEtiapine (SEROQUEL) 200 mg tablet TK 1 T PO HS PRF INSOMNIA  Patient not taking: Reported on 8/12/2021 10/9/19   Other, MD Yue     Patient Active Problem List    Diagnosis Date Noted    Hypoxia 09/08/2020    [facility] 09/08/2020    Cutaneous fungal infection 09/04/2020    Non-pressure chronic ulcer of right thigh with fat layer exposed ([facility] 08/07/2020    Non-pressure chronic ulcer of left thigh with fat layer exposed ([facility] 08/07/2020    Obesity, morbid ([facility] 12/19/2017    MURIEL on CPAP 06/03/2016    Hypokalemia 06/03/2016    MDD (major depressive disorder), recurrent episode, mild ([facility] 01/11/2016    Hyperlipidemia 09/09/2015    Pre-syncope 09/09/2015    GABRIEL (generalized anxiety disorder) 07/11/2012     Current Outpatient Medications   Medication Sig Dispense Refill    penicillin v potassium (VEETID) 500 mg tablet TAKE 1 TABLET BY MOUTH FOUR TIMES DAILY      sertraline (ZOLOFT) 50 mg tablet TAKE 1 TABLET BY MOUTH EVERY DAY      QUEtiapine (SEROquel) 100 mg tablet Take 100 mg by mouth nightly.  guaifenesin/dextromethorphan (ROBITUSSIN-DM PO) Take  by mouth daily as needed for Cough.  HYDROcodone-acetaminophen (Norco) 5-325 mg per tablet Take 1 Tablet by mouth every eight (8) hours as needed for Pain for up to 7 days. Max Daily Amount: 3 Tablets. 21 Tablet 0    gabapentin (NEURONTIN) 300 mg capsule Take 1 Capsule by mouth three (3) times daily. Max Daily Amount: 900 mg. 15 Capsule 0    doxycycline (VIBRA-TABS) 100 mg tablet Take 1 Tablet by mouth two (2) times a day for 7 days. 14 Tablet 0    albuterol (PROVENTIL HFA, VENTOLIN HFA, PROAIR HFA) 90 mcg/actuation inhaler Take 2 Puffs by inhalation every four (4) hours as needed for Wheezing, Shortness of Breath or Cough. 1 Inhaler 0    simvastatin (ZOCOR) 20 mg tablet TAKE 1 TABLET BY MOUTH EVERY NIGHT 90 Tablet 1    apixaban (Eliquis) 5 mg tablet TAKE 1 TABLET BY MOUTH TWICE DAILY 60 Tablet 5    omeprazole (PRILOSEC) 40 mg capsule TAKE 1 CAPSULE BY MOUTH DAILY 90 Capsule 0    Blood-Glucose Meter monitoring kit Check blood glucose daily. 1 Kit 0    glucose blood VI test strips (ASCENSIA AUTODISC VI, ONE TOUCH ULTRA TEST VI) strip Check blood glucose daily.  100 Strip 1  lancets misc Check blood glucose daily. 1 Each 11    oxybutynin chloride XL (DITROPAN XL) 10 mg CR tablet Take 1 Tablet by mouth daily. 30 Tablet 2    lisinopriL (PRINIVIL, ZESTRIL) 20 mg tablet TAKE 2 TABLETS BY MOUTH DAILY 180 Tablet 1    diclofenac (SOLARAZE) 3 % topical gel Apply  to affected area two (2) times a day. 100 g 0    Walker misc Patient will use the shower chair to reduce her risk of falling. Patient has poor mobility due to leg pain. 1 Each 0    furosemide (LASIX) 40 mg tablet Take 40 mg by mouth daily.  LORazepam (ATIVAN) 1 mg tablet Take 1 mg by mouth two (2) times daily as needed for Anxiety.  DULoxetine (CYMBALTA) 60 mg capsule Take 1 Cap by mouth daily. 30 Cap 1    potassium chloride SR (KLOR-CON 10) 10 mEq tablet TAKE 2 TABLETS BY MOUTH DAILY 180 Tab 1    benzonatate (Tessalon Perles) 100 mg capsule Take 1 Capsule by mouth three (3) times daily as needed for Cough for up to 7 days. (Patient not taking: Reported on 8/12/2021) 30 Capsule 0    methocarbamoL (Robaxin-750) 750 mg tablet Take 1 Tablet by mouth four (4) times daily as needed for Muscle Spasm(s). (Patient not taking: Reported on 6/23/2021) 20 Tablet 0    potassium chloride (Klor-Con) 20 mEq pack Take 1 Packet by mouth daily. (Patient not taking: Reported on 8/12/2021) 3 Packet 0    Nystatin, Bulk, 1 billion unit powd 1 Dose by Does Not Apply route two (2) times a day. (Patient not taking: Reported on 6/23/2021) 1 Each 0    lidocaine 4 % patch 2 Patches by TransDERmal route every twelve (12) hours every twelve (12) hours. (Patient not taking: Reported on 6/23/2021) 20 Patch 0    Shower Chair XX nishant Patient will use the shower chair to reduce her risk of falling. Patient has poor mobility due to leg pain. (Patient not taking: Reported on 6/23/2021) 1 Each 0    silver sulfADIAZINE (SILVADENE) 1 % topical cream Apply  to affected area daily.  (Patient not taking: Reported on 6/23/2021) 50 g 2    foam bandage ([facility] Multisit) 6 3/4 X 7 1/16 \" bndg Apply to affected area daily. (Patient not taking: Reported on 6/23/2021) 2 Box 2    bacitracin-polymyxin b (Polysporin) 500-10,000 unit/gram ointment Apply  to affected area two (2) times a day. (Patient not taking: Reported on 6/23/2021) 15 g 0    nystatin (MYCOSTATIN) topical cream Apply  to affected area two (2) times a day. (Patient not taking: Reported on 6/23/2021) 15 g 0    zolpidem (AMBIEN) 10 mg tablet TK 1 T PO HS PRF INSOMNIA (Patient not taking: Reported on 8/12/2021)      traZODone (DESYREL) 50 mg tablet Take 50 mg by mouth daily as needed for Sleep. at bedtime (Patient not taking: Reported on 6/23/2021)      naloxone [facility]) 4 mg/actuation nasal spray Use 1 spray intranasally, then discard. Repeat with new spray every 2 min as needed for opioid overdose symptoms, alternating nostrils. (Patient not taking: Reported on 6/23/2021) 1 Each 1    hydrocortisone (CORTAID) 0.5 % topical cream Apply  to affected area two (2) times a day. use thin layer (Patient not taking: Reported on 6/23/2021) 30 g 0    QUEtiapine (SEROQUEL) 200 mg tablet TK 1 T PO HS PRF INSOMNIA (Patient not taking: Reported on 8/12/2021)  0     No Known Allergies  Past Medical History:   Diagnosis Date    Arthritis     chronic pain related arthritis    Bipolar 1 disorder (HCC)     GERD (gastroesophageal reflux disease)     Hypertension     Other ill-defined conditions(799.89)     hyperlipidemia.  Stomach abcess    Psychiatric disorder     panic attacks    Sleep apnea     Thromboembolus ([address]     last year L leg    Unspecified sleep apnea      Past Surgical History:   Procedure Laterality Date    HX CHOLECYSTECTOMY      HX GI      gallbladder removed 2/2010    HX HYSTERECTOMY       Family History   Problem Relation Age of Onset    Heart Disease Mother     Diabetes Mother     Arthritis-osteo Mother     High Cholesterol Mother     Heart Attack Sister     Diabetes Sister     Heart Disease Sister     Diabetes Sister    [place] Jocelyn Sister     High Cholesterol Sister     Schizophrenia Paternal Grandmother     Drug Abuse Sister      Social History     Tobacco Use    Smoking status: Never Smoker    Smokeless tobacco: Never Used   Substance Use Topics    Alcohol use: No       Review of Systems   Constitutional: Negative. HENT: Negative. Eyes: Negative. Respiratory: Negative. Cardiovascular: Negative. Gastrointestinal: Negative. Genitourinary: Negative. Musculoskeletal: Positive for joint pain (baker's cyst ). Skin: Negative. Neurological: Negative. Endo/Heme/Allergies: Negative. Psychiatric/Behavioral: Negative. Objective:     Patient-Reported Vitals 11/18/2020   Patient-Reported Weight 390   Patient-Reported Systolic  025   Patient-Reported Diastolic 075        [INSTRUCTIONS:  \"[x]\" Indicates a positive item  \"[]\" Indicates a negative item  -- DELETE ALL ITEMS NOT EXAMINED]    Constitutional: [x] No apparent distress      [] Abnormal -     Mental status: [x] Alert and awake  [x] Oriented to person/place/time [x] Able to follow commands    [] Abnormal -            Psychiatric:       [x] Normal Affect [] Abnormal -        [x] No Hallucinations    Other pertinent observable physical exam findings:-        We discussed the expected course, resolution and complications of the diagnosis(es) in detail. Medication risks, benefits, costs, interactions, and alternatives were discussed as indicated. I advised her to contact the office if her condition worsens, changes or fails to improve as anticipated. She expressed understanding with the diagnosis(es) and plan. Karan Dilcia, was evaluated through a synchronous (real-time) audio-video encounter. The patient (or guardian if applicable) is aware that this is a billable service. Verbal consent to proceed has been obtained within the past 12 months.  The visit was conducted pursuant to the emergency declaration under the [address], [address] [place] [address] authority and the [employer] and [employer] Act. Patient identification was verified, and a caregiver was present when appropriate. The patient was located in a state where the provider was credentialed to provide care.       David Pretty, as dictated by Ck Arroyo MD.

## 2021-08-16 DIAGNOSIS — M25.561 RIGHT KNEE PAIN, UNSPECIFIED CHRONICITY: Primary | ICD-10-CM

## 2021-08-18 ENCOUNTER — OFFICE VISIT (OUTPATIENT)
Dept: ORTHOPEDIC SURGERY | Age: 66
End: 2021-08-18
Payer: MEDICARE

## 2021-08-18 ENCOUNTER — HOSPITAL ENCOUNTER (OUTPATIENT)
Dept: GENERAL RADIOLOGY | Age: 66
Discharge: HOME | End: 2021-08-18
Payer: MEDICARE

## 2021-08-18 VITALS
TEMPERATURE: 96.8 F | RESPIRATION RATE: 16 BRPM | HEIGHT: 61 IN | DIASTOLIC BLOOD PRESSURE: 72 MMHG | SYSTOLIC BLOOD PRESSURE: 122 MMHG | OXYGEN SATURATION: 95 % | HEART RATE: 72 BPM | BODY MASS INDEX: 75.39 KG/M2

## 2021-08-18 DIAGNOSIS — G89.29 CHRONIC PAIN OF RIGHT KNEE: Primary | ICD-10-CM

## 2021-08-18 DIAGNOSIS — M17.0 BILATERAL PRIMARY OSTEOARTHRITIS OF KNEE: ICD-10-CM

## 2021-08-18 DIAGNOSIS — M25.561 RIGHT KNEE PAIN, UNSPECIFIED CHRONICITY: ICD-10-CM

## 2021-08-18 DIAGNOSIS — M25.561 CHRONIC PAIN OF RIGHT KNEE: Primary | ICD-10-CM

## 2021-08-18 DIAGNOSIS — M25.561 CHRONIC PAIN OF RIGHT KNEE: ICD-10-CM

## 2021-08-18 DIAGNOSIS — G89.29 CHRONIC PAIN OF RIGHT KNEE: ICD-10-CM

## 2021-08-18 PROCEDURE — G9899 SCRN MAM PERF RSLTS DOC: HCPCS | Performed by: ORTHOPAEDIC SURGERY

## 2021-08-18 PROCEDURE — G8752 SYS BP LESS 140: HCPCS | Performed by: ORTHOPAEDIC SURGERY

## 2021-08-18 PROCEDURE — G8427 DOCREV CUR MEDS BY ELIG CLIN: HCPCS | Performed by: ORTHOPAEDIC SURGERY

## 2021-08-18 PROCEDURE — 20610 DRAIN/INJ JOINT/BURSA W/O US: CPT | Performed by: ORTHOPAEDIC SURGERY

## 2021-08-18 PROCEDURE — 73560 X-RAY EXAM OF KNEE 1 OR 2: CPT

## 2021-08-18 PROCEDURE — G8754 DIAS BP LESS 90: HCPCS | Performed by: ORTHOPAEDIC SURGERY

## 2021-08-18 PROCEDURE — G8536 NO DOC ELDER MAL SCRN: HCPCS | Performed by: ORTHOPAEDIC SURGERY

## 2021-08-18 PROCEDURE — 3017F COLORECTAL CA SCREEN DOC REV: CPT | Performed by: ORTHOPAEDIC SURGERY

## 2021-08-18 PROCEDURE — G8417 CALC BMI ABV UP PARAM F/U: HCPCS | Performed by: ORTHOPAEDIC SURGERY

## 2021-08-18 PROCEDURE — 1101F PT FALLS ASSESS-DOCD LE1/YR: CPT | Performed by: ORTHOPAEDIC SURGERY

## 2021-08-18 PROCEDURE — G8400 PT W/DXA NO RESULTS DOC: HCPCS | Performed by: ORTHOPAEDIC SURGERY

## 2021-08-18 PROCEDURE — G9717 DOC PT DX DEP/BP F/U NT REQ: HCPCS | Performed by: ORTHOPAEDIC SURGERY

## 2021-08-18 PROCEDURE — 99203 OFFICE O/P NEW LOW 30 MIN: CPT | Performed by: ORTHOPAEDIC SURGERY

## 2021-08-18 PROCEDURE — 1090F PRES/ABSN URINE INCON ASSESS: CPT | Performed by: ORTHOPAEDIC SURGERY

## 2021-08-18 RX ORDER — BETAMETHASONE SODIUM PHOSPHATE AND BETAMETHASONE ACETATE 3; 3 MG/ML; MG/ML
6 INJECTION, SUSPENSION INTRA-ARTICULAR; INTRALESIONAL; INTRAMUSCULAR; SOFT TISSUE ONCE
Status: COMPLETED | OUTPATIENT
Start: 2021-08-18 | End: 2021-08-18

## 2021-08-18 RX ORDER — TRAMADOL HYDROCHLORIDE 50 MG/1
50 TABLET ORAL
Qty: 28 TABLET | Refills: 0 | Status: SHIPPED | OUTPATIENT
Start: 2021-08-18 | End: 2021-08-26 | Stop reason: SDUPTHER

## 2021-08-18 RX ADMIN — BETAMETHASONE SODIUM PHOSPHATE AND BETAMETHASONE ACETATE 6 MG: 3; 3 INJECTION, SUSPENSION INTRA-ARTICULAR; INTRALESIONAL; INTRAMUSCULAR; SOFT TISSUE at 15:32

## 2021-08-18 RX ADMIN — BETAMETHASONE SODIUM PHOSPHATE AND BETAMETHASONE ACETATE 6 MG: 3; 3 INJECTION, SUSPENSION INTRA-ARTICULAR; INTRALESIONAL; INTRAMUSCULAR; SOFT TISSUE at 15:34

## 2021-08-18 NOTE — PROGRESS NOTES
Chief Complaint   Patient presents with    Knee Pain     right     New Patient       Visit Vitals  /72 (BP 1 Location: Left arm)   Pulse 72   Temp 96.8 °F (36 °C) (Temporal)   Resp 16   Ht 5' 1\" (1.549 m)   SpO2 95%   BMI 75.39 kg/m²

## 2021-08-18 NOTE — PROGRESS NOTES
8/18/2021    Chief Complaint: Bilateral knee pain    HPI: This is a 78 y.o. female who complains of bilateral knee pain. Right side is worse than the left side. Onset was gradual.  The patient has had activity dependent pain for years. The patient has tried activity modification, physical therapy exercises, injections have helped in he past.  The pain is in the knee globally, it is severe in intensity. The patient feels unstable with the knee, fears falling, and has significant limitation with activities of daily living, recreation, and walks with a limp. Past Medical History:   Diagnosis Date    Arthritis     chronic pain related arthritis    Bipolar 1 disorder (HCC)     GERD (gastroesophageal reflux disease)     Hypertension     Other ill-defined conditions(799.89)     hyperlipidemia. Stomach abcess    Psychiatric disorder     panic attacks    Sleep apnea     Thromboembolus ([address]     last year L leg    Unspecified sleep apnea        Past Surgical History:   Procedure Laterality Date    HX CHOLECYSTECTOMY      HX GI      gallbladder removed 2/2010    HX HYSTERECTOMY         Current Outpatient Medications on File Prior to Visit   Medication Sig Dispense Refill    sertraline (ZOLOFT) 50 mg tablet TAKE 1 TABLET BY MOUTH EVERY DAY      QUEtiapine (SEROquel) 100 mg tablet Take 100 mg by mouth nightly.  guaifenesin/dextromethorphan (ROBITUSSIN-DM PO) Take  by mouth daily as needed for Cough.  gabapentin (NEURONTIN) 300 mg capsule Take 1 Capsule by mouth three (3) times daily. Max Daily Amount: 900 mg. 15 Capsule 0    albuterol (PROVENTIL HFA, VENTOLIN HFA, PROAIR HFA) 90 mcg/actuation inhaler Take 2 Puffs by inhalation every four (4) hours as needed for Wheezing, Shortness of Breath or Cough.  1 Inhaler 0    simvastatin (ZOCOR) 20 mg tablet TAKE 1 TABLET BY MOUTH EVERY NIGHT 90 Tablet 1    apixaban (Eliquis) 5 mg tablet TAKE 1 TABLET BY MOUTH TWICE DAILY 60 Tablet 5    omeprazole (PRILOSEC) 40 mg capsule TAKE 1 CAPSULE BY MOUTH DAILY 90 Capsule 0    oxybutynin chloride XL (DITROPAN XL) 10 mg CR tablet Take 1 Tablet by mouth daily. 30 Tablet 2    lisinopriL (PRINIVIL, ZESTRIL) 20 mg tablet TAKE 2 TABLETS BY MOUTH DAILY 180 Tablet 1    Shower Chair XX nishant Patient will use the shower chair to reduce her risk of falling. Patient has poor mobility due to leg pain. 1 Each 0    Walker misc Patient will use the shower chair to reduce her risk of falling. Patient has poor mobility due to leg pain. 1 Each 0    furosemide (LASIX) 40 mg tablet Take 40 mg by mouth daily.  LORazepam (ATIVAN) 1 mg tablet Take 1 mg by mouth two (2) times daily as needed for Anxiety.  DULoxetine (CYMBALTA) 60 mg capsule Take 1 Cap by mouth daily. 30 Cap 1    penicillin v potassium (VEETID) 500 mg tablet TAKE 1 TABLET BY MOUTH FOUR TIMES DAILY (Patient not taking: Reported on 2021)      [] doxycycline (VIBRA-TABS) 100 mg tablet Take 1 Tablet by mouth two (2) times a day for 7 days. 14 Tablet 0    [] benzonatate (Tessalon Perles) 100 mg capsule Take 1 Capsule by mouth three (3) times daily as needed for Cough for up to 7 days. (Patient not taking: Reported on 2021) 30 Capsule 0    Blood-Glucose Meter monitoring kit Check blood glucose daily. (Patient not taking: Reported on 2021) 1 Kit 0    glucose blood VI test strips (ASCENSIA AUTODISC VI, ONE TOUCH ULTRA TEST VI) strip Check blood glucose daily. (Patient not taking: Reported on 2021) 100 Strip 1    lancets misc Check blood glucose daily. (Patient not taking: Reported on 2021) 1 Each 11    methocarbamoL (Robaxin-750) 750 mg tablet Take 1 Tablet by mouth four (4) times daily as needed for Muscle Spasm(s). (Patient not taking: Reported on 2021) 20 Tablet 0    potassium chloride (Klor-Con) 20 mEq pack Take 1 Packet by mouth daily.  (Patient not taking: Reported on 2021) 3 Packet 0    Nystatin, Bulk, 1 billion unit powd 1 Dose by Does Not Apply route two (2) times a day. (Patient not taking: Reported on 6/23/2021) 1 Each 0    lidocaine 4 % patch 2 Patches by TransDERmal route every twelve (12) hours every twelve (12) hours. (Patient not taking: Reported on 6/23/2021) 20 Patch 0    diclofenac (SOLARAZE) 3 % topical gel Apply  to affected area two (2) times a day. (Patient not taking: Reported on 8/18/2021) 100 g 0    silver sulfADIAZINE (SILVADENE) 1 % topical cream Apply  to affected area daily. (Patient not taking: Reported on 6/23/2021) 50 g 2    foam bandage (Allevyn Gentle Border Multisit) 6 3/4 X 7 1/16 \" bndg Apply to affected area daily. (Patient not taking: Reported on 6/23/2021) 2 Box 2    bacitracin-polymyxin b (Polysporin) 500-10,000 unit/gram ointment Apply  to affected area two (2) times a day. (Patient not taking: Reported on 6/23/2021) 15 g 0    nystatin (MYCOSTATIN) topical cream Apply  to affected area two (2) times a day. (Patient not taking: Reported on 6/23/2021) 15 g 0    zolpidem (AMBIEN) 10 mg tablet TK 1 T PO HS PRF INSOMNIA (Patient not taking: Reported on 8/12/2021)      traZODone (DESYREL) 50 mg tablet Take 50 mg by mouth daily as needed for Sleep. at bedtime (Patient not taking: Reported on 6/23/2021)      naloxone [facility]) 4 mg/actuation nasal spray Use 1 spray intranasally, then discard. Repeat with new spray every 2 min as needed for opioid overdose symptoms, alternating nostrils. (Patient not taking: Reported on 6/23/2021) 1 Each 1    hydrocortisone (CORTAID) 0.5 % topical cream Apply  to affected area two (2) times a day.  use thin layer (Patient not taking: Reported on 6/23/2021) 30 g 0    QUEtiapine (SEROQUEL) 200 mg tablet TK 1 T PO HS PRF INSOMNIA (Patient not taking: Reported on 8/12/2021)  0    potassium chloride SR (KLOR-CON 10) 10 mEq tablet TAKE 2 TABLETS BY MOUTH DAILY (Patient not taking: Reported on 8/18/2021) 180 Tab 1     No current facility-administered medications on file prior to visit. No Known Allergies    Family History   Problem Relation Age of Onset    Heart Disease Mother     Diabetes Mother     Arthritis-osteo Mother     High Cholesterol Mother     Heart Attack Sister     Diabetes Sister     Heart Disease Sister     Diabetes Sister     Arthritis-osteo Sister     High Cholesterol Sister     Schizophrenia Paternal Grandmother     Drug Abuse Sister        Social History     Socioeconomic History    Marital status:      Spouse name: Not on file    Number of children: Not on file    Years of education: Not on file    Highest education level: Not on file   Tobacco Use    Smoking status: Never Smoker    Smokeless tobacco: Never Used   Vaping Use    Vaping Use: Never used   Substance and Sexual Activity    Alcohol use: No    Drug use: No    Sexual activity: Not Currently     Social Determinants of Health     Financial Resource Strain:     Difficulty of Paying Living Expenses:    Food Insecurity:     Worried About Running Out of Food in the Last Year:     Ran Out of Food in the Last Year:    Transportation Needs:     Lack of Transportation (Medical):      Lack of Transportation (Non-Medical):    Physical Activity:     Days of Exercise per Week:     Minutes of Exercise per Session:    Stress:     Feeling of Stress :    Social Connections:     Frequency of Communication with Friends and Family:     Frequency of Social Gatherings with Friends and Family:     Attends Orthodox Services:     Active Member of Clubs or Organizations:     Attends Club or Organization Meetings:     Marital Status:          Review of Systems:       General: Denies headache, lethargy, fever, weight loss  Ears/Nose/Throat: Denies ear discharge, drainage, nosebleeds, hoarse voice, dental problems  Cardiovascular: Denies chest pain, shortness of breath  Lungs: Denies chest pain, breathing problems, wheezing, pneumonia  Stomach: Denies stomach pain, heartburn, constipation, irritable bowel  Skin: Denies rash, sores, open wounds  Musculoskeletal: Admits to bilateral knee pain, no deformity. Genitourinary: Denies dysuria, hematuria, polyuria  Gastrointestinal: Denies constipation, obstipation, diarrhea  Neurological: Denies changes in sight, smell, hearing, taste, seizures. Denies loss of consciousness. Psychiatric: Denies depression, sleep pattern changes, anxiety, change in personality  Endocrine: Denies mood swings, heat or cold intolerance  Hematologic/Lymphatic: Denies anemia, purpura, petechia  Allergic/Immunologic: Denies swelling of throat, pain or swelling at lymph nodes      Physical Examination:    Visit Vitals  /72 (BP 1 Location: Left arm)   Pulse 72   Temp 96.8 °F (36 °C) (Temporal)   Resp 16   Ht 5' 1\" (1.549 m)   SpO2 95%   BMI 75.39 kg/m²        General: AOX3, no apparent distress  Psychiatric: mood and affect appropriate  Lungs: breathing is symmetric and unlabored bilaterally  Heart: regular rate and rhythm  Abdomen: no guarding  Head: normocephalic, atraumatic  Skin: No significant abnormalities, good turgor  Sensation intact to light touch: L1-S1 dermatomes  Muscular exam: 5/5 strength in all major muscle groups unless noted in specialty exam.    Extremities:      Left upper extremity: Full active and passive range of motion without pain, deformity, no open wound, strength 5/5 in all major muscle groups. Right upper extremity: Full active and passive range of motion without pain, deformity, no open wound, strength 5/5 in all major muscle groups. Right lower extremity: No deformity is noted. Range of motion of the knee is 0-90. Ligamentous testing of the knee indicates stability of the the MCL, LCL, PCL, and ACL. Lachman's, anterior and posterior drawer tests are specifically negative. Medial joint line tenderness to palpation is noted. Popliteal area is unremarkable. 1+  for effusion. + patellar crepitus.   Patella tracks centrally. Pivot shift is negative. Strength testing is indicative of 5/5 strength at hip flexion, extension, knee flexion and extension, tibialis anterior, EHL, and FHL. Sensation is intact to light touch in the L1-S1 dermatomes. Capillary refill is less than 2 seconds in the toes. Left lower extremity:  No deformity is noted. Range of motion of the knee is 0-90. Ligamentous testing of the knee indicates stability of the the MCL, LCL, PCL, and ACL. Lachman's, anterior and posterior drawer tests are specifically negative. Medial joint line tenderness to palpation is noted. Popliteal area is unremarkable. 1+  for effusion. + patellar crepitus. Patella tracks centrally. Pivot shift is negative. Strength testing is indicative of 5/5 strength at hip flexion, extension, knee flexion and extension, tibialis anterior, EHL, and FHL. Sensation is intact to light touch in the L1-S1 dermatomes. Capillary refill is less than 2 seconds in the toes. Diagnostics:    Pertinent Diagnostics:  Xrays are available of the bilateral knee, they indicate severe tricompartmental osteoarthritis of the knee joints, no significant other findings, no other osseus abnormalities, fractures, or dislocations. Assessment: Osteoarthritis bilateral knee    Plan: This patient and I did discuss the many options in treating knee osteoarthritis. We did discuss that we could continue to seek out nonoperative modalities, such as: NSAIDs, oral and topical analgesics, knee injections, knee braces, physical therapy, stretching, strengthening, and weight loss strategies, activity modification, ambulatory assistive devices. The patient stated their understanding with this, but and would like to proceed with nonsurgical management in the form of injections, weight loss, pain management, tramadol.       Procedures: Date of Procedure: 8/18/2021  PROCEDURE NOTE - Bilateral knee injection of Celestone    Consent was obtained from the patient. The correct site was identified after confirmation with the patient. Following identification and confirmation of the correct site with the patient, the superolateral right knee was prepped in the normal sterile fashion. A local anesthetic of 1% lidocaine without epinephrine was then administered to the local tissues. Following, an injection of a mixture of  6 mg Celestone and 1% lidocaine without epinephrine was administered to the right knee. The needle was then withdrawn and the injection site dressed with a sterile bandage at the conclusion. The procedure was well tolerated by the patient. No immediate adverse reactions were noted. Attention was then turned to the left knee. Following identification and confirmation of the correct site with the patient, the superolateral left knee was prepped in the normal sterile fashion. A local anesthetic of 1% lidocaine without epinephrine was then administered to the local tissues. Following, an injection of a mixture of  6 mg Celestone and 1% lidocaine without epinephrine was administered to the left knee. The needle was then withdrawn and the injection site dressed with a sterile bandage at the conclusion. The procedure was well tolerated by the patient. No immediate adverse reactions were noted. Post injection instructions were given      Ms. Jc Ram has a reminder for a \"due or due soon\" health maintenance. I have asked that she contact her primary care provider for follow-up on this health maintenance.

## 2021-08-25 ENCOUNTER — VIRTUAL VISIT (OUTPATIENT)
Dept: ORTHOPEDIC SURGERY | Age: 66
End: 2021-08-25
Payer: MEDICARE

## 2021-08-25 DIAGNOSIS — M17.0 BILATERAL PRIMARY OSTEOARTHRITIS OF KNEE: ICD-10-CM

## 2021-08-25 DIAGNOSIS — M25.561 CHRONIC PAIN OF RIGHT KNEE: Primary | ICD-10-CM

## 2021-08-25 DIAGNOSIS — G89.29 CHRONIC PAIN OF RIGHT KNEE: Primary | ICD-10-CM

## 2021-08-25 PROCEDURE — 99441 PR PHYS/QHP TELEPHONE EVALUATION 5-10 MIN: CPT | Performed by: ORTHOPAEDIC SURGERY

## 2021-08-26 DIAGNOSIS — M25.561 CHRONIC PAIN OF RIGHT KNEE: ICD-10-CM

## 2021-08-26 DIAGNOSIS — M17.0 BILATERAL PRIMARY OSTEOARTHRITIS OF KNEE: ICD-10-CM

## 2021-08-26 DIAGNOSIS — G89.29 CHRONIC PAIN OF RIGHT KNEE: ICD-10-CM

## 2021-08-26 NOTE — PROGRESS NOTES
8/25/2021      CC: bilateral knee pain    HPI:      This is a 78y.o. year old female who presents for follow up of their bilateral knee injection. The patient states that they have had no relief of their pain. The time since injection has been one week. PMH:  Past Medical History:   Diagnosis Date    Arthritis     chronic pain related arthritis    Bipolar 1 disorder (HCC)     GERD (gastroesophageal reflux disease)     Hypertension     Other ill-defined conditions(799.89)     hyperlipidemia. Stomach abcess    Psychiatric disorder     panic attacks    Sleep apnea     Thromboembolus ([address]     last year L leg    Unspecified sleep apnea        PSxHx:  Past Surgical History:   Procedure Laterality Date    HX CHOLECYSTECTOMY      HX GI      gallbladder removed 2/2010    HX HYSTERECTOMY         Meds:    Current Outpatient Medications:     traMADoL (ULTRAM) 50 mg tablet, Take 1 Tablet by mouth every six (6) hours as needed for Pain for up to 7 days. Max Daily Amount: 200 mg., Disp: 28 Tablet, Rfl: 0    penicillin v potassium (VEETID) 500 mg tablet, TAKE 1 TABLET BY MOUTH FOUR TIMES DAILY (Patient not taking: Reported on 8/18/2021), Disp: , Rfl:     sertraline (ZOLOFT) 50 mg tablet, TAKE 1 TABLET BY MOUTH EVERY DAY, Disp: , Rfl:     QUEtiapine (SEROquel) 100 mg tablet, Take 100 mg by mouth nightly., Disp: , Rfl:     guaifenesin/dextromethorphan (ROBITUSSIN-DM PO), Take  by mouth daily as needed for Cough. , Disp: , Rfl:     gabapentin (NEURONTIN) 300 mg capsule, Take 1 Capsule by mouth three (3) times daily. Max Daily Amount: 900 mg., Disp: 15 Capsule, Rfl: 0    albuterol (PROVENTIL HFA, VENTOLIN HFA, PROAIR HFA) 90 mcg/actuation inhaler, Take 2 Puffs by inhalation every four (4) hours as needed for Wheezing, Shortness of Breath or Cough. , Disp: 1 Inhaler, Rfl: 0    simvastatin (ZOCOR) 20 mg tablet, TAKE 1 TABLET BY MOUTH EVERY NIGHT, Disp: 90 Tablet, Rfl: 1    apixaban (Eliquis) 5 mg tablet, TAKE 1 TABLET BY MOUTH TWICE DAILY, Disp: 60 Tablet, Rfl: 5    omeprazole (PRILOSEC) 40 mg capsule, TAKE 1 CAPSULE BY MOUTH DAILY, Disp: 90 Capsule, Rfl: 0    Blood-Glucose Meter monitoring kit, Check blood glucose daily. (Patient not taking: Reported on 8/18/2021), Disp: 1 Kit, Rfl: 0    glucose blood VI test strips (ASCENSIA AUTODISC VI, ONE TOUCH ULTRA TEST VI) strip, Check blood glucose daily. (Patient not taking: Reported on 8/18/2021), Disp: 100 Strip, Rfl: 1    lancets misc, Check blood glucose daily. (Patient not taking: Reported on 8/18/2021), Disp: 1 Each, Rfl: 11    oxybutynin chloride XL (DITROPAN XL) 10 mg CR tablet, Take 1 Tablet by mouth daily. , Disp: 30 Tablet, Rfl: 2    lisinopriL (PRINIVIL, ZESTRIL) 20 mg tablet, TAKE 2 TABLETS BY MOUTH DAILY, Disp: 180 Tablet, Rfl: 1    methocarbamoL (Robaxin-750) 750 mg tablet, Take 1 Tablet by mouth four (4) times daily as needed for Muscle Spasm(s). (Patient not taking: Reported on 6/23/2021), Disp: 20 Tablet, Rfl: 0    potassium chloride (Klor-Con) 20 mEq pack, Take 1 Packet by mouth daily. (Patient not taking: Reported on 8/12/2021), Disp: 3 Packet, Rfl: 0    Nystatin, Bulk, 1 billion unit powd, 1 Dose by Does Not Apply route two (2) times a day. (Patient not taking: Reported on 6/23/2021), Disp: 1 Each, Rfl: 0    lidocaine 4 % patch, 2 Patches by TransDERmal route every twelve (12) hours every twelve (12) hours. (Patient not taking: Reported on 6/23/2021), Disp: 20 Patch, Rfl: 0    diclofenac (SOLARAZE) 3 % topical gel, Apply  to affected area two (2) times a day. (Patient not taking: Reported on 8/18/2021), Disp: 100 g, Rfl: 0    Shower Chair XX nishant, Patient will use the shower chair to reduce her risk of falling. Patient has poor mobility due to leg pain., Disp: 1 Each, Rfl: 0    Walker misc, Patient will use the shower chair to reduce her risk of falling.  Patient has poor mobility due to leg pain., Disp: 1 Each, Rfl: 0    silver sulfADIAZINE (SILVADENE) 1 % topical cream, Apply  to affected area daily. (Patient not taking: Reported on 6/23/2021), Disp: 50 g, Rfl: 2    foam bandage (Allevyn Gentle Border Multisit) 6 3/4 X 7 1/16 \" bndg, Apply to affected area daily. (Patient not taking: Reported on 6/23/2021), Disp: 2 Box, Rfl: 2    bacitracin-polymyxin b (Polysporin) 500-10,000 unit/gram ointment, Apply  to affected area two (2) times a day. (Patient not taking: Reported on 6/23/2021), Disp: 15 g, Rfl: 0    nystatin (MYCOSTATIN) topical cream, Apply  to affected area two (2) times a day. (Patient not taking: Reported on 6/23/2021), Disp: 15 g, Rfl: 0    zolpidem (AMBIEN) 10 mg tablet, TK 1 T PO HS PRF INSOMNIA (Patient not taking: Reported on 8/12/2021), Disp: , Rfl:     furosemide (LASIX) 40 mg tablet, Take 40 mg by mouth daily. , Disp: , Rfl:     traZODone (DESYREL) 50 mg tablet, Take 50 mg by mouth daily as needed for Sleep. at bedtime (Patient not taking: Reported on 6/23/2021), Disp: , Rfl:     LORazepam (ATIVAN) 1 mg tablet, Take 1 mg by mouth two (2) times daily as needed for Anxiety. , Disp: , Rfl:     naloxone (NARCAN) 4 mg/actuation nasal spray, Use 1 spray intranasally, then discard. Repeat with new spray every 2 min as needed for opioid overdose symptoms, alternating nostrils. (Patient not taking: Reported on 6/23/2021), Disp: 1 Each, Rfl: 1    hydrocortisone (CORTAID) 0.5 % topical cream, Apply  to affected area two (2) times a day. use thin layer (Patient not taking: Reported on 6/23/2021), Disp: 30 g, Rfl: 0    QUEtiapine (SEROQUEL) 200 mg tablet, TK 1 T PO HS PRF INSOMNIA (Patient not taking: Reported on 8/12/2021), Disp: , Rfl: 0    DULoxetine (CYMBALTA) 60 mg capsule, Take 1 Cap by mouth daily. , Disp: 30 Cap, Rfl: 1    potassium chloride SR (KLOR-CON 10) 10 mEq tablet, TAKE 2 TABLETS BY MOUTH DAILY (Patient not taking: Reported on 8/18/2021), Disp: 180 Tab, Rfl: 1    All:  No Known Allergies    Social Hx:  Social History     Socioeconomic History    Marital status:      Spouse name: Not on file    Number of children: Not on file    Years of education: Not on file    Highest education level: Not on file   Tobacco Use    Smoking status: Never Smoker    Smokeless tobacco: Never Used   Vaping Use    Vaping Use: Never used   Substance and Sexual Activity    Alcohol use: No    Drug use: No    Sexual activity: Not Currently     Social Determinants of Health     Financial Resource Strain:     Difficulty of Paying Living Expenses:    Food Insecurity:     Worried About Running Out of Food in the Last Year:     [address] in the Last Year:    Transportation Needs:     Lack of Transportation (Medical):      Lack of Transportation (Non-Medical):    Physical Activity:     Days of Exercise per Week:     Minutes of Exercise per Session:    Stress:     Feeling of Stress :    Social Connections:     Frequency of Communication with Friends and Family:     Frequency of Social Gatherings with Friends and Family:     Attends [employer] Services:     Active Member of Clubs or Organizations:     Attends Club or Organization Meetings:     Marital Status:        Family Hx:  Family History   Problem Relation Age of Onset    Heart Disease Mother     Diabetes Mother     Arthritis-osteo Mother     High Cholesterol Mother     Heart Attack Sister     Diabetes Sister     Heart Disease Sister     Diabetes Sister     Arthritis-osteo Sister     High Cholesterol Sister     Schizophrenia Paternal Grandmother     Drug Abuse Sister          Review of Systems:       General: Denies headache, lethargy, fever, weight loss  Ears/Nose/Throat: Denies ear discharge, drainage, nosebleeds, hoarse voice, dental problems  Cardiovascular: Denies chest pain, shortness of breath  Lungs: Denies chest pain, breathing problems, wheezing, pneumonia  Stomach: Denies stomach pain, heartburn, constipation, irritable bowel  Skin: Denies rash, sores, open wounds  Musculoskeletal: bilateral knee pain  Genitourinary: Denies dysuria, hematuria, polyuria  Gastrointestinal: Denies constipation, obstipation, diarrhea  Neurological: Denies changes in sight, smell, hearing, taste, seizures. Denies loss of consciousness. Psychiatric: Denies depression, sleep pattern changes, anxiety, change in personality  Endocrine: Denies mood swings, heat or cold intolerance  Hematologic/Lymphatic: Denies anemia, purpura, petechia  Allergic/Immunologic: Denies swelling of throat, pain or swelling at lymph nodes      Physical Examination:    There were no vitals taken for this visit. General: AOX3, no apparent distress  Psychiatric: mood and affect appropriate        Diagnostics:    Pertinent Diagnostics: none    Assessment: Status post bilateral knee injection  Plan: This patient and I discussed the normal course for injections, we discussed that pain relief will likely be temporary to some degree, but I cannot predict the longevity of relief. We also discussed the limitations of injections, and that I cannot inject the same area any more often than every three months. We will proceed with pain management, weight loss efforts. Patient was in [place] at the time of consultation. I was in the office while conducting this encounter. Consent:  She and/or her healthcare decision maker is aware that this patient-initiated Telehealth encounter is a billable service, with coverage as determined by her insurance carrier. She is aware that she may receive a bill and has provided verbal consent to proceed: Yes    This virtual visit was conducted telephone encounter only. -  I affirm this is a Patient Initiated Episode with an Established Patient who has not had a related appointment within my department in the past 7 days or scheduled within the next 24 hours.   Note: this encounter is not billable if this call serves to triage the patient into an appointment for the relevant concern. Total Time: minutes: 5-10 minutes. Ms. Madelin Mena has a reminder for a \"due or due soon\" health maintenance. I have asked that she contact her primary care provider for follow-up on this health maintenance.

## 2021-08-27 RX ORDER — TRAMADOL HYDROCHLORIDE 50 MG/1
50 TABLET ORAL
Qty: 28 TABLET | Refills: 0 | Status: SHIPPED | OUTPATIENT
Start: 2021-08-27 | End: 2021-09-03

## 2021-08-31 ENCOUNTER — HOSPITAL ENCOUNTER (EMERGENCY)
Age: 66
Discharge: HOME | End: 2021-08-31
Attending: STUDENT IN AN ORGANIZED HEALTH CARE EDUCATION/TRAINING PROGRAM
Payer: MEDICARE

## 2021-08-31 ENCOUNTER — APPOINTMENT (OUTPATIENT)
Dept: GENERAL RADIOLOGY | Age: 66
End: 2021-08-31
Attending: STUDENT IN AN ORGANIZED HEALTH CARE EDUCATION/TRAINING PROGRAM
Payer: MEDICARE

## 2021-08-31 ENCOUNTER — APPOINTMENT (OUTPATIENT)
Dept: GENERAL RADIOLOGY | Age: 66
End: 2021-08-31
Attending: PHYSICIAN ASSISTANT
Payer: MEDICARE

## 2021-08-31 VITALS
HEIGHT: 60 IN | SYSTOLIC BLOOD PRESSURE: 123 MMHG | DIASTOLIC BLOOD PRESSURE: 89 MMHG | BODY MASS INDEX: 57.52 KG/M2 | HEART RATE: 95 BPM | TEMPERATURE: 98.1 F | WEIGHT: 293 LBS | RESPIRATION RATE: 16 BRPM | OXYGEN SATURATION: 95 %

## 2021-08-31 DIAGNOSIS — G89.29 CHRONIC PAIN OF RIGHT KNEE: Primary | ICD-10-CM

## 2021-08-31 DIAGNOSIS — M25.561 CHRONIC PAIN OF RIGHT KNEE: Primary | ICD-10-CM

## 2021-08-31 LAB
ALBUMIN SERPL-MCNC: 3.7 G/DL (ref 3.5–5)
ALBUMIN/GLOB SERPL: 0.8 {RATIO} (ref 1.1–2.2)
ALP SERPL-CCNC: 90 U/L (ref 45–117)
ALT SERPL-CCNC: 20 U/L (ref 12–78)
ANION GAP SERPL CALC-SCNC: 4 MMOL/L (ref 5–15)
AST SERPL-CCNC: 19 U/L (ref 15–37)
BASOPHILS # BLD: 0 K/UL (ref 0–0.1)
BASOPHILS NFR BLD: 0 % (ref 0–1)
BILIRUB SERPL-MCNC: 0.8 MG/DL (ref 0.2–1)
BUN SERPL-MCNC: 33 MG/DL (ref 6–20)
BUN/CREAT SERPL: 16 (ref 12–20)
CALCIUM SERPL-MCNC: 9.7 MG/DL (ref 8.5–10.1)
CHLORIDE SERPL-SCNC: 101 MMOL/L (ref 97–108)
CO2 SERPL-SCNC: 28 MMOL/L (ref 21–32)
CREAT SERPL-MCNC: 2.08 MG/DL (ref 0.55–1.02)
DIFFERENTIAL METHOD BLD: ABNORMAL
EOSINOPHIL # BLD: 0.2 K/UL (ref 0–0.4)
EOSINOPHIL NFR BLD: 3 % (ref 0–7)
ERYTHROCYTE [DISTWIDTH] IN BLOOD BY AUTOMATED COUNT: 14.6 % (ref 11.5–14.5)
GLOBULIN SER CALC-MCNC: 4.6 G/DL (ref 2–4)
GLUCOSE SERPL-MCNC: 90 MG/DL (ref 65–100)
HCT VFR BLD AUTO: 36.3 % (ref 35–47)
HGB BLD-MCNC: 11.4 G/DL (ref 11.5–16)
IMM GRANULOCYTES # BLD AUTO: 0 K/UL (ref 0–0.04)
IMM GRANULOCYTES NFR BLD AUTO: 0 % (ref 0–0.5)
LYMPHOCYTES # BLD: 2.3 K/UL (ref 0.8–3.5)
LYMPHOCYTES NFR BLD: 26 % (ref 12–49)
MCH RBC QN AUTO: 28.1 PG (ref 26–34)
MCHC RBC AUTO-ENTMCNC: 31.4 G/DL (ref 30–36.5)
MCV RBC AUTO: 89.4 FL (ref 80–99)
MONOCYTES # BLD: 0.6 K/UL (ref 0–1)
MONOCYTES NFR BLD: 7 % (ref 5–13)
NEUTS SEG # BLD: 5.6 K/UL (ref 1.8–8)
NEUTS SEG NFR BLD: 64 % (ref 32–75)
NRBC # BLD: 0 K/UL (ref 0–0.01)
NRBC BLD-RTO: 0 PER 100 WBC
PLATELET # BLD AUTO: 148 K/UL (ref 150–400)
PMV BLD AUTO: 11.9 FL (ref 8.9–12.9)
POTASSIUM SERPL-SCNC: 4.6 MMOL/L (ref 3.5–5.1)
PROT SERPL-MCNC: 8.3 G/DL (ref 6.4–8.2)
RBC # BLD AUTO: 4.06 M/UL (ref 3.8–5.2)
SODIUM SERPL-SCNC: 133 MMOL/L (ref 136–145)
WBC # BLD AUTO: 8.8 K/UL (ref 3.6–11)

## 2021-08-31 PROCEDURE — 96374 THER/PROPH/DIAG INJ IV PUSH: CPT

## 2021-08-31 PROCEDURE — 96361 HYDRATE IV INFUSION ADD-ON: CPT

## 2021-08-31 PROCEDURE — 99284 EMERGENCY DEPT VISIT MOD MDM: CPT

## 2021-08-31 PROCEDURE — 93005 ELECTROCARDIOGRAM TRACING: CPT

## 2021-08-31 PROCEDURE — 85025 COMPLETE CBC W/AUTO DIFF WBC: CPT

## 2021-08-31 PROCEDURE — 73562 X-RAY EXAM OF KNEE 3: CPT

## 2021-08-31 PROCEDURE — 74011250636 HC RX REV CODE- 250/636: Performed by: STUDENT IN AN ORGANIZED HEALTH CARE EDUCATION/TRAINING PROGRAM

## 2021-08-31 PROCEDURE — 71045 X-RAY EXAM CHEST 1 VIEW: CPT

## 2021-08-31 PROCEDURE — 80053 COMPREHEN METABOLIC PANEL: CPT

## 2021-08-31 PROCEDURE — 74011250637 HC RX REV CODE- 250/637: Performed by: STUDENT IN AN ORGANIZED HEALTH CARE EDUCATION/TRAINING PROGRAM

## 2021-08-31 PROCEDURE — 36415 COLL VENOUS BLD VENIPUNCTURE: CPT

## 2021-08-31 RX ORDER — HYDROMORPHONE HYDROCHLORIDE 2 MG/1
2 TABLET ORAL ONCE
Status: COMPLETED | OUTPATIENT
Start: 2021-08-31 | End: 2021-08-31

## 2021-08-31 RX ORDER — HYDROMORPHONE HYDROCHLORIDE 1 MG/ML
0.5 INJECTION, SOLUTION INTRAMUSCULAR; INTRAVENOUS; SUBCUTANEOUS ONCE
Status: COMPLETED | OUTPATIENT
Start: 2021-08-31 | End: 2021-08-31

## 2021-08-31 RX ADMIN — HYDROMORPHONE HYDROCHLORIDE 2 MG: 2 TABLET ORAL at 16:29

## 2021-08-31 RX ADMIN — HYDROMORPHONE HYDROCHLORIDE 0.5 MG: 1 INJECTION, SOLUTION INTRAMUSCULAR; INTRAVENOUS; SUBCUTANEOUS at 18:43

## 2021-08-31 RX ADMIN — SODIUM CHLORIDE 1000 ML: 9 INJECTION, SOLUTION INTRAVENOUS at 17:57

## 2021-08-31 NOTE — ED NOTES
Pt received from triage with c/o RLE pain x 2 months. Pt has a hx of osteoarthritis and osteopenia and recently received cortisone injections in both knees which she states normally helps but did not help this past time. Pt also reports sob when going up a flight of stairs. She is A&Ox4 and resting comfortably in stretcher.

## 2021-09-01 LAB
ATRIAL RATE: 90 BPM
CALCULATED P AXIS, ECG09: 49 DEGREES
CALCULATED R AXIS, ECG10: 44 DEGREES
CALCULATED T AXIS, ECG11: 45 DEGREES
DIAGNOSIS, 93000: NORMAL
P-R INTERVAL, ECG05: 170 MS
Q-T INTERVAL, ECG07: 356 MS
QRS DURATION, ECG06: 64 MS
QTC CALCULATION (BEZET), ECG08: 435 MS
VENTRICULAR RATE, ECG03: 90 BPM

## 2021-09-01 NOTE — ED PROVIDER NOTES
EMERGENCY DEPARTMENT HISTORY AND PHYSICAL EXAM      Date: 8/31/2021  Patient Name: India Klein    History of Presenting Illness     Chief Complaint   Patient presents with    Leg Pain     pt got cortisone shots, which she normally gets in her bilateral knees on 08/18. Her right leg and knee has been hurting her since. She had DVT last year and is on blood thinners. medic had to apply O2 en route for O2 sats of 91%         HPI: India Klein, 78 y.o. female presents to the ED with cc of right leg pain. This has been going on for many months, she says it has been gradually worse over the past 3 weeks. She has seen her orthopedic doctor as well as her primary care doctor regarding this. She sometimes gets cortisone shots in her knee as well from her orthopedic doctor. She is taking tramadol for the pain, but feels like is not helping. The pain is more in the right knee and slightly in the distal thigh. She denies any trauma, no weakness or numbness, no fevers, no redness. She feels like the right knee is more swollen as compared to the left. She denies any chest pain or shortness of breath. There are no other complaints, changes, or physical findings at this time. PCP: Anay Silva MD    No current facility-administered medications on file prior to encounter. Current Outpatient Medications on File Prior to Encounter   Medication Sig Dispense Refill    traMADoL (ULTRAM) 50 mg tablet Take 1 Tablet by mouth every six (6) hours as needed for Pain for up to 7 days. Max Daily Amount: 200 mg. 28 Tablet 0    penicillin v potassium (VEETID) 500 mg tablet TAKE 1 TABLET BY MOUTH FOUR TIMES DAILY (Patient not taking: Reported on 8/18/2021)      sertraline (ZOLOFT) 50 mg tablet TAKE 1 TABLET BY MOUTH EVERY DAY      QUEtiapine (SEROquel) 100 mg tablet Take 100 mg by mouth nightly.  guaifenesin/dextromethorphan (ROBITUSSIN-DM PO) Take  by mouth daily as needed for Cough.       gabapentin (NEURONTIN) 300 mg capsule Take 1 Capsule by mouth three (3) times daily. Max Daily Amount: 900 mg. 15 Capsule 0    albuterol (PROVENTIL HFA, VENTOLIN HFA, PROAIR HFA) 90 mcg/actuation inhaler Take 2 Puffs by inhalation every four (4) hours as needed for Wheezing, Shortness of Breath or Cough. 1 Inhaler 0    simvastatin (ZOCOR) 20 mg tablet TAKE 1 TABLET BY MOUTH EVERY NIGHT 90 Tablet 1    apixaban (Eliquis) 5 mg tablet TAKE 1 TABLET BY MOUTH TWICE DAILY 60 Tablet 5    omeprazole (PRILOSEC) 40 mg capsule TAKE 1 CAPSULE BY MOUTH DAILY 90 Capsule 0    Blood-Glucose Meter monitoring kit Check blood glucose daily. (Patient not taking: Reported on 8/18/2021) 1 Kit 0    glucose blood VI test strips (ASCENSIA AUTODISC VI, ONE TOUCH ULTRA TEST VI) strip Check blood glucose daily. (Patient not taking: Reported on 8/18/2021) 100 Strip 1    lancets misc Check blood glucose daily. (Patient not taking: Reported on 8/18/2021) 1 Each 11    oxybutynin chloride XL (DITROPAN XL) 10 mg CR tablet Take 1 Tablet by mouth daily. 30 Tablet 2    lisinopriL (PRINIVIL, ZESTRIL) 20 mg tablet TAKE 2 TABLETS BY MOUTH DAILY 180 Tablet 1    methocarbamoL (Robaxin-750) 750 mg tablet Take 1 Tablet by mouth four (4) times daily as needed for Muscle Spasm(s). (Patient not taking: Reported on 6/23/2021) 20 Tablet 0    potassium chloride (Klor-Con) 20 mEq pack Take 1 Packet by mouth daily. (Patient not taking: Reported on 8/12/2021) 3 Packet 0    Nystatin, Bulk, 1 billion unit powd 1 Dose by Does Not Apply route two (2) times a day. (Patient not taking: Reported on 6/23/2021) 1 Each 0    lidocaine 4 % patch 2 Patches by TransDERmal route every twelve (12) hours every twelve (12) hours. (Patient not taking: Reported on 6/23/2021) 20 Patch 0    diclofenac (SOLARAZE) 3 % topical gel Apply  to affected area two (2) times a day.  (Patient not taking: Reported on 8/18/2021) 100 g 0    Shower Chair XX nishant Patient will use the shower chair to reduce her risk of falling. Patient has poor mobility due to leg pain. 1 Each 0    Walker misc Patient will use the shower chair to reduce her risk of falling. Patient has poor mobility due to leg pain. 1 Each 0    silver sulfADIAZINE (SILVADENE) 1 % topical cream Apply  to affected area daily. (Patient not taking: Reported on 6/23/2021) 50 g 2    foam bandage (Allevyn Gentle Border Multisit) 6 3/4 X 7 1/16 \" bndg Apply to affected area daily. (Patient not taking: Reported on 6/23/2021) 2 Box 2    bacitracin-polymyxin b (Polysporin) 500-10,000 unit/gram ointment Apply  to affected area two (2) times a day. (Patient not taking: Reported on 6/23/2021) 15 g 0    nystatin (MYCOSTATIN) topical cream Apply  to affected area two (2) times a day. (Patient not taking: Reported on 6/23/2021) 15 g 0    zolpidem (AMBIEN) 10 mg tablet TK 1 T PO HS PRF INSOMNIA (Patient not taking: Reported on 8/12/2021)      furosemide (LASIX) 40 mg tablet Take 40 mg by mouth daily.  traZODone (DESYREL) 50 mg tablet Take 50 mg by mouth daily as needed for Sleep. at bedtime (Patient not taking: Reported on 6/23/2021)      LORazepam (ATIVAN) 1 mg tablet Take 1 mg by mouth two (2) times daily as needed for Anxiety.  naloxone (NARCAN) 4 mg/actuation nasal spray Use 1 spray intranasally, then discard. Repeat with new spray every 2 min as needed for opioid overdose symptoms, alternating nostrils. (Patient not taking: Reported on 6/23/2021) 1 Each 1    hydrocortisone (CORTAID) 0.5 % topical cream Apply  to affected area two (2) times a day. use thin layer (Patient not taking: Reported on 6/23/2021) 30 g 0    QUEtiapine (SEROQUEL) 200 mg tablet TK 1 T PO HS PRF INSOMNIA (Patient not taking: Reported on 8/12/2021)  0    DULoxetine (CYMBALTA) 60 mg capsule Take 1 Cap by mouth daily.  30 Cap 1    potassium chloride SR (KLOR-CON 10) 10 mEq tablet TAKE 2 TABLETS BY MOUTH DAILY (Patient not taking: Reported on 8/18/2021) 180 Tab 1       Past History     Past Medical History:  Past Medical History:   Diagnosis Date    Arthritis     chronic pain related arthritis    Bipolar 1 disorder (HCC)     GERD (gastroesophageal reflux disease)     Hypertension     Other ill-defined conditions(799.89)     hyperlipidemia. Stomach abcess    Psychiatric disorder     panic attacks    Sleep apnea     Thromboembolus ([address]     last year L leg    Unspecified sleep apnea        Past Surgical History:  Past Surgical History:   Procedure Laterality Date    HX CHOLECYSTECTOMY      HX GI      gallbladder removed 2/2010    HX HYSTERECTOMY         Family History:  Family History   Problem Relation Age of Onset    Heart Disease Mother     Diabetes Mother     Arthritis-osteo Mother     High Cholesterol Mother     Heart Attack Sister     Diabetes Sister     Heart Disease Sister     Diabetes Sister     Arthritis-osteo Sister     High Cholesterol Sister     Schizophrenia Paternal Grandmother     Drug Abuse Sister        Social History:  Social History     Tobacco Use    Smoking status: Never Smoker    Smokeless tobacco: Never Used   Vaping Use    Vaping Use: Never used   Substance Use Topics    Alcohol use: No    Drug use: No       Allergies:  No Known Allergies      Review of Systems   no fever  No eye pain  No ear pain  no shortness of breath  no chest pain  no abdominal pain  no dysuria  Reports right leg pain  No rash    Physical Exam   Physical Exam  Constitutional:       Appearance: She is not toxic-appearing. HENT:      Head: Normocephalic. Eyes:      Extraocular Movements: Extraocular movements intact. Cardiovascular:      Rate and Rhythm: Normal rate and regular rhythm. Pulmonary:      Effort: Pulmonary effort is normal.      Breath sounds: Normal breath sounds. Abdominal:      Palpations: Abdomen is soft. Tenderness: There is no abdominal tenderness.    Musculoskeletal:      Comments: Right knee is slightly more swollen as compared to the left, no erythema no warmth of the right knee as compared to the left, she has good range of motion of the knee with discomfort. She has some slight diffuse tenderness over the knee. Her bilateral legs are warm and well-perfused, sensation to light touch intact diffusely   Skin:     General: Skin is warm. Neurological:      General: No focal deficit present. Mental Status: She is alert. Psychiatric:         Mood and Affect: Mood normal.         Diagnostic Study Results     Labs -     Recent Results (from the past 24 hour(s))   CBC WITH AUTOMATED DIFF    Collection Time: 08/31/21  3:30 PM   Result Value Ref Range    WBC 8.8 3.6 - 11.0 K/uL    RBC 4.06 3.80 - 5.20 M/uL    HGB 11.4 (L) 11.5 - 16.0 g/dL    HCT 36.3 35.0 - 47.0 %    MCV 89.4 80.0 - 99.0 FL    MCH 28.1 26.0 - 34.0 PG    MCHC 31.4 30.0 - 36.5 g/dL    RDW 14.6 (H) 11.5 - 14.5 %    PLATELET 661 (L) 560 - 400 K/uL    MPV 11.9 8.9 - 12.9 FL    NRBC 0.0 0  WBC    ABSOLUTE NRBC 0.00 0.00 - 0.01 K/uL    NEUTROPHILS 64 32 - 75 %    LYMPHOCYTES 26 12 - 49 %    MONOCYTES 7 5 - 13 %    EOSINOPHILS 3 0 - 7 %    BASOPHILS 0 0 - 1 %    IMMATURE GRANULOCYTES 0 0.0 - 0.5 %    ABS. NEUTROPHILS 5.6 1.8 - 8.0 K/UL    ABS. LYMPHOCYTES 2.3 0.8 - 3.5 K/UL    ABS. MONOCYTES 0.6 0.0 - 1.0 K/UL    ABS. EOSINOPHILS 0.2 0.0 - 0.4 K/UL    ABS. BASOPHILS 0.0 0.0 - 0.1 K/UL    ABS. IMM.  GRANS. 0.0 0.00 - 0.04 K/UL    DF AUTOMATED     METABOLIC PANEL, COMPREHENSIVE    Collection Time: 08/31/21  3:30 PM   Result Value Ref Range    Sodium 133 (L) 136 - 145 mmol/L    Potassium 4.6 3.5 - 5.1 mmol/L    Chloride 101 97 - 108 mmol/L    CO2 28 21 - 32 mmol/L    Anion gap 4 (L) 5 - 15 mmol/L    Glucose 90 65 - 100 mg/dL    BUN 33 (H) 6 - 20 MG/DL    Creatinine 2.08 (H) 0.55 - 1.02 MG/DL    BUN/Creatinine ratio 16 12 - 20      GFR est AA 29 (L) >60 ml/min/1.73m2    GFR est non-AA 24 (L) >60 ml/min/1.73m2    Calcium 9.7 8.5 - 10.1 MG/DL    Bilirubin, total 0.8 0.2 - 1.0 MG/DL    ALT (SGPT) 20 12 - 78 U/L    AST (SGOT) 19 15 - 37 U/L    Alk. phosphatase 90 45 - 117 U/L    Protein, total 8.3 (H) 6.4 - 8.2 g/dL    Albumin 3.7 3.5 - 5.0 g/dL    Globulin 4.6 (H) 2.0 - 4.0 g/dL    A-G Ratio 0.8 (L) 1.1 - 2.2     EKG, 12 LEAD, INITIAL    Collection Time: 08/31/21  4:30 PM   Result Value Ref Range    Ventricular Rate 90 BPM    Atrial Rate 90 BPM    P-R Interval 170 ms    QRS Duration 64 ms    Q-T Interval 356 ms    QTC Calculation (Bezet) 435 ms    Calculated P Axis 49 degrees    Calculated R Axis 44 degrees    Calculated T Axis 45 degrees    Diagnosis       Normal sinus rhythm  Cannot rule out Anterior infarct , age undetermined  When compared with ECG of 10-AUG-2021 18:17,  No significant change was found         Radiologic Studies -   XR KNEE RT 3 V   Final Result   Severe osteoarthritis. XR CHEST SNGL V   Final Result   Partially limited radiograph secondary to patient body habitus and portable   technique. No evidence of acute process. CT Results  (Last 48 hours)    None        CXR Results  (Last 48 hours)               08/31/21 1557  XR CHEST SNGL V Final result    Impression:  Partially limited radiograph secondary to patient body habitus and portable   technique. No evidence of acute process. Narrative:  EXAM: XR CHEST SNGL V       HISTORY: Chest Pain. COMPARISON: 8/10/2021       FINDINGS: Single view(s) of the chest. The image is limited secondary to   underpenetration from patient body habitus and portable technique. This is   particularly true in the left lower lobe. The lungs are well inflated. No focal   consolidation, pleural effusion, or pneumothorax. The cardiomediastinal   silhouette is unremarkable. The visualized osseous structures are unremarkable. Medical Decision Making   I am the first provider for this patient.     I reviewed the vital signs, available nursing notes, past medical history, past surgical history, family history and social history. Vital Signs-Reviewed the patient's vital signs. Patient Vitals for the past 24 hrs:   Temp Pulse Resp BP SpO2   08/31/21 1844    123/89 95 %   08/31/21 1803    (!) 126/97 100 %   08/31/21 1630     93 %   08/31/21 1338 98.1 °F (36.7 °C) 95 16 (!) 102/54 95 %         Provider Notes (Medical Decision Making):   50-year-old female presenting with acute on chronic right knee pain. She is known to have a Baker's cyst, also known to have arthritis. Symptoms are consistent with exacerbation of this chronic pain. She denies any new injuries, however will obtain x-ray to assess for any acute osseous abnormality. No signs of infection on exam, she is neurovascularly intact. Has had recent ultrasound ruling out DVT. She otherwise denies any complaints. She will be given Dilaudid p.o. ED Course:     Initial assessment performed. The patients presenting problems have been discussed, and they are in agreement with the care plan formulated and outlined with them. I have encouraged them to ask questions as they arise throughout their visit. CBC negative for leukocytosis, shows creatinine elevated at 2.08, this is elevated above her recent baseline. She will be given IV fluids. After p.o. Dilaudid, she says she feels somewhat improved but still has pain, she is given 0.5 mg Dilaudid, and states that she feels improved. She is able to stand up and ambulate with assistance. Patient is counseled on supportive care and return precautions. Will return to the ED for any worsening pain, redness, swelling, fevers, or any new or worrisome symptoms. Will followup with pain specialist, orthopedic doctor, primary care doctor within 5 days. Critical Care Time:         Disposition:  Home    PLAN:  1. Discharge Medication List as of 8/31/2021  6:56 PM        2.    Follow-up Information     Follow up With Specialties Details Why Contact Info    Denia Posey MD Family Medicine Call in 2 days  [address] [facility]  [phone]      [facility] EMERGENCY DEPT Emergency Medicine  As needed, If symptoms worsen [address] [place] [address]  [phone]        Return to ED if worse     Diagnosis     Clinical Impression: Acute on chronic right knee pain

## 2021-09-08 ENCOUNTER — TELEPHONE (OUTPATIENT)
Dept: INTERNAL MEDICINE CLINIC | Age: 66
End: 2021-09-08

## 2021-09-08 NOTE — TELEPHONE ENCOUNTER
----- Message from Jud Martinez sent at 9/8/2021 10:51 AM EDT -----  Regarding: /TELEPHONE  Contact: [phone]  General Message/Vendor Calls    Caller's first and last name:[facility])      Reason for call:TRYING TO GET DR. BHATT TO SIGN DEATH CERTIFICATE THAT'S BEEN IN THE EDR SYSTEM      Callback required yes/no and why:YES      Best contact number(s):[phone]      Details to clarify the request:PT dod:09/02/2021

## 2021-11-16 NOTE — TELEPHONE ENCOUNTER
Xiomara has been waiting for 5 days for this script to be sent in or at least a call to let them know it won't be filled.
(4) rarely moist

## 2022-03-18 PROBLEM — B36.9 CUTANEOUS FUNGAL INFECTION: Status: ACTIVE | Noted: 2020-09-04

## 2022-03-19 PROBLEM — N17.9 ACUTE RENAL FAILURE (ARF) (HCC): Status: ACTIVE | Noted: 2020-09-08

## 2022-03-19 PROBLEM — R09.02 HYPOXIA: Status: ACTIVE | Noted: 2020-09-08

## 2022-03-19 PROBLEM — L97.122 NON-PRESSURE CHRONIC ULCER OF LEFT THIGH WITH FAT LAYER EXPOSED (HCC): Status: ACTIVE | Noted: 2020-08-07

## 2022-03-19 PROBLEM — L97.112 NON-PRESSURE CHRONIC ULCER OF RIGHT THIGH WITH FAT LAYER EXPOSED (HCC): Status: ACTIVE | Noted: 2020-08-07

## 2022-03-19 PROBLEM — E66.01 OBESITY, MORBID (HCC): Status: ACTIVE | Noted: 2017-12-19

## 2023-05-03 NOTE — DISCHARGE INSTRUCTIONS
Thank you!     Thank you for allowing us to provide you with excellent care today. We hope we addressed all of your concerns and needs. We strive to provide excellent quality care in the Emergency Department. You will receive a survey after your visit to evaluate the care you were provided.      Please rate us a level 5 (excellent), as anything less than excellent does not meet our goals.      If you feel that you have not received excellent quality care or timely care, please ask to speak to the nurse manager. Please choose us in the future for your continued health care needs. ______________________________________________________________________    The exam and treatment you received in the Emergency Department were for an urgent problem and are not intended as complete care. It is important that you follow-up with a doctor, nurse practitioner, or physician assistant to:  (1) confirm your diagnosis,  (2) re-evaluation of changes in your illness and treatment, and  (3) for ongoing care. If your symptoms become worse or you do not improve as expected and you are unable to reach your usual health care provider, you should return to the Emergency Department. We are available 24 hours a day. Take this sheet with you when you go to your follow-up visit. If you have any problem arranging the follow-up visit, contact [address] [place] [address] 21 [phone])    Make an appointment with your Primary Care doctor for follow up of this visit. Return to the ER if you are unable to be seen in the time recommended on your discharge instructions. Constipation: Care Instructions  Your Care Instructions    Constipation means that you have a hard time passing stools (bowel movements). People pass stools from 3 times a day to once every 3 days. What is normal for you may be different. Constipation may occur with pain in the rectum and cramping. The pain may get worse when you try to pass stools.  Sometimes there are small amounts of bright Women and Heart Disease: Understanding the Risks  Risk factors are habits and conditions that make heart disease more likely. The more you have, the higher your chances of heart attack (also known as acute myocardial infarction, or AMI) and other problems. You can manage most risk factors to help make your heart healthier. Below are many of the major factors that increase your risk for having heart disease. Smoking  This is the most important risk factor you can change. Smoking causes inflammation and damages the smooth muscle that lines the arteries. This makes them less flexible. It also raises your blood pressure, causing further damage to the artery lining. Smoking also increased your risk that your blood may clot, block an artery, and lead to a heart attack or stroke. Smoking also damages your lungs, which can affect the delivery of oxygen to the body. Research shows that smoking makes women up to 6 times more likely to have a heart attack. It's also important to avoid secondhand smoke (smoke from other peopleâs tobacco products). If you smoke, it's never too late to protect your heart. Ask your doctor about nicotine replacement products and other medicines, and smoking cessation counseling. Diabetes  Diabetes causes high blood sugar, which can damage blood vessels if not kept under control. Having diabetes also makes you more likely to have a silent heart attack--one without any symptoms. This occurs because long periods of high sugar levels in your blood eventually degrade nerve conduction which reduces your sensation. This translates to decreased chest pain than would be felt in a person without diabetes during a heart attack. Youâre at risk if your fasting blood sugar level is above 100 mg/dL. High cholesterol  Cholesterol is a fat-like substance in your blood. It can build up along the artery walls. This is called plaque.  Over time, plaque narrows the arteries and reduces blood flow to your heart "or brain. If a blood clot forms or a piece of the plaque breaks off, it can completely block the artery and cause a heart attack or stroke. Your risk of heart disease goes up if you have high levels of LDL (""bad\"") cholesterol or triglycerides (another fatty substance that can build up). Rudell Loss also at risk if you have low HDL (\""good\""), cholesterol . HDL helps clear the bad cholesterol away. Youâre at risk if you have:   HDL cholesterol 50 mg/dL or lower  LDL cholesterol 100 mg/dL or higher  Triglycerides of  150 mg/dL or higher   . High blood pressure  High blood pressure occurs when blood pushes too hard against artery walls. This causes damage to the artery walls and then the formation of scar tissue as it heals. This makes the arteries stiff and weak. Plaque sticks to the scarred tissue narrowing and hardening the arteries. High blood pressure also causes your heart to work harder to get blood out to the body. High blood pressure raises your risk of heart attack and stroke. The brain tissue is especially sensitive to high blood pressure damage. Youâre at risk if your blood pressure is 120/80 mmHg or higher. Excess weight  Extra weight makes your heart work harder. This raises your risk for a heart attack. Being overweight also puts you at risk of developing diabetes and high blood pressure. Extra weight around the waist or stomach increases your risk the most even if you are not obese. Being obese puts you at risk of developing heart disease. Lack of exercise  Without regular exercise, youâre more likely to develop other risk factors, such as overweight and diabetes. High blood pressure and unhealthy lipid levels are also more likely. Exercise helps good blood flow and makes sure your heart is able to meet the demands placed on it. Negative emotions  Emotions such as stress and pent-up anger have been linked to heart disease. Over time, these emotions could raise your heart disease risk.  If you have heart " red blood on toilet paper or the surface of stools. This is because of enlarged veins near the rectum (hemorrhoids). A few changes in your diet and lifestyle may help you avoid ongoing constipation. Your doctor may also prescribe medicine to help loosen your stool. Some medicines can cause constipation. These include pain medicines and antidepressants. Tell your doctor about all the medicines you take. Your doctor may want to make a medicine change to ease your symptoms. Follow-up care is a key part of your treatment and safety. Be sure to make and go to all appointments, and call your doctor if you are having problems. It's also a good idea to know your test results and keep a list of the medicines you take. How can you care for yourself at home? · Drink plenty of fluids, enough so that your urine is light yellow or clear like water. If you have kidney, heart, or liver disease and have to limit fluids, talk with your doctor before you increase the amount of fluids you drink. · Include high-fiber foods in your diet each day. These include fruits, vegetables, beans, and whole grains. · Get at least 30 minutes of exercise on most days of the week. Walking is a good choice. You also may want to do other activities, such as running, swimming, cycling, or playing tennis or team sports. · Take a fiber supplement, such as Citrucel or Metamucil, every day. Read and follow all instructions on the label. · Schedule time each day for a bowel movement. A daily routine may help. Take your time having your bowel movement. · Support your feet with a small step stool when you sit on the toilet. This helps flex your hips and places your pelvis in a squatting position. · Your doctor may recommend an over-the-counter laxative to relieve your constipation. Examples are Milk of Magnesia and MiraLax. Read and follow all instructions on the label. Do not use laxatives on a long-term basis. When should you call for help?   Call disease, emotion such as anxiety and depression can make it worse. Managing these emotions is important as they have been shown to reduce hormones that increase stress on the heart in the long run. Metabolic syndrome  This is caused by a combination of certain risk factors. It puts you at extra high risk for heart disease, stroke, and diabetes. You have metabolic syndrome if you have 3 or more of the following: low HDL cholesterol, high triglycerides, high blood pressure, high blood sugar, and extra weight around the waist.   Risks you canât control  A few risk factors canât be changed. But they still raise your heart disease risk. Family history. If your mother or sister had heart trouble before age 72 or your father or brother before age 54, youâre at higher risk of having a heart attack. Age. The older you are, the higher your heart disease risk. Gender. Post-menopause is another risk factor women. So is early onset of menstruation. For more information  www.smokefree.gov/odct-pc-kn-expert  www.women. smokefree. [facility] Smoking Quitline: 877-44U-QUIT ([phone] last reviewed this educational content on 12/1/2019  Â© 7134-9144 The [address] [facility], [facility] [address] All rights reserved. This information is not intended as a substitute for professional medical care. Always follow your healthcare professional's instructions. your doctor now or seek immediate medical care if:  ? · You have new or worse belly pain. ? · You have new or worse nausea or vomiting. ? · You have blood in your stools. ? Watch closely for changes in your health, and be sure to contact your doctor if:  ? · Your constipation is getting worse. ? · You do not get better as expected. Where can you learn more? Go to http://teddy-merlin.info/. Enter 21 [phone] in the search box to learn more about \"Constipation: Care Instructions. \"  Current as of: March 20, 2017  Content Version: 11.4  © 2928-9886 [employer]. Care instructions adapted under license by [employer] (which disclaims liability or warranty for this information). If you have questions about a medical condition or this instruction, always ask your healthcare professional. [address] any warranty or liability for your use of this information.

## 2023-05-10 RX ORDER — FUROSEMIDE 40 MG/1
40 TABLET ORAL DAILY
COMMUNITY

## 2023-05-10 RX ORDER — SILICONES/ADHESIVE TAPE
COMBINATION PACKAGE (EA) TOPICAL 2 TIMES DAILY
COMMUNITY
Start: 2020-11-09

## 2023-05-10 RX ORDER — POTASSIUM CHLORIDE 750 MG/1
2 TABLET, FILM COATED, EXTENDED RELEASE ORAL DAILY
COMMUNITY
Start: 2019-01-16

## 2023-05-10 RX ORDER — PENICILLIN V POTASSIUM 500 MG/1
1 TABLET ORAL 4 TIMES DAILY
COMMUNITY
Start: 2021-07-05

## 2023-05-10 RX ORDER — QUETIAPINE FUMARATE 200 MG/1
TABLET, FILM COATED ORAL
COMMUNITY
Start: 2019-10-09

## 2023-05-10 RX ORDER — TRAZODONE HYDROCHLORIDE 50 MG/1
50 TABLET ORAL DAILY PRN
COMMUNITY

## 2023-05-10 RX ORDER — LORAZEPAM 1 MG/1
1 TABLET ORAL 2 TIMES DAILY PRN
COMMUNITY

## 2023-05-10 RX ORDER — LISINOPRIL 20 MG/1
2 TABLET ORAL DAILY
COMMUNITY
Start: 2021-06-08

## 2023-05-10 RX ORDER — POTASSIUM CHLORIDE 1.5 G/1.77G
20 POWDER, FOR SOLUTION ORAL DAILY
COMMUNITY
Start: 2021-05-28

## 2023-05-10 RX ORDER — DICLOFENAC SODIUM 30 MG/G
GEL TOPICAL 2 TIMES DAILY
COMMUNITY
Start: 2021-01-24

## 2023-05-10 RX ORDER — OMEPRAZOLE 40 MG/1
1 CAPSULE, DELAYED RELEASE ORAL DAILY
COMMUNITY
Start: 2021-07-28

## 2023-05-10 RX ORDER — QUETIAPINE FUMARATE 100 MG/1
100 TABLET, FILM COATED ORAL NIGHTLY
COMMUNITY
Start: 2021-06-15

## 2023-05-10 RX ORDER — METHOCARBAMOL 750 MG/1
750 TABLET, FILM COATED ORAL 4 TIMES DAILY PRN
COMMUNITY
Start: 2021-05-28

## 2023-05-10 RX ORDER — NYSTATIN 100000 U/G
CREAM TOPICAL 2 TIMES DAILY
COMMUNITY
Start: 2020-11-09

## 2023-05-10 RX ORDER — ZOLPIDEM TARTRATE 10 MG/1
TABLET ORAL
COMMUNITY
Start: 2020-10-31

## 2023-05-10 RX ORDER — GABAPENTIN 300 MG/1
300 CAPSULE ORAL 3 TIMES DAILY
COMMUNITY
Start: 2021-08-12

## 2023-05-10 RX ORDER — OXYBUTYNIN CHLORIDE 10 MG/1
10 TABLET, EXTENDED RELEASE ORAL DAILY
COMMUNITY
Start: 2021-06-23

## 2023-05-10 RX ORDER — DIAPER,BRIEF,INFANT-TODD,DISP
EACH MISCELLANEOUS 2 TIMES DAILY
COMMUNITY
Start: 2020-07-17

## 2023-05-10 RX ORDER — ALBUTEROL SULFATE 90 UG/1
2 AEROSOL, METERED RESPIRATORY (INHALATION) EVERY 4 HOURS PRN
COMMUNITY
Start: 2021-08-10

## 2023-05-10 RX ORDER — LIDOCAINE 4 G/G
2 PATCH TOPICAL EVERY 12 HOURS
COMMUNITY
Start: 2021-01-24

## 2023-05-10 RX ORDER — DULOXETIN HYDROCHLORIDE 60 MG/1
60 CAPSULE, DELAYED RELEASE ORAL DAILY
COMMUNITY
Start: 2019-03-06

## 2023-05-10 RX ORDER — NALOXONE HYDROCHLORIDE 4 MG/.1ML
SPRAY NASAL
COMMUNITY
Start: 2020-09-04

## 2023-05-10 RX ORDER — SIMVASTATIN 20 MG
1 TABLET ORAL NIGHTLY
COMMUNITY
Start: 2021-08-03

## 2023-05-10 RX ORDER — LANCETS 30 GAUGE
EACH MISCELLANEOUS
COMMUNITY
Start: 2021-06-23

## 2023-05-11 NOTE — ED PROVIDER NOTES
EMERGENCY DEPARTMENT HISTORY AND PHYSICAL EXAM      Date: 1/1/2021  Patient Name: Ha Landry    History of Presenting Illness     Chief Complaint   Patient presents with    Wound Check     Pt comes to ED for pain and increase drainage of chronic wound to right inner thigh. pain 10/10         HPI: Ha Landry, 76 y.o. female presents to the ED with cc of painful lesion to her right posterior inner thigh. She states that at the site she previously had a similar wound for which she received wound care several months ago. It was improving significantly but over the last month or so it is worsened. It is now extremely painful. She has not followed up with her wound care specialist and is unsure whether she should be using ointments, creams, salves, or other. She states that the site is very painful but otherwise has no complaints. There are no other complaints, changes, or physical findings at this time. PCP: Michelle Yang MD    No current facility-administered medications on file prior to encounter. Current Outpatient Medications on File Prior to Encounter   Medication Sig Dispense Refill    apixaban (Eliquis) 5 mg tablet Take 1 Tab by mouth two (2) times a day for 30 days. 60 Tab 0    omeprazole (PRILOSEC) 40 mg capsule TAKE 1 CAPSULE BY MOUTH DAILY 90 Cap 0    foam bandage (Allevyn Gentle Border Multisit) 6 3/4 X 7 1/16 \" bndg Apply to affected area daily. 2 Box 2    zolpidem (AMBIEN) 10 mg tablet TK 1 T PO HS PRF INSOMNIA      lisinopriL (PRINIVIL, ZESTRIL) 20 mg tablet Take 2 tablets daily. 180 Tab 1    furosemide (LASIX) 40 mg tablet Take 40 mg by mouth daily.  LORazepam (ATIVAN) 1 mg tablet Take 1 mg by mouth two (2) times daily as needed for Anxiety.  dicyclomine (BENTYL) 10 mg capsule Take 1 Cap by mouth three (3) times daily.  90 Cap 1    simvastatin (ZOCOR) 20 mg tablet TAKE 1 TABLET BY MOUTH EVERY NIGHT 90 Tab 1    DULoxetine (CYMBALTA) 60 mg capsule Take 1 Patient states that the [facility] does have his prescription , issue resolved    Cap by mouth daily. 30 Cap 1    potassium chloride SR (KLOR-CON 10) 10 mEq tablet TAKE 2 TABLETS BY MOUTH DAILY 180 Tab 1    Shower Chair XX nishant Patient will use the shower chair to reduce her risk of falling. Patient has poor mobility due to leg pain. 1 Each 0    Walker misc Patient will use the shower chair to reduce her risk of falling. Patient has poor mobility due to leg pain. 1 Each 0    dicyclomine (BENTYL) 10 mg capsule Take 1 Cap by mouth three (3) times daily. 90 Cap 2    silver sulfADIAZINE (SILVADENE) 1 % topical cream Apply  to affected area daily. 50 g 2    bacitracin-polymyxin b (Polysporin) 500-10,000 unit/gram ointment Apply  to affected area two (2) times a day. 15 g 0    nystatin (MYCOSTATIN) topical cream Apply  to affected area two (2) times a day. 15 g 0    Nystatin, Bulk, 1 billion unit powd 1 Dose by Does Not Apply route two (2) times a day. 1 Each 0    traZODone (DESYREL) 50 mg tablet Take 50 mg by mouth daily as needed for Sleep. at bedtime      naloxone [facility]) 4 mg/actuation nasal spray Use 1 spray intranasally, then discard. Repeat with new spray every 2 min as needed for opioid overdose symptoms, alternating nostrils. 1 Each 1    hydrocortisone (CORTAID) 0.5 % topical cream Apply  to affected area two (2) times a day. use thin layer (Patient not taking: Reported on 11/6/2020) 30 g 0    QUEtiapine (SEROQUEL) 200 mg tablet TK 1 T PO HS PRF INSOMNIA  0       Past History     Past Medical History:  Past Medical History:   Diagnosis Date    Arthritis     chronic pain related arthritis    Bipolar 1 disorder (HCC)     GERD (gastroesophageal reflux disease)     Hypertension     Other ill-defined conditions(799.89)     hyperlipidemia.  Stomach abcess    Psychiatric disorder     panic attacks    Sleep apnea     Thromboembolus ([address]     last year L leg    Unspecified sleep apnea        Past Surgical History:  Past Surgical History:   Procedure Laterality Date    HX CHOLECYSTECTOMY      HX GI      gallbladder removed 2/2010    HX HYSTERECTOMY         Family History:  Family History   Problem Relation Age of Onset    Heart Disease Mother     Diabetes Mother     Arthritis-osteo Mother     High Cholesterol Mother     Heart Attack Sister     Diabetes Sister     Heart Disease Sister     Diabetes Sister     Arthritis-osteo Sister     High Cholesterol Sister     Schizophrenia Paternal Grandmother     Drug Abuse Sister        Social History:  Social History     Tobacco Use    Smoking status: Never Smoker    Smokeless tobacco: Never Used   Substance Use Topics    Alcohol use: No    Drug use: No       Allergies:  No Known Allergies      Review of Systems   Review of Systems   Constitutional: Negative for chills and fever. HENT: Negative for rhinorrhea. Eyes: Negative for redness. Respiratory: Negative for shortness of breath. Cardiovascular: Negative for chest pain. Gastrointestinal: Negative for abdominal pain. Genitourinary: Negative for dysuria. Musculoskeletal: Negative for back pain. Skin: Positive for wound. Neurological: Negative for syncope. Psychiatric/Behavioral: The patient is not nervous/anxious. All other systems reviewed and are negative. Physical Exam   Physical Exam  Vitals signs and nursing note reviewed. Constitutional:       Appearance: Normal appearance. HENT:      Head: Normocephalic and atraumatic. Mouth/Throat:      Mouth: Mucous membranes are moist.   Eyes:      Extraocular Movements: Extraocular movements intact. Neck:      Musculoskeletal: Neck supple. Cardiovascular:      Rate and Rhythm: Normal rate and regular rhythm. Pulses: Normal pulses. Pulmonary:      Effort: Pulmonary effort is normal.      Breath sounds: Normal breath sounds. Abdominal:      Palpations: Abdomen is soft. Tenderness: There is no abdominal tenderness. Musculoskeletal:         General: No deformity.    Skin: General: Skin is warm and dry. Neurological:      General: No focal deficit present. Mental Status: She is alert. Psychiatric:         Mood and Affect: Mood normal.         Behavior: Behavior normal.         Diagnostic Study Results     Labs -   No results found for this or any previous visit (from the past 24 hour(s)). Radiologic Studies -   No orders to display     CT Results  (Last 48 hours)    None        CXR Results  (Last 48 hours)    None            Medical Decision Making   I am the first provider for this patient. I reviewed the vital signs, available nursing notes, past medical history, past surgical history, family history and social history. Vital Signs-Reviewed the patient's vital signs. Patient Vitals for the past 24 hrs:   Temp Pulse Resp BP SpO2   01/01/21 1615  90  (!) 150/73 96 %   01/01/21 1457 98.1 °F (36.7 °C) 98 16 (!) 157/96 96 %         Provider Notes (Medical Decision Making):   Very pleasant, well-appearing 79-year-old presenting to ED with painful lesion to her right posterior medial thigh. Had a previous lesion at the site that was better but now it is painful again. Unclear etiology for this though it looks almost like a pressure ulcer, blisterlike in appearance, without signs of cellulitis or abscess. Otherwise, will patient looks well and feels well. It is not entirely clear to me why she has this, may be associated with previous lesion at the site, but looks mostly like a pressure posterior. Because it has been there for about a month, I think she is safe for discharge with plan to follow-up with her wound care specialist.  Plan for antibiotics and pain control until then. She was given very close return precautions. ED Course:     Initial assessment performed. The patients presenting problems have been discussed, and they are in agreement with the care plan formulated and outlined with them.   I have encouraged them to ask questions as they arise throughout their visit. Disposition:  dc  PLAN:  1. Discharge Medication List as of 1/1/2021  4:53 PM      START taking these medications    Details   trimethoprim-sulfamethoxazole (Bactrim DS) 160-800 mg per tablet Take 1 Tab by mouth two (2) times a day for 10 days. , Normal, Disp-20 Tab, R-0      HYDROcodone-acetaminophen (Norco) 5-325 mg per tablet Take 1 Tab by mouth every six (6) hours as needed for Pain for up to 3 days. Max Daily Amount: 4 Tabs., Normal, Disp-10 Tab, R-0         CONTINUE these medications which have NOT CHANGED    Details   apixaban (Eliquis) 5 mg tablet Take 1 Tab by mouth two (2) times a day for 30 days. , Normal, Disp-60 Tab, R-0      omeprazole (PRILOSEC) 40 mg capsule TAKE 1 CAPSULE BY MOUTH DAILY, Normal, Disp-90 Cap,R-0      foam bandage (Allevyn Gentle Border Multisit) 6 3/4 X 7 1/16 \" bndg Apply to affected area daily. , Normal, Disp-2 Box,R-2      zolpidem (AMBIEN) 10 mg tablet TK 1 T PO HS PRF INSOMNIA, Historical Med      lisinopriL (PRINIVIL, ZESTRIL) 20 mg tablet Take 2 tablets daily. , Normal, Disp-180 Tab,R-1      furosemide (LASIX) 40 mg tablet Take 40 mg by mouth daily. , Historical Med      LORazepam (ATIVAN) 1 mg tablet Take 1 mg by mouth two (2) times daily as needed for Anxiety. , Historical Med      !! dicyclomine (BENTYL) 10 mg capsule Take 1 Cap by mouth three (3) times daily. , Normal, Disp-90 Cap,R-1      simvastatin (ZOCOR) 20 mg tablet TAKE 1 TABLET BY MOUTH EVERY NIGHT, Normal, Disp-90 Tab,R-1      DULoxetine (CYMBALTA) 60 mg capsule Take 1 Cap by mouth daily. , Normal, Disp-30 Cap, R-1      potassium chloride SR (KLOR-CON 10) 10 mEq tablet TAKE 2 TABLETS BY MOUTH DAILY, Normal, Disp-180 Tab, R-1      Shower Chair XX nishant Patient will use the shower chair to reduce her risk of falling. Patient has poor mobility due to leg pain. , Print, Disp-1 Each,R-0      Walker misc Patient will use the shower chair to reduce her risk of falling.  Patient has poor mobility due to leg pain., Normal, Disp-1 Each,R-0      !! dicyclomine (BENTYL) 10 mg capsule Take 1 Cap by mouth three (3) times daily. , Normal, Disp-90 Cap,R-2      silver sulfADIAZINE (SILVADENE) 1 % topical cream Apply  to affected area daily. , Normal, Disp-50 g,R-2      bacitracin-polymyxin b (Polysporin) 500-10,000 unit/gram ointment Apply  to affected area two (2) times a day., Normal, Disp-15 g,R-0      nystatin (MYCOSTATIN) topical cream Apply  to affected area two (2) times a day., Normal, Disp-15 g,R-0      Nystatin, Bulk, 1 billion unit powd 1 Dose by Does Not Apply route two (2) times a day., Normal, Disp-1 Each,R-0      traZODone (DESYREL) 50 mg tablet Take 50 mg by mouth daily as needed for Sleep. at bedtime, Historical Med      naloxone (NARCAN) 4 mg/actuation nasal spray Use 1 spray intranasally, then discard. Repeat with new spray every 2 min as needed for opioid overdose symptoms, alternating nostrils. , Normal, Disp-1 Each,R-1      hydrocortisone (CORTAID) 0.5 % topical cream Apply  to affected area two (2) times a day. use thin layer, Normal, Disp-30 g,R-0      QUEtiapine (SEROQUEL) 200 mg tablet TK 1 T PO HS PRF INSOMNIA, Historical Med, R-0       !! - Potential duplicate medications found. Please discuss with provider.         2.   Follow-up Information    None       Return to ED if worse     Diagnosis     Clinical Impression: wound
